# Patient Record
Sex: MALE | Race: WHITE | NOT HISPANIC OR LATINO | Employment: OTHER | URBAN - METROPOLITAN AREA
[De-identification: names, ages, dates, MRNs, and addresses within clinical notes are randomized per-mention and may not be internally consistent; named-entity substitution may affect disease eponyms.]

---

## 2017-04-06 ENCOUNTER — OFFICE VISIT (OUTPATIENT)
Dept: FAMILY MEDICINE CLINIC | Facility: CLINIC | Age: 77
End: 2017-04-06

## 2017-04-06 VITALS
HEIGHT: 68 IN | OXYGEN SATURATION: 98 % | TEMPERATURE: 97.8 F | BODY MASS INDEX: 28.48 KG/M2 | RESPIRATION RATE: 16 BRPM | HEART RATE: 58 BPM | SYSTOLIC BLOOD PRESSURE: 128 MMHG | DIASTOLIC BLOOD PRESSURE: 80 MMHG | WEIGHT: 187.9 LBS

## 2017-04-06 DIAGNOSIS — J06.9 VIRAL URI: Primary | ICD-10-CM

## 2017-04-06 PROCEDURE — 99213 OFFICE O/P EST LOW 20 MIN: CPT | Performed by: FAMILY MEDICINE

## 2017-04-06 NOTE — PROGRESS NOTES
"Subjective   Darwin Sparks is a 76 y.o. male.     Chief Complaint   Patient presents with   • Headache   • Cough   • Sinus Problem        History of Present Illness    4 days of cold symptoms.  Sinus drainage.  Sore throat.  Some bronchial tube irritation with cough.  No shortness of breath.  No high fever.  No myalgias.  Mild to moderate symptoms.  No shortness of breath with exertion.      The following portions of the patient's history were reviewed and updated as appropriate: allergies, current medications, past family history, past medical history, past social history, past surgical history and problem list.          Review of Systems   Constitutional: Negative for fatigue and fever.   HENT: Positive for congestion, postnasal drip, sore throat and voice change.    Respiratory: Positive for cough.    Gastrointestinal: Negative.    Skin: Negative for rash.       Objective   Blood pressure 128/80, pulse 58, temperature 97.8 °F (36.6 °C), temperature source Oral, resp. rate 16, height 68\" (172.7 cm), weight 187 lb 14.4 oz (85.2 kg), SpO2 98 %.  Physical Exam   Constitutional: No distress.   No acute distress.  Nontoxic.   HENT:   Right Ear: Tympanic membrane, external ear and ear canal normal.   Left Ear: Tympanic membrane, external ear and ear canal normal.   Nose: Nose normal.   Mouth/Throat: Oropharynx is clear and moist. No oropharyngeal exudate.   Eyes: Conjunctivae are normal. Right eye exhibits no discharge. Left eye exhibits no discharge. No scleral icterus.   Cardiovascular: Normal rate.    Pulmonary/Chest: Effort normal and breath sounds normal. No stridor. No respiratory distress. He has no wheezes. He has no rales.   No tachypnea   Lymphadenopathy:     He has no cervical adenopathy.   Skin: No rash noted.   Nursing note and vitals reviewed.      Assessment/Plan   Darwin was seen today for headache, cough and sinus problem.    Diagnoses and all orders for this visit:    Viral URI    Viral URI.  Recommend " observation.  At this time no further treatment needed.  However if he develops a fever, worsening cough, or other concerns seek medical attention - he has potentially at higher risk for pneumonia.  However did receive a recent pneumonia vaccination..  I also prescribed an antibiotic prescription, handwritten, Augmentin 875 mg twice a day #14.  Take this with fever and seek medical attention immediately.

## 2017-04-18 ENCOUNTER — OFFICE VISIT (OUTPATIENT)
Dept: FAMILY MEDICINE CLINIC | Facility: CLINIC | Age: 77
End: 2017-04-18

## 2017-04-18 VITALS
RESPIRATION RATE: 20 BRPM | SYSTOLIC BLOOD PRESSURE: 120 MMHG | TEMPERATURE: 97.7 F | WEIGHT: 183.7 LBS | HEIGHT: 68 IN | HEART RATE: 59 BPM | OXYGEN SATURATION: 97 % | BODY MASS INDEX: 27.84 KG/M2 | DIASTOLIC BLOOD PRESSURE: 68 MMHG

## 2017-04-18 DIAGNOSIS — E03.9 ADULT HYPOTHYROIDISM: ICD-10-CM

## 2017-04-18 DIAGNOSIS — Z00.00 HEALTH CARE MAINTENANCE: ICD-10-CM

## 2017-04-18 DIAGNOSIS — E78.00 PURE HYPERCHOLESTEROLEMIA: ICD-10-CM

## 2017-04-18 DIAGNOSIS — F41.9 ANXIETY: ICD-10-CM

## 2017-04-18 DIAGNOSIS — I10 ESSENTIAL HYPERTENSION: Primary | ICD-10-CM

## 2017-04-18 PROCEDURE — 99214 OFFICE O/P EST MOD 30 MIN: CPT | Performed by: INTERNAL MEDICINE

## 2017-04-18 NOTE — PROGRESS NOTES
"Subjective   Patient ID: Darwin Sparks is a 76 y.o. male presents with   Chief Complaint   Patient presents with   • Hypertension     f/u       HPI - this patient presents today for follow-up of hypertension, hypercholesterolemia, hypothyroidism chronic back pain anxiety.  Aside from his chronic back pain he feels like he's doing pretty well on his [place] diet.  He needs labs drawn today and he would like a potassium capsule instead of potassium by mouth.  It's easier to swallow.    Assessment plan    Hypertension controlled with atenolol and Maxide    Hypothyroidism levothyroxin we'll check thyroid panel    Hypercholesterolemia simvastatin 40 and fasting lipid profile    Anxiety when necessary clonazepam this medicine helps with his ability to sleep he has no adverse effects that helps with his quality of life and he has adherent to the regimen.    Allergies   Allergen Reactions   • Allantoin-Pramoxine    • Pregabalin      Other reaction(s): Visual Disturbance       The following portions of the patient's history were reviewed and updated as appropriate: allergies, current medications, past family history, past medical history, past social history, past surgical history and problem list.      Review of Systems   Constitutional: Negative.    HENT: Negative.    Respiratory: Negative.    Cardiovascular: Negative.    Gastrointestinal: Negative.    Musculoskeletal: Positive for back pain.   Allergic/Immunologic: Negative.    Neurological: Negative.    Hematological: Negative.    Psychiatric/Behavioral: Negative.        Objective     Vitals:    04/18/17 1308   BP: 120/68   Pulse: 59   Resp: 20   Temp: 97.7 °F (36.5 °C)   TempSrc: Oral   SpO2: 97%   Weight: 183 lb 11.2 oz (83.3 kg)   Height: 68\" (172.7 cm)         Physical Exam   Constitutional: He is oriented to person, place, and time. He appears well-developed and well-nourished.   HENT:   Head: Normocephalic and atraumatic.   Eyes: EOM are normal. Pupils are equal, " round, and reactive to light.   Cardiovascular: Normal rate, regular rhythm and normal heart sounds.    Pulmonary/Chest: Effort normal and breath sounds normal.   Musculoskeletal: He exhibits no tenderness.   Neurological: He is alert and oriented to person, place, and time.   Psychiatric: He has a normal mood and affect. His behavior is normal.   Nursing note and vitals reviewed.        Darwin was seen today for hypertension.    Diagnoses and all orders for this visit:    Essential hypertension  -     Lipid Panel  -     Comprehensive Metabolic Panel  -     TSH+Free T4    Pure hypercholesterolemia  -     Lipid Panel  -     Comprehensive Metabolic Panel  -     TSH+Free T4    Adult hypothyroidism  -     Lipid Panel  -     Comprehensive Metabolic Panel  -     TSH+Free T4    Anxiety  -     Lipid Panel  -     Comprehensive Metabolic Panel  -     TSH+Free T4    Health care maintenance  -     Lipid Panel  -     Comprehensive Metabolic Panel  -     TSH+Free T4        Call or return to clinic prn if these symptoms worsen or fail to improve as anticipated.

## 2017-04-19 LAB
ALBUMIN SERPL-MCNC: 4.6 G/DL (ref 3.5–5.2)
ALBUMIN/GLOB SERPL: 1.8 G/DL
ALP SERPL-CCNC: 67 U/L (ref 39–117)
ALT SERPL-CCNC: 23 U/L (ref 1–41)
AST SERPL-CCNC: 30 U/L (ref 1–40)
BILIRUB SERPL-MCNC: 0.4 MG/DL (ref 0.1–1.2)
BUN SERPL-MCNC: 11 MG/DL (ref 8–23)
BUN/CREAT SERPL: 9.9 (ref 7–25)
CALCIUM SERPL-MCNC: 10.1 MG/DL (ref 8.6–10.5)
CHLORIDE SERPL-SCNC: 100 MMOL/L (ref 98–107)
CHOLEST SERPL-MCNC: 200 MG/DL (ref 0–200)
CO2 SERPL-SCNC: 26.8 MMOL/L (ref 22–29)
CREAT SERPL-MCNC: 1.11 MG/DL (ref 0.76–1.27)
GLOBULIN SER CALC-MCNC: 2.6 GM/DL
GLUCOSE SERPL-MCNC: 80 MG/DL (ref 65–99)
HDLC SERPL-MCNC: 81 MG/DL (ref 40–60)
LDLC SERPL CALC-MCNC: 84 MG/DL (ref 0–100)
POTASSIUM SERPL-SCNC: 4.7 MMOL/L (ref 3.5–5.2)
PROT SERPL-MCNC: 7.2 G/DL (ref 6–8.5)
SODIUM SERPL-SCNC: 143 MMOL/L (ref 136–145)
T4 FREE SERPL-MCNC: 1.02 NG/DL (ref 0.93–1.7)
TRIGL SERPL-MCNC: 175 MG/DL (ref 0–150)
TSH SERPL DL<=0.005 MIU/L-ACNC: 4.55 MIU/ML (ref 0.27–4.2)
VLDLC SERPL CALC-MCNC: 35 MG/DL (ref 5–40)

## 2017-04-19 RX ORDER — POTASSIUM CHLORIDE 20 MEQ/1
20 TABLET, EXTENDED RELEASE ORAL DAILY
Qty: 90 TABLET | Refills: 2 | OUTPATIENT
Start: 2017-04-19 | End: 2018-07-30 | Stop reason: SDUPTHER

## 2017-05-31 RX ORDER — MELOXICAM 7.5 MG/1
TABLET ORAL
Qty: 90 TABLET | Refills: 0 | Status: SHIPPED | OUTPATIENT
Start: 2017-05-31 | End: 2017-09-22 | Stop reason: SDUPTHER

## 2017-07-11 RX ORDER — POTASSIUM CHLORIDE 1500 MG/1
TABLET, EXTENDED RELEASE ORAL
Qty: 90 TABLET | Refills: 1 | Status: SHIPPED | OUTPATIENT
Start: 2017-07-11 | End: 2018-01-18 | Stop reason: SDUPTHER

## 2017-09-11 RX ORDER — NIACIN 250 MG
TABLET ORAL
Qty: 90 TABLET | Refills: 1 | Status: SHIPPED | OUTPATIENT
Start: 2017-09-11 | End: 2019-01-21 | Stop reason: SDUPTHER

## 2017-09-22 RX ORDER — MELOXICAM 7.5 MG/1
TABLET ORAL
Qty: 90 TABLET | Refills: 0 | Status: SHIPPED | OUTPATIENT
Start: 2017-09-22 | End: 2017-12-12 | Stop reason: SDUPTHER

## 2017-10-17 ENCOUNTER — OFFICE VISIT (OUTPATIENT)
Dept: FAMILY MEDICINE CLINIC | Facility: CLINIC | Age: 77
End: 2017-10-17

## 2017-10-17 VITALS
WEIGHT: 186 LBS | HEIGHT: 68 IN | DIASTOLIC BLOOD PRESSURE: 72 MMHG | SYSTOLIC BLOOD PRESSURE: 122 MMHG | RESPIRATION RATE: 16 BRPM | OXYGEN SATURATION: 98 % | BODY MASS INDEX: 28.19 KG/M2 | HEART RATE: 78 BPM | TEMPERATURE: 97.8 F

## 2017-10-17 DIAGNOSIS — Z23 NEED FOR VACCINATION: Primary | ICD-10-CM

## 2017-10-17 DIAGNOSIS — F41.9 ANXIETY: Primary | ICD-10-CM

## 2017-10-17 PROCEDURE — 90662 IIV NO PRSV INCREASED AG IM: CPT | Performed by: INTERNAL MEDICINE

## 2017-10-17 PROCEDURE — G0008 ADMIN INFLUENZA VIRUS VAC: HCPCS | Performed by: INTERNAL MEDICINE

## 2017-10-17 PROCEDURE — 99213 OFFICE O/P EST LOW 20 MIN: CPT | Performed by: INTERNAL MEDICINE

## 2017-10-17 RX ORDER — CLONAZEPAM 1 MG/1
1 TABLET ORAL NIGHTLY PRN
Qty: 30 TABLET | Refills: 5 | Status: SHIPPED | OUTPATIENT
Start: 2017-10-17 | End: 2018-08-24

## 2017-10-17 NOTE — PROGRESS NOTES
"Subjective   Patient ID: Darwin Sparks is a 76 y.o. male presents with   Chief Complaint   Patient presents with   • Follow-up     on meds   • Med Refill     on clonazepam to the the [facility]  - This patient presents today for follow-up for anxiety.  He takes clonazepam at night this medicine helps his quality of life he has no adverse effects he is adherent to the regimen.    Assessment plan    Anxiety/insomnia-clonazepam 1 mg by mouth daily at bedtime when necessary, 30, refills, Raul reports obtaining narcotic agreement was signed.    Allergies   Allergen Reactions   • Allantoin-Pramoxine    • Pregabalin      Other reaction(s): Visual Disturbance       The following portions of the patient's history were reviewed and updated as appropriate: allergies, current medications, past family history, past medical history, past social history, past surgical history and problem list.      Review of Systems   Constitutional: Positive for fatigue.   Musculoskeletal: Positive for arthralgias, back pain and gait problem.   Psychiatric/Behavioral: Positive for sleep disturbance. The patient is nervous/anxious.        Objective     Vitals:    10/17/17 1307   BP: 122/72   Pulse: 78   Resp: 16   Temp: 97.8 °F (36.6 °C)   TempSrc: Oral   SpO2: 98%   Weight: 186 lb (84.4 kg)   Height: 68\" (172.7 cm)         Physical Exam   Constitutional: He is oriented to person, place, and time. He appears well-developed and well-nourished.   HENT:   Head: Normocephalic and atraumatic.   Cardiovascular: Normal rate, regular rhythm and normal heart sounds.    Pulmonary/Chest: Effort normal and breath sounds normal.   Neurological: He is alert and oriented to person, place, and time.   Psychiatric: He has a normal mood and affect. His behavior is normal.   Nursing note and vitals reviewed.        Darwin was seen today for follow-up and med refill.    Diagnoses and all orders for this visit:    Anxiety    Other orders  -     " clonazePAM (KlonoPIN) 1 MG tablet; Take 1 tablet by mouth At Night As Needed for Anxiety.        Call or return to clinic prn if these symptoms worsen or fail to improve as anticipated.

## 2017-12-12 RX ORDER — SIMVASTATIN 40 MG
TABLET ORAL
Qty: 90 TABLET | Refills: 1 | Status: SHIPPED | OUTPATIENT
Start: 2017-12-12 | End: 2018-06-19 | Stop reason: SDUPTHER

## 2017-12-12 RX ORDER — ATENOLOL 50 MG/1
TABLET ORAL
Qty: 90 TABLET | Refills: 1 | Status: SHIPPED | OUTPATIENT
Start: 2017-12-12 | End: 2018-06-19 | Stop reason: SDUPTHER

## 2017-12-12 RX ORDER — LEVOTHYROXINE SODIUM 0.03 MG/1
TABLET ORAL
Qty: 90 TABLET | Refills: 1 | Status: SHIPPED | OUTPATIENT
Start: 2017-12-12 | End: 2018-06-19 | Stop reason: SDUPTHER

## 2017-12-12 RX ORDER — MELOXICAM 7.5 MG/1
TABLET ORAL
Qty: 90 TABLET | Refills: 0 | Status: SHIPPED | OUTPATIENT
Start: 2017-12-12 | End: 2018-03-15 | Stop reason: SDUPTHER

## 2017-12-12 RX ORDER — TRIAMTERENE AND HYDROCHLOROTHIAZIDE 37.5; 25 MG/1; MG/1
TABLET ORAL
Qty: 90 TABLET | Refills: 1 | Status: SHIPPED | OUTPATIENT
Start: 2017-12-12 | End: 2018-04-17 | Stop reason: SDUPTHER

## 2018-01-18 RX ORDER — POTASSIUM CHLORIDE 20 MEQ/1
TABLET, EXTENDED RELEASE ORAL
Qty: 90 TABLET | Refills: 1 | Status: SHIPPED | OUTPATIENT
Start: 2018-01-18 | End: 2018-08-24 | Stop reason: SDUPTHER

## 2018-03-15 RX ORDER — MELOXICAM 7.5 MG/1
TABLET ORAL
Qty: 90 TABLET | Refills: 0 | Status: SHIPPED | OUTPATIENT
Start: 2018-03-15 | End: 2018-06-19 | Stop reason: SDUPTHER

## 2018-03-15 RX ORDER — NIACIN 250 MG
TABLET ORAL
Qty: 90 TABLET | Refills: 0 | Status: SHIPPED | OUTPATIENT
Start: 2018-03-15 | End: 2018-06-19 | Stop reason: SDUPTHER

## 2018-04-17 ENCOUNTER — OFFICE VISIT (OUTPATIENT)
Dept: FAMILY MEDICINE CLINIC | Facility: CLINIC | Age: 78
End: 2018-04-17

## 2018-04-17 VITALS
OXYGEN SATURATION: 96 % | TEMPERATURE: 97.7 F | SYSTOLIC BLOOD PRESSURE: 98 MMHG | WEIGHT: 186.5 LBS | DIASTOLIC BLOOD PRESSURE: 66 MMHG | HEIGHT: 68 IN | HEART RATE: 72 BPM | BODY MASS INDEX: 28.26 KG/M2

## 2018-04-17 DIAGNOSIS — E78.00 PURE HYPERCHOLESTEROLEMIA: ICD-10-CM

## 2018-04-17 DIAGNOSIS — F41.9 ANXIETY: ICD-10-CM

## 2018-04-17 DIAGNOSIS — E03.9 ADULT HYPOTHYROIDISM: ICD-10-CM

## 2018-04-17 DIAGNOSIS — G89.29 CHRONIC MIDLINE LOW BACK PAIN WITHOUT SCIATICA: ICD-10-CM

## 2018-04-17 DIAGNOSIS — I10 ESSENTIAL HYPERTENSION: Primary | ICD-10-CM

## 2018-04-17 DIAGNOSIS — M54.50 CHRONIC MIDLINE LOW BACK PAIN WITHOUT SCIATICA: ICD-10-CM

## 2018-04-17 PROCEDURE — 99213 OFFICE O/P EST LOW 20 MIN: CPT | Performed by: INTERNAL MEDICINE

## 2018-04-17 RX ORDER — TRIAMTERENE AND HYDROCHLOROTHIAZIDE 37.5; 25 MG/1; MG/1
0.5 TABLET ORAL DAILY
Qty: 90 TABLET | Refills: 1 | OUTPATIENT
Start: 2018-04-17 | End: 2018-06-19 | Stop reason: SDUPTHER

## 2018-04-17 NOTE — PROGRESS NOTES
"Subjective   Patient ID: Darwin Sparks is a 77 y.o. male presents with   Chief Complaint   Patient presents with   • Anxiety       HPI - This patient's here today for treatment of hypertension hyperlipidemia hypothyroidism and anxiety insomnia.  We reviewed his labs labs overall looked good blood pressures a little low.  When all of his blood pressure medicines were initiated he weighed about 15 pounds more.    Assessment plan    Hypertension decrease Maxide 37.5-1/2 tablet daily and monitor blood pressure and then follow-up with Dr. Rodriguez    Hyperlipidemia controlled with Zocor 40    Hypothyroidism levothyroxin 25 controlled    Anxiety insomnia clonazepam        Allergies   Allergen Reactions   • Allantoin-Pramoxine    • Pregabalin      Other reaction(s): Visual Disturbance       The following portions of the patient's history were reviewed and updated as appropriate: allergies, current medications, past family history, past medical history, past social history, past surgical history and problem list.      Review of Systems   Constitutional: Negative.    HENT: Negative.    Respiratory: Negative.    Cardiovascular: Negative.    Psychiatric/Behavioral: Negative.        Objective     Vitals:    04/17/18 1300   BP: 98/66   BP Location: Left arm   Patient Position: Sitting   Cuff Size: Adult   Pulse: 72   Temp: 97.7 °F (36.5 °C)   TempSrc: Oral   SpO2: 96%   Weight: 84.6 kg (186 lb 8 oz)   Height: 172.7 cm (67.99\")         Physical Exam   Constitutional: He is oriented to person, place, and time. He appears well-developed and well-nourished.   HENT:   Head: Normocephalic and atraumatic.   Cardiovascular: Normal rate, regular rhythm and normal heart sounds.    Pulmonary/Chest: Effort normal and breath sounds normal.   Neurological: He is alert and oriented to person, place, and time.   Psychiatric: He has a normal mood and affect. His behavior is normal.   Nursing note and vitals reviewed.        Darwin was seen today " for anxiety.    Diagnoses and all orders for this visit:    Essential hypertension    Pure hypercholesterolemia    Adult hypothyroidism    Chronic midline low back pain without sciatica    Anxiety    Other orders  -     triamterene-hydrochlorothiazide (MAXZIDE-25) 37.5-25 MG per tablet; Take 0.5 tablets by mouth Daily.        Call or return to clinic prn if these symptoms worsen or fail to improve as anticipated.

## 2018-06-19 RX ORDER — ATENOLOL 50 MG/1
50 TABLET ORAL DAILY
Qty: 30 TABLET | Refills: 0 | Status: SHIPPED | OUTPATIENT
Start: 2018-06-19 | End: 2018-07-27 | Stop reason: SDUPTHER

## 2018-06-19 RX ORDER — LEVOTHYROXINE SODIUM 0.03 MG/1
25 TABLET ORAL DAILY
Qty: 30 TABLET | Refills: 0 | Status: SHIPPED | OUTPATIENT
Start: 2018-06-19 | End: 2018-07-27 | Stop reason: SDUPTHER

## 2018-06-19 RX ORDER — NIACIN 250 MG
250 TABLET ORAL
Qty: 30 TABLET | Refills: 0 | Status: SHIPPED | OUTPATIENT
Start: 2018-06-19 | End: 2018-07-27 | Stop reason: SDUPTHER

## 2018-06-19 RX ORDER — SIMVASTATIN 40 MG
40 TABLET ORAL NIGHTLY
Qty: 30 TABLET | Refills: 0 | Status: SHIPPED | OUTPATIENT
Start: 2018-06-19 | End: 2018-07-27 | Stop reason: SDUPTHER

## 2018-06-19 RX ORDER — MELOXICAM 7.5 MG/1
7.5 TABLET ORAL DAILY
Qty: 30 TABLET | Refills: 0 | Status: SHIPPED | OUTPATIENT
Start: 2018-06-19 | End: 2018-07-27 | Stop reason: SDUPTHER

## 2018-06-19 RX ORDER — TRIAMTERENE AND HYDROCHLOROTHIAZIDE 37.5; 25 MG/1; MG/1
0.5 TABLET ORAL DAILY
Qty: 90 TABLET | Refills: 1 | OUTPATIENT
Start: 2018-06-19 | End: 2018-06-25 | Stop reason: SDUPTHER

## 2018-06-25 RX ORDER — TRIAMTERENE AND HYDROCHLOROTHIAZIDE 37.5; 25 MG/1; MG/1
0.5 TABLET ORAL DAILY
Qty: 90 TABLET | Refills: 0 | OUTPATIENT
Start: 2018-06-25 | End: 2018-07-02 | Stop reason: SDUPTHER

## 2018-07-02 RX ORDER — TRIAMTERENE AND HYDROCHLOROTHIAZIDE 37.5; 25 MG/1; MG/1
1 TABLET ORAL DAILY
Qty: 30 TABLET | Refills: 0 | OUTPATIENT
Start: 2018-07-02 | End: 2018-08-24

## 2018-07-30 RX ORDER — LEVOTHYROXINE SODIUM 0.03 MG/1
TABLET ORAL
Qty: 30 TABLET | Refills: 0 | Status: SHIPPED | OUTPATIENT
Start: 2018-07-30 | End: 2018-09-01 | Stop reason: SDUPTHER

## 2018-07-30 RX ORDER — POTASSIUM CHLORIDE 20 MEQ/1
20 TABLET, EXTENDED RELEASE ORAL DAILY
Qty: 90 TABLET | Refills: 0 | Status: SHIPPED | OUTPATIENT
Start: 2018-07-30 | End: 2018-08-24 | Stop reason: SDUPTHER

## 2018-07-30 RX ORDER — SIMVASTATIN 40 MG
TABLET ORAL
Qty: 30 TABLET | Refills: 0 | Status: SHIPPED | OUTPATIENT
Start: 2018-07-30 | End: 2018-09-01 | Stop reason: SDUPTHER

## 2018-07-30 RX ORDER — MELOXICAM 7.5 MG/1
TABLET ORAL
Qty: 30 TABLET | Refills: 0 | Status: SHIPPED | OUTPATIENT
Start: 2018-07-30 | End: 2018-08-24

## 2018-07-30 RX ORDER — NIACIN 250 MG
TABLET ORAL
Qty: 30 TABLET | Refills: 0 | Status: SHIPPED | OUTPATIENT
Start: 2018-07-30 | End: 2018-08-24 | Stop reason: SDUPTHER

## 2018-07-30 RX ORDER — ATENOLOL 50 MG/1
TABLET ORAL
Qty: 30 TABLET | Refills: 0 | Status: SHIPPED | OUTPATIENT
Start: 2018-07-30 | End: 2018-09-01 | Stop reason: SDUPTHER

## 2018-08-24 ENCOUNTER — OFFICE VISIT (OUTPATIENT)
Dept: FAMILY MEDICINE CLINIC | Facility: CLINIC | Age: 78
End: 2018-08-24

## 2018-08-24 VITALS
BODY MASS INDEX: 27.43 KG/M2 | HEART RATE: 91 BPM | HEIGHT: 68 IN | WEIGHT: 181 LBS | OXYGEN SATURATION: 97 % | SYSTOLIC BLOOD PRESSURE: 159 MMHG | DIASTOLIC BLOOD PRESSURE: 84 MMHG

## 2018-08-24 DIAGNOSIS — B35.9 TINEA: Primary | ICD-10-CM

## 2018-08-24 PROCEDURE — 99213 OFFICE O/P EST LOW 20 MIN: CPT | Performed by: NURSE PRACTITIONER

## 2018-08-24 RX ORDER — NYSTATIN AND TRIAMCINOLONE ACETONIDE 100000; 1 [USP'U]/G; MG/G
OINTMENT TOPICAL EVERY 12 HOURS SCHEDULED
Qty: 60 G | Refills: 1 | Status: SHIPPED | OUTPATIENT
Start: 2018-08-24 | End: 2020-10-19

## 2018-08-24 RX ORDER — POTASSIUM CHLORIDE 20 MEQ/1
20 TABLET, EXTENDED RELEASE ORAL DAILY
Qty: 90 TABLET | Refills: 2 | Status: SHIPPED | OUTPATIENT
Start: 2018-08-24 | End: 2019-01-21 | Stop reason: SDUPTHER

## 2018-08-24 RX ORDER — TRIAMCINOLONE ACETONIDE 1 MG/G
CREAM TOPICAL 2 TIMES DAILY
Qty: 60 G | Refills: 1 | Status: SHIPPED | OUTPATIENT
Start: 2018-08-24 | End: 2021-07-19

## 2018-08-24 RX ORDER — OMEPRAZOLE 20 MG/1
20 CAPSULE, DELAYED RELEASE ORAL DAILY
Qty: 30 CAPSULE | Refills: 5 | Status: SHIPPED | OUTPATIENT
Start: 2018-08-24 | End: 2021-07-19

## 2018-08-24 RX ORDER — FLUCONAZOLE 200 MG/1
TABLET ORAL
Qty: 2 TABLET | Refills: 0 | Status: SHIPPED | OUTPATIENT
Start: 2018-08-24 | End: 2019-01-21

## 2018-08-24 NOTE — PATIENT INSTRUCTIONS
Apply combination nystatin and steroid ointment  Thinly  twice daily until improved    If your insurance covers this you do not need to fill  The steroid cream     This is a combination    Diflucan 1 tablet now repeat in one week  If not improved in one week see dermatology

## 2018-08-24 NOTE — PROGRESS NOTES
Subjective   Darwin Sparks is a 77 y.o. male.     Needs new pcp   Itchy carito left thigh and buttocks several mos  Tinactin no help  itches    Prostate cancer  Treated  Getting  Circumcised soon phimosis      Chronic  Back problems        Rash   Pertinent negatives include no cough, fatigue, fever or shortness of breath.        The following portions of the patient's history were reviewed and updated as appropriate: allergies, past family history, past medical history, past social history, past surgical history and problem list.    Review of Systems   Constitutional: Negative for fatigue and fever.   HENT: Negative.  Negative for trouble swallowing.    Eyes: Negative.    Respiratory: Negative.  Negative for cough and shortness of breath.    Cardiovascular: Negative for chest pain, palpitations and leg swelling.   Gastrointestinal: Negative.  Negative for abdominal pain.   Genitourinary: Negative.    Musculoskeletal: Negative.    Skin: Positive for rash.   Neurological: Negative.  Negative for dizziness and confusion.   Psychiatric/Behavioral: Negative.        Objective   Physical Exam   Constitutional: He is oriented to person, place, and time. He appears well-developed and well-nourished. No distress.   HENT:   Head: Normocephalic and atraumatic.   Nose: Nose normal.   Mouth/Throat: Oropharynx is clear and moist.   Eyes: Pupils are equal, round, and reactive to light. Conjunctivae are normal.   Neck: Neck supple.   Cardiovascular: Normal rate, regular rhythm and normal heart sounds.    No murmur heard.  Pulmonary/Chest: Effort normal and breath sounds normal. No respiratory distress. He has no wheezes.   Abdominal: Soft. Bowel sounds are normal. He exhibits no distension and no mass. There is no tenderness. There is no guarding. No hernia.   Musculoskeletal: He exhibits no edema or tenderness.   Lymphadenopathy:     He has no cervical adenopathy.   Neurological: He is alert and oriented to person, place, and time.    Skin: Skin is warm and dry. Rash noted. He is not diaphoretic.   Annular rash approximately 3 1/2 cm central clearing dry scaly  Buttocks  Mild errythemic rash   Psychiatric: He has a normal mood and affect. His behavior is normal. Judgment and thought content normal.   Vitals reviewed.        Assessment/Plan   Darwin was seen today for rash.    Diagnoses and all orders for this visit:    Tinea    Other orders  -     triamcinolone (KENALOG) 0.1 % cream; Apply  topically to the appropriate area as directed 2 (Two) Times a Day. Until better avoid chronic use  -     fluconazole (DIFLUCAN) 200 MG tablet; 1 tablet now, then repeat 1 tablet in 7 days then discontinue  -     nystatin-triamcinolone (MYCOLOG) 897315-0.1 UNIT/GM-% ointment; Apply  topically to the appropriate area as directed Every 12 (Twelve) Hours. Sparingly until resolved  -     omeprazole (priLOSEC) 20 MG capsule; Take 1 capsule by mouth Daily. For gastric protection when taking anti-inflammatory

## 2018-09-04 RX ORDER — NIACIN 250 MG
TABLET ORAL
Qty: 30 TABLET | Refills: 0 | Status: SHIPPED | OUTPATIENT
Start: 2018-09-04 | End: 2018-10-03 | Stop reason: SDUPTHER

## 2018-09-04 RX ORDER — MELOXICAM 7.5 MG/1
TABLET ORAL
Qty: 30 TABLET | Refills: 0 | Status: SHIPPED | OUTPATIENT
Start: 2018-09-04 | End: 2018-10-03 | Stop reason: SDUPTHER

## 2018-09-04 RX ORDER — SIMVASTATIN 40 MG
TABLET ORAL
Qty: 30 TABLET | Refills: 0 | Status: SHIPPED | OUTPATIENT
Start: 2018-09-04 | End: 2018-10-03 | Stop reason: SDUPTHER

## 2018-09-04 RX ORDER — ATENOLOL 50 MG/1
TABLET ORAL
Qty: 30 TABLET | Refills: 0 | Status: SHIPPED | OUTPATIENT
Start: 2018-09-04 | End: 2018-10-03 | Stop reason: SDUPTHER

## 2018-09-04 RX ORDER — LEVOTHYROXINE SODIUM 0.03 MG/1
TABLET ORAL
Qty: 30 TABLET | Refills: 0 | Status: SHIPPED | OUTPATIENT
Start: 2018-09-04 | End: 2018-10-03 | Stop reason: SDUPTHER

## 2018-10-03 RX ORDER — ATENOLOL 50 MG/1
TABLET ORAL
Qty: 30 TABLET | Refills: 6 | Status: SHIPPED | OUTPATIENT
Start: 2018-10-03 | End: 2019-03-11 | Stop reason: SDUPTHER

## 2018-10-03 RX ORDER — NIACIN 250 MG
TABLET ORAL
Qty: 30 TABLET | Refills: 6 | Status: SHIPPED | OUTPATIENT
Start: 2018-10-03 | End: 2019-07-22 | Stop reason: SDUPTHER

## 2018-10-03 RX ORDER — MELOXICAM 7.5 MG/1
TABLET ORAL
Qty: 30 TABLET | Refills: 6 | Status: SHIPPED | OUTPATIENT
Start: 2018-10-03 | End: 2019-08-30

## 2018-10-03 RX ORDER — LEVOTHYROXINE SODIUM 0.03 MG/1
TABLET ORAL
Qty: 30 TABLET | Refills: 6 | Status: SHIPPED | OUTPATIENT
Start: 2018-10-03 | End: 2019-03-11 | Stop reason: SDUPTHER

## 2018-10-03 RX ORDER — SIMVASTATIN 40 MG
TABLET ORAL
Qty: 30 TABLET | Refills: 6 | Status: SHIPPED | OUTPATIENT
Start: 2018-10-03 | End: 2019-01-21 | Stop reason: SDUPTHER

## 2018-12-13 ENCOUNTER — OFFICE VISIT (OUTPATIENT)
Dept: FAMILY MEDICINE CLINIC | Facility: CLINIC | Age: 78
End: 2018-12-13

## 2018-12-13 VITALS
DIASTOLIC BLOOD PRESSURE: 78 MMHG | SYSTOLIC BLOOD PRESSURE: 124 MMHG | OXYGEN SATURATION: 96 % | HEIGHT: 67 IN | BODY MASS INDEX: 29.58 KG/M2 | WEIGHT: 188.5 LBS | HEART RATE: 76 BPM

## 2018-12-13 DIAGNOSIS — Z23 NEED FOR IMMUNIZATION AGAINST INFLUENZA: ICD-10-CM

## 2018-12-13 DIAGNOSIS — G47.00 INSOMNIA, UNSPECIFIED TYPE: Primary | ICD-10-CM

## 2018-12-13 DIAGNOSIS — E78.2 MIXED HYPERLIPIDEMIA: ICD-10-CM

## 2018-12-13 DIAGNOSIS — G89.29 CHRONIC MIDLINE LOW BACK PAIN WITHOUT SCIATICA: ICD-10-CM

## 2018-12-13 DIAGNOSIS — M54.50 CHRONIC MIDLINE LOW BACK PAIN WITHOUT SCIATICA: ICD-10-CM

## 2018-12-13 DIAGNOSIS — E03.9 ADULT HYPOTHYROIDISM: ICD-10-CM

## 2018-12-13 PROCEDURE — 90662 IIV NO PRSV INCREASED AG IM: CPT | Performed by: NURSE PRACTITIONER

## 2018-12-13 PROCEDURE — G0008 ADMIN INFLUENZA VIRUS VAC: HCPCS | Performed by: NURSE PRACTITIONER

## 2018-12-13 PROCEDURE — 99214 OFFICE O/P EST MOD 30 MIN: CPT | Performed by: NURSE PRACTITIONER

## 2018-12-13 RX ORDER — CLONAZEPAM 1 MG/1
TABLET ORAL
Qty: 15 TABLET | Refills: 0 | Status: SHIPPED | OUTPATIENT
Start: 2018-12-13 | End: 2019-03-11

## 2018-12-13 RX ORDER — CLONAZEPAM 1 MG/1
TABLET ORAL
Qty: 15 TABLET | Refills: 0 | Status: SHIPPED | OUTPATIENT
Start: 2018-12-13 | End: 2018-12-13

## 2018-12-13 RX ORDER — TRAZODONE HYDROCHLORIDE 50 MG/1
TABLET ORAL
Qty: 30 TABLET | Refills: 3 | Status: SHIPPED | OUTPATIENT
Start: 2018-12-13 | End: 2019-01-21

## 2018-12-13 RX ORDER — CLONAZEPAM 1 MG/1
1 TABLET ORAL NIGHTLY PRN
COMMUNITY
End: 2018-12-13

## 2018-12-13 NOTE — PROGRESS NOTES
Pleasant gentleman here for follow-up  Recent tinea infection cleared up nicely    Chronic degenerative disc disease most back spine history sciatica  History of constipation with opiates  Chronic insomnia as well as sleep difficulty has been taking clonazepam to help sleep      You takes simvastatin    Takes clanazepam 2days week        D/c  trazadone 50 1/2 to 1      Neg slr no pf df weakness

## 2018-12-13 NOTE — PATIENT INSTRUCTIONS
Discharge instructions    Discontinue clonazepam as discussed    Try trazodone 50 mg one half tablet at bedtime  Use pills bladder  Call me in 3 weeks  Sign up my chart  Recheck in 3 months sooner if problems
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
## 2018-12-18 ENCOUNTER — RESULTS ENCOUNTER (OUTPATIENT)
Dept: FAMILY MEDICINE CLINIC | Facility: CLINIC | Age: 78
End: 2018-12-18

## 2018-12-18 DIAGNOSIS — E03.9 ADULT HYPOTHYROIDISM: ICD-10-CM

## 2018-12-18 DIAGNOSIS — E78.2 MIXED HYPERLIPIDEMIA: ICD-10-CM

## 2018-12-18 DIAGNOSIS — Z23 NEED FOR IMMUNIZATION AGAINST INFLUENZA: ICD-10-CM

## 2018-12-18 DIAGNOSIS — G89.29 CHRONIC MIDLINE LOW BACK PAIN WITHOUT SCIATICA: ICD-10-CM

## 2018-12-18 DIAGNOSIS — G47.00 INSOMNIA, UNSPECIFIED TYPE: ICD-10-CM

## 2018-12-18 DIAGNOSIS — M54.50 CHRONIC MIDLINE LOW BACK PAIN WITHOUT SCIATICA: ICD-10-CM

## 2018-12-19 ENCOUNTER — TELEPHONE (OUTPATIENT)
Dept: FAMILY MEDICINE CLINIC | Facility: CLINIC | Age: 78
End: 2018-12-19

## 2018-12-19 NOTE — TELEPHONE ENCOUNTER
Patient was seen on 12/13/18 received a flu shot. Patient states that night he came down with the flu.    Please advise.

## 2019-01-21 ENCOUNTER — OFFICE VISIT (OUTPATIENT)
Dept: FAMILY MEDICINE CLINIC | Facility: CLINIC | Age: 79
End: 2019-01-21

## 2019-01-21 VITALS
HEIGHT: 67 IN | OXYGEN SATURATION: 98 % | WEIGHT: 181 LBS | DIASTOLIC BLOOD PRESSURE: 90 MMHG | HEART RATE: 68 BPM | BODY MASS INDEX: 28.41 KG/M2 | SYSTOLIC BLOOD PRESSURE: 150 MMHG

## 2019-01-21 DIAGNOSIS — B35.6 TINEA CRURIS: Primary | ICD-10-CM

## 2019-01-21 DIAGNOSIS — N48.89 PENIS PAIN: ICD-10-CM

## 2019-01-21 PROCEDURE — 99214 OFFICE O/P EST MOD 30 MIN: CPT | Performed by: NURSE PRACTITIONER

## 2019-01-21 RX ORDER — POTASSIUM CHLORIDE 20 MEQ/1
20 TABLET, EXTENDED RELEASE ORAL DAILY
Qty: 90 TABLET | Refills: 2 | Status: SHIPPED | OUTPATIENT
Start: 2019-01-21 | End: 2019-10-25 | Stop reason: SDUPTHER

## 2019-01-21 RX ORDER — SIMVASTATIN 40 MG
40 TABLET ORAL NIGHTLY
Qty: 30 TABLET | Refills: 6 | Status: SHIPPED | OUTPATIENT
Start: 2019-01-21 | End: 2019-10-24 | Stop reason: SDUPTHER

## 2019-01-21 RX ORDER — LISINOPRIL 10 MG/1
10 TABLET ORAL DAILY
Qty: 30 TABLET | Refills: 1 | Status: SHIPPED | OUTPATIENT
Start: 2019-01-21 | End: 2019-03-11

## 2019-01-21 RX ORDER — TRAZODONE HYDROCHLORIDE 100 MG/1
TABLET ORAL
Qty: 30 TABLET | Refills: 2 | Status: SHIPPED | OUTPATIENT
Start: 2019-01-21 | End: 2019-04-27 | Stop reason: SDUPTHER

## 2019-01-21 NOTE — PATIENT INSTRUCTIONS
Tinea Cruis    OTC Lamisil generic  Twice daily until gone      If not resolved in 6 weeks see dermatology    Lisinopril 10 mg daily  Outpatient BP checks and  Fasting labs approximate one-week    1 in 20 persons may develop a chronic cough  If he developed this please return office to change medication      Continue atenolol 59 g daily    Increase trazodone to 100 mg at bedtime  May increase to 150 if needed    Let me know how you are doing in one week

## 2019-01-21 NOTE — PROGRESS NOTES
Subjective   Darwin Sparks is a 78 y.o. male.     Itchy rash times one month  Annular left groin  Not happy with outcome recent circumcision  Difficulty urinating  Not able to have full erection  Wasn't able to do much after his surgery  Has not followed up with urology  He had phimosis prior to surgery  He thinks the skin has healed too far down on his penis    Insomnia  Trazodone not working too much wakes up in 3 hours  He took a second one but then did not work    We discussed increasing to 100 mg he will try this and call me    Essential hypertension  Has been running 150s  No chest pain no shortness of breath  Takes a atenolol  Checks blood pressure at home         The following portions of the patient's history were reviewed and updated as appropriate: allergies, current medications, past family history, past medical history, past social history, past surgical history and problem list.    Review of Systems   Genitourinary:        Penis discomfort with erection difficulty   Skin: Positive for rash.   All other systems reviewed and are negative.      Objective   Physical Exam   Constitutional: He is oriented to person, place, and time. He appears well-developed and well-nourished. No distress.   HENT:   Head: Normocephalic and atraumatic.   Nose: Nose normal.   Mouth/Throat: Oropharynx is clear and moist.   Eyes: Conjunctivae are normal. Pupils are equal, round, and reactive to light. No scleral icterus.   Neck: Neck supple. No JVD present. No thyromegaly present.   Cardiovascular: Normal rate, regular rhythm and normal heart sounds. Exam reveals no gallop and no friction rub.   No murmur heard.  Pulmonary/Chest: Effort normal and breath sounds normal. No respiratory distress. He has no wheezes. He has no rales.   Abdominal: Soft. Bowel sounds are normal. He exhibits no distension and no mass. There is no tenderness. There is no guarding. No hernia.   Musculoskeletal: He exhibits no edema or tenderness.    Meatus is normal  Glans foreskin surrounds glans  And it does appear at least a portion to adhere to a approximal portion of the glans otherwise normal exam       Lymphadenopathy:     He has no cervical adenopathy.   Neurological: He is alert and oriented to person, place, and time. He has normal reflexes.   Skin: Skin is warm and dry. No rash noted. He is not diaphoretic. No erythema.   Psychiatric: He has a normal mood and affect. His behavior is normal. Judgment and thought content normal.   Vitals reviewed.        Assessment/Plan   Darwin was seen today for rash on left thigh and right shoulder.    Diagnoses and all orders for this visit:    Tinea cruris    Penis pain  Comments:  Mild associated with foreskin discomfort with erection status post circumcision    Other orders  -     potassium chloride (K-DUR,KLOR-CON) 20 MEQ CR tablet; Take 1 tablet by mouth Daily.  -     simvastatin (ZOCOR) 40 MG tablet; Take 1 tablet by mouth Every Night.  -     lisinopril (ZESTRIL) 10 MG tablet; Take 1 tablet by mouth Daily. For bp  -     traZODone (DESYREL) 100 MG tablet; one tablet as needed at bedtime for insomnia caution falls risk                  Tinea Cruis    OTC Lamisil generic  Twice daily until gone      If not resolved in 6 weeks see dermatology    Lisinopril 10 mg daily  Outpatient BP checks and  Fasting labs approximate one-week    1 in 20 persons may develop a chronic cough  If he developed this please return office to change medication      Continue atenolol 59 g daily    Increase trazodone to 100 mg at bedtime  May increase to 150 if needed    Let me know how you are doing in one week

## 2019-01-26 LAB
ALBUMIN SERPL-MCNC: 4.3 G/DL (ref 3.5–5.2)
ALBUMIN/GLOB SERPL: 1.9 G/DL
ALP SERPL-CCNC: 80 U/L (ref 39–117)
ALT SERPL-CCNC: 20 U/L (ref 1–41)
APPEARANCE UR: CLEAR
AST SERPL-CCNC: 21 U/L (ref 1–40)
BASOPHILS # BLD AUTO: 0.02 10*3/MM3 (ref 0–0.2)
BASOPHILS NFR BLD AUTO: 0.5 % (ref 0–1.5)
BILIRUB SERPL-MCNC: 0.5 MG/DL (ref 0.1–1.2)
BILIRUB UR QL STRIP: NEGATIVE
BUN SERPL-MCNC: 14 MG/DL (ref 8–23)
BUN/CREAT SERPL: 14.4 (ref 7–25)
CALCIUM SERPL-MCNC: 9.9 MG/DL (ref 8.6–10.5)
CHLORIDE SERPL-SCNC: 101 MMOL/L (ref 98–107)
CHOLEST SERPL-MCNC: 185 MG/DL (ref 0–200)
CO2 SERPL-SCNC: 25.1 MMOL/L (ref 22–29)
COLOR UR: YELLOW
CREAT SERPL-MCNC: 0.97 MG/DL (ref 0.76–1.27)
EOSINOPHIL # BLD AUTO: 0.08 10*3/MM3 (ref 0–0.7)
EOSINOPHIL NFR BLD AUTO: 2.1 % (ref 0.3–6.2)
ERYTHROCYTE [DISTWIDTH] IN BLOOD BY AUTOMATED COUNT: 12.9 % (ref 11.5–14.5)
GLOBULIN SER CALC-MCNC: 2.3 GM/DL
GLUCOSE SERPL-MCNC: 94 MG/DL (ref 65–99)
GLUCOSE UR QL: NEGATIVE
HCT VFR BLD AUTO: 45.8 % (ref 40.4–52.2)
HDLC SERPL-MCNC: 65 MG/DL (ref 40–60)
HGB BLD-MCNC: 14.9 G/DL (ref 13.7–17.6)
HGB UR QL STRIP: NEGATIVE
IMM GRANULOCYTES # BLD AUTO: 0.02 10*3/MM3 (ref 0–0.03)
IMM GRANULOCYTES NFR BLD AUTO: 0.5 % (ref 0–0.5)
KETONES UR QL STRIP: ABNORMAL
LDLC SERPL CALC-MCNC: 88 MG/DL (ref 0–100)
LDLC/HDLC SERPL: 1.36 {RATIO}
LEUKOCYTE ESTERASE UR QL STRIP: NEGATIVE
LYMPHOCYTES # BLD AUTO: 1.85 10*3/MM3 (ref 0.9–4.8)
LYMPHOCYTES NFR BLD AUTO: 48.7 % (ref 19.6–45.3)
MCH RBC QN AUTO: 33.8 PG (ref 27–32.7)
MCHC RBC AUTO-ENTMCNC: 32.5 G/DL (ref 32.6–36.4)
MCV RBC AUTO: 103.9 FL (ref 79.8–96.2)
MONOCYTES # BLD AUTO: 0.25 10*3/MM3 (ref 0.2–1.2)
MONOCYTES NFR BLD AUTO: 6.6 % (ref 5–12)
NEUTROPHILS # BLD AUTO: 1.58 10*3/MM3 (ref 1.9–8.1)
NEUTROPHILS NFR BLD AUTO: 41.6 % (ref 42.7–76)
NITRITE UR QL STRIP: NEGATIVE
PH UR STRIP: 6.5 [PH] (ref 5–8)
PLATELET # BLD AUTO: 200 10*3/MM3 (ref 140–500)
POTASSIUM SERPL-SCNC: 4.3 MMOL/L (ref 3.5–5.2)
PROT SERPL-MCNC: 6.6 G/DL (ref 6–8.5)
PROT UR QL STRIP: NEGATIVE
RBC # BLD AUTO: 4.41 10*6/MM3 (ref 4.6–6)
SODIUM SERPL-SCNC: 142 MMOL/L (ref 136–145)
SP GR UR: 1.02 (ref 1–1.03)
T4 FREE SERPL-MCNC: 1.24 NG/DL (ref 0.93–1.7)
TRIGL SERPL-MCNC: 159 MG/DL (ref 0–150)
TSH SERPL DL<=0.005 MIU/L-ACNC: 4.84 MIU/ML (ref 0.27–4.2)
UROBILINOGEN UR STRIP-MCNC: ABNORMAL MG/DL
VLDLC SERPL CALC-MCNC: 31.8 MG/DL (ref 5–40)
WBC # BLD AUTO: 3.8 10*3/MM3 (ref 4.5–10.7)

## 2019-02-11 DIAGNOSIS — D53.9 MACROCYTIC ANEMIA: ICD-10-CM

## 2019-02-11 DIAGNOSIS — D70.9 NEUTROPENIA, UNSPECIFIED TYPE (HCC): Primary | ICD-10-CM

## 2019-02-12 ENCOUNTER — LAB (OUTPATIENT)
Dept: FAMILY MEDICINE CLINIC | Facility: CLINIC | Age: 79
End: 2019-02-12

## 2019-02-12 DIAGNOSIS — D70.9 NEUTROPENIA, UNSPECIFIED TYPE (HCC): ICD-10-CM

## 2019-02-12 DIAGNOSIS — D53.9 MACROCYTIC ANEMIA: ICD-10-CM

## 2019-02-15 LAB
ALBUMIN 24H MFR UR ELPH: 30.9 %
ALBUMIN SERPL ELPH-MCNC: 3.8 G/DL (ref 2.9–4.4)
ALBUMIN/GLOB SERPL: 1.4 {RATIO} (ref 0.7–1.7)
ALPHA1 GLOB 24H MFR UR ELPH: 4.3 %
ALPHA1 GLOB SERPL ELPH-MCNC: 0.2 G/DL (ref 0–0.4)
ALPHA2 GLOB 24H MFR UR ELPH: 13 %
ALPHA2 GLOB SERPL ELPH-MCNC: 0.7 G/DL (ref 0.4–1)
B-GLOBULIN MFR UR ELPH: 35.2 %
B-GLOBULIN SERPL ELPH-MCNC: 1.1 G/DL (ref 0.7–1.3)
BASOPHILS # BLD AUTO: 0 X10E3/UL (ref 0–0.2)
BASOPHILS NFR BLD AUTO: 0 %
EOSINOPHIL # BLD AUTO: 0 X10E3/UL (ref 0–0.4)
EOSINOPHIL NFR BLD AUTO: 0 %
ERYTHROCYTE [DISTWIDTH] IN BLOOD BY AUTOMATED COUNT: 13.2 % (ref 12.3–15.4)
FOLATE SERPL-MCNC: 17 NG/ML
GAMMA GLOB 24H MFR UR ELPH: 16.6 %
GAMMA GLOB SERPL ELPH-MCNC: 0.8 G/DL (ref 0.4–1.8)
GLOBULIN SER-MCNC: 2.8 G/DL (ref 2.2–3.9)
HCT VFR BLD AUTO: 43.2 % (ref 37.5–51)
HGB BLD-MCNC: 14.9 G/DL (ref 13–17.7)
HIV 1 & 2 AB SER-IMP: NORMAL
IGA SERPL-MCNC: 226 MG/DL (ref 61–437)
IGG SERPL-MCNC: 715 MG/DL (ref 700–1600)
IGM SERPL-MCNC: 82 MG/DL (ref 15–143)
IMM GRANULOCYTES # BLD AUTO: 0 X10E3/UL (ref 0–0.1)
IMM GRANULOCYTES NFR BLD AUTO: 0 %
INTERPRETATION SERPL IEP-IMP: NORMAL
INTERPRETATION UR IFE-IMP: NORMAL
LABORATORY COMMENT REPORT: NORMAL
LYMPHOCYTES # BLD AUTO: 2.3 X10E3/UL (ref 0.7–3.1)
LYMPHOCYTES NFR BLD AUTO: 54 %
M PROTEIN 24H MFR UR ELPH: NORMAL %
M PROTEIN SERPL ELPH-MCNC: NORMAL G/DL
MCH RBC QN AUTO: 33.7 PG (ref 26.6–33)
MCHC RBC AUTO-ENTMCNC: 34.5 G/DL (ref 31.5–35.7)
MCV RBC AUTO: 98 FL (ref 79–97)
MONOCYTES # BLD AUTO: 0.3 X10E3/UL (ref 0.1–0.9)
MONOCYTES NFR BLD AUTO: 8 %
NEUTROPHILS # BLD AUTO: 1.6 X10E3/UL (ref 1.4–7)
NEUTROPHILS NFR BLD AUTO: 38 %
PATH INTERP BLD-IMP: ABNORMAL
PATH REV BLD -IMP: ABNORMAL
PATHOLOGIST NAME: ABNORMAL
PLATELET # BLD AUTO: 244 X10E3/UL (ref 150–379)
PROT SERPL-MCNC: 6.6 G/DL (ref 6–8.5)
PROT UR-MCNC: 19 MG/DL
RBC # BLD AUTO: 4.42 X10E6/UL (ref 4.14–5.8)
VIT B12 SERPL-MCNC: 752 PG/ML (ref 232–1245)
WBC # BLD AUTO: 4.2 X10E3/UL (ref 3.4–10.8)

## 2019-03-11 ENCOUNTER — OFFICE VISIT (OUTPATIENT)
Dept: FAMILY MEDICINE CLINIC | Facility: CLINIC | Age: 79
End: 2019-03-11

## 2019-03-11 VITALS
HEIGHT: 67 IN | SYSTOLIC BLOOD PRESSURE: 110 MMHG | BODY MASS INDEX: 25.9 KG/M2 | WEIGHT: 165 LBS | DIASTOLIC BLOOD PRESSURE: 68 MMHG | HEART RATE: 106 BPM | OXYGEN SATURATION: 96 %

## 2019-03-11 DIAGNOSIS — E03.9 ADULT HYPOTHYROIDISM: ICD-10-CM

## 2019-03-11 DIAGNOSIS — I10 ESSENTIAL HYPERTENSION: Primary | ICD-10-CM

## 2019-03-11 DIAGNOSIS — G89.29 CHRONIC MIDLINE LOW BACK PAIN WITHOUT SCIATICA: ICD-10-CM

## 2019-03-11 DIAGNOSIS — G47.09 OTHER INSOMNIA: ICD-10-CM

## 2019-03-11 DIAGNOSIS — M54.50 CHRONIC MIDLINE LOW BACK PAIN WITHOUT SCIATICA: ICD-10-CM

## 2019-03-11 PROCEDURE — 99213 OFFICE O/P EST LOW 20 MIN: CPT | Performed by: NURSE PRACTITIONER

## 2019-03-11 RX ORDER — ATENOLOL 50 MG/1
50 TABLET ORAL DAILY
Qty: 30 TABLET | Refills: 6 | Status: SHIPPED | OUTPATIENT
Start: 2019-03-11 | End: 2019-10-24 | Stop reason: DRUGHIGH

## 2019-03-11 RX ORDER — LISINOPRIL 5 MG/1
5 TABLET ORAL DAILY
Qty: 30 TABLET | Refills: 5 | Status: SHIPPED | OUTPATIENT
Start: 2019-03-11 | End: 2019-10-24 | Stop reason: DRUGHIGH

## 2019-03-11 RX ORDER — LEVOTHYROXINE SODIUM 0.03 MG/1
25 TABLET ORAL DAILY
Qty: 30 TABLET | Refills: 6 | Status: SHIPPED | OUTPATIENT
Start: 2019-03-11 | End: 2019-10-24 | Stop reason: SDUPTHER

## 2019-03-11 NOTE — PROGRESS NOTES
Subjective   Darwin Sparks is a 78 y.o. male.     F/u ess htn ran out atenolol  He takes atenolol daily for blood pressure  Lisinopril 10 mg  Blood pressures controlled no dizziness  Discussed blood pressure goal  1 to avoid hypotension or too aggressive blood pressure treatment  Less than 140/90  Blood pressure sometimes is been low 100 he will hold the atenolol  We discussed regarding decreasing lisinopril from 10-5 and be more consistent  We may need to decrease this further?  Hypothyroid compliant with levothyroxine low dose    Insomnia previously clonazepam for many years at least 20 he has not taken this since January  Trazodone 50 helped sleep but awakened early  Sleeps better with 100 but has some daytime somnolence early but getting better    Abnormal dreams with melatonin  Caution with trazodone if he does not improve with this we should consider doxepin  He can see psychiatry as well    He does not wish to try gabapentin  His  had problems with this medication        Hypertension   Pertinent negatives include no chest pain, palpitations or shortness of breath.        The following portions of the patient's history were reviewed and updated as appropriate: allergies, current medications, past family history, past medical history, past social history, past surgical history and problem list.    Review of Systems   Constitutional: Negative for fatigue and fever.   HENT: Negative.  Negative for trouble swallowing.    Eyes: Negative.    Respiratory: Negative.  Negative for cough and shortness of breath.    Cardiovascular: Negative for chest pain, palpitations and leg swelling.   Gastrointestinal: Negative.  Negative for abdominal pain.   Genitourinary: Negative.    Musculoskeletal: Positive for back pain.   Skin: Negative.    Neurological: Negative.  Negative for dizziness and confusion.   Psychiatric/Behavioral: Negative.        Objective   Physical Exam   Constitutional: He is oriented to person,  place, and time. He appears well-developed and well-nourished. No distress.   Nontoxic pleasant   HENT:   Head: Normocephalic and atraumatic.   Nose: Nose normal.   Mouth/Throat: Oropharynx is clear and moist.   Eyes: Conjunctivae are normal. Pupils are equal, round, and reactive to light. No scleral icterus.   Neck: Neck supple. No JVD present. No thyromegaly present.   Cardiovascular: Normal rate, regular rhythm and normal heart sounds. Exam reveals no gallop and no friction rub.   No murmur heard.  Pulmonary/Chest: Effort normal and breath sounds normal. No respiratory distress. He has no wheezes. He has no rales.   Abdominal: Soft. Bowel sounds are normal. He exhibits no distension and no mass. There is no tenderness. There is no guarding. No hernia.   Musculoskeletal: He exhibits no edema or tenderness.   Uses crutches to ambulate   Lymphadenopathy:     He has no cervical adenopathy.   Neurological: He is alert and oriented to person, place, and time. He has normal reflexes.   Skin: Skin is warm and dry. No rash noted. He is not diaphoretic. No erythema.   Psychiatric: He has a normal mood and affect. His behavior is normal. Judgment and thought content normal.   Vitals reviewed.        Assessment/Plan   Darwin was seen today for hypertension.    Diagnoses and all orders for this visit:    Essential hypertension    Adult hypothyroidism    Other insomnia    Chronic midline low back pain without sciatica    Other orders  -     atenolol (TENORMIN) 50 MG tablet; Take 1 tablet by mouth Daily.  -     levothyroxine (SYNTHROID, LEVOTHROID) 25 MCG tablet; Take 1 tablet by mouth Daily.  -     lisinopril (PRINIVIL,ZESTRIL) 5 MG tablet; Take 1 tablet by mouth Daily. For bp                  Consider psychiatry  Doxepin if he does not improve soon with trazodone use caution sedation falls high risk medication prefer no medication consider  Meditation  Calm shannan  Etc.  Recheck in 3 months sooner problems  Consider muscle  relaxer as another alternative such as low-dose Zanaflex at bedtime?

## 2019-03-25 RX ORDER — LISINOPRIL 10 MG/1
TABLET ORAL
Qty: 30 TABLET | Refills: 0 | Status: SHIPPED | OUTPATIENT
Start: 2019-03-25 | End: 2019-04-27 | Stop reason: SDUPTHER

## 2019-04-12 RX ORDER — CLONAZEPAM 1 MG/1
TABLET ORAL
Qty: 15 TABLET | Refills: 0 | Status: SHIPPED | OUTPATIENT
Start: 2019-04-12 | End: 2019-08-30

## 2019-04-29 RX ORDER — LISINOPRIL 10 MG/1
TABLET ORAL
Qty: 30 TABLET | Refills: 5 | Status: SHIPPED | OUTPATIENT
Start: 2019-04-29 | End: 2019-10-24 | Stop reason: DRUGHIGH

## 2019-04-29 RX ORDER — TRAZODONE HYDROCHLORIDE 100 MG/1
TABLET ORAL
Qty: 30 TABLET | Refills: 5 | Status: SHIPPED | OUTPATIENT
Start: 2019-04-29 | End: 2019-04-30

## 2019-04-30 ENCOUNTER — OFFICE VISIT (OUTPATIENT)
Dept: FAMILY MEDICINE CLINIC | Facility: CLINIC | Age: 79
End: 2019-04-30

## 2019-04-30 VITALS
BODY MASS INDEX: 26.37 KG/M2 | WEIGHT: 168 LBS | DIASTOLIC BLOOD PRESSURE: 76 MMHG | HEART RATE: 72 BPM | SYSTOLIC BLOOD PRESSURE: 142 MMHG | OXYGEN SATURATION: 97 % | HEIGHT: 67 IN

## 2019-04-30 DIAGNOSIS — F41.9 ANXIETY: Primary | ICD-10-CM

## 2019-04-30 DIAGNOSIS — K59.00 CONSTIPATION, UNSPECIFIED CONSTIPATION TYPE: ICD-10-CM

## 2019-04-30 PROCEDURE — 99213 OFFICE O/P EST LOW 20 MIN: CPT | Performed by: NURSE PRACTITIONER

## 2019-04-30 NOTE — PATIENT INSTRUCTIONS
Discharge instructions  Plenty of vegetables and fruit increase water intake daily    Gradually at your pace decrease the dose and frequency of clonazepam  Follow-up in 4 months  Call for refill approximately for 5 days early so you do not run out of clonazepam  Do not stop clonazepam abruptly

## 2019-04-30 NOTE — PROGRESS NOTES
Subjective   Darwin Sparks is a 78 y.o. male.     Chronic insomnia anxiety takes clonazepam he decreased the dose and tried to stop it  Although I did not want him to completely stop    But he started trazodone and had taken it for a while but he thinks it caused constipation at very brick hard stool cause some left abdominal discomfort which since has resolved after bowel movement he was taking over-the-counter laxatives which helped.  Stool softeners which help MiraLAX  Hemorrhoid resolved he is doing fine presently  1 me to check his groin thought he may have tore something but no pain  Last week he had pain with bowel movement but no fever good appetite    Colonoscopy according to patient previous was normal and up-to-date    Medication because of dry mouth as well  Does not want to see psychiatry who have done not before  He is willing to work with me as I am decreasing the medication slowly      Constipation          The following portions of the patient's history were reviewed and updated as appropriate: allergies, current medications, past family history, past medical history, past social history, past surgical history and problem list.    Review of Systems   Gastrointestinal: Positive for constipation.       Objective   Physical Exam   Constitutional: He is oriented to person, place, and time. He appears well-developed and well-nourished. No distress.   HENT:   Head: Normocephalic and atraumatic.   Nose: Nose normal.   Mouth/Throat: Oropharynx is clear and moist.   Eyes: Conjunctivae are normal. Pupils are equal, round, and reactive to light.   Neck: Neck supple. No JVD present.   Cardiovascular: Normal rate, regular rhythm and normal heart sounds.   No murmur heard.  Pulmonary/Chest: Effort normal and breath sounds normal. No respiratory distress. He has no wheezes.   Abdominal: Soft. Bowel sounds are normal. He exhibits no distension and no mass. There is no tenderness. There is no guarding. No hernia.    Genitourinary:   Genitourinary Comments: No hernia  Standing Valsalva   Musculoskeletal: He exhibits no edema or tenderness.   Uses braces to ambulate   Lymphadenopathy:     He has no cervical adenopathy.   Neurological: He is alert and oriented to person, place, and time.   Skin: Skin is warm and dry. He is not diaphoretic.   Psychiatric: He has a normal mood and affect. His behavior is normal. Judgment and thought content normal.   Vitals reviewed.        Assessment/Plan   Darwin was seen today for follow-up on trazodone and constipation.    Diagnoses and all orders for this visit:    Anxiety    Constipation, unspecified constipation type        Patient may have had some diverticulitis but he is without pain presently otherwise just good no constipation probably related to trazodone  Stop trazodone  He is rarely taking clonazepam presently only twice a week and has decreased the dose we can continue try to wean  And see how he does with this  Consider doing it slowly over 4 to 6 months  He will follow-up in 4 months    Otherwise we could consider  Low-dose mirtazapine?  At bedtime to  Gastroenterology for any recurrent constipation or abdominal pain acute abdominal pain especially with fever fatigue urgent care or ER          Patient Instructions   Discharge instructions  Plenty of vegetables and fruit increase water intake daily    Gradually at your pace decrease the dose and frequency of clonazepam  Follow-up in 4 months  Call for refill approximately for 5 days early so you do not run out of clonazepam  Do not stop clonazepam abruptly

## 2019-07-22 RX ORDER — NIACIN 250 MG
250 TABLET ORAL
Qty: 30 TABLET | Refills: 6 | Status: SHIPPED | OUTPATIENT
Start: 2019-07-22

## 2019-08-30 ENCOUNTER — OFFICE VISIT (OUTPATIENT)
Dept: FAMILY MEDICINE CLINIC | Facility: CLINIC | Age: 79
End: 2019-08-30

## 2019-08-30 VITALS
DIASTOLIC BLOOD PRESSURE: 70 MMHG | HEART RATE: 64 BPM | WEIGHT: 170 LBS | OXYGEN SATURATION: 98 % | BODY MASS INDEX: 26.68 KG/M2 | SYSTOLIC BLOOD PRESSURE: 140 MMHG | HEIGHT: 67 IN

## 2019-08-30 DIAGNOSIS — G89.29 CHRONIC MIDLINE LOW BACK PAIN WITHOUT SCIATICA: ICD-10-CM

## 2019-08-30 DIAGNOSIS — I10 ESSENTIAL HYPERTENSION: ICD-10-CM

## 2019-08-30 DIAGNOSIS — M15.9 PRIMARY OSTEOARTHRITIS INVOLVING MULTIPLE JOINTS: ICD-10-CM

## 2019-08-30 DIAGNOSIS — G47.09 OTHER INSOMNIA: Primary | ICD-10-CM

## 2019-08-30 DIAGNOSIS — M54.50 CHRONIC MIDLINE LOW BACK PAIN WITHOUT SCIATICA: ICD-10-CM

## 2019-08-30 DIAGNOSIS — F13.20 BENZODIAZEPINE DEPENDENCE (HCC): ICD-10-CM

## 2019-08-30 PROBLEM — M15.0 PRIMARY OSTEOARTHRITIS INVOLVING MULTIPLE JOINTS: Status: ACTIVE | Noted: 2019-08-30

## 2019-08-30 PROCEDURE — 99213 OFFICE O/P EST LOW 20 MIN: CPT | Performed by: NURSE PRACTITIONER

## 2019-08-30 RX ORDER — MELOXICAM 10 MG/1
1 CAPSULE ORAL DAILY
Qty: 30 CAPSULE | Refills: 5 | Status: SHIPPED | OUTPATIENT
Start: 2019-08-30 | End: 2019-11-04 | Stop reason: SDUPTHER

## 2019-08-30 NOTE — PROGRESS NOTES
Subjective   Darwin Sparks is a 78 y.o. male.     Pleasant gentleman here to follow-up on insomnia  Has been taking clonazepam for years for anxiety and insomnia at night  We have weaned him off of this but he is having difficulty sleeping does not sleep a couple hours  Cannot do well with trazodone caused him quite a bit constipation discomfort  We discussed other alternatives as well he is already tried sleep hygiene continues to do so he is had a long history of insomnia and sleep difficulties  We discussed other possibilities including Belsomra mechanism of action risk and benefit expectations controlled substance  His insurance may not cover this but I would like him to go ahead and check this he would like to try this now  I will give him a 10-day free coupon try      Essential hypertension his blood pressures been controlled  Takes lisinopril  I think you are up-to-date on everything else    History of age-related arthritis chronic low back pain no new change osteoarthritis  Normally takes 7.5 meloxicam he would like to increase it to 10 mg  He borrowed his 's 10 mg and did better with this  We reviewed risk and benefit including risk of stroke heart attack fluid retention gastric bleed and kidney failure and strategy to mitigate risk he should be taken some type of antiacid but this due to his age  Although this may be less likely than others to call us bleeds      Hypertension   Pertinent negatives include no chest pain, palpitations or shortness of breath.        The following portions of the patient's history were reviewed and updated as appropriate: allergies, current medications, past family history, past medical history, past social history, past surgical history and problem list.    Review of Systems   Constitutional: Negative for fatigue and fever.   HENT: Negative.  Negative for trouble swallowing.    Eyes: Negative.    Respiratory: Negative.  Negative for cough and shortness of breath.     Cardiovascular: Negative for chest pain, palpitations and leg swelling.   Gastrointestinal: Negative.  Negative for abdominal pain.   Genitourinary: Negative.    Musculoskeletal: Positive for arthralgias and back pain.   Skin: Negative.    Neurological: Negative.  Negative for dizziness and confusion.   Psychiatric/Behavioral: Positive for sleep disturbance. The patient is not nervous/anxious.    All other systems reviewed and are negative.      Objective   Physical Exam   Constitutional: He is oriented to person, place, and time. He appears well-developed and well-nourished. No distress.   HENT:   Head: Normocephalic and atraumatic.   Nose: Nose normal.   Mouth/Throat: Oropharynx is clear and moist.   Eyes: Conjunctivae are normal. Pupils are equal, round, and reactive to light.   Neck: Neck supple. No JVD present.   Cardiovascular: Normal rate, regular rhythm and normal heart sounds.   No murmur heard.  Pulmonary/Chest: Effort normal and breath sounds normal. No respiratory distress. He has no wheezes.   Abdominal: Soft. Bowel sounds are normal. He exhibits no distension and no mass. There is no tenderness. There is no guarding. No hernia.   Musculoskeletal: He exhibits no edema or tenderness.   Patient uses crutches to ambulate due to chronic  Muscle skeletal deformities weakness lower extremity     Lymphadenopathy:     He has no cervical adenopathy.   Neurological: He is alert and oriented to person, place, and time.   Skin: Skin is warm and dry. He is not diaphoretic.   Psychiatric: He has a normal mood and affect. His behavior is normal. Judgment and thought content normal.   Vitals reviewed.        Assessment/Plan   Darwin was seen today for hypertension.    Diagnoses and all orders for this visit:    Other insomnia    Essential hypertension    Benzodiazepine dependence (CMS/HCC)    Other orders  -     Meloxicam 10 MG capsule; Take 1 capsule by mouth Daily. As needed with food and water  -     Suvorexant  (BELSOMRA) 15 MG tablet; Take 1 tablet by mouth every night at bedtime. For insomia    Insomnia sleep hygiene discussed options including Belsomra risk-benefit  Falls precaution  Patient prefers to decrease the dose first night which is  certainly acceptable      Stretching walking range of motion exercises for most arthritis  Okay to increase meloxicam to 10 mg maximum would be 15  Gastric protection as discussed omeprazole  Push fluids check blood pressure kidney liver function every 6 months              Patient Instructions   Start Belsomra 15 mg 1/2 tablet or 1 tablet at bedtime  May take several days up to a week for full effect  Should be tolerated fine but if any problems stop  Falls precaution      Check with your insurance to see if they will cover Belsomra or what process we need to do     Call me several days before you run out I can reenter the same medicine at a different strength to get another 10 days we can do this 3 times total  This will give us time to find a better plan for you      Otherwise I will discuss other options  With you as we discussed briefly in the office to let keep me updated

## 2019-08-30 NOTE — PATIENT INSTRUCTIONS
Start Belsomra 15 mg 1/2 tablet or 1 tablet at bedtime  May take several days up to a week for full effect  Should be tolerated fine but if any problems stop  Falls precaution      Check with your insurance to see if they will cover Belsomra or what process we need to do     Call me several days before you run out I can reenter the same medicine at a different strength to get another 10 days we can do this 3 times total  This will give us time to find a better plan for you      Otherwise I will discuss other options  With you as we discussed briefly in the office to let keep me updated

## 2019-10-24 RX ORDER — LISINOPRIL 10 MG/1
10 TABLET ORAL DAILY
Qty: 30 TABLET | Refills: 6 | Status: SHIPPED | OUTPATIENT
Start: 2019-10-24 | End: 2020-05-19

## 2019-10-24 RX ORDER — LEVOTHYROXINE SODIUM 0.03 MG/1
25 TABLET ORAL DAILY
Qty: 30 TABLET | Refills: 6 | Status: SHIPPED | OUTPATIENT
Start: 2019-10-24 | End: 2020-05-19

## 2019-10-24 RX ORDER — SIMVASTATIN 40 MG
40 TABLET ORAL NIGHTLY
Qty: 30 TABLET | Refills: 6 | Status: SHIPPED | OUTPATIENT
Start: 2019-10-24 | End: 2020-06-09

## 2019-10-24 RX ORDER — ATENOLOL 50 MG/1
50 TABLET ORAL DAILY
Qty: 30 TABLET | Refills: 6 | Status: SHIPPED | OUTPATIENT
Start: 2019-10-24 | End: 2020-06-09

## 2019-10-25 RX ORDER — POTASSIUM CHLORIDE 20 MEQ/1
20 TABLET, EXTENDED RELEASE ORAL DAILY
Qty: 90 TABLET | Refills: 2 | Status: SHIPPED | OUTPATIENT
Start: 2019-10-25 | End: 2020-07-23

## 2019-11-04 RX ORDER — MELOXICAM 10 MG/1
1 CAPSULE ORAL DAILY
Qty: 30 CAPSULE | Refills: 5 | Status: SHIPPED | OUTPATIENT
Start: 2019-11-04 | End: 2019-11-19 | Stop reason: DRUGHIGH

## 2019-11-19 ENCOUNTER — OFFICE VISIT (OUTPATIENT)
Dept: FAMILY MEDICINE CLINIC | Facility: CLINIC | Age: 79
End: 2019-11-19

## 2019-11-19 VITALS
SYSTOLIC BLOOD PRESSURE: 118 MMHG | DIASTOLIC BLOOD PRESSURE: 74 MMHG | BODY MASS INDEX: 27.31 KG/M2 | OXYGEN SATURATION: 98 % | HEIGHT: 67 IN | WEIGHT: 174 LBS | HEART RATE: 73 BPM

## 2019-11-19 DIAGNOSIS — G47.00 INSOMNIA, UNSPECIFIED TYPE: ICD-10-CM

## 2019-11-19 DIAGNOSIS — Z23 NEED FOR IMMUNIZATION AGAINST INFLUENZA: Primary | ICD-10-CM

## 2019-11-19 DIAGNOSIS — M15.9 PRIMARY OSTEOARTHRITIS INVOLVING MULTIPLE JOINTS: ICD-10-CM

## 2019-11-19 DIAGNOSIS — F41.9 ANXIETY: ICD-10-CM

## 2019-11-19 PROCEDURE — G0008 ADMIN INFLUENZA VIRUS VAC: HCPCS | Performed by: NURSE PRACTITIONER

## 2019-11-19 PROCEDURE — 90653 IIV ADJUVANT VACCINE IM: CPT | Performed by: NURSE PRACTITIONER

## 2019-11-19 PROCEDURE — 99213 OFFICE O/P EST LOW 20 MIN: CPT | Performed by: NURSE PRACTITIONER

## 2019-11-19 RX ORDER — MELOXICAM 15 MG/1
15 TABLET ORAL DAILY
Qty: 90 TABLET | Refills: 1 | Status: SHIPPED | OUTPATIENT
Start: 2019-11-19 | End: 2021-02-09

## 2019-11-19 RX ORDER — MELOXICAM 7.5 MG/1
TABLET ORAL
COMMUNITY
Start: 2019-09-30 | End: 2019-11-19

## 2019-11-19 RX ORDER — MIRTAZAPINE 7.5 MG/1
7.5 TABLET, FILM COATED ORAL NIGHTLY
Qty: 30 TABLET | Refills: 1 | Status: SHIPPED | OUTPATIENT
Start: 2019-11-19 | End: 2020-01-15

## 2019-11-19 NOTE — PATIENT INSTRUCTIONS
Discharge instructions    Trial of mirtazapine 7.5 mg  Approximately 1 hour prior to bed    Avoid alcohol okay to continue melatonin    Follow-up psychiatry      [facility] [place] [facility]  [facility]  [facility]    Meloxicam consider alternating 15 mg with 7.5 mg and give it a 3-week trial  With food and water  Liver kidney function every 6 months or so    Follow-up email in 3 weeks    Otherwise follow-up in the office in 4 months

## 2019-11-21 NOTE — PROGRESS NOTES
"Subjective   Darwin Sparks is a 79 y.o. male.     Follow-up insomnia anxiety, previously taking clonazepam we tried several alternatives in patients had quite a bit of side effects including constipation next-day somnolence.  We discussed patient seeing psychiatry to discuss he is already tried and continues sleep hygiene  Does not like to drink but switch to having a couple drinks at night to help him sleep    Insurance would not cover 10 mg of meloxicam  He did better with 10 mg and 7.5 and did not think he needed the 15  We did discuss that he could alternate doses 15 mg with 7.5  After a week or 2 he would get a steady state likely enough to cover his pain while lowering risk      Back Pain   Pertinent negatives include no abdominal pain, chest pain or fever.   Insomnia   Pertinent negatives include no abdominal pain, chest pain, coughing, fatigue or fever.        /74   Pulse 73   Ht 170.2 cm (67\")   Wt 78.9 kg (174 lb)   SpO2 98%   BMI 27.25 kg/m²       The following portions of the patient's history were reviewed and updated as appropriate: allergies, current medications, past family history, past medical history, past social history, past surgical history and problem list.    Review of Systems   Constitutional: Negative for fatigue and fever.   HENT: Negative.  Negative for trouble swallowing.    Eyes: Negative.    Respiratory: Negative.  Negative for cough and shortness of breath.    Cardiovascular: Negative for chest pain, palpitations and leg swelling.   Gastrointestinal: Negative.  Negative for abdominal pain.   Genitourinary: Negative.    Musculoskeletal: Positive for back pain.   Skin: Negative.    Neurological: Negative.  Negative for dizziness and confusion.   Psychiatric/Behavioral: Positive for sleep disturbance. The patient is nervous/anxious and has insomnia.    All other systems reviewed and are negative.      Objective   Physical Exam   Constitutional: He is oriented to person, " place, and time. He appears well-developed and well-nourished. No distress.   HENT:   Head: Normocephalic and atraumatic.   Nose: Nose normal.   Mouth/Throat: Oropharynx is clear and moist.   Eyes: Conjunctivae are normal. Pupils are equal, round, and reactive to light.   Neck: Neck supple. No JVD present.   Cardiovascular: Normal rate, regular rhythm and normal heart sounds.   No murmur heard.  Pulmonary/Chest: Effort normal and breath sounds normal. No respiratory distress. He has no wheezes.   Abdominal: Soft. Bowel sounds are normal. He exhibits no distension and no mass. There is no tenderness. There is no guarding. No hernia.   Musculoskeletal: He exhibits no edema or tenderness.   Chronic lower extremity weakness gait deformity uses crutches to ambulate   Lymphadenopathy:     He has no cervical adenopathy.   Neurological: He is alert and oriented to person, place, and time.   Skin: Skin is warm and dry. He is not diaphoretic.   Psychiatric: He has a normal mood and affect. His behavior is normal. Judgment and thought content normal.   Vitals reviewed.        Assessment/Plan   Darwin was seen today for back pain and insomnia.    Diagnoses and all orders for this visit:    Need for immunization against influenza  -     Fluad Quad >65 years ([id number])    Insomnia, unspecified type    Primary osteoarthritis involving multiple joints    Other orders  -     meloxicam (MOBIC) 15 MG tablet; Take 1 tablet by mouth Daily. Or as directed take with food and water  -     mirtazapine (REMERON) 7.5 MG tablet; Take 1 tablet by mouth Every Night.      Insomnia anxiety we can try mirtazapine  Risk-benefit  Discussed caution falls  Osteoarthritic aches and pains discussed NSAID lowest effective dose shortest time possible strategy to mitigate risk  He can alternate doses as discussed              Patient Instructions   Discharge instructions    Trial of mirtazapine 7.5 mg  Approximately 1 hour prior to bed    Avoid alcohol okay  to continue melatonin    Follow-up psychiatry      [place] [facility]  [facility]  [facility]    Meloxicam consider alternating 15 mg with 7.5 mg and give it a 3-week trial  With food and water  Liver kidney function every 6 months or so    Follow-up email in 3 weeks    Otherwise follow-up in the office in 4 months

## 2020-01-15 RX ORDER — MIRTAZAPINE 7.5 MG/1
TABLET, FILM COATED ORAL
Qty: 30 TABLET | Refills: 1 | Status: SHIPPED | OUTPATIENT
Start: 2020-01-15 | End: 2020-05-19

## 2020-05-13 DIAGNOSIS — Z79.899 LONG-TERM USE OF HIGH-RISK MEDICATION: ICD-10-CM

## 2020-05-13 DIAGNOSIS — E78.2 MIXED HYPERLIPIDEMIA: ICD-10-CM

## 2020-05-13 DIAGNOSIS — I10 ESSENTIAL HYPERTENSION: ICD-10-CM

## 2020-05-13 DIAGNOSIS — I10 ESSENTIAL HYPERTENSION: Primary | ICD-10-CM

## 2020-05-13 LAB
ALBUMIN SERPL-MCNC: 4.5 G/DL (ref 3.5–5.2)
ALBUMIN/GLOB SERPL: 1.9 G/DL
ALP SERPL-CCNC: 78 U/L (ref 39–117)
ALT SERPL-CCNC: 45 U/L (ref 1–41)
AST SERPL-CCNC: 49 U/L (ref 1–40)
BASOPHILS # BLD AUTO: 0.06 10*3/MM3 (ref 0–0.2)
BASOPHILS NFR BLD AUTO: 1 % (ref 0–1.5)
BILIRUB SERPL-MCNC: 0.9 MG/DL (ref 0.2–1.2)
BUN SERPL-MCNC: 14 MG/DL (ref 8–23)
BUN/CREAT SERPL: 13.2 (ref 7–25)
CALCIUM SERPL-MCNC: 10 MG/DL (ref 8.6–10.5)
CHLORIDE SERPL-SCNC: 103 MMOL/L (ref 98–107)
CHOLEST SERPL-MCNC: 203 MG/DL (ref 0–200)
CO2 SERPL-SCNC: 26.5 MMOL/L (ref 22–29)
CREAT SERPL-MCNC: 1.06 MG/DL (ref 0.76–1.27)
EOSINOPHIL # BLD AUTO: 0.25 10*3/MM3 (ref 0–0.4)
EOSINOPHIL NFR BLD AUTO: 4.3 % (ref 0.3–6.2)
ERYTHROCYTE [DISTWIDTH] IN BLOOD BY AUTOMATED COUNT: 12.7 % (ref 12.3–15.4)
GLOBULIN SER CALC-MCNC: 2.4 GM/DL
GLUCOSE SERPL-MCNC: 108 MG/DL (ref 65–99)
HCT VFR BLD AUTO: 39.1 % (ref 37.5–51)
HDLC SERPL-MCNC: 85 MG/DL (ref 40–60)
HGB BLD-MCNC: 13.5 G/DL (ref 13–17.7)
IMM GRANULOCYTES # BLD AUTO: 0.01 10*3/MM3 (ref 0–0.05)
IMM GRANULOCYTES NFR BLD AUTO: 0.2 % (ref 0–0.5)
LDLC SERPL CALC-MCNC: 91 MG/DL (ref 0–100)
LDLC/HDLC SERPL: 1.07 {RATIO}
LYMPHOCYTES # BLD AUTO: 2.54 10*3/MM3 (ref 0.7–3.1)
LYMPHOCYTES NFR BLD AUTO: 43.5 % (ref 19.6–45.3)
MCH RBC QN AUTO: 35.5 PG (ref 26.6–33)
MCHC RBC AUTO-ENTMCNC: 34.5 G/DL (ref 31.5–35.7)
MCV RBC AUTO: 102.9 FL (ref 79–97)
MONOCYTES # BLD AUTO: 0.54 10*3/MM3 (ref 0.1–0.9)
MONOCYTES NFR BLD AUTO: 9.2 % (ref 5–12)
NEUTROPHILS # BLD AUTO: 2.44 10*3/MM3 (ref 1.7–7)
NEUTROPHILS NFR BLD AUTO: 41.8 % (ref 42.7–76)
NRBC BLD AUTO-RTO: 0 /100 WBC (ref 0–0.2)
PLATELET # BLD AUTO: 235 10*3/MM3 (ref 140–450)
POTASSIUM SERPL-SCNC: 4.1 MMOL/L (ref 3.5–5.2)
PROT SERPL-MCNC: 6.9 G/DL (ref 6–8.5)
RBC # BLD AUTO: 3.8 10*6/MM3 (ref 4.14–5.8)
SODIUM SERPL-SCNC: 143 MMOL/L (ref 136–145)
T4 FREE SERPL-MCNC: 1.04 NG/DL (ref 0.93–1.7)
TRIGL SERPL-MCNC: 134 MG/DL (ref 0–150)
TSH SERPL DL<=0.005 MIU/L-ACNC: 11.7 UIU/ML (ref 0.27–4.2)
VLDLC SERPL CALC-MCNC: 26.8 MG/DL
WBC # BLD AUTO: 5.84 10*3/MM3 (ref 3.4–10.8)

## 2020-05-19 ENCOUNTER — OFFICE VISIT (OUTPATIENT)
Dept: FAMILY MEDICINE CLINIC | Facility: CLINIC | Age: 80
End: 2020-05-19

## 2020-05-19 VITALS
DIASTOLIC BLOOD PRESSURE: 66 MMHG | HEART RATE: 81 BPM | OXYGEN SATURATION: 97 % | HEIGHT: 67 IN | WEIGHT: 179 LBS | RESPIRATION RATE: 16 BRPM | BODY MASS INDEX: 28.09 KG/M2 | TEMPERATURE: 98.2 F | SYSTOLIC BLOOD PRESSURE: 102 MMHG

## 2020-05-19 DIAGNOSIS — G47.00 INSOMNIA, UNSPECIFIED TYPE: ICD-10-CM

## 2020-05-19 DIAGNOSIS — E03.9 ADULT HYPOTHYROIDISM: ICD-10-CM

## 2020-05-19 DIAGNOSIS — I10 ESSENTIAL HYPERTENSION: Primary | ICD-10-CM

## 2020-05-19 DIAGNOSIS — M51.36 DEGENERATIVE DISC DISEASE, LUMBAR: ICD-10-CM

## 2020-05-19 PROCEDURE — 99213 OFFICE O/P EST LOW 20 MIN: CPT | Performed by: NURSE PRACTITIONER

## 2020-05-19 RX ORDER — LEVOTHYROXINE SODIUM 0.05 MG/1
50 TABLET ORAL DAILY
Qty: 30 TABLET | Refills: 5 | Status: SHIPPED | OUTPATIENT
Start: 2020-05-19 | End: 2020-11-16

## 2020-05-19 RX ORDER — MIRTAZAPINE 15 MG/1
15 TABLET, FILM COATED ORAL NIGHTLY
Qty: 30 TABLET | Refills: 5 | Status: SHIPPED | OUTPATIENT
Start: 2020-05-19 | End: 2020-11-16

## 2020-05-19 RX ORDER — LISINOPRIL 5 MG/1
5 TABLET ORAL DAILY
Qty: 30 TABLET | Refills: 6 | Status: SHIPPED | OUTPATIENT
Start: 2020-05-19 | End: 2020-11-20

## 2020-05-19 RX ORDER — METHYLPREDNISOLONE 4 MG/1
TABLET ORAL
Qty: 21 TABLET | Refills: 0 | Status: SHIPPED | OUTPATIENT
Start: 2020-05-19 | End: 2020-11-20
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
## 2020-05-19 NOTE — PATIENT INSTRUCTIONS
Consider and recommend water aerobics physical therapy    Daily range of motion exercises to increase flexibility and decrease pain levels    Falls precaution    Recheck blood pressure  Should be less than 130/80 and greater than 110/70 approximately  Weekly blood pressure check    Meloxicam 15 mg lowest effective dose for example  May alternate between 15 mg and 7.5 mg during better periods or decrease to 1/2 tablet 7.5 mg    Plenty of fluids to protect your kidneys continue antiacid for GI protection    Mirtazapine 15 mg at bedtime  For improved sleep  6-week trial may need to increase to 30 mg at some point?      Other options we may consider a low-dose Seroquel or quetiapine    Or tramadol at bedtime    Increase levothyroxine or Synthroid  25 mg  1-1/2 tablet x 1 week  Then increase to 50 mg daily    Outpatient TSH in 1 month after the change  Nonfasting okay    Decrease lisinopril 10 mg down to 5 mg daily  Call me some blood pressure readings in 3 to 4 weeks    Follow-up in the office in 4 months otherwise

## 2020-06-09 RX ORDER — SIMVASTATIN 40 MG
TABLET ORAL
Qty: 30 TABLET | Refills: 5 | Status: SHIPPED | OUTPATIENT
Start: 2020-06-09 | End: 2020-12-18

## 2020-06-09 RX ORDER — LEVOTHYROXINE SODIUM 0.03 MG/1
TABLET ORAL
Qty: 30 TABLET | Refills: 5 | Status: SHIPPED | OUTPATIENT
Start: 2020-06-09 | End: 2021-08-17

## 2020-06-09 RX ORDER — LISINOPRIL 10 MG/1
TABLET ORAL
Qty: 30 TABLET | Refills: 5 | Status: SHIPPED | OUTPATIENT
Start: 2020-06-09 | End: 2020-12-18

## 2020-06-09 RX ORDER — ATENOLOL 50 MG/1
TABLET ORAL
Qty: 30 TABLET | Refills: 5 | Status: SHIPPED | OUTPATIENT
Start: 2020-06-09 | End: 2020-12-18

## 2020-06-10 ENCOUNTER — RESULTS ENCOUNTER (OUTPATIENT)
Dept: FAMILY MEDICINE CLINIC | Facility: CLINIC | Age: 80
End: 2020-06-10

## 2020-06-10 DIAGNOSIS — E03.9 ADULT HYPOTHYROIDISM: ICD-10-CM

## 2020-07-23 RX ORDER — POTASSIUM CHLORIDE 20 MEQ/1
TABLET, EXTENDED RELEASE ORAL
Qty: 90 TABLET | Refills: 1 | Status: SHIPPED | OUTPATIENT
Start: 2020-07-23 | End: 2021-01-18

## 2020-10-19 RX ORDER — NYSTATIN AND TRIAMCINOLONE ACETONIDE 100000; 1 [USP'U]/G; MG/G
OINTMENT TOPICAL
Qty: 60 G | Refills: 0 | Status: SHIPPED | OUTPATIENT
Start: 2020-10-19 | End: 2022-05-11

## 2020-11-16 RX ORDER — MIRTAZAPINE 15 MG/1
TABLET, FILM COATED ORAL
Qty: 30 TABLET | Refills: 0 | Status: SHIPPED | OUTPATIENT
Start: 2020-11-16 | End: 2020-11-20

## 2020-11-16 RX ORDER — LEVOTHYROXINE SODIUM 0.05 MG/1
TABLET ORAL
Qty: 30 TABLET | Refills: 0 | Status: SHIPPED | OUTPATIENT
Start: 2020-11-16 | End: 2020-12-18

## 2020-11-20 ENCOUNTER — OFFICE VISIT (OUTPATIENT)
Dept: FAMILY MEDICINE CLINIC | Facility: CLINIC | Age: 80
End: 2020-11-20

## 2020-11-20 VITALS
SYSTOLIC BLOOD PRESSURE: 117 MMHG | TEMPERATURE: 97.1 F | BODY MASS INDEX: 28.56 KG/M2 | HEART RATE: 64 BPM | DIASTOLIC BLOOD PRESSURE: 67 MMHG | HEIGHT: 67 IN | OXYGEN SATURATION: 95 % | WEIGHT: 182 LBS

## 2020-11-20 DIAGNOSIS — M54.42 CHRONIC BILATERAL LOW BACK PAIN WITH LEFT-SIDED SCIATICA: ICD-10-CM

## 2020-11-20 DIAGNOSIS — E03.9 ADULT HYPOTHYROIDISM: ICD-10-CM

## 2020-11-20 DIAGNOSIS — F41.9 ANXIETY: ICD-10-CM

## 2020-11-20 DIAGNOSIS — E78.2 MIXED HYPERLIPIDEMIA: ICD-10-CM

## 2020-11-20 DIAGNOSIS — F13.20 BENZODIAZEPINE DEPENDENCE (HCC): ICD-10-CM

## 2020-11-20 DIAGNOSIS — M62.3 IMMOBILITY SYNDROME: ICD-10-CM

## 2020-11-20 DIAGNOSIS — G89.29 CHRONIC BILATERAL LOW BACK PAIN WITH LEFT-SIDED SCIATICA: ICD-10-CM

## 2020-11-20 DIAGNOSIS — I10 ESSENTIAL HYPERTENSION: Primary | ICD-10-CM

## 2020-11-20 DIAGNOSIS — M54.16 LUMBAR RADICULOPATHY: ICD-10-CM

## 2020-11-20 PROCEDURE — 99213 OFFICE O/P EST LOW 20 MIN: CPT | Performed by: NURSE PRACTITIONER

## 2020-11-20 NOTE — PATIENT INSTRUCTIONS
Discharge instructions    Pain management Dr. Rodriguez to discuss options  Focus on increasing mobility  Strength in lower extremities  Consider physical therapy    Follow-up in 6 months  kn95 hi filtration mask  Covid vaccine when available

## 2020-11-21 LAB
ALBUMIN SERPL-MCNC: 3.9 G/DL (ref 3.5–5.2)
ALBUMIN/GLOB SERPL: 1.8 G/DL
ALP SERPL-CCNC: 81 U/L (ref 39–117)
ALT SERPL-CCNC: 24 U/L (ref 1–41)
AST SERPL-CCNC: 27 U/L (ref 1–40)
BASOPHILS # BLD AUTO: 0.06 10*3/MM3 (ref 0–0.2)
BASOPHILS NFR BLD AUTO: 1 % (ref 0–1.5)
BILIRUB SERPL-MCNC: 0.4 MG/DL (ref 0–1.2)
BUN SERPL-MCNC: 15 MG/DL (ref 8–23)
BUN/CREAT SERPL: 16.7 (ref 7–25)
CALCIUM SERPL-MCNC: 9.5 MG/DL (ref 8.6–10.5)
CHLORIDE SERPL-SCNC: 103 MMOL/L (ref 98–107)
CO2 SERPL-SCNC: 24.6 MMOL/L (ref 22–29)
CREAT SERPL-MCNC: 0.9 MG/DL (ref 0.76–1.27)
EOSINOPHIL # BLD AUTO: 0.57 10*3/MM3 (ref 0–0.4)
EOSINOPHIL NFR BLD AUTO: 9.2 % (ref 0.3–6.2)
ERYTHROCYTE [DISTWIDTH] IN BLOOD BY AUTOMATED COUNT: 12.8 % (ref 12.3–15.4)
GLOBULIN SER CALC-MCNC: 2.2 GM/DL
GLUCOSE SERPL-MCNC: 96 MG/DL (ref 65–99)
HCT VFR BLD AUTO: 41.8 % (ref 37.5–51)
HGB BLD-MCNC: 14.4 G/DL (ref 13–17.7)
IMM GRANULOCYTES # BLD AUTO: 0.01 10*3/MM3 (ref 0–0.05)
IMM GRANULOCYTES NFR BLD AUTO: 0.2 % (ref 0–0.5)
LYMPHOCYTES # BLD AUTO: 2.71 10*3/MM3 (ref 0.7–3.1)
LYMPHOCYTES NFR BLD AUTO: 43.6 % (ref 19.6–45.3)
MCH RBC QN AUTO: 34.2 PG (ref 26.6–33)
MCHC RBC AUTO-ENTMCNC: 34.4 G/DL (ref 31.5–35.7)
MCV RBC AUTO: 99.3 FL (ref 79–97)
MONOCYTES # BLD AUTO: 0.44 10*3/MM3 (ref 0.1–0.9)
MONOCYTES NFR BLD AUTO: 7.1 % (ref 5–12)
NEUTROPHILS # BLD AUTO: 2.42 10*3/MM3 (ref 1.7–7)
NEUTROPHILS NFR BLD AUTO: 38.9 % (ref 42.7–76)
NRBC BLD AUTO-RTO: 0 /100 WBC (ref 0–0.2)
PLATELET # BLD AUTO: 240 10*3/MM3 (ref 140–450)
POTASSIUM SERPL-SCNC: 5.2 MMOL/L (ref 3.5–5.2)
PROT SERPL-MCNC: 6.1 G/DL (ref 6–8.5)
RBC # BLD AUTO: 4.21 10*6/MM3 (ref 4.14–5.8)
SODIUM SERPL-SCNC: 139 MMOL/L (ref 136–145)
TSH SERPL DL<=0.005 MIU/L-ACNC: 4.31 UIU/ML (ref 0.27–4.2)
WBC # BLD AUTO: 6.21 10*3/MM3 (ref 3.4–10.8)

## 2020-11-26 NOTE — PROGRESS NOTES
"Subjective   Darwin Sparks is a 80 y.o. male.     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      Chronic weakness from decreased mobility chronic pain radiculopathy symptoms  Patient's had a multitude of evaluations over the years nonetheless he has had a decrease in his mobility  He is a little resistant for any further work-up or evaluation now but after some discussion he agreed to see pain management to see if there is any other options for quality life and improve mobility      Hypertension  Pertinent negatives include no chest pain, palpitations or shortness of breath.        /67   Pulse 64   Temp 97.1 °F (36.2 °C) (Temporal)   Ht 170.2 cm (67\")   Wt 82.6 kg (182 lb)   SpO2 95%   BMI 28.51 kg/m²       The following portions of the patient's history were reviewed and updated as appropriate: allergies, current medications, past family history, past medical history, past social history, past surgical history and problem list.    Review of Systems   Constitutional: Negative for fatigue and fever.   HENT: Negative.  Negative for trouble swallowing.    Eyes: Negative.    Respiratory: Negative.  Negative for cough and shortness of breath.    Cardiovascular: Negative for chest pain, palpitations and leg swelling.   Gastrointestinal: Negative.  Negative for abdominal pain.   Genitourinary: Negative.    Musculoskeletal: Negative.    Skin: Negative.    Neurological: Negative.  Negative for dizziness and confusion.   Psychiatric/Behavioral: Negative.        Objective   Physical Exam  Vitals signs " reviewed.   Constitutional:       General: He is not in acute distress.     Appearance: Normal appearance. He is well-developed and normal weight. He is not diaphoretic.      Comments: Appears well pleasant in a wheelchair which is new   HENT:      Head: Normocephalic and atraumatic.      Nose: Nose normal.   Eyes:      Conjunctiva/sclera: Conjunctivae normal.      Pupils: Pupils are equal, round, and reactive to light.   Neck:      Musculoskeletal: Neck supple.      Vascular: No JVD.   Cardiovascular:      Rate and Rhythm: Normal rate and regular rhythm.      Heart sounds: Normal heart sounds. No murmur.   Pulmonary:      Effort: Pulmonary effort is normal. No respiratory distress.      Breath sounds: Normal breath sounds. No wheezing.   Abdominal:      General: Bowel sounds are normal. There is no distension.      Palpations: Abdomen is soft. There is no mass.      Tenderness: There is no abdominal tenderness. There is no guarding.      Hernia: No hernia is present.   Musculoskeletal:         General: No tenderness.   Lymphadenopathy:      Cervical: No cervical adenopathy.   Skin:     General: Skin is warm and dry.   Neurological:      Mental Status: He is alert and oriented to person, place, and time.   Psychiatric:         Mood and Affect: Mood normal.         Behavior: Behavior normal.         Thought Content: Thought content normal.         Judgment: Judgment normal.           Assessment/Plan   Diagnoses and all orders for this visit:    1. Essential hypertension (Primary)    2. Mixed hyperlipidemia  -     CBC & Differential  -     Comprehensive Metabolic Panel  -     TSH    3. Adult hypothyroidism  -     CBC & Differential  -     Comprehensive Metabolic Panel  -     TSH    4. Anxiety  -     CBC & Differential  -     Comprehensive Metabolic Panel  -     TSH    5. Benzodiazepine dependence (CMS/HCC)  -     CBC & Differential  -     Comprehensive Metabolic Panel  -     TSH    6. Lumbar radiculopathy  -     CBC &  Differential  -     Comprehensive Metabolic Panel  -     TSH  -     Ambulatory Referral to Pain Management    7. Chronic bilateral low back pain with left-sided sciatica  -     Ambulatory Referral to Pain Management    8. Immobility syndrome  Comments:  Decreased mobility due to chronic radiculopathy pain lumbar        Encouraged physical therapy strongly advised therapy he declines at this time but he is willing to go to pain management not for narcotic but to see if there is any other interventions that can help him with pain  Range of motion exercises pool therapy recommended    Continue to wean clonazepam        Patient Instructions   Discharge instructions    Pain management Dr. Rodriguez to discuss options  Focus on increasing mobility  Strength in lower extremities  Consider physical therapy    Follow-up in 6 months  kn95 hi filtration mask  Covid vaccine when available

## 2020-12-18 RX ORDER — LISINOPRIL 10 MG/1
TABLET ORAL
Qty: 30 TABLET | Refills: 4 | Status: SHIPPED | OUTPATIENT
Start: 2020-12-18 | End: 2022-05-11 | Stop reason: SDUPTHER

## 2020-12-18 RX ORDER — MIRTAZAPINE 15 MG/1
TABLET, FILM COATED ORAL
Qty: 30 TABLET | Refills: 0 | Status: SHIPPED | OUTPATIENT
Start: 2020-12-18 | End: 2021-01-18

## 2020-12-18 RX ORDER — LISINOPRIL 5 MG/1
TABLET ORAL
Qty: 30 TABLET | Refills: 5 | Status: SHIPPED | OUTPATIENT
Start: 2020-12-18 | End: 2022-05-11 | Stop reason: SDUPTHER

## 2020-12-18 RX ORDER — ATENOLOL 50 MG/1
TABLET ORAL
Qty: 30 TABLET | Refills: 4 | Status: SHIPPED | OUTPATIENT
Start: 2020-12-18 | End: 2022-05-11 | Stop reason: SDUPTHER

## 2020-12-18 RX ORDER — LEVOTHYROXINE SODIUM 0.05 MG/1
TABLET ORAL
Qty: 30 TABLET | Refills: 0 | Status: SHIPPED | OUTPATIENT
Start: 2020-12-18 | End: 2021-01-18

## 2020-12-18 RX ORDER — SIMVASTATIN 40 MG
TABLET ORAL
Qty: 30 TABLET | Refills: 4 | Status: SHIPPED | OUTPATIENT
Start: 2020-12-18 | End: 2022-05-11 | Stop reason: SDUPTHER

## 2021-01-18 RX ORDER — LEVOTHYROXINE SODIUM 0.05 MG/1
TABLET ORAL
Qty: 30 TABLET | Refills: 5 | Status: SHIPPED | OUTPATIENT
Start: 2021-01-18 | End: 2022-05-11

## 2021-01-18 RX ORDER — POTASSIUM CHLORIDE 1500 MG/1
TABLET, EXTENDED RELEASE ORAL
Qty: 90 TABLET | Refills: 0 | Status: SHIPPED | OUTPATIENT
Start: 2021-01-18 | End: 2021-04-15

## 2021-01-18 RX ORDER — MIRTAZAPINE 15 MG/1
TABLET, FILM COATED ORAL
Qty: 30 TABLET | Refills: 5 | Status: SHIPPED | OUTPATIENT
Start: 2021-01-18 | End: 2021-01-28

## 2021-01-28 ENCOUNTER — OFFICE VISIT (OUTPATIENT)
Dept: FAMILY MEDICINE CLINIC | Facility: CLINIC | Age: 81
End: 2021-01-28

## 2021-01-28 VITALS
BODY MASS INDEX: 28.41 KG/M2 | OXYGEN SATURATION: 96 % | SYSTOLIC BLOOD PRESSURE: 133 MMHG | HEIGHT: 67 IN | WEIGHT: 181 LBS | TEMPERATURE: 97.3 F | DIASTOLIC BLOOD PRESSURE: 77 MMHG | HEART RATE: 63 BPM

## 2021-01-28 DIAGNOSIS — R10.31 RIGHT LOWER QUADRANT ABDOMINAL PAIN: Primary | ICD-10-CM

## 2021-01-28 DIAGNOSIS — E78.2 MIXED HYPERLIPIDEMIA: ICD-10-CM

## 2021-01-28 DIAGNOSIS — K59.09 OTHER CONSTIPATION: ICD-10-CM

## 2021-01-28 DIAGNOSIS — E03.9 ADULT HYPOTHYROIDISM: ICD-10-CM

## 2021-01-28 PROCEDURE — 99214 OFFICE O/P EST MOD 30 MIN: CPT | Performed by: NURSE PRACTITIONER

## 2021-01-28 RX ORDER — AMOXICILLIN AND CLAVULANATE POTASSIUM 875; 125 MG/1; MG/1
1 TABLET, FILM COATED ORAL 2 TIMES DAILY
Qty: 20 TABLET | Refills: 0 | Status: SHIPPED | OUTPATIENT
Start: 2021-01-28 | End: 2021-07-19

## 2021-01-28 NOTE — PATIENT INSTRUCTIONS
Discharge instructions  Increase water intake to help constipation  Stool softener several weeks MiraLAX daily large glass of water  Unlikely diverticulitis but cannot rule out today so will cover you with Augmentin for 10 days  Eat lightly the next several   Days,  Increase abdominal pain fever chills vomiting emergency room otherwise see gastroenterology for work-up for colonoscopy  As well as a CT abdomen any increasing abdominal pain  Any severe or persistent abdominal pain fever chills nausea vomiting as above emergency room immediately

## 2021-01-28 NOTE — PROGRESS NOTES
"Chief Complaint  Hip Pain (x 1 mo, right hip) and Constipation (x 1 wk )    Subjective          Darwin Sparks presents to [facility] for   Pleasant gentleman here today follow-up essential hypertension controlled 133/77  Hyperlipidemia is compliant with his statin hypothyroid takes replacement    Is having some constipation in the right lower quadrant intermittent pain no pain today no fever no chills no coffee-ground emesis or black tarry stools  He has no night sweats or unexplained weight loss its been sometime since his last colonoscopy  Sometimes some pain in his upper abdomen  He actually has no hip pain and denies any hip pain denies any low back pain and no pain to his groin or any pain with range of motion of the hip or bony pain.        Hip Pain     Constipation        Objective   Vital Signs:   /77   Pulse 63   Temp 97.3 °F (36.3 °C) (Temporal)   Ht 170.2 cm (67.01\")   Wt 82.1 kg (181 lb)   SpO2 96%   BMI 28.34 kg/m²     Physical Exam  Vitals signs reviewed.   Constitutional:       General: He is not in acute distress.     Appearance: He is well-developed. He is not diaphoretic.   HENT:      Head: Normocephalic and atraumatic.      Nose: Nose normal.   Eyes:      Conjunctiva/sclera: Conjunctivae normal.      Pupils: Pupils are equal, round, and reactive to light.   Neck:      Musculoskeletal: Neck supple.      Vascular: No JVD.   Cardiovascular:      Rate and Rhythm: Normal rate and regular rhythm.      Heart sounds: Normal heart sounds. No murmur.   Pulmonary:      Effort: Pulmonary effort is normal. No respiratory distress.      Breath sounds: Normal breath sounds. No wheezing.   Abdominal:      General: Bowel sounds are normal. There is no distension.      Palpations: Abdomen is soft. There is no mass.      Tenderness: There is no abdominal tenderness. There is no guarding.      Hernia: No hernia is present.   Musculoskeletal:         General: No tenderness. "   Lymphadenopathy:      Cervical: No cervical adenopathy.   Skin:     General: Skin is warm and dry.   Neurological:      Mental Status: He is alert and oriented to person, place, and time.   Psychiatric:         Behavior: Behavior normal.         Thought Content: Thought content normal.         Judgment: Judgment normal.        Result Review :                 Assessment and Plan    Problem List Items Addressed This Visit     None          Follow Up   No follow-ups on file.  Patient was given instructions and counseling regarding his condition or for health maintenance advice. Please see specific information pulled into the AVS if appropriate.   Patient has no acute pain today no evidence of any acute infection or diverticulitis and appendicitis  He will need to see gastroenterology and likely need a colonoscopy and CT abdomen pelvis discussed with patient for now I will let gastro decide which is the best test to work him up.  Make sure he gets in the gastro reasonable amount of time the next couple weeks any difficulties he should notify me immediately as well  Follow-up with me after his evaluation      Any severe pain fever chills weakness emergency room continue social distancing high risk  Continue antihypertensives  Continue present medication  See plan for constipation refer to GI

## 2021-01-29 LAB
ALBUMIN SERPL-MCNC: 3.7 G/DL (ref 3.5–5.2)
ALBUMIN/GLOB SERPL: 1.7 G/DL
ALP SERPL-CCNC: 72 U/L (ref 39–117)
ALT SERPL-CCNC: 26 U/L (ref 1–41)
APPEARANCE UR: CLEAR
AST SERPL-CCNC: 29 U/L (ref 1–40)
BASOPHILS # BLD AUTO: 0.03 10*3/MM3 (ref 0–0.2)
BASOPHILS NFR BLD AUTO: 0.7 % (ref 0–1.5)
BILIRUB SERPL-MCNC: 0.5 MG/DL (ref 0–1.2)
BILIRUB UR QL STRIP: NEGATIVE
BUN SERPL-MCNC: 13 MG/DL (ref 8–23)
BUN/CREAT SERPL: 16.3 (ref 7–25)
CALCIUM SERPL-MCNC: 9.1 MG/DL (ref 8.6–10.5)
CHLORIDE SERPL-SCNC: 104 MMOL/L (ref 98–107)
CHOLEST SERPL-MCNC: 164 MG/DL (ref 0–200)
CO2 SERPL-SCNC: 24 MMOL/L (ref 22–29)
COLOR UR: ABNORMAL
CREAT SERPL-MCNC: 0.8 MG/DL (ref 0.76–1.27)
EOSINOPHIL # BLD AUTO: 0.09 10*3/MM3 (ref 0–0.4)
EOSINOPHIL NFR BLD AUTO: 2 % (ref 0.3–6.2)
ERYTHROCYTE [DISTWIDTH] IN BLOOD BY AUTOMATED COUNT: 12 % (ref 12.3–15.4)
GLOBULIN SER CALC-MCNC: 2.2 GM/DL
GLUCOSE SERPL-MCNC: 90 MG/DL (ref 65–99)
GLUCOSE UR QL: NEGATIVE
HCT VFR BLD AUTO: 41.7 % (ref 37.5–51)
HDLC SERPL-MCNC: 61 MG/DL (ref 40–60)
HGB BLD-MCNC: 14.1 G/DL (ref 13–17.7)
HGB UR QL STRIP: NEGATIVE
IMM GRANULOCYTES # BLD AUTO: 0.01 10*3/MM3 (ref 0–0.05)
IMM GRANULOCYTES NFR BLD AUTO: 0.2 % (ref 0–0.5)
KETONES UR QL STRIP: ABNORMAL
LDLC SERPL CALC-MCNC: 84 MG/DL (ref 0–100)
LDLC/HDLC SERPL: 1.33 {RATIO}
LEUKOCYTE ESTERASE UR QL STRIP: NEGATIVE
LYMPHOCYTES # BLD AUTO: 1.65 10*3/MM3 (ref 0.7–3.1)
LYMPHOCYTES NFR BLD AUTO: 36.5 % (ref 19.6–45.3)
MCH RBC QN AUTO: 35 PG (ref 26.6–33)
MCHC RBC AUTO-ENTMCNC: 33.8 G/DL (ref 31.5–35.7)
MCV RBC AUTO: 103.5 FL (ref 79–97)
MONOCYTES # BLD AUTO: 0.43 10*3/MM3 (ref 0.1–0.9)
MONOCYTES NFR BLD AUTO: 9.5 % (ref 5–12)
NEUTROPHILS # BLD AUTO: 2.31 10*3/MM3 (ref 1.7–7)
NEUTROPHILS NFR BLD AUTO: 51.1 % (ref 42.7–76)
NITRITE UR QL STRIP: NEGATIVE
NRBC BLD AUTO-RTO: 0 /100 WBC (ref 0–0.2)
PH UR STRIP: 5.5 [PH] (ref 5–8)
PLATELET # BLD AUTO: 191 10*3/MM3 (ref 140–450)
POTASSIUM SERPL-SCNC: 4.2 MMOL/L (ref 3.5–5.2)
PROT SERPL-MCNC: 5.9 G/DL (ref 6–8.5)
PROT UR QL STRIP: NEGATIVE
RBC # BLD AUTO: 4.03 10*6/MM3 (ref 4.14–5.8)
SODIUM SERPL-SCNC: 140 MMOL/L (ref 136–145)
SP GR UR: 1.02 (ref 1–1.03)
TRIGL SERPL-MCNC: 108 MG/DL (ref 0–150)
TSH SERPL DL<=0.005 MIU/L-ACNC: 7.8 UIU/ML (ref 0.27–4.2)
UROBILINOGEN UR STRIP-MCNC: ABNORMAL MG/DL
VLDLC SERPL CALC-MCNC: 19 MG/DL (ref 5–40)
WBC # BLD AUTO: 4.52 10*3/MM3 (ref 3.4–10.8)

## 2021-02-09 RX ORDER — MELOXICAM 15 MG/1
TABLET ORAL
Qty: 90 TABLET | Refills: 0 | Status: SHIPPED | OUTPATIENT
Start: 2021-02-09 | End: 2021-07-12

## 2021-03-03 ENCOUNTER — OFFICE VISIT (OUTPATIENT)
Dept: GASTROENTEROLOGY | Facility: CLINIC | Age: 81
End: 2021-03-03

## 2021-03-03 VITALS — BODY MASS INDEX: 28.16 KG/M2 | WEIGHT: 179.4 LBS | TEMPERATURE: 97.4 F | HEIGHT: 67 IN

## 2021-03-03 DIAGNOSIS — K59.00 CONSTIPATION, UNSPECIFIED CONSTIPATION TYPE: Primary | ICD-10-CM

## 2021-03-03 DIAGNOSIS — R10.31 RIGHT LOWER QUADRANT ABDOMINAL PAIN: ICD-10-CM

## 2021-03-03 PROCEDURE — 99203 OFFICE O/P NEW LOW 30 MIN: CPT | Performed by: INTERNAL MEDICINE

## 2021-03-03 NOTE — PROGRESS NOTES
Chief Complaint   Patient presents with   • Abdominal Pain     Right Lower quadrent pain   • Constipation        Darwin Sparks is a  80 y.o. male here for an initial visit for abdominal pain, constipation.    HPI this 80-year-old white male patient of James Epley APRN presents with recent complaints of right lower quadrant abdominal pain as well as constipation.  He was seen by his primary care tender in GI late January of this year and after taking of course of antibiotics did show symptomatic relief.  He has used stool softeners, fiber, and laxatives to control his constipation.  Currently he is having bowel movements daily or every other day.  He had undergone previous colonoscopic examination through this office last in 2014 for screening purposes with reported negative findings.  He denies any melena or bright red blood per rectum.  He would like to have further evaluation and I will start with a CT scan of the abdomen and pelvis.  He admits to immobility syndrome to some degree because of significant spinal problems.  He uses bilateral canes to ambulate.    Past Medical History:   Diagnosis Date   • Hyperlipidemia    • Hypertension    • Hypothyroidism        Current Outpatient Medications   Medication Sig Dispense Refill   • amoxicillin-clavulanate (Augmentin) 875-125 MG per tablet Take 1 tablet by mouth 2 (Two) Times a Day. 20 tablet 0   • atenolol (TENORMIN) 50 MG tablet TAKE ONE TABLET BY MOUTH DAILY 30 tablet 4   • Cholecalciferol (VITAMIN D) 1000 UNITS tablet Take  by mouth.     • KLOR-CON 20 MEQ CR tablet TAKE ONE TABLET BY MOUTH DAILY 90 tablet 0   • levothyroxine (SYNTHROID, LEVOTHROID) 25 MCG tablet TAKE ONE TABLET BY MOUTH DAILY 30 tablet 5   • levothyroxine (SYNTHROID, LEVOTHROID) 50 MCG tablet TAKE ONE TABLET BY MOUTH DAILY FOR THYROID 30 tablet 5   • lisinopril (PRINIVIL,ZESTRIL) 10 MG tablet TAKE ONE TABLET BY MOUTH DAILY FOR BLOOD PRESSURE 30 tablet 4   • lisinopril (PRINIVIL,ZESTRIL) 5 MG  tablet TAKE ONE TABLET BY MOUTH DAILY FOR BLOOD PRESSURE 30 tablet 5   • Misc Natural Products (OSTEO BI-FLEX ADV TRIPLE ST) tablet Take  by mouth.     • MULTIPLE VITAMINS ESSENTIAL PO Take  by mouth.     • niacin 250 MG tablet Take 1 tablet by mouth Daily With Breakfast. 30 tablet 6   • nystatin-triamcinolone (MYCOLOG) 578585-9.1 UNIT/GM-% ointment APPLY TO APPROPRIATE AREA(S) AS DIRECTED EVERY 12 HOURS SPARINGLY UNTIL RESOVED 60 g 0   • simvastatin (ZOCOR) 40 MG tablet TAKE ONE TABLET BY MOUTH ONCE NIGHTLY 30 tablet 4   • triamcinolone (KENALOG) 0.1 % cream Apply  topically to the appropriate area as directed 2 (Two) Times a Day. Until better avoid chronic use 60 g 1   • meloxicam (MOBIC) 15 MG tablet TAKE ONE TABLET BY MOUTH DAILY WITH FOOD AND WATER 90 tablet 0   • omeprazole (priLOSEC) 20 MG capsule Take 1 capsule by mouth Daily. For gastric protection when taking anti-inflammatory 30 capsule 5     No current facility-administered medications for this visit.        PRN Meds:.    Allergies   Allergen Reactions   • Allantoin-Pramoxine    • Milk-Related Compounds Other (See Comments)     Constipation   • Pregabalin      Other reaction(s): Visual Disturbance   • Neomycin-Bacitracin Zn-Polymyx Rash       Social History     Socioeconomic History   • Marital status:      Spouse name: Not on file   • Number of children: Not on file   • Years of education: Not on file   • Highest education level: Not on file   Tobacco Use   • Smoking status: Former Smoker   • Smokeless tobacco: Never Used   Substance and Sexual Activity   • Alcohol use: Yes   • Drug use: Not Currently     Types: Marijuana     Comment: used in the 1970's   • Sexual activity: Defer       Family History   Problem Relation Age of Onset   • Diabetes Mother    • Hypertension Mother    • Bipolar disorder Mother    • Diabetes Father    • Hypertension Father        Review of Systems   Constitutional: Negative for activity change, appetite change, fatigue  and unexpected weight change.   HENT: Negative for congestion, facial swelling, sore throat, trouble swallowing and voice change.    Eyes: Negative for photophobia and visual disturbance.   Respiratory: Negative for cough and choking.    Cardiovascular: Negative for chest pain.   Gastrointestinal: Positive for abdominal pain and constipation. Negative for abdominal distention, anal bleeding, blood in stool, diarrhea, nausea, rectal pain and vomiting.   Endocrine: Negative for polyphagia.   Musculoskeletal: Negative for arthralgias, gait problem and joint swelling.   Skin: Negative for color change, pallor and rash.   Allergic/Immunologic: Negative for food allergies.   Neurological: Negative for speech difficulty and headaches.   Hematological: Does not bruise/bleed easily.   Psychiatric/Behavioral: Negative for agitation, confusion and sleep disturbance.       Vitals:    03/03/21 1352   Temp: 97.4 °F (36.3 °C)       Physical Exam  Vitals signs reviewed.   Constitutional:       General: He is not in acute distress.     Appearance: He is well-developed. He is not diaphoretic.   HENT:      Head: Normocephalic.      Mouth/Throat:      Pharynx: No oropharyngeal exudate.   Eyes:      General: No scleral icterus.     Conjunctiva/sclera: Conjunctivae normal.   Neck:      Musculoskeletal: Normal range of motion.      Thyroid: No thyromegaly.   Cardiovascular:      Rate and Rhythm: Normal rate and regular rhythm.      Heart sounds: No murmur.   Pulmonary:      Effort: No respiratory distress.      Breath sounds: Normal breath sounds. No wheezing or rales.   Abdominal:      General: Bowel sounds are normal. There is no distension.      Palpations: Abdomen is soft. There is no mass.      Tenderness: There is no abdominal tenderness.   Musculoskeletal: Normal range of motion.         General: No tenderness.      Comments: Uses bilateral canes for ambulation   Lymphadenopathy:      Cervical: No cervical adenopathy.   Skin:      General: Skin is warm and dry.      Findings: No erythema or rash.   Neurological:      Mental Status: He is alert and oriented to person, place, and time.   Psychiatric:         Behavior: Behavior normal.         ASSESSMENT   #1 constipation: Improving with use of stool softener, fiber, and laxatives as needed  #2 right lower quadrant abdominal pain: Etiology not well-defined      PLAN  Schedule CT scan of the abdomen and pelvis with contrast  We will call patient with results, pending those findings in his status may entertain colonoscopic examination.      ICD-10-CM ICD-9-CM   1. Constipation, unspecified constipation type  K59.00 564.00   2. Right lower quadrant abdominal pain  R10.31 789.03

## 2021-03-17 ENCOUNTER — HOSPITAL ENCOUNTER (OUTPATIENT)
Dept: CT IMAGING | Facility: HOSPITAL | Age: 81
Discharge: HOME | End: 2021-03-17
Admitting: INTERNAL MEDICINE

## 2021-03-17 DIAGNOSIS — R10.31 RIGHT LOWER QUADRANT ABDOMINAL PAIN: ICD-10-CM

## 2021-03-17 DIAGNOSIS — K59.00 CONSTIPATION, UNSPECIFIED CONSTIPATION TYPE: ICD-10-CM

## 2021-03-17 PROCEDURE — 25010000002 IOPAMIDOL 61 % SOLUTION: Performed by: INTERNAL MEDICINE

## 2021-03-17 PROCEDURE — 0 DIATRIZOATE MEGLUMINE & SODIUM PER 1 ML: Performed by: INTERNAL MEDICINE

## 2021-03-17 PROCEDURE — 82565 ASSAY OF CREATININE: CPT

## 2021-03-17 PROCEDURE — 74177 CT ABD & PELVIS W/CONTRAST: CPT

## 2021-03-17 RX ADMIN — DIATRIZOATE MEGLUMINE AND DIATRIZOATE SODIUM 30 ML: 660; 100 LIQUID ORAL; RECTAL at 07:30

## 2021-03-17 RX ADMIN — IOPAMIDOL 85 ML: 612 INJECTION, SOLUTION INTRAVENOUS at 08:41

## 2021-03-18 LAB — CREAT BLDA-MCNC: 0.9 MG/DL (ref 0.6–1.3)

## 2021-04-15 RX ORDER — POTASSIUM CHLORIDE 1500 MG/1
TABLET, EXTENDED RELEASE ORAL
Qty: 90 TABLET | Refills: 0 | Status: SHIPPED | OUTPATIENT
Start: 2021-04-15 | End: 2022-05-11 | Stop reason: SDUPTHER

## 2021-04-20 ENCOUNTER — TELEPHONE (OUTPATIENT)
Dept: GASTROENTEROLOGY | Facility: CLINIC | Age: 81
End: 2021-04-20

## 2021-04-20 DIAGNOSIS — K59.00 CONSTIPATION, UNSPECIFIED CONSTIPATION TYPE: Primary | ICD-10-CM

## 2021-04-20 DIAGNOSIS — R93.89 ABNORMAL FINDING ON CT SCAN: ICD-10-CM

## 2021-04-20 NOTE — TELEPHONE ENCOUNTER
Called pt and per ct report advised it did show wall thickening of the terminal ileum, a mod sized hiatal hernia, and a mild fatty liver.     ADvised of Dr Michaels's recommendations.  Pt verb understanding and would like to proceed with c/s.  Advised someone from scheduling will be calling him to arrange.       Message sent to Dr Michaels to coisign case request.

## 2021-04-21 PROBLEM — R93.89 ABNORMAL FINDING ON CT SCAN: Status: ACTIVE | Noted: 2021-04-21

## 2021-04-21 PROBLEM — K59.00 CONSTIPATION: Status: ACTIVE | Noted: 2021-04-21

## 2021-04-27 ENCOUNTER — OFFICE VISIT (OUTPATIENT)
Dept: FAMILY MEDICINE CLINIC | Facility: CLINIC | Age: 81
End: 2021-04-27

## 2021-04-27 ENCOUNTER — HOSPITAL ENCOUNTER (OUTPATIENT)
Dept: GENERAL RADIOLOGY | Facility: HOSPITAL | Age: 81
Discharge: HOME | End: 2021-04-27

## 2021-04-27 VITALS
HEIGHT: 67 IN | WEIGHT: 172 LBS | OXYGEN SATURATION: 97 % | SYSTOLIC BLOOD PRESSURE: 134 MMHG | TEMPERATURE: 97.1 F | HEART RATE: 65 BPM | DIASTOLIC BLOOD PRESSURE: 78 MMHG | BODY MASS INDEX: 27 KG/M2

## 2021-04-27 DIAGNOSIS — M25.551 RIGHT HIP PAIN: ICD-10-CM

## 2021-04-27 DIAGNOSIS — R10.31 RIGHT LOWER QUADRANT ABDOMINAL PAIN: Primary | ICD-10-CM

## 2021-04-27 PROCEDURE — 73502 X-RAY EXAM HIP UNI 2-3 VIEWS: CPT

## 2021-04-27 PROCEDURE — 72100 X-RAY EXAM L-S SPINE 2/3 VWS: CPT

## 2021-04-27 PROCEDURE — 99214 OFFICE O/P EST MOD 30 MIN: CPT | Performed by: NURSE PRACTITIONER

## 2021-04-27 RX ORDER — AMOXICILLIN AND CLAVULANATE POTASSIUM 875; 125 MG/1; MG/1
1 TABLET, FILM COATED ORAL 2 TIMES DAILY
Qty: 20 TABLET | Refills: 0 | Status: SHIPPED | OUTPATIENT
Start: 2021-04-27 | End: 2021-07-19

## 2021-04-27 NOTE — PATIENT INSTRUCTIONS
Discharge instructions  Labs today then x-rays of your hip and back and pelvis    Light diet the next several days okay to try Linzess to discontinue if any problems increased diarrhea or abdominal pain  Augmentin as instructed  Follow-up next week if not improving  Keep your appointment with gastro for colonoscopy and keep your appointment with me as well to follow-up I want a Rose until his pain subsides to make sure were on top of the pain and listening to you.  As well as to make sure you need no other work-up that is not a referred pain etc.    Severe pain fever chills worsening symptoms from this point on saddlebag paresthesias incontinence or retention emergency room

## 2021-04-27 NOTE — PROGRESS NOTES
"Chief Complaint  Pain right side and back    Subjective          Darwin Sparks presents to [facility]  Pleasant gentleman complains of right lower abdominal pain right side pain above his pelvis.  Occasionally radiates down his leg some back pain but no new back pain mostly abdominal pain, no fever no chills he has had some intermittent constipation, he is taking a mix of stool softeners MiraLAX,, Senokot,, Metamucil, he is already seeing gastro for right lower quadrant pain he responded back in January to some Augmentin, if possible chronic or acute inflammation of his ileum, some diverticulosis and some fatty liver otherwise normal.  He has a colonoscopy scheduled next month.  Presently no vomiting no fever no chills been eating healthy lots of fiber really making some good changes and chronic changes with his diet.    He has a known chronic midline      Objective   Vital Signs:   /78   Pulse 65   Temp 97.1 °F (36.2 °C) (Temporal)   Ht 170.2 cm (67.01\")   Wt 78 kg (172 lb)   SpO2 97%   BMI 26.93 kg/m²     Physical Exam  Vitals reviewed.   Constitutional:       Appearance: He is well-developed.   HENT:      Head: Normocephalic.      Nose: Nose normal.   Eyes:      General: No scleral icterus.     Conjunctiva/sclera: Conjunctivae normal.      Pupils: Pupils are equal, round, and reactive to light.   Neck:      Thyroid: No thyromegaly.      Vascular: No JVD.   Cardiovascular:      Rate and Rhythm: Normal rate and regular rhythm.      Heart sounds: Normal heart sounds. No murmur heard.   No friction rub. No gallop.    Pulmonary:      Effort: Pulmonary effort is normal. No respiratory distress.      Breath sounds: Normal breath sounds. No stridor. No wheezing or rales.   Abdominal:      General: Bowel sounds are normal. There is no distension.      Palpations: Abdomen is soft.      Tenderness: There is no abdominal tenderness.      Comments: No hepatosplenomegaly, no " ascites,  Mild tenderness right mid and patient nontender clavicle tenderness left lower quadrant tenderness  Abdomen soft bowel sounds positive no distention no CVA tenderness no epigastric tenderness or right upper quadrant     Musculoskeletal:         General: No tenderness.      Cervical back: Neck supple.   Lymphadenopathy:      Cervical: No cervical adenopathy.   Skin:     General: Skin is warm and dry.      Findings: No erythema or rash.   Neurological:      Mental Status: He is alert and oriented to person, place, and time.      Deep Tendon Reflexes: Reflexes are normal and symmetric.   Psychiatric:         Behavior: Behavior normal.         Thought Content: Thought content normal.         Judgment: Judgment normal.        Result Review :                 Assessment and Plan    Diagnoses and all orders for this visit:    1. Right lower quadrant abdominal pain (Primary)  -     CBC & Differential  -     Comprehensive Metabolic Panel  -     Amylase  -     Urinalysis With Microscopic If Indicated (No Culture) - Urine, Clean Catch  -     XR Spine Lumbar 2 or 3 View  -     XR Hip With or Without Pelvis 2 - 3 View Right  -     Sedimentation Rate    2. Right hip pain  -     CBC & Differential  -     Comprehensive Metabolic Panel  -     Amylase  -     Urinalysis With Microscopic If Indicated (No Culture) - Urine, Clean Catch  -     XR Spine Lumbar 2 or 3 View  -     XR Hip With or Without Pelvis 2 - 3 View Right  -     Sedimentation Rate    Other orders  -     amoxicillin-clavulanate (Augmentin) 875-125 MG per tablet; Take 1 tablet by mouth 2 (Two) Times a Day.  Dispense: 20 tablet; Refill: 0        Follow Up   No follow-ups on file.  Patient was given instructions and counseling regarding his condition or for health maintenance advice. Please see specific information pulled into the AVS if appropriate.         Patient to return to office promptly any new bone pain or joint pain he has history of prostate cancer has  none today  Mostly right lower quadrant and lateral abdominal pain without dysuria frequency urgency or flank pain  He reports similar, pain in January responded to Augmentin he is asking for the same he has appointment with GI for colonoscopy soon  Chronic constipation, alternates between  MiraLAX, Metamucil, Senokot    He is pointing to pain laterally above his iliac crest I will go ahead and include a pelvis x-ray although he is having no definite bony pain  Denies any back pain radiating to his abdomen    Low-dose linzses  Recheck next week severe pain weakness fever chills emergency room

## 2021-04-28 LAB
ALBUMIN SERPL-MCNC: 3.9 G/DL (ref 3.5–5.2)
ALBUMIN/GLOB SERPL: 1.8 G/DL
ALP SERPL-CCNC: 97 U/L (ref 39–117)
ALT SERPL-CCNC: 34 U/L (ref 1–41)
AMYLASE SERPL-CCNC: 47 U/L (ref 28–100)
APPEARANCE UR: CLEAR
AST SERPL-CCNC: 49 U/L (ref 1–40)
BASOPHILS # BLD AUTO: ABNORMAL 10*3/UL
BASOPHILS # BLD MANUAL: 0.04 10*3/MM3 (ref 0–0.2)
BASOPHILS NFR BLD MANUAL: 1 % (ref 0–1.5)
BILIRUB SERPL-MCNC: 0.8 MG/DL (ref 0–1.2)
BILIRUB UR QL STRIP: NEGATIVE
BUN SERPL-MCNC: 9 MG/DL (ref 8–23)
BUN/CREAT SERPL: 9.6 (ref 7–25)
CALCIUM SERPL-MCNC: 9.8 MG/DL (ref 8.6–10.5)
CHLORIDE SERPL-SCNC: 104 MMOL/L (ref 98–107)
CO2 SERPL-SCNC: 26.4 MMOL/L (ref 22–29)
COLOR UR: YELLOW
CREAT SERPL-MCNC: 0.94 MG/DL (ref 0.76–1.27)
DIFFERENTIAL COMMENT: NORMAL
EOSINOPHIL # BLD AUTO: ABNORMAL 10*3/UL
EOSINOPHIL # BLD MANUAL: 0.14 10*3/MM3 (ref 0–0.4)
EOSINOPHIL NFR BLD AUTO: ABNORMAL %
EOSINOPHIL NFR BLD MANUAL: 3.1 % (ref 0.3–6.2)
ERYTHROCYTE [DISTWIDTH] IN BLOOD BY AUTOMATED COUNT: 14.1 % (ref 12.3–15.4)
ERYTHROCYTE [SEDIMENTATION RATE] IN BLOOD BY WESTERGREN METHOD: 2 MM/HR (ref 0–20)
GLOBULIN SER CALC-MCNC: 2.2 GM/DL
GLUCOSE SERPL-MCNC: 95 MG/DL (ref 65–99)
GLUCOSE UR QL: NEGATIVE
HCT VFR BLD AUTO: 46.1 % (ref 37.5–51)
HGB BLD-MCNC: 15.5 G/DL (ref 13–17.7)
HGB UR QL STRIP: NEGATIVE
KETONES UR QL STRIP: NEGATIVE
LEUKOCYTE ESTERASE UR QL STRIP: NEGATIVE
LYMPHOCYTES # BLD AUTO: ABNORMAL 10*3/UL
LYMPHOCYTES # BLD MANUAL: 1.49 10*3/MM3 (ref 0.7–3.1)
LYMPHOCYTES NFR BLD AUTO: ABNORMAL %
LYMPHOCYTES NFR BLD MANUAL: 34 % (ref 19.6–45.3)
MCH RBC QN AUTO: 35.7 PG (ref 26.6–33)
MCHC RBC AUTO-ENTMCNC: 33.6 G/DL (ref 31.5–35.7)
MCV RBC AUTO: 106.2 FL (ref 79–97)
MONOCYTES # BLD MANUAL: 0.36 10*3/MM3 (ref 0.1–0.9)
MONOCYTES NFR BLD AUTO: ABNORMAL %
MONOCYTES NFR BLD MANUAL: 8.2 % (ref 5–12)
NEUTROPHILS # BLD MANUAL: 2.35 10*3/MM3 (ref 1.7–7)
NEUTROPHILS NFR BLD AUTO: ABNORMAL %
NEUTROPHILS NFR BLD MANUAL: 53.6 % (ref 42.7–76)
NITRITE UR QL STRIP: NEGATIVE
PH UR STRIP: 7.5 [PH] (ref 5–8)
PLATELET # BLD AUTO: 245 10*3/MM3 (ref 140–450)
PLATELET BLD QL SMEAR: NORMAL
POTASSIUM SERPL-SCNC: 4.6 MMOL/L (ref 3.5–5.2)
PROT SERPL-MCNC: 6.1 G/DL (ref 6–8.5)
PROT UR QL STRIP: ABNORMAL
RBC # BLD AUTO: 4.34 10*6/MM3 (ref 4.14–5.8)
RBC MORPH BLD: NORMAL
SODIUM SERPL-SCNC: 143 MMOL/L (ref 136–145)
SP GR UR: 1.02 (ref 1–1.03)
UROBILINOGEN UR STRIP-MCNC: ABNORMAL MG/DL
WBC # BLD AUTO: 4.39 10*3/MM3 (ref 3.4–10.8)

## 2021-05-01 LAB
HCV AB S/CO SERPL IA: <0.1 S/CO RATIO (ref 0–0.9)
Lab: NORMAL
WRITTEN AUTHORIZATION: NORMAL

## 2021-05-04 ENCOUNTER — OFFICE VISIT (OUTPATIENT)
Dept: FAMILY MEDICINE CLINIC | Facility: CLINIC | Age: 81
End: 2021-05-04

## 2021-05-04 VITALS
BODY MASS INDEX: 27 KG/M2 | HEIGHT: 67 IN | DIASTOLIC BLOOD PRESSURE: 84 MMHG | SYSTOLIC BLOOD PRESSURE: 128 MMHG | TEMPERATURE: 97.5 F | HEART RATE: 61 BPM | OXYGEN SATURATION: 98 % | WEIGHT: 172 LBS

## 2021-05-04 DIAGNOSIS — G89.29 CHRONIC MIDLINE LOW BACK PAIN WITHOUT SCIATICA: Primary | ICD-10-CM

## 2021-05-04 DIAGNOSIS — K59.00 CONSTIPATION, UNSPECIFIED CONSTIPATION TYPE: ICD-10-CM

## 2021-05-04 DIAGNOSIS — M54.50 CHRONIC MIDLINE LOW BACK PAIN WITHOUT SCIATICA: Primary | ICD-10-CM

## 2021-05-04 DIAGNOSIS — M15.9 PRIMARY OSTEOARTHRITIS INVOLVING MULTIPLE JOINTS: ICD-10-CM

## 2021-05-04 DIAGNOSIS — M41.9 SCOLIOSIS OF LUMBAR SPINE, UNSPECIFIED SCOLIOSIS TYPE: ICD-10-CM

## 2021-05-04 PROCEDURE — 99213 OFFICE O/P EST LOW 20 MIN: CPT | Performed by: NURSE PRACTITIONER

## 2021-05-12 PROBLEM — M41.9 SCOLIOSIS OF LUMBAR SPINE: Status: ACTIVE | Noted: 2021-05-12

## 2021-05-24 RX ORDER — LINACLOTIDE 72 UG/1
CAPSULE, GELATIN COATED ORAL
Qty: 30 CAPSULE | Refills: 3 | Status: SHIPPED | OUTPATIENT
Start: 2021-05-24 | End: 2023-01-12 | Stop reason: ALTCHOICE

## 2021-05-27 ENCOUNTER — HOSPITAL ENCOUNTER (OUTPATIENT)
Facility: HOSPITAL | Age: 81
Setting detail: HOSPITAL OUTPATIENT SURGERY
Discharge: HOME | End: 2021-05-27
Attending: INTERNAL MEDICINE | Admitting: INTERNAL MEDICINE

## 2021-05-27 ENCOUNTER — ANESTHESIA EVENT (OUTPATIENT)
Dept: GASTROENTEROLOGY | Facility: HOSPITAL | Age: 81
End: 2021-05-27

## 2021-05-27 ENCOUNTER — ANESTHESIA (OUTPATIENT)
Dept: GASTROENTEROLOGY | Facility: HOSPITAL | Age: 81
End: 2021-05-27

## 2021-05-27 VITALS
SYSTOLIC BLOOD PRESSURE: 154 MMHG | HEART RATE: 73 BPM | DIASTOLIC BLOOD PRESSURE: 91 MMHG | RESPIRATION RATE: 16 BRPM | WEIGHT: 175 LBS | HEIGHT: 67 IN | TEMPERATURE: 97.9 F | BODY MASS INDEX: 27.47 KG/M2 | OXYGEN SATURATION: 98 %

## 2021-05-27 PROCEDURE — 45378 DIAGNOSTIC COLONOSCOPY: CPT | Performed by: INTERNAL MEDICINE

## 2021-05-27 PROCEDURE — 25010000002 PROPOFOL 10 MG/ML EMULSION: Performed by: ANESTHESIOLOGY

## 2021-05-27 PROCEDURE — S0260 H&P FOR SURGERY: HCPCS | Performed by: INTERNAL MEDICINE

## 2021-05-27 RX ORDER — PROMETHAZINE HYDROCHLORIDE 25 MG/1
25 SUPPOSITORY RECTAL ONCE AS NEEDED
Status: DISCONTINUED | OUTPATIENT
Start: 2021-05-27 | End: 2021-05-27 | Stop reason: HOSPADM

## 2021-05-27 RX ORDER — PROMETHAZINE HYDROCHLORIDE 25 MG/1
25 TABLET ORAL ONCE AS NEEDED
Status: DISCONTINUED | OUTPATIENT
Start: 2021-05-27 | End: 2021-05-27 | Stop reason: HOSPADM

## 2021-05-27 RX ORDER — LIDOCAINE HYDROCHLORIDE 20 MG/ML
INJECTION, SOLUTION INFILTRATION; PERINEURAL AS NEEDED
Status: DISCONTINUED | OUTPATIENT
Start: 2021-05-27 | End: 2021-05-27 | Stop reason: SURG

## 2021-05-27 RX ORDER — SODIUM CHLORIDE, SODIUM LACTATE, POTASSIUM CHLORIDE, CALCIUM CHLORIDE 600; 310; 30; 20 MG/100ML; MG/100ML; MG/100ML; MG/100ML
1000 INJECTION, SOLUTION INTRAVENOUS CONTINUOUS
Status: DISCONTINUED | OUTPATIENT
Start: 2021-05-27 | End: 2021-05-27 | Stop reason: HOSPADM

## 2021-05-27 RX ORDER — PROPOFOL 10 MG/ML
VIAL (ML) INTRAVENOUS CONTINUOUS PRN
Status: DISCONTINUED | OUTPATIENT
Start: 2021-05-27 | End: 2021-05-27 | Stop reason: SURG

## 2021-05-27 RX ORDER — SODIUM CHLORIDE 0.9 % (FLUSH) 0.9 %
10 SYRINGE (ML) INJECTION AS NEEDED
Status: DISCONTINUED | OUTPATIENT
Start: 2021-05-27 | End: 2021-05-27 | Stop reason: HOSPADM

## 2021-05-27 RX ADMIN — SODIUM CHLORIDE, POTASSIUM CHLORIDE, SODIUM LACTATE AND CALCIUM CHLORIDE 1000 ML: 600; 310; 30; 20 INJECTION, SOLUTION INTRAVENOUS at 08:01

## 2021-05-27 RX ADMIN — PROPOFOL 200 MCG/KG/MIN: 10 INJECTION, EMULSION INTRAVENOUS at 08:52

## 2021-05-27 RX ADMIN — LIDOCAINE HYDROCHLORIDE 60 MG: 20 INJECTION, SOLUTION INFILTRATION; PERINEURAL at 08:52

## 2021-05-27 NOTE — ANESTHESIA POSTPROCEDURE EVALUATION
Patient: Darwin Sparks    Procedure Summary     Date: 05/27/21 Room / Location:  [facility]    Anesthesia Start: 0848 Anesthesia Stop: 0910    Procedure: COLONOSCOPY TO CECUM/TI (N/A ) Diagnosis:       Constipation, unspecified constipation type      Abnormal finding on CT scan      (Constipation, unspecified constipation type [K59.00])      (Abnormal finding on CT scan [R93.89])    Surgeons: Jamel Michaels MD Provider: Fran Del Rosario MD    Anesthesia Type: MAC ASA Status: 3          Anesthesia Type: MAC    Vitals  No vitals data found for the desired time range.          Post Anesthesia Care and Evaluation    Patient location during evaluation: bedside  Pain management: adequate  Airway patency: patent  Anesthetic complications: No anesthetic complications    Cardiovascular status: acceptable  Respiratory status: acceptable  Hydration status: acceptable

## 2021-05-27 NOTE — ANESTHESIA PREPROCEDURE EVALUATION
Anesthesia Evaluation     NPO Solid Status: > 8 hours             Airway   Mallampati: II  TM distance: >3 FB  Neck ROM: full  Dental    (+) upper dentures and lower dentures    Pulmonary - negative pulmonary ROS and normal exam   Cardiovascular - normal exam    (+) hypertension, hyperlipidemia,   (-) past MI      Neuro/Psych  GI/Hepatic/Renal/Endo      Musculoskeletal     Abdominal    Substance History      OB/GYN          Other                        Anesthesia Plan    ASA 3     MAC       Anesthetic plan, all risks, benefits, and alternatives have been provided, discussed and informed consent has been obtained with: patient.

## 2021-07-12 RX ORDER — MELOXICAM 15 MG/1
TABLET ORAL
Qty: 90 TABLET | Refills: 0 | Status: SHIPPED | OUTPATIENT
Start: 2021-07-12 | End: 2022-05-11 | Stop reason: SDUPTHER

## 2021-07-19 ENCOUNTER — TRANSCRIBE ORDERS (OUTPATIENT)
Dept: SURGERY | Facility: SURGERY CENTER | Age: 81
End: 2021-07-19

## 2021-07-19 ENCOUNTER — PREP FOR SURGERY (OUTPATIENT)
Dept: SURGERY | Facility: SURGERY CENTER | Age: 81
End: 2021-07-19

## 2021-07-19 ENCOUNTER — TRANSCRIBE ORDERS (OUTPATIENT)
Dept: LAB | Facility: SURGERY CENTER | Age: 81
End: 2021-07-19

## 2021-07-19 ENCOUNTER — OFFICE VISIT (OUTPATIENT)
Dept: PAIN MEDICINE | Facility: CLINIC | Age: 81
End: 2021-07-19

## 2021-07-19 VITALS
TEMPERATURE: 97.2 F | OXYGEN SATURATION: 96 % | HEIGHT: 67 IN | RESPIRATION RATE: 18 BRPM | SYSTOLIC BLOOD PRESSURE: 129 MMHG | DIASTOLIC BLOOD PRESSURE: 80 MMHG | WEIGHT: 182 LBS | BODY MASS INDEX: 28.56 KG/M2 | HEART RATE: 68 BPM

## 2021-07-19 DIAGNOSIS — M54.16 LUMBAR RADICULOPATHY: ICD-10-CM

## 2021-07-19 DIAGNOSIS — G89.4 CHRONIC PAIN SYNDROME: Primary | ICD-10-CM

## 2021-07-19 DIAGNOSIS — M47.816 LUMBAR FACET ARTHROPATHY: Primary | ICD-10-CM

## 2021-07-19 DIAGNOSIS — M47.816 LUMBAR FACET ARTHROPATHY: ICD-10-CM

## 2021-07-19 DIAGNOSIS — M47.26 OSTEOARTHRITIS OF SPINE WITH RADICULOPATHY, LUMBAR REGION: ICD-10-CM

## 2021-07-19 DIAGNOSIS — Z01.818 OTHER SPECIFIED PRE-OPERATIVE EXAMINATION: Primary | ICD-10-CM

## 2021-07-19 PROCEDURE — 99204 OFFICE O/P NEW MOD 45 MIN: CPT | Performed by: ANESTHESIOLOGY

## 2021-07-19 NOTE — PROGRESS NOTES
CHIEF COMPLAINT  Mr. Sparks states that he has had low back pain since 1973. He states that he has previously had PT for his back pain that didn't help. He states that he has previously seen several back surgeons for his back pain. He also states that he has previously had 3 rounds of epidurals done at [facility] that didn't help.    Subjective   Darwin Sparks is a 80 y.o. male.   He presents to the office for evaluation of back pain. He was referred here by James Epley, APRN..     I saw this gentleman many years ago.  It has been much greater than 3 years ago so today's visit is a new patient type visit.  In review of my notes he does have a history of seen many back surgeons and has trialed physical therapy options.  He states a desire to avoid opiates.  For an nonoperative type low back pain and failure with epidural injections we had talked about advanced options in the past, and a presented him many years ago with some information about spinal cord stimulation therapies but he was not interested in anything invasive at the time.        History of Present Illness     Worsening back pain.  Gradually worsening over years.  It feels even quite a bit worse than his severe baseline over the past year or so.    He describes bilateral throbbing and dull back pains with shooting elements.  Shoots down the backs of the legs being moderate to severe averaging 7 out of 10 pain.  Bilateral posterior/posterior lateral pain shoots all the way to his feet and there are stabbing pains in the feet.   He describes his pain is hurting a lot and that he has had some element of constant pain since 1973.  Pain flareups make him feel nervous and irritable and sometimes will affect appetite as well as sleep.        PEG Assessment   What number best describes your pain on average in the past week?8  What number best describes how, during the past week, pain has interfered with your enjoyment of life?10  What number best describes  how, during the past week, pain has interfered with your general activity?  5    --  The aforementioned information the Chief Complaint section and above subjective data including any HPI data, and also the Review of Systems data, has been personally reviewed and affirmed.  --        Current Outpatient Medications:   •  atenolol (TENORMIN) 50 MG tablet, TAKE ONE TABLET BY MOUTH DAILY, Disp: 30 tablet, Rfl: 4  •  Cholecalciferol (VITAMIN D) 1000 UNITS tablet, Take  by mouth., Disp: , Rfl:   •  KLOR-CON 20 MEQ CR tablet, TAKE ONE TABLET BY MOUTH DAILY, Disp: 90 tablet, Rfl: 0  •  levothyroxine (SYNTHROID, LEVOTHROID) 25 MCG tablet, TAKE ONE TABLET BY MOUTH DAILY, Disp: 30 tablet, Rfl: 5  •  levothyroxine (SYNTHROID, LEVOTHROID) 50 MCG tablet, TAKE ONE TABLET BY MOUTH DAILY FOR THYROID, Disp: 30 tablet, Rfl: 5  •  Linzess 72 MCG capsule capsule, TAKE ONE CAPSULE BY MOUTH EVERY MORNING BEFORE BREAKFAST, Disp: 30 capsule, Rfl: 3  •  lisinopril (PRINIVIL,ZESTRIL) 10 MG tablet, TAKE ONE TABLET BY MOUTH DAILY FOR BLOOD PRESSURE, Disp: 30 tablet, Rfl: 4  •  lisinopril (PRINIVIL,ZESTRIL) 5 MG tablet, TAKE ONE TABLET BY MOUTH DAILY FOR BLOOD PRESSURE, Disp: 30 tablet, Rfl: 5  •  meloxicam (MOBIC) 15 MG tablet, TAKE ONE TABLET BY MOUTH DAILY WITH FOOD AND WATER, Disp: 90 tablet, Rfl: 0  •  Misc Natural Products (OSTEO BI-FLEX ADV TRIPLE ST) tablet, Take  by mouth., Disp: , Rfl:   •  MULTIPLE VITAMINS ESSENTIAL PO, Take  by mouth., Disp: , Rfl:   •  niacin 250 MG tablet, Take 1 tablet by mouth Daily With Breakfast., Disp: 30 tablet, Rfl: 6  •  nystatin-triamcinolone (MYCOLOG) 865117-8.1 UNIT/GM-% ointment, APPLY TO APPROPRIATE AREA(S) AS DIRECTED EVERY 12 HOURS SPARINGLY UNTIL RESOVED, Disp: 60 g, Rfl: 0  •  simvastatin (ZOCOR) 40 MG tablet, TAKE ONE TABLET BY MOUTH ONCE NIGHTLY, Disp: 30 tablet, Rfl: 4    The following portions of the patient's history were reviewed and updated as appropriate: allergies, current medications,  past family history, past medical history, past social history, past surgical history and problem list.    -------    The following portions of the patient's history were reviewed and updated as appropriate: allergies, current medications, past family history, past medical history, past social history, past surgical history and problem list.    Allergies   Allergen Reactions   • Milk-Related Compounds Other (See Comments)     Constipation   • Pregabalin      Other reaction(s): Visual Disturbance   • Neomycin-Bacitracin Zn-Polymyx Rash       Current Outpatient Medications on File Prior to Visit   Medication Sig Dispense Refill   • atenolol (TENORMIN) 50 MG tablet TAKE ONE TABLET BY MOUTH DAILY 30 tablet 4   • Cholecalciferol (VITAMIN D) 1000 UNITS tablet Take  by mouth.     • KLOR-CON 20 MEQ CR tablet TAKE ONE TABLET BY MOUTH DAILY 90 tablet 0   • levothyroxine (SYNTHROID, LEVOTHROID) 25 MCG tablet TAKE ONE TABLET BY MOUTH DAILY 30 tablet 5   • levothyroxine (SYNTHROID, LEVOTHROID) 50 MCG tablet TAKE ONE TABLET BY MOUTH DAILY FOR THYROID 30 tablet 5   • Linzess 72 MCG capsule capsule TAKE ONE CAPSULE BY MOUTH EVERY MORNING BEFORE BREAKFAST 30 capsule 3   • lisinopril (PRINIVIL,ZESTRIL) 10 MG tablet TAKE ONE TABLET BY MOUTH DAILY FOR BLOOD PRESSURE 30 tablet 4   • lisinopril (PRINIVIL,ZESTRIL) 5 MG tablet TAKE ONE TABLET BY MOUTH DAILY FOR BLOOD PRESSURE 30 tablet 5   • meloxicam (MOBIC) 15 MG tablet TAKE ONE TABLET BY MOUTH DAILY WITH FOOD AND WATER 90 tablet 0   • Misc Natural Products (OSTEO BI-FLEX ADV TRIPLE ST) tablet Take  by mouth.     • MULTIPLE VITAMINS ESSENTIAL PO Take  by mouth.     • niacin 250 MG tablet Take 1 tablet by mouth Daily With Breakfast. 30 tablet 6   • nystatin-triamcinolone (MYCOLOG) 393491-9.1 UNIT/GM-% ointment APPLY TO APPROPRIATE AREA(S) AS DIRECTED EVERY 12 HOURS SPARINGLY UNTIL RESOVED 60 g 0   • simvastatin (ZOCOR) 40 MG tablet TAKE ONE TABLET BY MOUTH ONCE NIGHTLY 30 tablet 4     No  current facility-administered medications on file prior to visit.       Patient Active Problem List   Diagnosis   • BP (high blood pressure)   • Hyperlipidemia   • Adult hypothyroidism   • Insomnia   • Anxiety   • Dermatitis, eczematoid   • Bronchitis   • Seasonal allergies   • Health care maintenance   • Chronic midline low back pain without sciatica   • Penis pain   • Tinea cruris   • Primary osteoarthritis involving multiple joints   • Benzodiazepine dependence (CMS/HCC)   • Constipation   • Abnormal finding on CT scan   • Scoliosis of lumbar spine   • Lumbar facet arthropathy       Past Medical History:   Diagnosis Date   • Cataract    • Hyperlipidemia    • Hypertension    • Hypothyroidism    • Osteoarthritis    • Prostate cancer (CMS/HCC)        Past Surgical History:   Procedure Laterality Date   • CATARACT EXTRACTION     • CIRCUMCISION     • COLONOSCOPY     • COLONOSCOPY N/A 5/27/2021    Procedure: COLONOSCOPY TO CECUM/TI;  Surgeon: Jamel Michaels MD;  Location: [facility] ENDOSCOPY;  Service: Gastroenterology;  Laterality: N/A;  Pre op: Abnormal cat scan, constipation, RLQ pain  Post op: Diverticulosis, Hemorrhoids, otherwise normal to and including TI.   • TONSILLECTOMY     • TOOTH EXTRACTION         Family History   Problem Relation Age of Onset   • Diabetes Mother    • Hypertension Mother    • Diabetes Father    • Hypertension Father    • Bipolar disorder Father        Social History     Socioeconomic History   • Marital status:      Spouse name: Not on file   • Number of children: Not on file   • Years of education: Not on file   • Highest education level: Not on file   Tobacco Use   • Smoking status: Former Smoker   • Smokeless tobacco: Never Used   Vaping Use   • Vaping Use: Never used   Substance and Sexual Activity   • Alcohol use: Yes   • Drug use: Not Currently     Types: Marijuana     Comment: used in the 1970's   • Sexual activity: Defer       -------      REVIEW OF PERTINENT MEDICAL  "DATA    E-charlie report is reviewed:  I reviewed the document in the electronic form under the PDMP tab in the Epic EMR...  - In this function, the report is a current report in as close to real-time as possible.  - The report was available for immediate review.    - I did celena the report as reviewed.  - There is not concern for aberrant behavior based on this ekasper review.      Review of Systems   Constitutional: Positive for fatigue.   HENT: Negative for congestion.    Eyes: Negative for visual disturbance.   Respiratory: Negative for cough, shortness of breath and wheezing.    Cardiovascular: Negative.    Gastrointestinal: Negative for constipation and diarrhea.   Genitourinary: Negative for difficulty urinating.   Musculoskeletal: Positive for back pain.   Neurological: Positive for weakness (bilateral legs). Negative for numbness.   Psychiatric/Behavioral: Positive for sleep disturbance. Negative for suicidal ideas. The patient is not nervous/anxious.          Vitals:    07/19/21 1423   BP: 129/80   Pulse: 68   Resp: 18   Temp: 97.2 °F (36.2 °C)   SpO2: 96%   Weight: 82.6 kg (182 lb)   Height: 170.2 cm (67\")   PainSc:   8   PainLoc: Back         Objective   Physical Exam  VSS, NNR, NCAT, NMNA, NRD, AAOx3.  He has some quite severe pain on extension of his low back.  Mid lower lumbar facet palpation is very painful but little pain on palpation of the spinous processes.  Lumbar loading is exquisitely positive as is extension of the lumbar spine even 1 or 2 degrees past the neutral position.  He does display only equivocal straight leg raising maneuvers on both sides.  The radicular component been stimulated by this though it is not nearly as painful as these facet provocation maneuvers.    Assessment/Plan   Diagnoses and all orders for this visit:    1. Chronic pain syndrome (Primary)    2. Lumbar facet arthropathy    3. Lumbar radiculopathy    4. Osteoarthritis of spine with radiculopathy, lumbar region    So " in summary his radiating pain down the legs may have a radicular component but also may have a pseudosciatica component as a facet mediated referred pain.  His axial symptoms were quite significant also.    --- Follow-up for procedure... bilateral lumbar medial branch blockade, likely L35 bilaterally, consider MAC care  --If diagnostic but if not sustaining relief then repeat injections would be diagnostic only for potential consideration of RF ablation  --Alternative plan of care could be a trial of spinal cord stimulation  --I do not think opioid therapy is indicated         SYDNI REPORT  SYDNI report has been reviewed and scanned into the patient's chart.  Date of last SYDNI : as above.  No current use of opioids.            EMR Dragon/Transcription disclaimer:   Much of this encounter note is an electronic transcription/translation of spoken language to printed text. The electronic translation of spoken language may permit erroneous, or at times, nonsensical words or phrases to be inadvertently transcribed; Although I have reviewed the note for such errors, some may still exist.        ---    Vitals:    07/19/21 1423   PainSc:   8   PainLoc: Back        Darwin Sparks reports a pain score of 8.  Given his pain assessment as noted, treatment options were discussed and the following options were decided upon as a follow-up plan to address the patient's pain: educational materials on pain management and steroid injections.

## 2021-07-23 ENCOUNTER — PATIENT ROUNDING (BHMG ONLY) (OUTPATIENT)
Dept: PAIN MEDICINE | Facility: CLINIC | Age: 81
End: 2021-07-23

## 2021-07-28 ENCOUNTER — LAB (OUTPATIENT)
Dept: LAB | Facility: SURGERY CENTER | Age: 81
End: 2021-07-28

## 2021-07-30 ENCOUNTER — ANESTHESIA EVENT (OUTPATIENT)
Dept: SURGERY | Facility: SURGERY CENTER | Age: 81
End: 2021-07-30

## 2021-07-30 ENCOUNTER — HOSPITAL ENCOUNTER (OUTPATIENT)
Facility: SURGERY CENTER | Age: 81
Setting detail: HOSPITAL OUTPATIENT SURGERY
Discharge: HOME | End: 2021-07-30
Attending: ANESTHESIOLOGY | Admitting: ANESTHESIOLOGY

## 2021-07-30 ENCOUNTER — HOSPITAL ENCOUNTER (OUTPATIENT)
Dept: GENERAL RADIOLOGY | Facility: SURGERY CENTER | Age: 81
Setting detail: HOSPITAL OUTPATIENT SURGERY
End: 2021-07-30

## 2021-07-30 ENCOUNTER — ANESTHESIA (OUTPATIENT)
Dept: SURGERY | Facility: SURGERY CENTER | Age: 81
End: 2021-07-30

## 2021-07-30 VITALS
WEIGHT: 174 LBS | TEMPERATURE: 96.9 F | SYSTOLIC BLOOD PRESSURE: 164 MMHG | OXYGEN SATURATION: 95 % | BODY MASS INDEX: 27.25 KG/M2 | DIASTOLIC BLOOD PRESSURE: 90 MMHG | RESPIRATION RATE: 20 BRPM | HEART RATE: 79 BPM

## 2021-07-30 DIAGNOSIS — M47.816 LUMBAR FACET ARTHROPATHY: ICD-10-CM

## 2021-07-30 PROCEDURE — 64493 INJ PARAVERT F JNT L/S 1 LEV: CPT | Performed by: ANESTHESIOLOGY

## 2021-07-30 PROCEDURE — 0 IOHEXOL 300 MG/ML SOLUTION 10 ML VIAL: Performed by: ANESTHESIOLOGY

## 2021-07-30 PROCEDURE — BR16ZZZ FLUOROSCOPY OF LUMBAR FACET JOINT(S): ICD-10-PCS | Performed by: ANESTHESIOLOGY

## 2021-07-30 PROCEDURE — 25010000002 METHYLPREDNISOLONE PER 80 MG: Performed by: ANESTHESIOLOGY

## 2021-07-30 PROCEDURE — 25010000002 PROPOFOL 10 MG/ML EMULSION: Performed by: NURSE ANESTHETIST, CERTIFIED REGISTERED

## 2021-07-30 PROCEDURE — 3E0T3BZ INTRODUCTION OF ANESTHETIC AGENT INTO PERIPHERAL NERVES AND PLEXI, PERCUTANEOUS APPROACH: ICD-10-PCS | Performed by: ANESTHESIOLOGY

## 2021-07-30 PROCEDURE — 3E0T33Z INTRODUCTION OF ANTI-INFLAMMATORY INTO PERIPHERAL NERVES AND PLEXI, PERCUTANEOUS APPROACH: ICD-10-PCS | Performed by: ANESTHESIOLOGY

## 2021-07-30 PROCEDURE — 64494 INJ PARAVERT F JNT L/S 2 LEV: CPT | Performed by: ANESTHESIOLOGY

## 2021-07-30 RX ORDER — PROPOFOL 10 MG/ML
VIAL (ML) INTRAVENOUS AS NEEDED
Status: DISCONTINUED | OUTPATIENT
Start: 2021-07-30 | End: 2021-07-30 | Stop reason: SURG

## 2021-07-30 RX ORDER — SODIUM CHLORIDE, SODIUM LACTATE, POTASSIUM CHLORIDE, CALCIUM CHLORIDE 600; 310; 30; 20 MG/100ML; MG/100ML; MG/100ML; MG/100ML
1000 INJECTION, SOLUTION INTRAVENOUS CONTINUOUS
Status: DISCONTINUED | OUTPATIENT
Start: 2021-07-30 | End: 2021-07-30 | Stop reason: HOSPADM

## 2021-07-30 RX ORDER — FENTANYL CITRATE 50 UG/ML
50 INJECTION, SOLUTION INTRAMUSCULAR; INTRAVENOUS
Status: DISCONTINUED | OUTPATIENT
Start: 2021-07-30 | End: 2021-07-30 | Stop reason: HOSPADM

## 2021-07-30 RX ORDER — LIDOCAINE HYDROCHLORIDE 10 MG/ML
0.5 INJECTION, SOLUTION INFILTRATION; PERINEURAL ONCE AS NEEDED
Status: DISCONTINUED | OUTPATIENT
Start: 2021-07-30 | End: 2021-07-30 | Stop reason: HOSPADM

## 2021-07-30 RX ORDER — ONDANSETRON 2 MG/ML
4 INJECTION INTRAMUSCULAR; INTRAVENOUS ONCE AS NEEDED
Status: DISCONTINUED | OUTPATIENT
Start: 2021-07-30 | End: 2021-07-30 | Stop reason: HOSPADM

## 2021-07-30 RX ORDER — LIDOCAINE HYDROCHLORIDE 20 MG/ML
INJECTION, SOLUTION INFILTRATION; PERINEURAL AS NEEDED
Status: DISCONTINUED | OUTPATIENT
Start: 2021-07-30 | End: 2021-07-30 | Stop reason: SURG

## 2021-07-30 RX ORDER — IBUPROFEN 800 MG/1
800 TABLET ORAL ONCE AS NEEDED
Status: DISCONTINUED | OUTPATIENT
Start: 2021-07-30 | End: 2021-07-30 | Stop reason: HOSPADM

## 2021-07-30 RX ORDER — DIPHENHYDRAMINE HYDROCHLORIDE 50 MG/ML
12.5 INJECTION INTRAMUSCULAR; INTRAVENOUS
Status: DISCONTINUED | OUTPATIENT
Start: 2021-07-30 | End: 2021-07-30 | Stop reason: HOSPADM

## 2021-07-30 RX ORDER — SODIUM CHLORIDE 0.9 % (FLUSH) 0.9 %
10 SYRINGE (ML) INJECTION AS NEEDED
Status: DISCONTINUED | OUTPATIENT
Start: 2021-07-30 | End: 2021-07-30 | Stop reason: HOSPADM

## 2021-07-30 RX ORDER — LABETALOL HYDROCHLORIDE 5 MG/ML
5 INJECTION, SOLUTION INTRAVENOUS
Status: DISCONTINUED | OUTPATIENT
Start: 2021-07-30 | End: 2021-07-30 | Stop reason: HOSPADM

## 2021-07-30 RX ADMIN — SODIUM CHLORIDE, POTASSIUM CHLORIDE, SODIUM LACTATE AND CALCIUM CHLORIDE 1000 ML: 600; 310; 30; 20 INJECTION, SOLUTION INTRAVENOUS at 07:24

## 2021-07-30 RX ADMIN — GLYCOPYRROLATE 0.2 MG: 0.2 INJECTION, SOLUTION INTRAMUSCULAR; INTRAVITREAL at 08:23

## 2021-07-30 RX ADMIN — PROPOFOL 50 MCG/KG/MIN: 10 INJECTION, EMULSION INTRAVENOUS at 08:31

## 2021-07-30 RX ADMIN — PROPOFOL 50 MG: 10 INJECTION, EMULSION INTRAVENOUS at 08:31

## 2021-07-30 RX ADMIN — LIDOCAINE HYDROCHLORIDE 60 MG: 20 INJECTION, SOLUTION INFILTRATION; PERINEURAL at 08:27

## 2021-07-30 NOTE — ANESTHESIA PREPROCEDURE EVALUATION
Anesthesia Evaluation     Patient summary reviewed and Nursing notes reviewed   NPO Solid Status: > 8 hours  NPO Liquid Status: > 2 hours           Airway   Mallampati: II  Dental - normal exam   (+) upper dentures and lower dentures    Pulmonary - normal exam   Cardiovascular - normal exam    (+) hypertension, hyperlipidemia,       Neuro/Psych  (+) psychiatric history Anxiety,     GI/Hepatic/Renal/Endo    (+)   thyroid problem hypothyroidism    Musculoskeletal     Abdominal    Substance History      OB/GYN          Other   arthritis,    history of cancer                    Anesthesia Plan    ASA 2     MAC       Anesthetic plan, all risks, benefits, and alternatives have been provided, discussed and informed consent has been obtained with: patient.

## 2021-07-30 NOTE — ANESTHESIA POSTPROCEDURE EVALUATION
Patient: Darwin Sparks    Procedure Summary     Date: 07/30/21 Room / Location: [facility]    Anesthesia Start: 0821 Anesthesia Stop: 0850    Procedure: LUMBAR MEDIAL BRANCH BLOCK (Bilateral ) Diagnosis:       Lumbar facet arthropathy      (Lumbar facet arthropathy [M47.816])    Surgeons: Kb Rodriguez MD Provider: Christine Hess MD    Anesthesia Type: MAC ASA Status: 2          Anesthesia Type: MAC    Vitals  Vitals Value Taken Time   /87 07/30/21 0855   Temp 36.1 °C (96.9 °F) 07/30/21 0850   Pulse 82 07/30/21 0855   Resp 16 07/30/21 0855   SpO2 94 % 07/30/21 0855           Post Anesthesia Care and Evaluation    Patient location during evaluation: PHASE II  Patient participation: complete - patient participated  Level of consciousness: awake  Pain management: adequate  Airway patency: patent  Anesthetic complications: No anesthetic complications    Cardiovascular status: acceptable  Respiratory status: acceptable  Hydration status: acceptable    Comments: /87 (BP Location: Left arm, Patient Position: Sitting)   Pulse 82   Temp 36.1 °C (96.9 °F) (Temporal)   Resp 16   Wt 78.9 kg (174 lb)   SpO2 94%   BMI 27.25 kg/m²

## 2021-08-17 ENCOUNTER — TRANSCRIBE ORDERS (OUTPATIENT)
Dept: LAB | Facility: SURGERY CENTER | Age: 81
End: 2021-08-17

## 2021-08-17 ENCOUNTER — OFFICE VISIT (OUTPATIENT)
Dept: PAIN MEDICINE | Facility: CLINIC | Age: 81
End: 2021-08-17

## 2021-08-17 ENCOUNTER — PREP FOR SURGERY (OUTPATIENT)
Dept: SURGERY | Facility: SURGERY CENTER | Age: 81
End: 2021-08-17

## 2021-08-17 ENCOUNTER — TRANSCRIBE ORDERS (OUTPATIENT)
Dept: SURGERY | Facility: SURGERY CENTER | Age: 81
End: 2021-08-17

## 2021-08-17 VITALS
OXYGEN SATURATION: 99 % | WEIGHT: 180 LBS | RESPIRATION RATE: 18 BRPM | HEART RATE: 64 BPM | DIASTOLIC BLOOD PRESSURE: 74 MMHG | BODY MASS INDEX: 28.25 KG/M2 | SYSTOLIC BLOOD PRESSURE: 128 MMHG | TEMPERATURE: 96.4 F | HEIGHT: 67 IN

## 2021-08-17 DIAGNOSIS — M47.816 LUMBAR FACET ARTHROPATHY: ICD-10-CM

## 2021-08-17 DIAGNOSIS — M47.816 LUMBAR FACET ARTHROPATHY: Primary | ICD-10-CM

## 2021-08-17 DIAGNOSIS — G89.4 CHRONIC PAIN SYNDROME: Primary | ICD-10-CM

## 2021-08-17 DIAGNOSIS — M47.26 OSTEOARTHRITIS OF SPINE WITH RADICULOPATHY, LUMBAR REGION: Primary | ICD-10-CM

## 2021-08-17 DIAGNOSIS — Z01.818 OTHER SPECIFIED PRE-OPERATIVE EXAMINATION: Primary | ICD-10-CM

## 2021-08-17 DIAGNOSIS — M47.26 OSTEOARTHRITIS OF SPINE WITH RADICULOPATHY, LUMBAR REGION: ICD-10-CM

## 2021-08-17 PROCEDURE — 99214 OFFICE O/P EST MOD 30 MIN: CPT | Performed by: NURSE PRACTITIONER

## 2021-08-17 RX ORDER — SODIUM CHLORIDE 0.9 % (FLUSH) 0.9 %
10 SYRINGE (ML) INJECTION AS NEEDED
Status: CANCELLED | OUTPATIENT
Start: 2021-08-17

## 2021-08-17 RX ORDER — SODIUM CHLORIDE 0.9 % (FLUSH) 0.9 %
10 SYRINGE (ML) INJECTION EVERY 12 HOURS SCHEDULED
Status: CANCELLED | OUTPATIENT
Start: 2021-08-17

## 2021-08-17 NOTE — H&P (VIEW-ONLY)
CHIEF COMPLAINT  Follow-up for back pain.      Subjective   Darwin pSarks is a 80 y.o. male  who presents to the office for follow-up of procedure.  He completed a Bilateral L3-5 Lumbar Medial Branch Blockade   on  7/30/2021 performed by Dr. Rodriguez for management of back pain. Patient reports 50-75% relief from the procedure for 2 weeks. Noticed improvement and ability to cook. He was also able to sleep much better.   His pain is almost back to baseline.     Complains of pain in his low back. Today his pain is 6/10VAS.  Describes the pain as continuous aching. Pain increases with walking, standing, activity, household chores; pain decreases with rest, laying down, procedure and medication. ADL's by self. Denies any bowel or bladder changes. He reports his BM's have increased due to diet changes (allergy to animal protein, vegan lifestyle now).     Patient was initially evaluated as a new patient by Dr. Lazaro Rodriguez on 7/19/2021.  He was referred here by his PCP James Epley, APRN.  Has a history of chronic low back pain.  Previously had 3 rounds of epidurals done at [facility] that did not help.  He was previously seen many years ago here at this clinic.  Discussed about advanced neuromodulation with patient did not want anything invasive at that time.  Plan for bilateral L3-L5 MBB under MAC care.  If diagnostic but not sustained relief then repeat injections would be diagnostic only for potential consideration of RF ablation.  Alternative plan would be to trial a spinal cord stimulator.  Do not believe opioid therapy is indicated.    Patient remained masked during entire encounter. No cough present. I donned a mask and eye protection throughout entire visit. Prior to donning mask and eye protection, hand hygiene was performed, as well as when it was doffed.  I was closer than 6 feet, but not for an extended period of time. No obvious exposure to any bodily fluids.    Back Pain  This is a chronic problem.  "The current episode started more than 1 year ago. Pertinent negatives include no chest pain or numbness.      PEG Assessment   What number best describes your pain on average in the past week?6  What number best describes how, during the past week, pain has interfered with your enjoyment of life?4  What number best describes how, during the past week, pain has interfered with your general activity?  5    The following portions of the patient's history were reviewed and updated as appropriate: allergies, current medications, past family history, past medical history, past social history, past surgical history and problem list.    Review of Systems   Constitutional: Positive for fatigue.   HENT: Negative for congestion.    Eyes: Negative for visual disturbance.   Respiratory: Positive for shortness of breath.    Cardiovascular: Negative for chest pain.   Gastrointestinal: Positive for constipation.   Genitourinary: Positive for difficulty urinating.   Musculoskeletal: Positive for back pain.   Neurological: Negative for numbness.   Psychiatric/Behavioral: Positive for sleep disturbance. The patient is not nervous/anxious.      I have reviewed and confirmed the accuracy of the ROS as documented by the MA/LPN/RN MICHAEL Gtz       Vitals:    08/17/21 1414   BP: 128/74   Pulse: 64   Resp: 18   Temp: 96.4 °F (35.8 °C)   SpO2: 99%   Weight: 81.6 kg (180 lb)   Height: 170.2 cm (67\")   PainSc:   6   PainLoc: Back     Objective   Physical Exam  Vitals and nursing note reviewed.   Constitutional:       Appearance: He is well-developed.   HENT:      Head: Normocephalic and atraumatic.   Musculoskeletal:      Lumbar back: Tenderness and bony tenderness (bilateral moderate L3-L5 facet tenderness; + loading manuever) present. Decreased range of motion.   Neurological:      Mental Status: He is alert.      Gait: Gait abnormal.   Psychiatric:         Speech: Speech normal.         Behavior: Behavior normal.         " Thought Content: Thought content normal.         Judgment: Judgment normal.         Assessment/Plan   Diagnoses and all orders for this visit:    1. Chronic pain syndrome (Primary)    2. Lumbar facet arthropathy    3. Osteoarthritis of spine with radiculopathy, lumbar region      --- repeat bilateral L3-L5 MBB with MAC No blood thinners. Reviewed the procedure at length with the patient.  Included in the review was expectations, complications, risk and benefits.The procedure was described in detail and the risks, benefits and alternatives were discussed with the patient (including but not limited to: bleeding, infection, nerve damage, worsening of pain, inability to perform injection, paralysis, seizures, and death) who agreed to proceed.  Discussed the potential for sedation if warranted/wanted.  The procedure will plan to be performed at [facility] with fluoroscopic guidance(unless ultrasound is indicated) and could potentially have steroids and contrast dye used. Questions were answered and in a way the patient could understand.  Patient verbalized understanding and wishes to proceed.  This intervention will be ordered.  Discussed with patient that all procedures are part of a multimodal plan of care and include either formal PT or a home exercise program.  Patient has no evidence of coagulopathy or current infection.    --- Follow-up after procedure or sooner if needed.    -------  Education about Medial Branch Blockade and RF Therapy:    This medial branch blockade (MBB) suggested is intended for diagnostic purposes, with the intent of offering the patient Radiofrequency thermal rhizotomy (RF) if the MBB is diagnostically effective.  The diagnostic blockade is necessary to determine the likelihood that RF therapy could be efficacious in providing long term relief to the patient.    Medial branches are sensory nerve branches that connect to a facet joint and transmit sensations & pain  "signals from that joint.  Facet is a term for the type of joints found in the spine.  Medial branches are the nerves that go to a facet, and therefore are also sometimes called \"facet joint nerves\" (FJNs).      In a medial branch blockade procedure, xray fluoroscopy is used to verify the locations of the outside of the joint lines which are being targeted.  Under xray guidance, needles are placed to these areas.  Contrast dye is injected to confirm proper placement, with dye flowing over the joint area, and to ensure that the dye does not flow into unintended areas such as a vein.  When this is confirmed, local anesthetic is injected to block the medial branch at that joint level.      If MBBs are diagnostically successful in blocking pain, then the patient is most likely a great candidate for Radiofrequency of those facet joint nerves.  In the RF procedure, needles are placed to the joint lines in the same fashion, and after testing, the needle tips are heated to thermally treat the nerves, blocking the nerves by in essence damaging the nerves with the heat treatment.       Medically, a successful RF procedure should provide a patient with 50% pain relief or more for at least 6 months.  Clinical experience suggests that successful patients receive relief more in the range of 12 months on average.  We also discussed that a fortunate minority of patients receive therapeutic success from the MBB, and may not require RF ablation.  If a patient receives more than 8 weeks of relief from MBB, then occasional repeat MBB for therapeutic purposes is a very reasonable alternative therapy.  This course of therapy is consistent with our LCDs according to our CMS  in the area, and therefore other insurance providers should follow accordingly.  We will monitor our patients to screen for these therapeutic responders and will offer RF therapy only when necessary.        We discussed that MBB & RF are not without risks. "  Guidelines regarding anticoagulant use & neuraxial procedures will be respected.  Patients that are ill or otherwise may be at risk for sepsis will not have their spines accessed by neuraxial injections of any type.  This patient will not be offered these therapies if there is an increased risk.   We discussed that there is a risk of postprocedural pain and also a risk of worsening of clinical picture with these procedures as with any neuraxial procedure.    -------         SYDNI REPORT  As the clinician, I personally reviewed the SYDNI from 8-17-21 while the patient was in the office today.           Dictated utilizing Dragon dictation.

## 2021-08-18 ENCOUNTER — LAB (OUTPATIENT)
Dept: LAB | Facility: SURGERY CENTER | Age: 81
End: 2021-08-18

## 2021-08-18 DIAGNOSIS — Z01.818 OTHER SPECIFIED PRE-OPERATIVE EXAMINATION: ICD-10-CM

## 2021-08-18 LAB — SARS-COV-2 ORF1AB RESP QL NAA+PROBE: NOT DETECTED

## 2021-08-18 PROCEDURE — U0004 COV-19 TEST NON-CDC HGH THRU: HCPCS | Performed by: SURGERY

## 2021-08-18 PROCEDURE — U0005 INFEC AGEN DETEC AMPLI PROBE: HCPCS | Performed by: SURGERY

## 2021-08-18 PROCEDURE — C9803 HOPD COVID-19 SPEC COLLECT: HCPCS

## 2021-08-19 NOTE — SIGNIFICANT NOTE
Patient educated on the following :    - If you are receiving Sedation for your procedure Nothing to Eat 6 hours and only clear liquids for 2 hours prior to your procedure.    -Your required COVID Test is Scheduled on    8/18/21      Between the Hours of [id number]  -You will only be notified if your COVID test Result is POSITIVE  -The importance of reducing your number of contacts by self quarantining after you COVID test until the date of your PROCEDURE  -You will need to have someone drive you home after your PROCEDURE and remain with you for 24 hours after the PROCEDURE  - The date of your procedure, your are welcome to have one visitor at bedside or remain within 10-15 minutes of [facility]  -You will need to arrive at         0815  on  8/20/21         for your PROCEDURE  -Please contact [facility] at: [phone] with any questions and/or concerns

## 2021-08-20 ENCOUNTER — ANESTHESIA (OUTPATIENT)
Dept: SURGERY | Facility: SURGERY CENTER | Age: 81
End: 2021-08-20

## 2021-08-20 ENCOUNTER — HOSPITAL ENCOUNTER (OUTPATIENT)
Dept: GENERAL RADIOLOGY | Facility: SURGERY CENTER | Age: 81
Setting detail: HOSPITAL OUTPATIENT SURGERY
End: 2021-08-20

## 2021-08-20 ENCOUNTER — ANESTHESIA EVENT (OUTPATIENT)
Dept: SURGERY | Facility: SURGERY CENTER | Age: 81
End: 2021-08-20

## 2021-08-20 ENCOUNTER — HOSPITAL ENCOUNTER (OUTPATIENT)
Facility: SURGERY CENTER | Age: 81
Setting detail: HOSPITAL OUTPATIENT SURGERY
Discharge: HOME | End: 2021-08-20
Attending: ANESTHESIOLOGY | Admitting: ANESTHESIOLOGY

## 2021-08-20 VITALS
WEIGHT: 179.5 LBS | SYSTOLIC BLOOD PRESSURE: 128 MMHG | RESPIRATION RATE: 16 BRPM | OXYGEN SATURATION: 97 % | HEART RATE: 63 BPM | TEMPERATURE: 97.5 F | BODY MASS INDEX: 28.11 KG/M2 | DIASTOLIC BLOOD PRESSURE: 75 MMHG

## 2021-08-20 DIAGNOSIS — M47.26 OSTEOARTHRITIS OF SPINE WITH RADICULOPATHY, LUMBAR REGION: ICD-10-CM

## 2021-08-20 DIAGNOSIS — M47.816 LUMBAR FACET ARTHROPATHY: ICD-10-CM

## 2021-08-20 PROCEDURE — 25010000002 METHYLPREDNISOLONE PER 80 MG: Performed by: ANESTHESIOLOGY

## 2021-08-20 PROCEDURE — 64494 INJ PARAVERT F JNT L/S 2 LEV: CPT | Performed by: ANESTHESIOLOGY

## 2021-08-20 PROCEDURE — 25010000003 LIDOCAINE 1 % SOLUTION: Performed by: ANESTHESIOLOGY

## 2021-08-20 PROCEDURE — 64493 INJ PARAVERT F JNT L/S 1 LEV: CPT | Performed by: ANESTHESIOLOGY

## 2021-08-20 PROCEDURE — 3E0T3BZ INTRODUCTION OF ANESTHETIC AGENT INTO PERIPHERAL NERVES AND PLEXI, PERCUTANEOUS APPROACH: ICD-10-PCS | Performed by: ANESTHESIOLOGY

## 2021-08-20 PROCEDURE — 25010000002 MIDAZOLAM PER 1 MG: Performed by: ANESTHESIOLOGY

## 2021-08-20 PROCEDURE — 25010000002 PROPOFOL 10 MG/ML EMULSION: Performed by: ANESTHESIOLOGY

## 2021-08-20 PROCEDURE — 0 IOHEXOL 300 MG/ML SOLUTION: Performed by: ANESTHESIOLOGY

## 2021-08-20 PROCEDURE — 76000 FLUOROSCOPY <1 HR PHYS/QHP: CPT

## 2021-08-20 PROCEDURE — BR16ZZZ FLUOROSCOPY OF LUMBAR FACET JOINT(S): ICD-10-PCS | Performed by: ANESTHESIOLOGY

## 2021-08-20 RX ORDER — DIPHENHYDRAMINE HYDROCHLORIDE 50 MG/ML
12.5 INJECTION INTRAMUSCULAR; INTRAVENOUS
Status: DISCONTINUED | OUTPATIENT
Start: 2021-08-20 | End: 2021-08-20 | Stop reason: HOSPADM

## 2021-08-20 RX ORDER — PROPOFOL 10 MG/ML
VIAL (ML) INTRAVENOUS AS NEEDED
Status: DISCONTINUED | OUTPATIENT
Start: 2021-08-20 | End: 2021-08-20 | Stop reason: SURG

## 2021-08-20 RX ORDER — KETAMINE HYDROCHLORIDE 50 MG/ML
INJECTION, SOLUTION, CONCENTRATE INTRAMUSCULAR; INTRAVENOUS AS NEEDED
Status: DISCONTINUED | OUTPATIENT
Start: 2021-08-20 | End: 2021-08-20 | Stop reason: SURG

## 2021-08-20 RX ORDER — SODIUM CHLORIDE 0.9 % (FLUSH) 0.9 %
10 SYRINGE (ML) INJECTION EVERY 12 HOURS SCHEDULED
Status: DISCONTINUED | OUTPATIENT
Start: 2021-08-20 | End: 2021-08-20 | Stop reason: HOSPADM

## 2021-08-20 RX ORDER — METHYLPREDNISOLONE ACETATE 80 MG/ML
INJECTION, SUSPENSION INTRA-ARTICULAR; INTRALESIONAL; INTRAMUSCULAR; SOFT TISSUE AS NEEDED
Status: DISCONTINUED | OUTPATIENT
Start: 2021-08-20 | End: 2021-08-20 | Stop reason: HOSPADM

## 2021-08-20 RX ORDER — FLUMAZENIL 0.1 MG/ML
0.2 INJECTION INTRAVENOUS AS NEEDED
Status: DISCONTINUED | OUTPATIENT
Start: 2021-08-20 | End: 2021-08-20 | Stop reason: HOSPADM

## 2021-08-20 RX ORDER — LIDOCAINE HYDROCHLORIDE 10 MG/ML
INJECTION, SOLUTION INFILTRATION; PERINEURAL AS NEEDED
Status: DISCONTINUED | OUTPATIENT
Start: 2021-08-20 | End: 2021-08-20 | Stop reason: HOSPADM

## 2021-08-20 RX ORDER — FENTANYL CITRATE 50 UG/ML
50 INJECTION, SOLUTION INTRAMUSCULAR; INTRAVENOUS
Status: DISCONTINUED | OUTPATIENT
Start: 2021-08-20 | End: 2021-08-20 | Stop reason: HOSPADM

## 2021-08-20 RX ORDER — IBUPROFEN 200 MG
600 TABLET ORAL ONCE AS NEEDED
Status: DISCONTINUED | OUTPATIENT
Start: 2021-08-20 | End: 2021-08-20 | Stop reason: HOSPADM

## 2021-08-20 RX ORDER — ACETAMINOPHEN/DIPHENHYDRAMINE 500MG-25MG
1 TABLET ORAL NIGHTLY PRN
COMMUNITY

## 2021-08-20 RX ORDER — BUPIVACAINE HYDROCHLORIDE 5 MG/ML
INJECTION, SOLUTION PERINEURAL AS NEEDED
Status: DISCONTINUED | OUTPATIENT
Start: 2021-08-20 | End: 2021-08-20 | Stop reason: HOSPADM

## 2021-08-20 RX ORDER — SODIUM CHLORIDE 0.9 % (FLUSH) 0.9 %
10 SYRINGE (ML) INJECTION AS NEEDED
Status: DISCONTINUED | OUTPATIENT
Start: 2021-08-20 | End: 2021-08-20 | Stop reason: HOSPADM

## 2021-08-20 RX ORDER — SODIUM CHLORIDE, SODIUM LACTATE, POTASSIUM CHLORIDE, CALCIUM CHLORIDE 600; 310; 30; 20 MG/100ML; MG/100ML; MG/100ML; MG/100ML
30 INJECTION, SOLUTION INTRAVENOUS CONTINUOUS
Status: DISCONTINUED | OUTPATIENT
Start: 2021-08-20 | End: 2021-08-20 | Stop reason: HOSPADM

## 2021-08-20 RX ORDER — MIDAZOLAM HYDROCHLORIDE 1 MG/ML
INJECTION INTRAMUSCULAR; INTRAVENOUS AS NEEDED
Status: DISCONTINUED | OUTPATIENT
Start: 2021-08-20 | End: 2021-08-20 | Stop reason: SURG

## 2021-08-20 RX ORDER — DIPHENHYDRAMINE HCL 25 MG
25 TABLET ORAL
Status: DISCONTINUED | OUTPATIENT
Start: 2021-08-20 | End: 2021-08-20 | Stop reason: HOSPADM

## 2021-08-20 RX ORDER — SODIUM CHLORIDE 9 MG/ML
INJECTION INTRAVENOUS AS NEEDED
Status: DISCONTINUED | OUTPATIENT
Start: 2021-08-20 | End: 2021-08-20 | Stop reason: HOSPADM

## 2021-08-20 RX ORDER — NALOXONE HCL 0.4 MG/ML
0.2 VIAL (ML) INJECTION AS NEEDED
Status: DISCONTINUED | OUTPATIENT
Start: 2021-08-20 | End: 2021-08-20 | Stop reason: HOSPADM

## 2021-08-20 RX ADMIN — SODIUM CHLORIDE, POTASSIUM CHLORIDE, SODIUM LACTATE AND CALCIUM CHLORIDE 30 ML/HR: 600; 310; 30; 20 INJECTION, SOLUTION INTRAVENOUS at 08:32

## 2021-08-20 RX ADMIN — MIDAZOLAM 1 MG: 1 INJECTION INTRAMUSCULAR; INTRAVENOUS at 09:27

## 2021-08-20 RX ADMIN — PROPOFOL 50 MG: 10 INJECTION, EMULSION INTRAVENOUS at 09:27

## 2021-08-20 RX ADMIN — KETAMINE HYDROCHLORIDE 10 MG: 50 INJECTION, SOLUTION INTRAMUSCULAR; INTRAVENOUS at 09:31

## 2021-08-20 RX ADMIN — MIDAZOLAM 0.5 MG: 1 INJECTION INTRAMUSCULAR; INTRAVENOUS at 09:45

## 2021-08-20 RX ADMIN — KETAMINE HYDROCHLORIDE 10 MG: 50 INJECTION, SOLUTION INTRAMUSCULAR; INTRAVENOUS at 09:29

## 2021-08-20 RX ADMIN — KETAMINE HYDROCHLORIDE 10 MG: 50 INJECTION, SOLUTION INTRAMUSCULAR; INTRAVENOUS at 09:45

## 2021-08-20 RX ADMIN — PROPOFOL INJECTABLE EMULSION 50 MCG/KG/MIN: 10 INJECTION, EMULSION INTRAVENOUS at 09:27

## 2021-08-20 NOTE — ANESTHESIA POSTPROCEDURE EVALUATION
Patient: Darwin Sparks    Procedure Summary     Date: 08/20/21 Room / Location: [facility]    Anesthesia Start: 0924 Anesthesia Stop: 1000    Procedure: MEDIAL BRANCH BLOCK--bilateral lumbar3-lumbar5 (Bilateral ) Diagnosis:       Lumbar facet arthropathy      (Lumbar facet arthropathy [M47.816])    Surgeons: Kb Rodriguez MD Provider: Hilario Pizano MD    Anesthesia Type: MAC ASA Status: 2          Anesthesia Type: MAC    Vitals  Vitals Value Taken Time   /75 08/20/21 1013   Temp 36.4 °C (97.5 °F) 08/20/21 1000   Pulse 63 08/20/21 1013   Resp 16 08/20/21 1013   SpO2 97 % 08/20/21 1013           Post Anesthesia Care and Evaluation    Patient location during evaluation: bedside  Patient participation: complete - patient participated  Level of consciousness: awake  Pain management: adequate  Airway patency: patent  Anesthetic complications: No anesthetic complications  PONV Status: none  Cardiovascular status: acceptable  Respiratory status: acceptable  Hydration status: acceptable  Post Neuraxial Block status: Motor and sensory function returned to baseline

## 2021-08-20 NOTE — ANESTHESIA PREPROCEDURE EVALUATION
Anesthesia Evaluation     Patient summary reviewed and Nursing notes reviewed   NPO Solid Status: > 8 hours  NPO Liquid Status: > 2 hours           Airway   Mallampati: II  Dental - normal exam   (+) upper dentures and lower dentures    Pulmonary - normal exam   Cardiovascular - normal exam    (+) hypertension well controlled less than 2 medications, hyperlipidemia,       Neuro/Psych  (+) psychiatric history Anxiety,     GI/Hepatic/Renal/Endo    (+)   thyroid problem hypothyroidism    Musculoskeletal     Abdominal    Substance History      OB/GYN          Other   arthritis,    history of cancer                      Anesthesia Plan    ASA 2     MAC       Anesthetic plan, all risks, benefits, and alternatives have been provided, discussed and informed consent has been obtained with: patient.

## 2021-08-20 NOTE — OP NOTE
Bilateral L3-5 Lumbar Medial Branch Blockade  [facility]      PREOPERATIVE DIAGNOSIS:  Lumbar spondylosis without myelopathy    POSTOPERATIVE DIAGNOSIS:  Lumbar spondylosis without myelopathy    PROCEDURE:   Diagnostic Bilateral Lumbar Medial Branch Nerve Blockades, with fluoroscopy:  L3, L4, and L5 nerves (at the L4 & L5 transverse processes and the sacral alar groove) to block facet joints L4-5, and L5-S1  1. 17184-74 -- Bilateral Lumbar Facet blocks, 1st Level  2. 72055-69 -- Bilateral Lumbar Facet blocks, 2nd  Level    PRE-PROCEDURE DISCUSSION WITH PATIENT:    Risks and complications were discussed with the patient prior to starting the procedure and informed consent was obtained.      SURGEON:  Kb Rodriguez MD    REASON FOR PROCEDURE:    The patient complains of pain that seems to have a significant axial component and Previous diagnostic positivity of a Lumbar Medial Branch Blockade at the same levels    SEDATION:  Monitored Anesthesia Care (MAC)  ANESTHETIC:  Marcaine 0.5%  STEROID:  Methylprednisolone (DEPO MEDROL) 60mg  TOTAL VOLUME OF SOLUTION:  6ml    DESCRIPTON OF PROCEDURE:  After obtaining informed consent, IV access was obtained in the preoperative area.   The patient was taken to the operating room.  The patient was placed in the prone position with a pillow under the abdomen. All pressure points were well padded.  EKG, blood pressure, and pulse oximeter were monitored.  The patient was monitored and sedated by Dr Pizano from the anesthesiology group. The lumbosacral area was prepped with Chloraprep and draped in a sterile fashion. Under fluoroscopic guidance the transverse processes of the L4 and L5 vertebrae at the junctions of the superior articular processes were identified on the right. Also identified was the groove between the ala and the superior articular process of the sacrum on the ipsilateral side.  Skin and subcutaneous tissue were anesthetized with 1% lidocaine  above each of these points. A 22-gauge spinal needle was introduced under fluoroscopic guidance at the above junctions. Aspiration was negative for blood and CSF.  After confirming the position of the needle with fluoroscope in all views, 0.25 mL of Omnipaque was injected, and after seeing the proper spread a total of 1 mL of the anesthetic solution noted above was injected at each of these points.  Needles were removed intact from each of the areas.  A similar procedure was repeated to block the L3, L4, and L5 nerves on the contralateral side.   Onset of analgesia was noted.  Vital signs remained stable throughout.      ESTIMATED BLOOD LOSS:  <5 mL  SPECIMENS:  none    COMPLICATIONS:   No complications were noted., There was no indication of vascular uptake on live injection of contrast dye., There was no indication of intrathecal uptake on live injection of contrast dye., There was not any evidence of dural puncture.   and The patient did not have any signs of postprocedure numbness nor weakness.    TOLERANCE & DISCHARGE CONDITION:    The patient tolerated the procedure well.  The patient was transported to the recovery area without difficulties.  The patient was discharged to home under the care of family in stable and satisfactory condition.    PLAN OF CARE:  1. The patient was given our standard instruction sheet.  2. We discussed that Lumbar Medial Branch Blockade is a diagnostic procedure in consideration for radiofrequency ablation if two diagnostic procedures prove to be positive for significant benefit.  If sustained relief of 6 to eight weeks is obtained, then an alternative plan could be therapeutic lumbar branch blockades.  3. The patient is asked to keep a pain log each hour for 8 hours after the procedure today.  4. The patient will  Return to clinic 1-2 wks.  5. The patient will resume all medications as per the medication reconciliation sheet.

## 2021-09-07 ENCOUNTER — PREP FOR SURGERY (OUTPATIENT)
Dept: SURGERY | Facility: SURGERY CENTER | Age: 81
End: 2021-09-07

## 2021-09-07 ENCOUNTER — OFFICE VISIT (OUTPATIENT)
Dept: PAIN MEDICINE | Facility: CLINIC | Age: 81
End: 2021-09-07

## 2021-09-07 VITALS
HEIGHT: 67 IN | OXYGEN SATURATION: 98 % | SYSTOLIC BLOOD PRESSURE: 143 MMHG | RESPIRATION RATE: 16 BRPM | HEART RATE: 71 BPM | TEMPERATURE: 97.1 F | BODY MASS INDEX: 28.05 KG/M2 | WEIGHT: 178.7 LBS | DIASTOLIC BLOOD PRESSURE: 77 MMHG

## 2021-09-07 DIAGNOSIS — G89.4 CHRONIC PAIN SYNDROME: Primary | ICD-10-CM

## 2021-09-07 DIAGNOSIS — M47.26 OSTEOARTHRITIS OF SPINE WITH RADICULOPATHY, LUMBAR REGION: ICD-10-CM

## 2021-09-07 DIAGNOSIS — M47.816 LUMBAR FACET ARTHROPATHY: ICD-10-CM

## 2021-09-07 DIAGNOSIS — M47.816 LUMBAR FACET ARTHROPATHY: Primary | ICD-10-CM

## 2021-09-07 PROCEDURE — 99214 OFFICE O/P EST MOD 30 MIN: CPT | Performed by: NURSE PRACTITIONER

## 2021-09-07 RX ORDER — SODIUM CHLORIDE 0.9 % (FLUSH) 0.9 %
10 SYRINGE (ML) INJECTION EVERY 12 HOURS SCHEDULED
Status: CANCELLED | OUTPATIENT
Start: 2021-09-07

## 2021-09-07 RX ORDER — SODIUM CHLORIDE 0.9 % (FLUSH) 0.9 %
10 SYRINGE (ML) INJECTION AS NEEDED
Status: CANCELLED | OUTPATIENT
Start: 2021-09-07

## 2021-09-07 NOTE — PROGRESS NOTES
"CHIEF COMPLAINT  F/U procedure.MEDIAL BRANCH BLOCK--bilateral lumbar3-lumbar5. For back pain . Pt states his pain improved up to 80% .     Subjective   Darwin Sparks is a 80 y.o. male  who presents to the office for follow-up of procedure.  He completed a bilateral L3-L5 MBB WITH MAC   on  8-20-21 performed by Dr. GOMES for management of LOW BACK PAIN. Patient reports 80% relief from the procedure for 1 week.  Noted improvement in activity and \"I could even stand up straighter.\"    Complains of pain in his low back. Today his pain is 6/10VAS. Describes the pain as nearly continuous aching and throbbing. Pain increases with activity, walking, standing; pain decreases with rest and procedures. ADL's by self. Denies any bowel or bladder changes.     He completed a Bilateral L3-5 Lumbar Medial Branch Blockade   on  7/30/2021 performed by Dr. Gomes for management of back pain. Patient reports 50-75% relief from the procedure for 2 weeks. Noticed improvement and ability to cook. He was also able to sleep much better.     Patient was initially evaluated as a new patient by Dr. Lazaro Gomes on 7/19/2021.  He was referred here by his PCP James Epley, APRN.  Has a history of chronic low back pain. Previously had 3 rounds of epidurals done [facility] that did not help.  He was previously seen many years ago here at this clinic.  Discussed about advanced neuromodulation with patient did not want anything invasive at that time.  Plan for bilateral L3-L5 MBB under MAC care.  If diagnostic but not sustained relief then repeat injections would be diagnostic only for potential consideration of RF ablation.  Alternative plan would be to trial a spinal cord stimulator.  Do not believe opioid therapy is indicated.    Patient remained masked during entire encounter. No cough present. I donned a mask and eye protection throughout entire visit. Prior to donning mask and eye protection, hand hygiene was performed, as well " as when it was doffed.  I was closer than 6 feet, but not for an extended period of time. No obvious exposure to any bodily fluids.    Back Pain  This is a chronic problem. The current episode started more than 1 year ago. The problem has been waxing and waning since onset. The quality of the pain is described as aching. The pain does not radiate. The pain is at a severity of 6/10. The symptoms are aggravated by bending, standing and twisting. Associated symptoms include headaches. Pertinent negatives include no abdominal pain, bladder incontinence, bowel incontinence, chest pain, dysuria, fever, numbness or weakness.      PEG Assessment   What number best describes your pain on average in the past week?6  What number best describes how, during the past week, pain has interfered with your enjoyment of life?6  What number best describes how, during the past week, pain has interfered with your general activity?  6    The following portions of the patient's history were reviewed and updated as appropriate: allergies, current medications, past family history, past medical history, past social history, past surgical history and problem list.    Review of Systems   Constitutional: Negative for activity change, fatigue and fever.   HENT: Negative for congestion.    Eyes: Negative for visual disturbance.   Respiratory: Negative for cough and chest tightness.    Cardiovascular: Negative for chest pain.   Gastrointestinal: Negative for abdominal pain, bowel incontinence, constipation and diarrhea.   Genitourinary: Negative for bladder incontinence, difficulty urinating and dysuria.   Musculoskeletal: Positive for back pain.   Neurological: Positive for headaches. Negative for dizziness, weakness, light-headedness and numbness.   Psychiatric/Behavioral: Positive for agitation. Negative for sleep disturbance and suicidal ideas. The patient is not nervous/anxious.      I have reviewed and confirmed the accuracy of the ROS as  "documented by the MA/LPN/RN Caitlin Loera, MICHAEL       Vitals:    09/07/21 1312   BP: 143/77   Pulse: 71   Resp: 16   Temp: 97.1 °F (36.2 °C)   TempSrc: Temporal   SpO2: 98%   Weight: 81.1 kg (178 lb 11.2 oz)   Height: 170.2 cm (67\")   PainSc:   6   PainLoc: Back     Objective   Physical Exam  Vitals and nursing note reviewed.   Constitutional:       Appearance: He is well-developed.   HENT:      Head: Normocephalic and atraumatic.   Musculoskeletal:      Lumbar back: Tenderness and bony tenderness (bilateral moderate L3-L5 facet tenderness; + loading manuever) present. Decreased range of motion.   Neurological:      Mental Status: He is alert.      Gait: Gait abnormal.   Psychiatric:         Speech: Speech normal.         Behavior: Behavior normal.         Thought Content: Thought content normal.         Judgment: Judgment normal.         Assessment/Plan   Diagnoses and all orders for this visit:    1. Chronic pain syndrome (Primary)    2. Lumbar facet arthropathy    3. Osteoarthritis of spine with radiculopathy, lumbar region      --- bilateral L3-L5 RFL  WITH MAC. No blood thinners. Reviewed the procedure at length with the patient.  Included in the review was expectations, complications, risk and benefits.The procedure was described in detail and the risks, benefits and alternatives were discussed with the patient (including but not limited to: bleeding, infection, nerve damage, worsening of pain, inability to perform injection, paralysis, seizures, and death) who agreed to proceed.  Discussed the potential for sedation if warranted/wanted.  The procedure will plan to be performed at [facility] with fluoroscopic guidance(unless ultrasound is indicated) and could potentially have steroids and contrast dye used. Questions were answered and in a way the patient could understand.  Patient verbalized understanding and wishes to proceed.  This intervention will be ordered.  Discussed with " "patient that all procedures are part of a multimodal plan of care and include either formal PT or a home exercise program.  Patient has no evidence of coagulopathy or current infection.  --- Follow-up after procedure or sooner if needed    -------  Education about Medial Branch Blockade and RF Therapy:    This medial branch blockade (MBB) suggested is intended for diagnostic purposes, with the intent of offering the patient Radiofrequency thermal rhizotomy (RF) if the MBB is diagnostically effective.  The diagnostic blockade is necessary to determine the likelihood that RF therapy could be efficacious in providing long term relief to the patient.    Medial branches are sensory nerve branches that connect to a facet joint and transmit sensations & pain signals from that joint.  Facet is a term for the type of joints found in the spine.  Medial branches are the nerves that go to a facet, and therefore are also sometimes called \"facet joint nerves\" (FJNs).      In a medial branch blockade procedure, xray fluoroscopy is used to verify the locations of the outside of the joint lines which are being targeted.  Under xray guidance, needles are placed to these areas.  Contrast dye is injected to confirm proper placement, with dye flowing over the joint area, and to ensure that the dye does not flow into unintended areas such as a vein.  When this is confirmed, local anesthetic is injected to block the medial branch at that joint level.      If MBBs are diagnostically successful in blocking pain, then the patient is most likely a great candidate for Radiofrequency of those facet joint nerves.  In the RF procedure, needles are placed to the joint lines in the same fashion, and after testing, the needle tips are heated to thermally treat the nerves, blocking the nerves by in essence damaging the nerves with the heat treatment.       Medically, a successful RF procedure should provide a patient with 50% pain relief or more for " at least 6 months.  Clinical experience suggests that successful patients receive relief more in the range of 12 months on average.  We also discussed that a fortunate minority of patients receive therapeutic success from the MBB, and may not require RF ablation.  If a patient receives more than 8 weeks of relief from MBB, then occasional repeat MBB for therapeutic purposes is a very reasonable alternative therapy.  This course of therapy is consistent with our LCDs according to our CMS  in the area, and therefore other insurance providers should follow accordingly.  We will monitor our patients to screen for these therapeutic responders and will offer RF therapy only when necessary.        We discussed that MBB & RF are not without risks.  Guidelines regarding anticoagulant use & neuraxial procedures will be respected.  Patients that are ill or otherwise may be at risk for sepsis will not have their spines accessed by neuraxial injections of any type.  This patient will not be offered these therapies if there is an increased risk.   We discussed that there is a risk of postprocedural pain and also a risk of worsening of clinical picture with these procedures as with any neuraxial procedure.    -------         SYDNI REPORT  As the clinician, I personally reviewed the SYDNI from9 9-7-21 while the patient was in the office today.             Dictated utilizing Dragon dictation.

## 2021-09-17 NOTE — DISCHARGE INSTRUCTIONS
[facility] - Post-procedure Instructions          --  While there are no absolute restrictions, it is recommended that you do not perform strenuous activity today. In the morning, you may resume your level of activity as before your block.    --  If you have a band-aid at your injection site, please remove it later today. Observe the area for any redness, swelling, pus-like drainage, or a temperature over 101°. If any of these symptoms occur, please call your doctor at [phone]. If after office hours, leave a message and the on-call provider will return your call.    --  Ice may be applied to your injection site. It is recommended you avoid direct heat (heating pad; hot tub) for 1-2 days.    --  Call [facility] at [phone] if you experience persistent headache, persistent bleeding from the injection site, or severe pain not relieved by heat or oral medication.    --  Do not make important decisions today.    --  Due to the effects of the block and/or the I.V. Sedation, DO NOT drive or operate hazardous machinery for 12 hours.  Local anesthetics may cause numbness after procedure and precautions must be taken with regards to operating equipment as well as with walking, even if ambulating with assistance of another person or with an assistive device.    --  Do not drink alcohol for 12 hours.    -- You may return to work tomorrow, or as directed by your referring doctor.    --  Occasionally you may notice a slight increase in your pain after the procedure. This should start to improve within the next 24-48 hours. Radiofrequency ablation procedure pain may last 3-4 weeks.    --  It may take as long as 3-4 days before you notice a gradual improvement in your pain and/or other symptoms.    -- You may continue to take your prescribed pain medication as needed.    --  Some normal possible side effects of steroid use could include fluid retention, increased blood sugar, dull headache,   Ongoing SW/CM Assessment/Plan of Care Note     See SW/CM flowsheets for goals and other objective data.    Progress note:  Stress test today    Update 1706  [facility] order received and referral sent to [facility]- pending acceptance.     Current Status  Physical Therapy: Recommends non-daily skilled therapy.  Occupational Therapy: Not ordered.    Barriers to discharge:   Medical clearance     Discharge plan:  Home with home health     CM/SW team to continue to follow for discharge needs.     increased sweating, increased appetite, mood swings and flushing.    --  Diabetics are recommended to watch their blood glucose level closely for 24-48 hours after the injection.    --  Must stay in PACU for 20 min upon arrival and prove no leg weakness before being discharged.    --  IN THE EVENT OF A LIFE THREATENING EMERGENCY, (CHEST PAIN, BREATHING DIFFICULTIES, PARALYSIS…) YOU SHOULD GO TO YOUR NEAREST EMERGENCY ROOM.    --  You should be contacted by our office within 2-3 days to schedule follow up or next appointment date.  If not contacted within 7 days, please call the office at [phone]

## 2021-09-21 ENCOUNTER — TRANSCRIBE ORDERS (OUTPATIENT)
Dept: SURGERY | Facility: SURGERY CENTER | Age: 81
End: 2021-09-21

## 2021-09-21 DIAGNOSIS — M47.816 LUMBAR FACET ARTHROPATHY: Primary | ICD-10-CM

## 2021-10-06 ENCOUNTER — TRANSCRIBE ORDERS (OUTPATIENT)
Dept: LAB | Facility: SURGERY CENTER | Age: 81
End: 2021-10-06

## 2021-10-06 ENCOUNTER — LAB (OUTPATIENT)
Dept: LAB | Facility: SURGERY CENTER | Age: 81
End: 2021-10-06

## 2021-10-06 DIAGNOSIS — Z01.818 OTHER SPECIFIED PRE-OPERATIVE EXAMINATION: Primary | ICD-10-CM

## 2021-10-06 DIAGNOSIS — Z01.818 OTHER SPECIFIED PRE-OPERATIVE EXAMINATION: ICD-10-CM

## 2021-10-06 LAB — SARS-COV-2 ORF1AB RESP QL NAA+PROBE: NOT DETECTED

## 2021-10-06 PROCEDURE — C9803 HOPD COVID-19 SPEC COLLECT: HCPCS

## 2021-10-06 PROCEDURE — U0004 COV-19 TEST NON-CDC HGH THRU: HCPCS | Performed by: SURGERY

## 2021-10-06 PROCEDURE — U0005 INFEC AGEN DETEC AMPLI PROBE: HCPCS | Performed by: SURGERY

## 2021-10-07 ENCOUNTER — ANESTHESIA EVENT (OUTPATIENT)
Dept: SURGERY | Facility: SURGERY CENTER | Age: 81
End: 2021-10-07

## 2021-10-07 PROCEDURE — S0260 H&P FOR SURGERY: HCPCS | Performed by: ANESTHESIOLOGY

## 2021-10-07 NOTE — H&P
Brief Pre-procedural / Pre-operative H&P        -----    Pertinent Diagnosis:   Lumbar spondylosis/lumbar facet arthropathy    Proposed Procedure: Lumbar medial branch radiofrequency ablation      Subjective   Darwin Spakrs is a 80 y.o. male  who presents for intervention.  He has a history of back pain with axial elements.      History of Present Illness     Diagnostic lumbar medial branch blockade's x2 to the L3 and L4 and L5 medial branches.  Recurrent pain.  RF is indicated.    -------    The following portions of the patient's history were reviewed and updated as appropriate: allergies, current medications, past family history, past medical history, past social history, past surgical history and problem list.    Allergies   Allergen Reactions   • Milk-Related Compounds Other (See Comments)     Constipation   • Pregabalin      Other reaction(s): Visual Disturbance   • Neomycin-Bacitracin Zn-Polymyx Rash         Current Facility-Administered Medications:   •  lactated ringers infusion, 20 mL/hr, Intravenous, Continuous, Kb Rodriguez MD, Last Rate: 20 mL/hr at 10/08/21 0811, 20 mL/hr at 10/08/21 0811  •  sodium chloride 0.9 % flush 10 mL, 10 mL, Intravenous, Q12H, Kb Rodriguez MD  •  sodium chloride 0.9 % flush 10 mL, 10 mL, Intravenous, PRN, Kb Rodriguez MD    No current facility-administered medications on file prior to encounter.     Current Outpatient Medications on File Prior to Encounter   Medication Sig Dispense Refill   • KLOR-CON 20 MEQ CR tablet TAKE ONE TABLET BY MOUTH DAILY 90 tablet 0   • atenolol (TENORMIN) 50 MG tablet TAKE ONE TABLET BY MOUTH DAILY 30 tablet 4   • Cholecalciferol (VITAMIN D) 1000 UNITS tablet Take  by mouth.     • diphenhydrAMINE-acetaminophen (Tylenol PM Extra Strength)  MG tablet per tablet Take 1 tablet by mouth At Night As Needed for Sleep.     • levothyroxine (SYNTHROID, LEVOTHROID) 50 MCG tablet TAKE ONE TABLET BY MOUTH DAILY FOR THYROID 30 tablet 5    • Linzess 72 MCG capsule capsule TAKE ONE CAPSULE BY MOUTH EVERY MORNING BEFORE BREAKFAST 30 capsule 3   • lisinopril (PRINIVIL,ZESTRIL) 10 MG tablet TAKE ONE TABLET BY MOUTH DAILY FOR BLOOD PRESSURE 30 tablet 4   • lisinopril (PRINIVIL,ZESTRIL) 5 MG tablet TAKE ONE TABLET BY MOUTH DAILY FOR BLOOD PRESSURE 30 tablet 5   • meloxicam (MOBIC) 15 MG tablet TAKE ONE TABLET BY MOUTH DAILY WITH FOOD AND WATER 90 tablet 0   • Misc Natural Products (OSTEO BI-FLEX ADV TRIPLE ST) tablet Take  by mouth.     • MULTIPLE VITAMINS ESSENTIAL PO Take  by mouth.     • niacin 250 MG tablet Take 1 tablet by mouth Daily With Breakfast. 30 tablet 6   • nystatin-triamcinolone (MYCOLOG) 730076-2.1 UNIT/GM-% ointment APPLY TO APPROPRIATE AREA(S) AS DIRECTED EVERY 12 HOURS SPARINGLY UNTIL RESOVED 60 g 0   • simvastatin (ZOCOR) 40 MG tablet TAKE ONE TABLET BY MOUTH ONCE NIGHTLY 30 tablet 4       Patient Active Problem List   Diagnosis   • BP (high blood pressure)   • Hyperlipidemia   • Adult hypothyroidism   • Insomnia   • Anxiety   • Dermatitis, eczematoid   • Bronchitis   • Seasonal allergies   • Health care maintenance   • Chronic midline low back pain without sciatica   • Penis pain   • Tinea cruris   • Primary osteoarthritis involving multiple joints   • Benzodiazepine dependence (HCC)   • Constipation   • Abnormal finding on CT scan   • Scoliosis of lumbar spine   • Lumbar facet arthropathy       Past Medical History:   Diagnosis Date   • Cataract    • Hyperlipidemia    • Hypertension    • Hypothyroidism    • Osteoarthritis    • Prostate cancer (HCC)        Past Surgical History:   Procedure Laterality Date   • CATARACT EXTRACTION     • CIRCUMCISION     • COLONOSCOPY     • COLONOSCOPY N/A 5/27/2021    Procedure: COLONOSCOPY TO CECUM/TI;  Surgeon: Jamel Michaels MD;  Location: [facility] ENDOSCOPY;  Service: Gastroenterology;  Laterality: N/A;  Pre op: Abnormal cat scan, constipation, RLQ pain  Post op: Diverticulosis, Hemorrhoids,  "otherwise normal to and including TI.   • MEDIAL BRANCH BLOCK Bilateral 7/30/2021    Procedure: LUMBAR MEDIAL BRANCH BLOCK;  Surgeon: Kb Rodriguez MD;  Location: [facility];  Service: Pain Management;  Laterality: Bilateral;   • MEDIAL BRANCH BLOCK Bilateral 8/20/2021    Procedure: MEDIAL BRANCH BLOCK--bilateral lumbar3-lumbar5;  Surgeon: Kb Rodriguez MD;  Location: [facility];  Service: Pain Management;  Laterality: Bilateral;   • TONSILLECTOMY     • TOOTH EXTRACTION         Family History   Problem Relation Age of Onset   • Diabetes Mother    • Hypertension Mother    • Diabetes Father    • Hypertension Father    • Bipolar disorder Father    • Malig Hyperthermia Neg Hx        Social History     Socioeconomic History   • Marital status:      Spouse name: Not on file   • Number of children: Not on file   • Years of education: Not on file   • Highest education level: Not on file   Tobacco Use   • Smoking status: Former Smoker   • Smokeless tobacco: Never Used   Vaping Use   • Vaping Use: Never used   Substance and Sexual Activity   • Alcohol use: Yes   • Drug use: Not Currently     Types: Marijuana     Comment: used in the 1970's   • Sexual activity: Defer       -------       Review of Systems  No Fever, No Chills, No ear pain, No sinus pressure or drainage, No eye pain or drainage, No cough, No SOB, No chest tightness nor chest pain, no palpitations.          Vitals:    10/08/21 0759   BP: 135/70   BP Location: Left arm   Patient Position: Sitting   Pulse: 63   Resp: 16   Temp: 96.9 °F (36.1 °C)   TempSrc: Temporal   SpO2: 96%   Weight: 79.4 kg (175 lb)   Height: 170.2 cm (67\")         Objective   Physical Exam  VSS, NNR, NCAT, NMNA, NRD, AAOx3.      -------    Assessment & Plan:  - as noted above, the stated intervention is indicated  - Follow-up plan will be noted in the operative report        We will plan on seeing him back in about 6 weeks per routine      EMR Dragon/Transcription " disclaimer:   Typed items in this encounter note may have been created by electronic transcription/translation software which converts spoken language to printed text. The electronic translation of spoken language may permit erroneous, or at times, nonsensical words or phrases to be inadvertently transcribed; Although I have reviewed the note for such errors, some may still exist.

## 2021-10-08 ENCOUNTER — HOSPITAL ENCOUNTER (OUTPATIENT)
Facility: SURGERY CENTER | Age: 81
Setting detail: HOSPITAL OUTPATIENT SURGERY
Discharge: HOME | End: 2021-10-08
Attending: ANESTHESIOLOGY | Admitting: ANESTHESIOLOGY

## 2021-10-08 ENCOUNTER — HOSPITAL ENCOUNTER (OUTPATIENT)
Dept: GENERAL RADIOLOGY | Facility: SURGERY CENTER | Age: 81
Setting detail: HOSPITAL OUTPATIENT SURGERY
End: 2021-10-08

## 2021-10-08 ENCOUNTER — ANESTHESIA (OUTPATIENT)
Dept: SURGERY | Facility: SURGERY CENTER | Age: 81
End: 2021-10-08

## 2021-10-08 VITALS
BODY MASS INDEX: 27.47 KG/M2 | RESPIRATION RATE: 16 BRPM | OXYGEN SATURATION: 97 % | HEIGHT: 67 IN | WEIGHT: 175 LBS | DIASTOLIC BLOOD PRESSURE: 98 MMHG | HEART RATE: 71 BPM | SYSTOLIC BLOOD PRESSURE: 152 MMHG | TEMPERATURE: 97.4 F

## 2021-10-08 DIAGNOSIS — M47.816 LUMBAR FACET ARTHROPATHY: ICD-10-CM

## 2021-10-08 PROCEDURE — 76000 FLUOROSCOPY <1 HR PHYS/QHP: CPT

## 2021-10-08 PROCEDURE — 64635 DESTROY LUMB/SAC FACET JNT: CPT | Performed by: ANESTHESIOLOGY

## 2021-10-08 PROCEDURE — 64636 DESTROY L/S FACET JNT ADDL: CPT | Performed by: ANESTHESIOLOGY

## 2021-10-08 PROCEDURE — 25010000002 PROPOFOL 10 MG/ML EMULSION: Performed by: ANESTHESIOLOGY

## 2021-10-08 PROCEDURE — 3E0T3TZ INTRODUCTION OF DESTRUCTIVE AGENT INTO PERIPHERAL NERVES AND PLEXI, PERCUTANEOUS APPROACH: ICD-10-PCS | Performed by: ANESTHESIOLOGY

## 2021-10-08 RX ORDER — PROPOFOL 10 MG/ML
VIAL (ML) INTRAVENOUS AS NEEDED
Status: DISCONTINUED | OUTPATIENT
Start: 2021-10-08 | End: 2021-10-08 | Stop reason: SURG

## 2021-10-08 RX ORDER — ONDANSETRON 2 MG/ML
4 INJECTION INTRAMUSCULAR; INTRAVENOUS ONCE AS NEEDED
Status: DISCONTINUED | OUTPATIENT
Start: 2021-10-08 | End: 2021-10-08 | Stop reason: HOSPADM

## 2021-10-08 RX ORDER — SODIUM CHLORIDE 0.9 % (FLUSH) 0.9 %
10 SYRINGE (ML) INJECTION AS NEEDED
Status: DISCONTINUED | OUTPATIENT
Start: 2021-10-08 | End: 2021-10-08 | Stop reason: HOSPADM

## 2021-10-08 RX ORDER — SODIUM CHLORIDE, SODIUM LACTATE, POTASSIUM CHLORIDE, CALCIUM CHLORIDE 600; 310; 30; 20 MG/100ML; MG/100ML; MG/100ML; MG/100ML
20 INJECTION, SOLUTION INTRAVENOUS CONTINUOUS
Status: DISCONTINUED | OUTPATIENT
Start: 2021-10-08 | End: 2021-10-08 | Stop reason: HOSPADM

## 2021-10-08 RX ORDER — SODIUM CHLORIDE 0.9 % (FLUSH) 0.9 %
10 SYRINGE (ML) INJECTION EVERY 12 HOURS SCHEDULED
Status: DISCONTINUED | OUTPATIENT
Start: 2021-10-08 | End: 2021-10-08 | Stop reason: HOSPADM

## 2021-10-08 RX ORDER — FENTANYL CITRATE 50 UG/ML
25 INJECTION, SOLUTION INTRAMUSCULAR; INTRAVENOUS
Status: DISCONTINUED | OUTPATIENT
Start: 2021-10-08 | End: 2021-10-08 | Stop reason: HOSPADM

## 2021-10-08 RX ADMIN — PROPOFOL 75 MCG/KG/MIN: 10 INJECTION, EMULSION INTRAVENOUS at 10:02

## 2021-10-08 RX ADMIN — PROPOFOL 50 MG: 10 INJECTION, EMULSION INTRAVENOUS at 10:02

## 2021-10-08 RX ADMIN — SODIUM CHLORIDE, POTASSIUM CHLORIDE, SODIUM LACTATE AND CALCIUM CHLORIDE 20 ML/HR: 600; 310; 30; 20 INJECTION, SOLUTION INTRAVENOUS at 08:11

## 2021-10-08 NOTE — OP NOTE
Bilateral L3-5 Lumbar Medial Branch RADIOFREQUENCY  [facility]      PREOPERATIVE DIAGNOSIS:  Lumbar spondylosis without myelopathy    POSTOPERATIVE DIAGNOSIS:  Lumbar spondylosis without myelopathy    PROCEDURE:   Diagnostic Bilateral Lumbar Medial Branch Nerve thermal radiofrequency lesioning, with fluoroscopy:  L3, L4, and L5 nerves (at the L4 and L5 transverse processes and the sacral alar groove) to thermally treat the innervation to facet joints L4-5 and L5-S1  1. 98723-76 -- Bilateral L/S facet neuro destr., 1st Level  2. 06056-05 -- Bilateral L/S facet neuro destr., 2nd  Level    PRE-PROCEDURE DISCUSSION WITH PATIENT:    Risks and complications were discussed with the patient prior to starting the procedure and informed consent was obtained.      SURGEON:  Kb Rodriguez MD    REASON FOR PROCEDURE:    The patient complains of pain that seems to have a significant axial component and Previous diagnostic positivity of two Lumbar Medial Branch Blockades at the same levels    SEDATION:  Monitored Anesthesia Care (MAC), The use of MAC care was carefully considered, and for this particular patient the need for additional procedural sedation seemed necessary in this instance to safely perform the procedure. and The patient had higher than average levels of procedural anxiety and the need to provide additional procedural sedation was needed to safely proceed.      ANESTHETIC:  Lidocaine 2%  STEROID:  NONE      DESCRIPTON OF PROCEDURE:  After obtaining informed consent, IV access  was obtained in the preoperative area.   The patient was taken to the operating room.  The patient was placed in the prone position with a pillow under the abdomen. All pressure points were well padded.  EKG, blood pressure, and pulse oximeter were monitored.  The patient was monitored and sedated by Dr. Del Rosario from the anesthesiology group. The lumbosacral area was prepped with Chloraprep and draped in a sterile fashion.      Under fluoroscopic guidance the transverse processes of the L4 and L5 vertebrae at the junctions of the superior articular processes were identified on the right.  Also identified was the groove between the ala and the superior articular process of the sacrum on the ipsilateral side.  Skin and subcutaneous tissue were anesthetized with 1ml of 1% lidocaine above each of these points. Then, radiofrequency probe needles were advanced in this fluoro view to the above junctions.  Aspiration was negative for blood and CSF.  After confirming the position of the needle with fluoroscope in all views, testing was initiated. Motor testing was confirmed to be negative at 3V and 2Hz for any radicular stimulation.  Then 1mL of the local anesthetic was instilled in each needle.  Two minutes elapsed, and during this time a lateral fluoroscopic view was confirmed again to ensure the needles had not advanced nor retracted.  Then, Radiofrequency Lesioning was initiated for 1.5 minutes at 85 degrees Celsius.  Needles were removed intact from each of the areas.     A similar procedure was repeated to address the L3, L4, and L5 nerves on the contralateral side.   Onset of analgesia was noted.  Vital signs remained stable throughout.      ESTIMATED BLOOD LOSS:  <5 mL  SPECIMENS:  none    COMPLICATIONS:   No complications were noted.    TOLERANCE & DISCHARGE CONDITION:    The patient tolerated the procedure well.  The patient was transported to the recovery area without difficulties.  The patient was discharged to home under the care of family in stable and satisfactory condition.    PLAN OF CARE:  1. The patient was given our standard instruction sheet.  2. The patient will  Return to clinic 6 wks.  3. The patient will resume all medications as per the medication reconciliation sheet.

## 2021-10-08 NOTE — ANESTHESIA POSTPROCEDURE EVALUATION
Patient: Darwin Sparks    Procedure Summary     Date: 10/08/21 Room / Location: [facility] OR [facility] / [facility]    Anesthesia Start: 1000 Anesthesia Stop: 1033    Procedure: RADIOFREQUENCY ABLATION LUMBAR--bilateral lumbar3-5 WITH MAC (Bilateral ) Diagnosis:       Lumbar facet arthropathy      (Lumbar facet arthropathy [M47.816])    Surgeons: Kb Rodriguez MD Provider: Fran Del Rosario MD    Anesthesia Type: MAC ASA Status: 3          Anesthesia Type: MAC    Vitals  Vitals Value Taken Time   /98 10/08/21 1050   Temp 36.3 °C (97.4 °F) 10/08/21 1033   Pulse 71 10/08/21 1048   Resp 16 10/08/21 1048   SpO2 97 % 10/08/21 1048   Vitals shown include unvalidated device data.        Post Anesthesia Care and Evaluation    Patient location during evaluation: bedside  Pain management: adequate  Airway patency: patent  Anesthetic complications: No anesthetic complications    Cardiovascular status: acceptable  Respiratory status: acceptable  Hydration status: acceptable

## 2021-10-08 NOTE — DISCHARGE INSTRUCTIONS
[facility] - Post-procedure Instructions          --  While there are no absolute restrictions, it is recommended that you do not perform strenuous activity today. In the morning, you may resume your level of activity as before your block.    --  If you have a band-aid at your injection site, please remove it later today. Observe the area for any redness, swelling, pus-like drainage, or a temperature over 101°. If any of these symptoms occur, please call your doctor at [phone]. If after office hours, leave a message and the on-call provider will return your call.    --  Ice may be applied to your injection site. It is recommended you avoid direct heat (heating pad; hot tub) for 1-2 days.    --  Call [facility] at [phone] if you experience persistent headache, persistent bleeding from the injection site, or severe pain not relieved by heat or oral medication.    --  Do not make important decisions today.    --  Due to the effects of the block and/or the I.V. Sedation, DO NOT drive or operate hazardous machinery for 12 hours.  Local anesthetics may cause numbness after procedure and precautions must be taken with regards to operating equipment as well as with walking, even if ambulating with assistance of another person or with an assistive device.    --  Do not drink alcohol for 12 hours.    -- You may return to work tomorrow, or as directed by your referring doctor.    --  Occasionally you may notice a slight increase in your pain after the procedure. This should start to improve within the next 24-48 hours. Radiofrequency ablation procedure pain may last 3-4 weeks.    --  It may take as long as 3-4 days before you notice a gradual improvement in your pain and/or other symptoms.    -- You may continue to take your prescribed pain medication as needed.    --  Some normal possible side effects of steroid use could include fluid retention, increased blood sugar, dull headache,  increased sweating, increased appetite, mood swings and flushing.    --  Diabetics are recommended to watch their blood glucose level closely for 24-48 hours after the injection.    --  Must stay in PACU for 20 min upon arrival and prove no leg weakness before being discharged.    --  IN THE EVENT OF A LIFE THREATENING EMERGENCY, (CHEST PAIN, BREATHING DIFFICULTIES, PARALYSIS…) YOU SHOULD GO TO YOUR NEAREST EMERGENCY ROOM.    --  You should be contacted by our office within 2-3 days to schedule follow up or next appointment date.  If not contacted within 7 days, please call the office at [phone]

## 2021-10-08 NOTE — ANESTHESIA PREPROCEDURE EVALUATION
Anesthesia Evaluation     NPO Solid Status: > 8 hours             Airway   Mallampati: II  TM distance: >3 FB  Neck ROM: full  Dental    (+) lower dentures and upper dentures    Pulmonary - normal exam   Cardiovascular - normal exam    (+) hypertension, hyperlipidemia,   (-) past MI      Neuro/Psych  (+) psychiatric history Anxiety,     GI/Hepatic/Renal/Endo    (+)   thyroid problem hypothyroidism    Musculoskeletal     Abdominal    Substance History      OB/GYN          Other   arthritis,                      Anesthesia Plan    ASA 3     MAC       Anesthetic plan, all risks, benefits, and alternatives have been provided, discussed and informed consent has been obtained with: patient.

## 2021-10-28 ENCOUNTER — LAB (OUTPATIENT)
Dept: FAMILY MEDICINE CLINIC | Facility: CLINIC | Age: 81
End: 2021-10-28

## 2021-10-28 DIAGNOSIS — Z12.5 PROSTATE CANCER SCREENING: ICD-10-CM

## 2021-10-28 DIAGNOSIS — E78.2 MIXED HYPERLIPIDEMIA: Primary | ICD-10-CM

## 2021-10-28 DIAGNOSIS — I10 ESSENTIAL HYPERTENSION: ICD-10-CM

## 2021-10-28 DIAGNOSIS — E03.9 ADULT HYPOTHYROIDISM: ICD-10-CM

## 2021-10-28 DIAGNOSIS — Z00.00 HEALTH CARE MAINTENANCE: ICD-10-CM

## 2021-10-29 LAB
ALBUMIN SERPL-MCNC: 3.9 G/DL (ref 3.6–4.6)
ALBUMIN/GLOB SERPL: 1.5 {RATIO} (ref 1.2–2.2)
ALP SERPL-CCNC: 88 IU/L (ref 44–121)
ALT SERPL-CCNC: 29 IU/L (ref 0–44)
AST SERPL-CCNC: 39 IU/L (ref 0–40)
BASOPHILS # BLD AUTO: 0.1 X10E3/UL (ref 0–0.2)
BASOPHILS NFR BLD AUTO: 1 %
BILIRUB SERPL-MCNC: 0.4 MG/DL (ref 0–1.2)
BUN SERPL-MCNC: 11 MG/DL (ref 8–27)
BUN/CREAT SERPL: 12 (ref 10–24)
CALCIUM SERPL-MCNC: 9.3 MG/DL (ref 8.6–10.2)
CHLORIDE SERPL-SCNC: 103 MMOL/L (ref 96–106)
CHOLEST SERPL-MCNC: 199 MG/DL (ref 100–199)
CO2 SERPL-SCNC: 19 MMOL/L (ref 20–29)
CREAT SERPL-MCNC: 0.93 MG/DL (ref 0.76–1.27)
EOSINOPHIL # BLD AUTO: 0.3 X10E3/UL (ref 0–0.4)
EOSINOPHIL NFR BLD AUTO: 5 %
ERYTHROCYTE [DISTWIDTH] IN BLOOD BY AUTOMATED COUNT: 12.8 % (ref 11.6–15.4)
GLOBULIN SER CALC-MCNC: 2.6 G/DL (ref 1.5–4.5)
GLUCOSE SERPL-MCNC: 93 MG/DL (ref 65–99)
HCT VFR BLD AUTO: 46.3 % (ref 37.5–51)
HDLC SERPL-MCNC: 67 MG/DL
HGB BLD-MCNC: 15.9 G/DL (ref 13–17.7)
IMM GRANULOCYTES # BLD AUTO: 0 X10E3/UL (ref 0–0.1)
IMM GRANULOCYTES NFR BLD AUTO: 0 %
LDLC SERPL CALC-MCNC: 87 MG/DL (ref 0–99)
LYMPHOCYTES # BLD AUTO: 3.7 X10E3/UL (ref 0.7–3.1)
LYMPHOCYTES NFR BLD AUTO: 70 %
MCH RBC QN AUTO: 35.3 PG (ref 26.6–33)
MCHC RBC AUTO-ENTMCNC: 34.3 G/DL (ref 31.5–35.7)
MCV RBC AUTO: 103 FL (ref 79–97)
MONOCYTES # BLD AUTO: 0.3 X10E3/UL (ref 0.1–0.9)
MONOCYTES NFR BLD AUTO: 5 %
MORPHOLOGY BLD-IMP: ABNORMAL
NEUTROPHILS # BLD AUTO: 1 X10E3/UL (ref 1.4–7)
NEUTROPHILS NFR BLD AUTO: 19 %
PLATELET # BLD AUTO: 355 X10E3/UL (ref 150–450)
POTASSIUM SERPL-SCNC: 4.8 MMOL/L (ref 3.5–5.2)
PROT SERPL-MCNC: 6.5 G/DL (ref 6–8.5)
PSA SERPL-MCNC: 0.6 NG/ML (ref 0–4)
RBC # BLD AUTO: 4.51 X10E6/UL (ref 4.14–5.8)
SODIUM SERPL-SCNC: 141 MMOL/L (ref 134–144)
T4 FREE SERPL-MCNC: 0.97 NG/DL (ref 0.82–1.77)
TRIGL SERPL-MCNC: 274 MG/DL (ref 0–149)
TSH SERPL DL<=0.005 MIU/L-ACNC: 4.77 UIU/ML (ref 0.45–4.5)
UNABLE TO VOID: NORMAL
VLDLC SERPL CALC-MCNC: 45 MG/DL (ref 5–40)
WBC # BLD AUTO: 5.4 X10E3/UL (ref 3.4–10.8)

## 2021-11-04 ENCOUNTER — OFFICE VISIT (OUTPATIENT)
Dept: FAMILY MEDICINE CLINIC | Facility: CLINIC | Age: 81
End: 2021-11-04

## 2021-11-04 VITALS
BODY MASS INDEX: 28.55 KG/M2 | WEIGHT: 181.9 LBS | HEIGHT: 67 IN | RESPIRATION RATE: 12 BRPM | SYSTOLIC BLOOD PRESSURE: 120 MMHG | OXYGEN SATURATION: 96 % | TEMPERATURE: 96.6 F | HEART RATE: 68 BPM | DIASTOLIC BLOOD PRESSURE: 60 MMHG

## 2021-11-04 DIAGNOSIS — G89.29 CHRONIC MIDLINE LOW BACK PAIN WITHOUT SCIATICA: ICD-10-CM

## 2021-11-04 DIAGNOSIS — M15.9 PRIMARY OSTEOARTHRITIS INVOLVING MULTIPLE JOINTS: ICD-10-CM

## 2021-11-04 DIAGNOSIS — M54.50 CHRONIC MIDLINE LOW BACK PAIN WITHOUT SCIATICA: ICD-10-CM

## 2021-11-04 DIAGNOSIS — E03.9 ACQUIRED HYPOTHYROIDISM: Primary | ICD-10-CM

## 2021-11-04 DIAGNOSIS — E78.2 MIXED HYPERLIPIDEMIA: ICD-10-CM

## 2021-11-04 PROCEDURE — 99213 OFFICE O/P EST LOW 20 MIN: CPT | Performed by: NURSE PRACTITIONER

## 2021-11-04 NOTE — PATIENT INSTRUCTIONS
Discharge instructions  Continue present plan you look like you are doing well, cut back on the breads and pasta your triglycerides are high  Decreased portion sizes across the board  This would allow you some slow steady weight loss over the next 6 months to 12 months  Consider intermittent fasting 64 ounces of water daily    Synthroid generic 50 mcg daily except  Take 2 tablets on Fridays and Tuesdays  1 tablet the remaining days  Outpatient TSH in 8 weeks    Covid booster when available as soon as possible should you get Covid reach out to me immediately so I can arrange for antibodies high fever chest pain shortness of breath severe fatigue emergency room immediately early recognition for condition change and signs and symptoms of worsening is critical with Covid    If you get Covid symptoms early checking for Covid and repeat to 3 days later  As false positive more frequently possible.    As long as you are doing well follow-up in 6 months and fasting labs prior to next appointment

## 2021-11-09 NOTE — PROGRESS NOTES
"Chief Complaint  No chief complaint on file.    Subjective          Darwin Sparks presents to [facility]  Very pleasant gentleman follow-up acquired hypothyroid compliant with replacement therapy mixed hyperlipidemia takes a statin  Chronic back pain chronic osteoarthritis multiple joints, gait difficulties no change, just to get around be active, takes extra precautions against falls.  He is fully vaccinated for Covid.  He has a walk-in tub or pole at home which is quite helpful      Objective   Vital Signs:   /60   Pulse 68   Temp 96.6 °F (35.9 °C) (Infrared)   Resp 12   Ht 170.2 cm (67\")   Wt 82.5 kg (181 lb 14.4 oz)   SpO2 96%   BMI 28.49 kg/m²     Physical Exam  Vitals reviewed.   Constitutional:       Appearance: He is well-developed.   HENT:      Head: Normocephalic.      Nose: Nose normal.   Eyes:      General: No scleral icterus.     Conjunctiva/sclera: Conjunctivae normal.      Pupils: Pupils are equal, round, and reactive to light.   Neck:      Thyroid: No thyromegaly.      Vascular: No JVD.   Cardiovascular:      Rate and Rhythm: Normal rate and regular rhythm.      Heart sounds: Normal heart sounds. No murmur heard.  No friction rub. No gallop.    Pulmonary:      Effort: Pulmonary effort is normal. No respiratory distress.      Breath sounds: Normal breath sounds. No stridor. No wheezing or rales.   Abdominal:      General: Bowel sounds are normal. There is no distension.      Palpations: Abdomen is soft.      Tenderness: There is no abdominal tenderness.      Comments: No hepatosplenomegaly, no ascites,   Musculoskeletal:         General: No tenderness.      Cervical back: Neck supple.      Comments: Gait abnormality   Lymphadenopathy:      Cervical: No cervical adenopathy.   Skin:     General: Skin is warm and dry.      Findings: No erythema or rash.   Neurological:      Mental Status: He is alert and oriented to person, place, and time.      Deep Tendon " Reflexes: Reflexes are normal and symmetric.   Psychiatric:         Behavior: Behavior normal.         Thought Content: Thought content normal.         Judgment: Judgment normal.        Result Review :                 Assessment and Plan    Diagnoses and all orders for this visit:    1. Acquired hypothyroidism (Primary)  -     TSH; Future    2. Mixed hyperlipidemia    3. 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 Primary osteoarthritis involving multiple joints        Follow Up   Return in about 6 months (around 5/4/2022), or Schedule nonfasting lab 2 months, fasting lab 6 months before next appointment thank you.  Patient was given instructions and counseling regarding his condition or for health maintenance advice. Please see specific information pulled into the AVS if appropriate.     Discharge instructions  Continue present plan you look like you are doing well, cut back on the breads and pasta your triglycerides are high  Decreased portion sizes across the board  This would allow you some slow steady weight loss over the next 6 months to 12 months  Consider intermittent fasting 64 ounces of water daily    Synthroid generic 50 mcg daily except  Take 2 tablets on Fridays and Tuesdays  1 tablet the remaining days  Outpatient TSH in 8 weeks    Covid booster when available as soon as possible should you get Covid reach out to me immediately so I can arrange for antibodies high fever chest pain shortness of breath severe fatigue emergency room immediately early recognition for condition change and signs and symptoms of worsening is critical with Covid    If you get Covid symptoms early checking for Covid and repeat to 3 days later  As false positive more frequently possible.    As long as you are doing well follow-up in 6 months and fasting labs prior to next appointment

## 2022-05-05 LAB
ALBUMIN SERPL-MCNC: 3.7 G/DL (ref 3.6–4.6)
ALBUMIN/GLOB SERPL: 1.5 {RATIO} (ref 1.2–2.2)
ALP SERPL-CCNC: 96 IU/L (ref 44–121)
ALT SERPL-CCNC: 12 IU/L (ref 0–44)
APPEARANCE UR: CLEAR
AST SERPL-CCNC: 20 IU/L (ref 0–40)
BACTERIA #/AREA URNS HPF: ABNORMAL /[HPF]
BASOPHILS # BLD AUTO: 0.1 X10E3/UL (ref 0–0.2)
BASOPHILS NFR BLD AUTO: 1 %
BILIRUB SERPL-MCNC: 0.4 MG/DL (ref 0–1.2)
BILIRUB UR QL STRIP: NEGATIVE
BUN SERPL-MCNC: 15 MG/DL (ref 8–27)
BUN/CREAT SERPL: 17 (ref 10–24)
CALCIUM SERPL-MCNC: 9.7 MG/DL (ref 8.6–10.2)
CASTS URNS QL MICRO: ABNORMAL /LPF
CHLORIDE SERPL-SCNC: 104 MMOL/L (ref 96–106)
CHOLEST SERPL-MCNC: 270 MG/DL (ref 100–199)
CO2 SERPL-SCNC: 23 MMOL/L (ref 20–29)
COLOR UR: YELLOW
CREAT SERPL-MCNC: 0.88 MG/DL (ref 0.76–1.27)
CRYSTALS URNS MICRO: ABNORMAL
EGFRCR SERPLBLD CKD-EPI 2021: 85 ML/MIN/1.73
EOSINOPHIL # BLD AUTO: 0.1 X10E3/UL (ref 0–0.4)
EOSINOPHIL NFR BLD AUTO: 3 %
EPI CELLS #/AREA URNS HPF: ABNORMAL /HPF (ref 0–10)
ERYTHROCYTE [DISTWIDTH] IN BLOOD BY AUTOMATED COUNT: 12.6 % (ref 11.6–15.4)
GLOBULIN SER CALC-MCNC: 2.4 G/DL (ref 1.5–4.5)
GLUCOSE SERPL-MCNC: 89 MG/DL (ref 65–99)
GLUCOSE UR QL STRIP: NEGATIVE
HCT VFR BLD AUTO: 42.6 % (ref 37.5–51)
HDLC SERPL-MCNC: 53 MG/DL
HGB BLD-MCNC: 14.4 G/DL (ref 13–17.7)
HGB UR QL STRIP: NEGATIVE
IMM GRANULOCYTES # BLD AUTO: 0 X10E3/UL (ref 0–0.1)
IMM GRANULOCYTES NFR BLD AUTO: 0 %
KETONES UR QL STRIP: NEGATIVE
LDLC SERPL CALC-MCNC: 178 MG/DL (ref 0–99)
LEUKOCYTE ESTERASE UR QL STRIP: NEGATIVE
LYMPHOCYTES # BLD AUTO: 2.8 X10E3/UL (ref 0.7–3.1)
LYMPHOCYTES NFR BLD AUTO: 51 %
MCH RBC QN AUTO: 33.6 PG (ref 26.6–33)
MCHC RBC AUTO-ENTMCNC: 33.8 G/DL (ref 31.5–35.7)
MCV RBC AUTO: 99 FL (ref 79–97)
MICRO URNS: NORMAL
MICRO URNS: NORMAL
MONOCYTES # BLD AUTO: 0.4 X10E3/UL (ref 0.1–0.9)
MONOCYTES NFR BLD AUTO: 8 %
MUCOUS THREADS URNS QL MICRO: PRESENT
NEUTROPHILS # BLD AUTO: 2 X10E3/UL (ref 1.4–7)
NEUTROPHILS NFR BLD AUTO: 37 %
NITRITE UR QL STRIP: NEGATIVE
PH UR STRIP: 5.5 [PH] (ref 5–7.5)
PLATELET # BLD AUTO: 286 X10E3/UL (ref 150–450)
POTASSIUM SERPL-SCNC: 5.1 MMOL/L (ref 3.5–5.2)
PROT SERPL-MCNC: 6.1 G/DL (ref 6–8.5)
PROT UR QL STRIP: NEGATIVE
RBC # BLD AUTO: 4.29 X10E6/UL (ref 4.14–5.8)
RBC #/AREA URNS HPF: ABNORMAL /HPF (ref 0–2)
SODIUM SERPL-SCNC: 144 MMOL/L (ref 134–144)
SP GR UR STRIP: 1.02 (ref 1–1.03)
TRIGL SERPL-MCNC: 209 MG/DL (ref 0–149)
TSH SERPL DL<=0.005 MIU/L-ACNC: 21.8 UIU/ML (ref 0.45–4.5)
UNIDENT CRYS URNS QL MICRO: PRESENT
UROBILINOGEN UR STRIP-MCNC: 0.2 MG/DL (ref 0.2–1)
VLDLC SERPL CALC-MCNC: 39 MG/DL (ref 5–40)
WBC # BLD AUTO: 5.5 X10E3/UL (ref 3.4–10.8)
WBC #/AREA URNS HPF: ABNORMAL /HPF (ref 0–5)

## 2022-05-11 ENCOUNTER — OFFICE VISIT (OUTPATIENT)
Dept: FAMILY MEDICINE CLINIC | Facility: CLINIC | Age: 82
End: 2022-05-11

## 2022-05-11 VITALS
OXYGEN SATURATION: 96 % | WEIGHT: 180 LBS | BODY MASS INDEX: 28.25 KG/M2 | HEART RATE: 67 BPM | SYSTOLIC BLOOD PRESSURE: 150 MMHG | TEMPERATURE: 97.1 F | HEIGHT: 67 IN | RESPIRATION RATE: 14 BRPM | DIASTOLIC BLOOD PRESSURE: 78 MMHG

## 2022-05-11 DIAGNOSIS — G89.29 CHRONIC MIDLINE LOW BACK PAIN WITHOUT SCIATICA: ICD-10-CM

## 2022-05-11 DIAGNOSIS — E78.2 MIXED HYPERLIPIDEMIA: ICD-10-CM

## 2022-05-11 DIAGNOSIS — M54.50 CHRONIC MIDLINE LOW BACK PAIN WITHOUT SCIATICA: ICD-10-CM

## 2022-05-11 DIAGNOSIS — E03.9 ADULT HYPOTHYROIDISM: Primary | ICD-10-CM

## 2022-05-11 PROCEDURE — 99213 OFFICE O/P EST LOW 20 MIN: CPT | Performed by: NURSE PRACTITIONER

## 2022-05-11 RX ORDER — LISINOPRIL 5 MG/1
5 TABLET ORAL DAILY
Qty: 90 TABLET | Refills: 1 | Status: SHIPPED | OUTPATIENT
Start: 2022-05-11 | End: 2022-11-07

## 2022-05-11 RX ORDER — ATENOLOL 50 MG/1
50 TABLET ORAL DAILY
Qty: 90 TABLET | Refills: 1 | Status: SHIPPED | OUTPATIENT
Start: 2022-05-11 | End: 2022-11-07

## 2022-05-11 RX ORDER — LEVOTHYROXINE SODIUM 0.05 MG/1
50 TABLET ORAL DAILY
Qty: 30 TABLET | Refills: 5 | Status: CANCELLED | OUTPATIENT
Start: 2022-05-11

## 2022-05-11 RX ORDER — NYSTATIN AND TRIAMCINOLONE ACETONIDE 100000; 1 [USP'U]/G; MG/G
OINTMENT TOPICAL
Qty: 60 G | Refills: 0 | Status: CANCELLED | OUTPATIENT
Start: 2022-05-11

## 2022-05-11 RX ORDER — LEVOTHYROXINE SODIUM 88 UG/1
88 TABLET ORAL DAILY
Qty: 90 TABLET | Refills: 0 | Status: SHIPPED | OUTPATIENT
Start: 2022-05-11 | End: 2022-08-09

## 2022-05-11 RX ORDER — CYCLOBENZAPRINE HCL 10 MG
10 TABLET ORAL NIGHTLY
Qty: 30 TABLET | Refills: 5 | Status: SHIPPED | OUTPATIENT
Start: 2022-05-11 | End: 2023-01-25 | Stop reason: SDUPTHER

## 2022-05-11 RX ORDER — NIACIN 250 MG
250 TABLET ORAL
Qty: 30 TABLET | Refills: 6 | Status: CANCELLED | OUTPATIENT
Start: 2022-05-11

## 2022-05-11 RX ORDER — LISINOPRIL 10 MG/1
10 TABLET ORAL DAILY
Qty: 90 TABLET | Refills: 1 | Status: SHIPPED | OUTPATIENT
Start: 2022-05-11 | End: 2022-11-07

## 2022-05-11 RX ORDER — SIMVASTATIN 40 MG
40 TABLET ORAL NIGHTLY
Qty: 90 TABLET | Refills: 1 | Status: SHIPPED | OUTPATIENT
Start: 2022-05-11 | End: 2022-11-07

## 2022-05-11 RX ORDER — MELOXICAM 15 MG/1
15 TABLET ORAL DAILY
Qty: 90 TABLET | Refills: 1 | Status: SHIPPED | OUTPATIENT
Start: 2022-05-11

## 2022-05-11 RX ORDER — POTASSIUM CHLORIDE 20 MEQ/1
20 TABLET, EXTENDED RELEASE ORAL DAILY
Qty: 90 TABLET | Refills: 1 | Status: SHIPPED | OUTPATIENT
Start: 2022-05-11 | End: 2022-11-07

## 2022-05-11 NOTE — PATIENT INSTRUCTIONS
Discharge instructions    Generic Synthroid 88 mcg daily do not miss any doses please    Resume simvastatin medications.  Check blood pressure weekly should be less than 150 systolic top number  Greater than 110 top number on average    Outpatient TSH  Nonfasting okay in 1 month important    His lungs are doing better and your blood pressures controlled you are taking the medications okay then follow-up  In 6 months with labs before your next appointment please.    Temporary trial cyclobenzaprine muscle relaxer for improved sleep, muscle relaxation give this at least a 3-week trial  Caution if next-day sedation we should decrease the dose.  Falls precaution while taking this medication his lungs are doing better follow-up in 6 months    Follow-up with Dr. Rodriguez as discussed to see if there is other options available to you for back pain and quality life

## 2022-05-26 NOTE — PROGRESS NOTES
"Chief Complaint  Follow-up (6 month f/u)    Subjective          Darwin Sparks presents to [facility]  Pleasant patient here today follow-up essential hypertension controlled  A little above goal today but he checks this regularly  No chest pain shortness of breath,  Hypothyroid compliant with medication he is a little subtherapeutic needs adjusted medication chronic low back pain no change  No worsening sees Dr. Rodriguez up-to-date care  Does not wish any further work-up at this time no night sweats chest pain unexplained weight loss or other issues overall he is doing well he is vaccinated for COVID.  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    No back surgery it was too bad.        Objective   Vital Signs:  /78   Pulse 67   Temp 97.1 °F (36.2 °C) (Infrared)   Resp 14   Ht 170.2 cm (67\")   Wt 81.6 kg (180 lb)   SpO2 96%   BMI 28.19 kg/m²   BMI has not been calculated during today's encounter.       Physical Exam  Vitals reviewed.   Constitutional:       Appearance: Normal appearance. He is well-developed.      Comments: Rafael appears well   HENT:      Head: Normocephalic.      Nose: Nose normal.   Eyes:      General: No scleral icterus.     Conjunctiva/sclera: Conjunctivae normal.      Pupils: Pupils are equal, round, and reactive to light.   Neck:      Thyroid: No thyromegaly.      Vascular: No JVD.   Cardiovascular:      Rate and Rhythm: Normal rate and regular rhythm.      Heart sounds: Normal heart sounds. No murmur heard.    No friction rub. No gallop.   Pulmonary:      Effort: Pulmonary effort is normal. No respiratory distress.      Breath sounds: Normal breath sounds. No stridor. No wheezing or rales.   Abdominal:      General: Bowel sounds are normal. There is no distension.      Palpations: Abdomen is soft.      Tenderness: There is no abdominal tenderness.      Comments: No hepatosplenomegaly, no ascites,   Musculoskeletal:    "      General: No tenderness.      Cervical back: Neck supple.      Comments: Chronic lower extremity weakness disability using braces trace edema   Lymphadenopathy:      Cervical: No cervical adenopathy.   Skin:     General: Skin is warm and dry.      Findings: No erythema or rash.   Neurological:      Mental Status: He is alert and oriented to person, place, and time.      Deep Tendon Reflexes: Reflexes are normal and symmetric.   Psychiatric:         Behavior: Behavior normal.         Thought Content: Thought content normal.         Judgment: Judgment normal.        Result Review :                 Assessment and Plan    Diagnoses and all orders for this visit:    1. Adult hypothyroidism (Primary)    2. Mixed hyperlipidemia    3. Chronic midline low back pain without sciatica    Other orders  -     atenolol (TENORMIN) 50 MG tablet; Take 1 tablet by mouth Daily.  Dispense: 90 tablet; Refill: 1  -     potassium chloride (KLOR-CON) 20 MEQ CR tablet; Take 1 tablet by mouth Daily.  Dispense: 90 tablet; Refill: 1  -     lisinopril (PRINIVIL,ZESTRIL) 10 MG tablet; Take 1 tablet by mouth Daily. FOR BLOOD PRESSURE  Dispense: 90 tablet; Refill: 1  -     lisinopril (PRINIVIL,ZESTRIL) 5 MG tablet; Take 1 tablet by mouth Daily. FOR BLOOD PRESSURE  Dispense: 90 tablet; Refill: 1  -     meloxicam (MOBIC) 15 MG tablet; Take 1 tablet by mouth Daily.  Dispense: 90 tablet; Refill: 1  -     simvastatin (ZOCOR) 40 MG tablet; Take 1 tablet by mouth Every Night.  Dispense: 90 tablet; Refill: 1  -     levothyroxine (Synthroid) 88 MCG tablet; Take 1 tablet by mouth Daily.  Dispense: 90 tablet; Refill: 0  -     cyclobenzaprine (FLEXERIL) 10 MG tablet; Take 1 tablet by mouth Every Night. For muscle relaxer and improved sleep  Dispense: 30 tablet; Refill: 5             Follow Up   Return in about 6 months (around 11/11/2022), or lab 1 mos and 6 mos.  Patient was given instructions and counseling regarding his condition or for health  maintenance advice. Please see specific information pulled into the AVS if appropriate.     Discharge instructions    Generic Synthroid 88 mcg daily do not miss any doses please    Resume simvastatin medications.  Check blood pressure weekly should be less than 150 systolic top number  Greater than 110 top number on average    Outpatient TSH  Nonfasting okay in 1 month important    His lungs are doing better and your blood pressures controlled you are taking the medications okay then follow-up  In 6 months with labs before your next appointment please.    Temporary trial cyclobenzaprine muscle relaxer for improved sleep, muscle relaxation give this at least a 3-week trial  Caution if next-day sedation we should decrease the dose.  Falls precaution while taking this medication his lungs are doing better follow-up in 6 months    Follow-up with Dr. Rodriguez as discussed to see if there is other options available to you for back pain and quality life

## 2022-08-09 RX ORDER — LEVOTHYROXINE SODIUM 88 UG/1
TABLET ORAL
Qty: 90 TABLET | Refills: 3 | Status: SHIPPED | OUTPATIENT
Start: 2022-08-09 | End: 2022-11-18

## 2022-08-09 NOTE — TELEPHONE ENCOUNTER
Rx Refill Note  Requested Prescriptions     Pending Prescriptions Disp Refills   • levothyroxine (SYNTHROID, LEVOTHROID) 88 MCG tablet [Pharmacy Med Name: LEVOTHYROXINE 88 MCG TABLET] 90 tablet 0     Sig: TAKE ONE TABLET BY MOUTH DAILY      Last office visit with prescribing clinician: 5/11/2022      Next office visit with prescribing clinician: 11/18/2022            Shannan Rebolledo MA  08/09/22, 09:49 EDT

## 2022-11-07 RX ORDER — LISINOPRIL 10 MG/1
TABLET ORAL
Qty: 90 TABLET | Refills: 1 | Status: SHIPPED | OUTPATIENT
Start: 2022-11-07

## 2022-11-07 RX ORDER — SIMVASTATIN 40 MG
TABLET ORAL
Qty: 90 TABLET | Refills: 1 | Status: SHIPPED | OUTPATIENT
Start: 2022-11-07

## 2022-11-07 RX ORDER — LISINOPRIL 5 MG/1
TABLET ORAL
Qty: 90 TABLET | Refills: 1 | Status: SHIPPED | OUTPATIENT
Start: 2022-11-07

## 2022-11-07 RX ORDER — ATENOLOL 50 MG/1
TABLET ORAL
Qty: 90 TABLET | Refills: 1 | Status: SHIPPED | OUTPATIENT
Start: 2022-11-07

## 2022-11-07 RX ORDER — POTASSIUM CHLORIDE 1500 MG/1
TABLET, FILM COATED, EXTENDED RELEASE ORAL
Qty: 90 TABLET | Refills: 1 | Status: SHIPPED | OUTPATIENT
Start: 2022-11-07

## 2022-11-07 NOTE — TELEPHONE ENCOUNTER
Rx Refill Note  Requested Prescriptions     Pending Prescriptions Disp Refills   • lisinopril (PRINIVIL,ZESTRIL) 5 MG tablet [Pharmacy Med Name: LISINOPRIL 5 MG TABLET] 90 tablet 1     Sig: TAKE ONE TABLET BY MOUTH DAILY FOR BLOOD PRESSURE   • lisinopril (PRINIVIL,ZESTRIL) 10 MG tablet [Pharmacy Med Name: LISINOPRIL 10 MG TABLET] 90 tablet 1     Sig: TAKE ONE TABLET BY MOUTH DAILY FOR BLOOD PRESSURE   • potassium chloride ER (K-TAB) 20 MEQ tablet controlled-release ER tablet [Pharmacy Med Name: POTASSIUM CHLORIDE ER 20 MEQ TABLET] 90 tablet      Sig: TAKE ONE TABLET BY MOUTH DAILY   • atenolol (TENORMIN) 50 MG tablet [Pharmacy Med Name: ATENOLOL 50 MG TABLET] 90 tablet 1     Sig: TAKE ONE TABLET BY MOUTH DAILY   • simvastatin (ZOCOR) 40 MG tablet [Pharmacy Med Name: SIMVASTATIN 40 MG TABLET] 90 tablet 1     Sig: TAKE ONE TABLET BY MOUTH ONCE NIGHTLY      Last office visit with prescribing clinician: 5/11/2022      Next office visit with prescribing clinician: 11/18/2022            Annamarie Swanson Rep  11/07/22, 08:52 EST

## 2022-11-14 ENCOUNTER — TELEPHONE (OUTPATIENT)
Dept: FAMILY MEDICINE CLINIC | Facility: CLINIC | Age: 82
End: 2022-11-14

## 2022-11-14 DIAGNOSIS — E03.9 ADULT HYPOTHYROIDISM: ICD-10-CM

## 2022-11-14 DIAGNOSIS — Z00.00 MEDICARE ANNUAL WELLNESS VISIT, SUBSEQUENT: Primary | ICD-10-CM

## 2022-11-14 DIAGNOSIS — I10 ESSENTIAL HYPERTENSION: ICD-10-CM

## 2022-11-14 DIAGNOSIS — E03.9 ACQUIRED HYPOTHYROIDISM: ICD-10-CM

## 2022-11-14 DIAGNOSIS — E78.2 MIXED HYPERLIPIDEMIA: Primary | ICD-10-CM

## 2022-11-14 DIAGNOSIS — Z12.5 PROSTATE CANCER SCREENING: ICD-10-CM

## 2022-11-14 DIAGNOSIS — Z00.00 HEALTH CARE MAINTENANCE: ICD-10-CM

## 2022-11-14 DIAGNOSIS — E78.2 MIXED HYPERLIPIDEMIA: ICD-10-CM

## 2022-11-15 LAB
ALBUMIN SERPL-MCNC: 4.1 G/DL (ref 3.5–5.2)
ALBUMIN/GLOB SERPL: 1.6 G/DL
ALP SERPL-CCNC: 99 U/L (ref 39–117)
ALT SERPL-CCNC: 22 U/L (ref 1–41)
AST SERPL-CCNC: 31 U/L (ref 1–40)
BASOPHILS # BLD AUTO: 0.05 10*3/MM3 (ref 0–0.2)
BASOPHILS NFR BLD AUTO: 0.8 % (ref 0–1.5)
BILIRUB SERPL-MCNC: 0.3 MG/DL (ref 0–1.2)
BUN SERPL-MCNC: 11 MG/DL (ref 8–23)
BUN/CREAT SERPL: 13.3 (ref 7–25)
CALCIUM SERPL-MCNC: 9.5 MG/DL (ref 8.6–10.5)
CHLORIDE SERPL-SCNC: 103 MMOL/L (ref 98–107)
CHOLEST SERPL-MCNC: 230 MG/DL (ref 0–200)
CO2 SERPL-SCNC: 24.5 MMOL/L (ref 22–29)
CREAT SERPL-MCNC: 0.83 MG/DL (ref 0.76–1.27)
EGFRCR SERPLBLD CKD-EPI 2021: 86.3 ML/MIN/1.73
EOSINOPHIL # BLD AUTO: 0.32 10*3/MM3 (ref 0–0.4)
EOSINOPHIL NFR BLD AUTO: 5.2 % (ref 0.3–6.2)
ERYTHROCYTE [DISTWIDTH] IN BLOOD BY AUTOMATED COUNT: 13.2 % (ref 12.3–15.4)
GLOBULIN SER CALC-MCNC: 2.5 GM/DL
GLUCOSE SERPL-MCNC: 92 MG/DL (ref 65–99)
HCT VFR BLD AUTO: 44 % (ref 37.5–51)
HDLC SERPL-MCNC: 65 MG/DL (ref 40–60)
HGB BLD-MCNC: 14.9 G/DL (ref 13–17.7)
IMM GRANULOCYTES # BLD AUTO: 0.02 10*3/MM3 (ref 0–0.05)
IMM GRANULOCYTES NFR BLD AUTO: 0.3 % (ref 0–0.5)
LDLC SERPL CALC-MCNC: 117 MG/DL (ref 0–100)
LYMPHOCYTES # BLD AUTO: 3.03 10*3/MM3 (ref 0.7–3.1)
LYMPHOCYTES NFR BLD AUTO: 48.8 % (ref 19.6–45.3)
MCH RBC QN AUTO: 34.1 PG (ref 26.6–33)
MCHC RBC AUTO-ENTMCNC: 33.9 G/DL (ref 31.5–35.7)
MCV RBC AUTO: 100.7 FL (ref 79–97)
MONOCYTES # BLD AUTO: 0.37 10*3/MM3 (ref 0.1–0.9)
MONOCYTES NFR BLD AUTO: 6 % (ref 5–12)
NEUTROPHILS # BLD AUTO: 2.42 10*3/MM3 (ref 1.7–7)
NEUTROPHILS NFR BLD AUTO: 38.9 % (ref 42.7–76)
NRBC BLD AUTO-RTO: 0 /100 WBC (ref 0–0.2)
PLATELET # BLD AUTO: 269 10*3/MM3 (ref 140–450)
POTASSIUM SERPL-SCNC: 4.6 MMOL/L (ref 3.5–5.2)
PROT SERPL-MCNC: 6.6 G/DL (ref 6–8.5)
RBC # BLD AUTO: 4.37 10*6/MM3 (ref 4.14–5.8)
SODIUM SERPL-SCNC: 142 MMOL/L (ref 136–145)
TRIGL SERPL-MCNC: 277 MG/DL (ref 0–150)
TSH SERPL DL<=0.005 MIU/L-ACNC: 13.4 UIU/ML (ref 0.27–4.2)
UNABLE TO VOID: NORMAL
VLDLC SERPL CALC-MCNC: 48 MG/DL (ref 5–40)
WBC # BLD AUTO: 6.21 10*3/MM3 (ref 3.4–10.8)

## 2022-11-18 ENCOUNTER — OFFICE VISIT (OUTPATIENT)
Dept: FAMILY MEDICINE CLINIC | Facility: CLINIC | Age: 82
End: 2022-11-18

## 2022-11-18 VITALS
WEIGHT: 186 LBS | OXYGEN SATURATION: 98 % | HEART RATE: 72 BPM | RESPIRATION RATE: 14 BRPM | SYSTOLIC BLOOD PRESSURE: 149 MMHG | HEIGHT: 67 IN | DIASTOLIC BLOOD PRESSURE: 83 MMHG | TEMPERATURE: 97.1 F | BODY MASS INDEX: 29.19 KG/M2

## 2022-11-18 DIAGNOSIS — M54.50 CHRONIC MIDLINE LOW BACK PAIN WITHOUT SCIATICA: ICD-10-CM

## 2022-11-18 DIAGNOSIS — G89.29 CHRONIC MIDLINE LOW BACK PAIN WITHOUT SCIATICA: ICD-10-CM

## 2022-11-18 DIAGNOSIS — M15.9 PRIMARY OSTEOARTHRITIS INVOLVING MULTIPLE JOINTS: Primary | ICD-10-CM

## 2022-11-18 PROCEDURE — 99213 OFFICE O/P EST LOW 20 MIN: CPT | Performed by: NURSE PRACTITIONER

## 2022-11-18 RX ORDER — LEVOTHYROXINE SODIUM 0.12 MG/1
125 TABLET ORAL DAILY
Qty: 90 TABLET | Refills: 0 | Status: SHIPPED | OUTPATIENT
Start: 2022-11-18 | End: 2023-02-17

## 2022-11-18 NOTE — PATIENT INSTRUCTIONS
Discharge instructions    Follow-up with Dr. Verduzco    Increase levothyroxine 125 mcg daily  In 1 month a TSH outpatient  Nonfasting rhea    Is loge doing well follow-up in 6 months  Sooner for any difficulties continue healthy diet and exercise lots of water

## 2022-11-18 NOTE — PROGRESS NOTES
"Chief Complaint  Hypothyroidism (6 month f/u), Back Pain (Pt states lower back), and Leg Pain (Pt states down left leg)    Subjective        Darwin Sparks presents to [facility]  History of Present Illness    Mr. Sparks is a 82-year-old male who presents today for a follow up of acquired hypothyroid. He also has chronic degenerative disc disease and low back pain.      The patient reports that he is feeling well today.  He states that he is currently seeing Dr. Rodriguez to treat his symptoms of low back pain. The patient endorses that his blood pressure normally runs higher. He patient expresses concern about his recent stress level.         Mr. Sparks reports that he tried ablation treatment at his previous pain management visit with Dr. Rodriguez. He notes that he did not see improvement from the ablation treatment, but he would like to seek other treatment options. The patient endorses that he is currently taking lisinopril, levothyroxine, and simvastatin. He reports that he noticed some weight gain after taking gabapentin  Hyperlipidemia mixed takes a statin appropriately hypertension essential controlled appropriately monitors blood pressure we will recheck it is a little elevated today.     Objective   Vital Signs:  /83   Pulse 72   Temp 97.1 °F (36.2 °C) (Infrared)   Resp 14   Ht 170.2 cm (67\")   Wt 84.4 kg (186 lb)   SpO2 98%   BMI 29.13 kg/m²   Estimated body mass index is 29.13 kg/m² as calculated from the following:    Height as of this encounter: 170.2 cm (67\").    Weight as of this encounter: 84.4 kg (186 lb).          Physical Exam  Vitals reviewed.   Constitutional:       General: He is not in acute distress.     Appearance: Normal appearance. He is well-developed. He is not ill-appearing, toxic-appearing or diaphoretic.   HENT:      Head: Normocephalic.      Nose: Nose normal.   Eyes:      General: No scleral icterus.     Conjunctiva/sclera: Conjunctivae " normal.      Pupils: Pupils are equal, round, and reactive to light.   Neck:      Thyroid: No thyromegaly.      Vascular: No JVD.   Cardiovascular:      Rate and Rhythm: Normal rate and regular rhythm.      Heart sounds: Normal heart sounds. No murmur heard.    No friction rub. No gallop.   Pulmonary:      Effort: Pulmonary effort is normal. No respiratory distress.      Breath sounds: Normal breath sounds. No stridor. No wheezing or rales.   Abdominal:      General: Bowel sounds are normal. There is no distension.      Palpations: Abdomen is soft.      Tenderness: There is no abdominal tenderness.      Comments: No hepatosplenomegaly, no ascites,   Musculoskeletal:         General: No tenderness.      Cervical back: Neck supple.      Comments: Patient presents in a wheelchair, decreased mobility    Lymphadenopathy:      Cervical: No cervical adenopathy.   Skin:     General: Skin is warm and dry.      Findings: No erythema or rash.   Neurological:      Mental Status: He is alert and oriented to person, place, and time.      Deep Tendon Reflexes: Reflexes are normal and symmetric.   Psychiatric:         Behavior: Behavior normal.         Thought Content: Thought content normal.         Judgment: Judgment normal.        Result Review :                Assessment and Plan   Diagnoses and all orders for this visit:    1. Primary osteoarthritis involving multiple joints (Primary)    2. Chronic midline low back pain without sciatica    Other orders  -     levothyroxine (Synthroid) 125 MCG tablet; Take 1 tablet by mouth Daily.  Dispense: 90 tablet; Refill: 0         1. Acquired hypothyroid.   - The patient will start taking an increased dose of levothyroxine 125 mcg daily. He will complete an outpatient non-fasting TSH in 1 month. He was advised to continue a healthy diet and exercise and increase. He was encouraged to increase his fluid intake. Follow up in 6 months or sooner with any new or worsening symptoms.     Caution  when starting the thyroid medication he will let me know if he has any difficulties increased anxiety palpitations insomnia          I spent 20  minutes caring for Darwin on this date of service. This time includes time spent by me in the following activities:preparing for the visit, reviewing tests, obtaining and/or reviewing a separately obtained history, performing a medically appropriate examination and/or evaluation , counseling and educating the patient/family/caregiver, ordering medications, tests, or procedures, documenting information in the medical record and care coordination  Follow Up   Return in about 6 months (around 5/18/2023).  Patient was given instructions and counseling regarding his condition or for health maintenance advice. Please see specific information pulled into the AVS if appropriate.     Patient Instructions   Discharge instructions    Follow-up with Dr. Verduzco    Increase levothyroxine 125 mcg daily  In 1 month a TSH outpatient  Nonfasting okay    Is loge doing well follow-up in 6 months  Sooner for any difficulties continue healthy diet and exercise lots of water           Transcribed from ambient dictation for James Epley, APRN by Randall Rebolledo.  11/18/22   16:25 EST    Patient or patient representative verbalized consent to the visit recording.  I have personally performed the services described in this document as transcribed by the above individual, and it is both accurate and complete.

## 2023-01-06 ENCOUNTER — OFFICE VISIT (OUTPATIENT)
Dept: FAMILY MEDICINE CLINIC | Facility: CLINIC | Age: 83
End: 2023-01-06
Payer: MEDICARE

## 2023-01-06 VITALS
RESPIRATION RATE: 14 BRPM | HEIGHT: 67 IN | OXYGEN SATURATION: 96 % | WEIGHT: 186 LBS | HEART RATE: 63 BPM | DIASTOLIC BLOOD PRESSURE: 80 MMHG | TEMPERATURE: 97 F | SYSTOLIC BLOOD PRESSURE: 151 MMHG | BODY MASS INDEX: 29.19 KG/M2

## 2023-01-06 DIAGNOSIS — R06.09 EXERTIONAL DYSPNEA: Primary | ICD-10-CM

## 2023-01-06 DIAGNOSIS — I10 PRIMARY HYPERTENSION: ICD-10-CM

## 2023-01-06 DIAGNOSIS — E78.2 MIXED HYPERLIPIDEMIA: ICD-10-CM

## 2023-01-06 DIAGNOSIS — E03.9 ADULT HYPOTHYROIDISM: ICD-10-CM

## 2023-01-06 PROCEDURE — 99213 OFFICE O/P EST LOW 20 MIN: CPT | Performed by: NURSE PRACTITIONER

## 2023-01-06 NOTE — PROGRESS NOTES
Chief Complaint  Results (Pt here to discuss lab results)    Subjective        Darwin Sparks presents to [facility]  History of Present Illness  Very pleasant patient here today for follow-up essential hypertension generally his blood pressure has run around 120 over the  months or so then little higher around 150 or so he is have no chest pain or acute shortness of breath however  After finding out his  Roger has heart failure he is wondering if he should be checked out a little bit more he has had some exertional dyspnea over the last couple years which is gradually increasing.  He has chronic weakness in lower extremity from failed back surgeries as well as he had prostate cancer treatments with over 40 radiation treatments he thinks it affected his back as well nonetheless this made keeping his weight down and mobility difficult.  He has no acute cough congestion fever chills or other worries.  Recent PSA 0.3.          Objective   Vital Signs:  /80   Pulse 63   Temp 97 °F (36.1 °C) (Infrared)   Resp 14   Ht 170.2 cm (67\")   Wt 84.4 kg (186 lb)   SpO2 96%   BMI 29.13 kg/m²   Estimated body mass index is 29.13 kg/m² as calculated from the following:    Height as of this encounter: 170.2 cm (67\").    Weight as of this encounter: 84.4 kg (186 lb).    BMI is >= 25 and <30. (Overweight) The following options were offered after discussion;: exercise counseling/recommendations and nutrition counseling/recommendations      Physical Exam  Vitals reviewed.   Constitutional:       General: He is not in acute distress.     Appearance: Normal appearance. He is well-developed. He is not diaphoretic.      Comments: Pleasant appears well   HENT:      Head: Normocephalic and atraumatic.      Nose: Nose normal.   Eyes:      Conjunctiva/sclera: Conjunctivae normal.      Pupils: Pupils are equal, round, and reactive to light.   Neck:      Vascular: No JVD.   Cardiovascular:       Rate and Rhythm: Normal rate and regular rhythm.      Heart sounds: Normal heart sounds. No murmur heard.     Comments: Regular rate and rhythm without murmur  Pulmonary:      Effort: Pulmonary effort is normal. No respiratory distress.      Breath sounds: Normal breath sounds. No wheezing.   Abdominal:      General: Bowel sounds are normal. There is no distension.      Palpations: Abdomen is soft. There is no mass.      Tenderness: There is no abdominal tenderness. There is no guarding.      Hernia: No hernia is present.   Musculoskeletal:         General: No tenderness.      Cervical back: Neck supple.      Comments: Unable to ambulate any distance and uses wheelchair to ambulate using his legs to walk forward,  No increased swelling or edema lower EXTR.   Lymphadenopathy:      Cervical: No cervical adenopathy.   Skin:     General: Skin is warm and dry.   Neurological:      Mental Status: He is alert and oriented to person, place, and time.   Psychiatric:         Mood and Affect: Mood normal.         Behavior: Behavior normal.         Thought Content: Thought content normal.         Judgment: Judgment normal.        Result Review :                Assessment and Plan   Diagnoses and all orders for this visit:    1. Exertional dyspnea (Primary)  -     XR Chest PA & Lateral  -     Ambulatory Referral to Cardiology  -     Ambulatory Referral to Pulmonology    2. Mixed hyperlipidemia    3. Primary hypertension    4. Adult hypothyroidism           I spent 20  minutes caring for Darwin on this date of service. This time includes time spent by me in the following activities:preparing for the visit, reviewing tests, obtaining and/or reviewing a separately obtained history, performing a medically appropriate examination and/or evaluation , counseling and educating the patient/family/caregiver, ordering medications, tests, or procedures, referring and communicating with other health care professionals , documenting  information in the medical record and care coordination  Follow Up   No follow-ups on file.  Patient was given instructions and counseling regarding his condition or for health maintenance advice. Please see specific information pulled into the AVS if appropriate.     There are no Patient Instructions on file for this visit.       Unable to get EKG today needs to go  his  at the doctor's office but he is having no acute complaints at this can wait for cardiology he will come back for chest x-ray    Since there may be a delay getting him into pulmonary I will have him get the x-ray  But I refer pulmonary to see if he get a PFT or other evaluation from a pulmonologist.    He will follow-up in 6 months sooner if any difficulties falls precaution hydrate well any increased chest pain shortness breath fever chills emergency room  Flu and COVID plan for early detection as discussed

## 2023-01-06 NOTE — PATIENT INSTRUCTIONS
Discharge instructions return to office for outpatient chest x-ray  If you are feeling good and unable and keep your appointment with pulmonary did not get it there but not to delay too long    Keep your appointment with cardiology  High fever chest pain shortness of breath emergency room falls precaution push fluids

## 2023-01-12 ENCOUNTER — OFFICE VISIT (OUTPATIENT)
Dept: CARDIOLOGY | Facility: CLINIC | Age: 83
End: 2023-01-12
Payer: MEDICARE

## 2023-01-12 VITALS
SYSTOLIC BLOOD PRESSURE: 132 MMHG | HEIGHT: 67 IN | DIASTOLIC BLOOD PRESSURE: 74 MMHG | BODY MASS INDEX: 29.19 KG/M2 | HEART RATE: 76 BPM | RESPIRATION RATE: 18 BRPM | WEIGHT: 186 LBS

## 2023-01-12 DIAGNOSIS — I10 HYPERTENSION, UNSPECIFIED TYPE: ICD-10-CM

## 2023-01-12 DIAGNOSIS — R06.02 SHORTNESS OF BREATH: ICD-10-CM

## 2023-01-12 DIAGNOSIS — E78.2 MIXED HYPERLIPIDEMIA: ICD-10-CM

## 2023-01-12 DIAGNOSIS — I10 PRIMARY HYPERTENSION: Primary | ICD-10-CM

## 2023-01-12 PROCEDURE — 99204 OFFICE O/P NEW MOD 45 MIN: CPT | Performed by: INTERNAL MEDICINE

## 2023-01-12 PROCEDURE — 93000 ELECTROCARDIOGRAM COMPLETE: CPT | Performed by: INTERNAL MEDICINE

## 2023-01-12 NOTE — PROGRESS NOTES
"      CARDIOLOGY    Yessica Hyatt MD    ENCOUNTER DATE:  01/12/2023    Darwin Sparks / 82 y.o. / male        CHIEF COMPLAINT / REASON FOR OFFICE VISIT     Heart Problem (New patient/___________/Exertional dyspnea )      HISTORY OF PRESENT ILLNESS       HPI    Darwin Sparks is a 82 y.o. male     This is a gentleman with hypertension and hyperlipidemia.  He says his partner has had some wheezing and when they go to the doctor all the questions they could ask about shortness of breath and fatigue seems to fit the patient so he thought he should probably get his heart checked out.  He has not had any heart testing, except for an EKG, for over a decade.  He says he is short of breath.  He has weakness.  He says his blood pressure is higher usually than it is today.  He has a history of prostate cancer.  He has thyroid disease, hypertension, and hyperlipidemia.  He has never smoked.  Heart disease runs on the maternal side of the family, but he really did not know details.          REVIEW OF SYSTEMS     Review of Systems   Constitutional: Negative for chills, fever, weight gain and weight loss.   Cardiovascular: Negative for leg swelling.   Respiratory: Negative for cough, snoring and wheezing.    Hematologic/Lymphatic: Negative for bleeding problem. Does not bruise/bleed easily.   Skin: Negative for color change.   Musculoskeletal: Negative for falls, joint pain and myalgias.   Gastrointestinal: Negative for melena.   Genitourinary: Negative for hematuria.   Neurological: Negative for excessive daytime sleepiness.   Psychiatric/Behavioral: Negative for depression. The patient is not nervous/anxious.          VITAL SIGNS     Visit Vitals  /74 (BP Location: Left arm, Patient Position: Sitting, Cuff Size: Adult)   Pulse 76   Resp 18   Ht 170.2 cm (67\")   Wt 84.4 kg (186 lb)   BMI 29.13 kg/m²         Wt Readings from Last 3 Encounters:   01/12/23 84.4 kg (186 lb)   01/06/23 84.4 kg (186 lb)   11/18/22 84.4 kg " (186 lb)     Body mass index is 29.13 kg/m².      PHYSICAL EXAMINATION     Constitutional:       General: Not in acute distress.  Neck:      Vascular: No carotid bruit or JVD.   Pulmonary:      Effort: Pulmonary effort is normal.      Breath sounds: Normal breath sounds.   Cardiovascular:      Normal rate. Regular rhythm.      Murmurs: There is no murmur.   Psychiatric:         Mood and Affect: Mood and affect normal.           REVIEWED DATA       ECG 12 Lead    Date/Time: 1/12/2023 12:54 PM  Performed by: Yessica Hyatt MD  Authorized by: Yessica Haytt MD   Comparison: compared with previous ECG from 9/10/2018  Similar to previous ECG  Rhythm: sinus rhythm  BPM: 76  Conduction: conduction normal  ST Segments: ST segments normal  T Waves: T waves normal    Clinical impression: normal ECG              Lipid Panel    Lipid Panel 5/4/22 11/14/22 1/3/23   Total Cholesterol 270 (A) 230 (A) 200   Triglycerides 209 (A) 277 (A) 173 (A)   HDL Cholesterol 53 65 (A) 63 (A)   VLDL Cholesterol 39 48 (A) 30   LDL Cholesterol  178 (A) 117 (A) 107 (A)   (A) Abnormal value       Comments are available for some flowsheets but are not being displayed.             Lab Results   Component Value Date    GLUCOSE 98 01/03/2023    BUN 18 01/03/2023    CREATININE 1.00 01/03/2023    EGFRIFNONA 76 10/28/2021    EGFRIFAFRI 88 10/28/2021    BCR 18.0 01/03/2023    K 4.7 01/03/2023    CO2 27.7 01/03/2023    CALCIUM 9.8 01/03/2023    PROTENTOTREF 6.4 01/03/2023    ALBUMIN 4.1 01/03/2023    LABIL2 1.8 01/03/2023    AST 42 (H) 01/03/2023    ALT 37 01/03/2023       ASSESSMENT & PLAN      Diagnosis Plan   1. Primary hypertension  Adult Transthoracic Echo Complete W/ Cont if Necessary Per Protocol      2. Mixed hyperlipidemia  Adult Transthoracic Echo Complete W/ Cont if Necessary Per Protocol      3. Shortness of breath  Adult Transthoracic Echo Complete W/ Cont if Necessary Per Protocol      4. Hypertension, unspecified type  Adult  Transthoracic Echo Complete W/ Cont if Necessary Per Protocol          Shortness of breath. He looks short of breath just looking at him. He does not have any significant edema and his lungs sound clear. I am going to check an echocardiogram to start with, to look for any LV systolic or diastolic dysfunction. Consider a Lexiscan nuclear stress test. He cannot exercise on a treadmill because of a bad back.   Hypertension. I have asked him to monitor his blood pressure at home.  Hyperlipidemia, on Niacin and simvastatin.   Thyroid disease, on replacement.   History of prostate cancer.     One of my nurses or I will go over the results of his echocardiogram when it is performed and determine if he needs any further cardiac testing.         Orders Placed This Encounter   Procedures   • ECG 12 Lead     This order was created via procedure documentation     Order Specific Question:   Release to patient     Answer:   Routine Release   • Adult Transthoracic Echo Complete W/ Cont if Necessary Per Protocol     Standing Status:   Future     Standing Expiration Date:   1/12/2024     Order Specific Question:   Reason for exam?     Answer:   Dyspnea           MEDICATIONS         Discharge Medications          Accurate as of January 12, 2023 12:54 PM. If you have any questions, ask your nurse or doctor.            Continue These Medications      Instructions Start Date   atenolol 50 MG tablet  Commonly known as: TENORMIN   TAKE ONE TABLET BY MOUTH DAILY      cyclobenzaprine 10 MG tablet  Commonly known as: FLEXERIL   10 mg, Oral, Nightly, For muscle relaxer and improved sleep      levothyroxine 125 MCG tablet  Commonly known as: Synthroid   125 mcg, Oral, Daily      lisinopril 5 MG tablet  Commonly known as: PRINIVIL,ZESTRIL   TAKE ONE TABLET BY MOUTH DAILY FOR BLOOD PRESSURE      lisinopril 10 MG tablet  Commonly known as: PRINIVIL,ZESTRIL   TAKE ONE TABLET BY MOUTH DAILY FOR BLOOD PRESSURE      meloxicam 15 MG tablet  Commonly  known as: MOBIC   15 mg, Oral, Daily      multivitamin tablet tablet  Commonly known as: THERAGRAN   Oral      niacin 250 MG tablet   250 mg, Oral, Daily With Breakfast      Osteo Bi-Flex Adv Triple St tablet   Oral      potassium chloride ER 20 MEQ tablet controlled-release ER tablet  Commonly known as: K-TAB   TAKE ONE TABLET BY MOUTH DAILY      simvastatin 40 MG tablet  Commonly known as: ZOCOR   TAKE ONE TABLET BY MOUTH ONCE NIGHTLY      Tylenol PM Extra Strength  MG tablet per tablet  Generic drug: diphenhydrAMINE-acetaminophen   1 tablet, Oral, Nightly PRN         Stop These Medications    cholecalciferol 25 MCG (1000 UT) tablet  Commonly known as: VITAMIN D3  Stopped by: MD Yessica Munoz MD  01/12/23  12:54 EST    Part of this note may be an electronic transcription/translation of spoken language to printed text using the Dragon dictation system.

## 2023-01-25 RX ORDER — CYCLOBENZAPRINE HCL 10 MG
10 TABLET ORAL NIGHTLY
Qty: 90 TABLET | Refills: 2 | Status: SHIPPED | OUTPATIENT
Start: 2023-01-25 | End: 2023-01-27 | Stop reason: SDUPTHER

## 2023-01-25 NOTE — TELEPHONE ENCOUNTER
Rx Refill Note  Requested Prescriptions     Pending Prescriptions Disp Refills   • cyclobenzaprine (FLEXERIL) 10 MG tablet 30 tablet 5     Sig: Take 1 tablet by mouth Every Night. For muscle relaxer and improved sleep      Last office visit with prescribing clinician: 1/6/2023   Last telemedicine visit with prescribing clinician: 5/18/2023   Next office visit with prescribing clinician: 5/18/2023                         Would you like a call back once the refill request has been completed: [] Yes [] No    If the office needs to give you a call back, can they leave a voicemail: [] Yes [] No    Annamarie Swanson Rep  01/25/23, 11:03 EST

## 2023-01-25 NOTE — TELEPHONE ENCOUNTER
Caller: Darwin Sparks    Relationship: Self    Best call back number: [phone] (Mobile)    Requested Prescriptions:   cyclobenzaprine (FLEXERIL) 10 MG tablet     Pharmacy where request should be sent:  [id number] [address]  AT [facility] [address] - [phone]  - [phone] FX        Additional details provided by patient: PATIENT CALLED TO REQUEST A MEDICATION REFILL ON HIS MEDICATION. PATIENT STATES THAT HE IS COMPLETELY OUT MEDICATION.        Does the patient have less than a 3 day supply:  [x] Yes  [] No    Would you like a call back once the refill request has been completed: [x] Yes [] No    If the office needs to give you a call back, can they leave a voicemail: [x] Yes [] No    Annamarie Long Rep   01/25/23 10:58 EST         THANKS   No pertinent family history in first degree relatives

## 2023-01-27 RX ORDER — CYCLOBENZAPRINE HCL 10 MG
10 TABLET ORAL NIGHTLY
Qty: 90 TABLET | Refills: 2 | Status: SHIPPED | OUTPATIENT
Start: 2023-01-27

## 2023-01-27 NOTE — TELEPHONE ENCOUNTER
Rx Refill Note  Requested Prescriptions     Pending Prescriptions Disp Refills   • cyclobenzaprine (FLEXERIL) 10 MG tablet 90 tablet 2     Sig: Take 1 tablet by mouth Every Night. For muscle relaxer and improved sleep      Last office visit with prescribing clinician: 1/6/2023   Last telemedicine visit with prescribing clinician: 5/18/2023   Next office visit with prescribing clinician: 5/18/2023                         Would you like a call back once the refill request has been completed: [] Yes [] No    If the office needs to give you a call back, can they leave a voicemail: [] Yes [] No    Annamarie Swanson Rep  01/27/23, 15:59 EST

## 2023-01-27 NOTE — TELEPHONE ENCOUNTER
Caller: Darwin Sparks    Relationship: Self    Best call back number: [phone]  Requested Prescriptions:   Requested Prescriptions     Pending Prescriptions Disp Refills   • cyclobenzaprine (FLEXERIL) 10 MG tablet 90 tablet 2     Sig: Take 1 tablet by mouth Every Night. For muscle relaxer and improved sleep        Pharmacy where request should be sent: [facility] #[id number] [address] AT [facility] [address]  [facility] [phone] [facility] [phone] FX     Additional details provided by patient: THIS WAS SENT TO [facility] AND THE PATIENT WOULD LIKE FOR IT TO GO TO [facility]     Does the patient have less than a 3 day supply:  [x] Yes  [] No    Would you like a call back once the refill request has been completed: [x] Yes [] No    If the office needs to give you a call back, can they leave a voicemail: [x] Yes [] No    Annamarie Shah Rep   01/27/23 10:55 EST

## 2023-01-31 ENCOUNTER — HOSPITAL ENCOUNTER (OUTPATIENT)
Dept: CARDIOLOGY | Facility: HOSPITAL | Age: 83
Discharge: HOME | End: 2023-01-31
Admitting: INTERNAL MEDICINE
Payer: MEDICARE

## 2023-01-31 VITALS
SYSTOLIC BLOOD PRESSURE: 130 MMHG | OXYGEN SATURATION: 99 % | DIASTOLIC BLOOD PRESSURE: 80 MMHG | BODY MASS INDEX: 29.19 KG/M2 | WEIGHT: 186 LBS | HEIGHT: 67 IN | HEART RATE: 70 BPM

## 2023-01-31 DIAGNOSIS — R06.02 SHORTNESS OF BREATH: ICD-10-CM

## 2023-01-31 DIAGNOSIS — I10 PRIMARY HYPERTENSION: ICD-10-CM

## 2023-01-31 DIAGNOSIS — I10 HYPERTENSION, UNSPECIFIED TYPE: ICD-10-CM

## 2023-01-31 DIAGNOSIS — E78.2 MIXED HYPERLIPIDEMIA: ICD-10-CM

## 2023-01-31 LAB
AORTIC ARCH: 3.5 CM
ASCENDING AORTA: 3.7 CM
BH CV ECHO MEAS - ACS: 2.44 CM
BH CV ECHO MEAS - AO MAX PG: 4.1 MMHG
BH CV ECHO MEAS - AO MEAN PG: 2.49 MMHG
BH CV ECHO MEAS - AO ROOT DIAM: 4 CM
BH CV ECHO MEAS - AO V2 MAX: 101 CM/SEC
BH CV ECHO MEAS - AO V2 VTI: 21.1 CM
BH CV ECHO MEAS - AVA(I,D): 3.2 CM2
BH CV ECHO MEAS - EDV(CUBED): 71.6 ML
BH CV ECHO MEAS - EDV(MOD-SP2): 57 ML
BH CV ECHO MEAS - EDV(MOD-SP4): 86 ML
BH CV ECHO MEAS - EF(MOD-BP): 62.3 %
BH CV ECHO MEAS - EF(MOD-SP2): 63.2 %
BH CV ECHO MEAS - EF(MOD-SP4): 65.1 %
BH CV ECHO MEAS - ESV(CUBED): 34.1 ML
BH CV ECHO MEAS - ESV(MOD-SP2): 21 ML
BH CV ECHO MEAS - ESV(MOD-SP4): 30 ML
BH CV ECHO MEAS - FS: 21.9 %
BH CV ECHO MEAS - IVS/LVPW: 0.92 CM
BH CV ECHO MEAS - IVSD: 1.06 CM
BH CV ECHO MEAS - LAT PEAK E' VEL: 6.2 CM/SEC
BH CV ECHO MEAS - LV MASS(C)D: 155.4 GRAMS
BH CV ECHO MEAS - LV MAX PG: 2.38 MMHG
BH CV ECHO MEAS - LV MEAN PG: 1.52 MMHG
BH CV ECHO MEAS - LV V1 MAX: 77.1 CM/SEC
BH CV ECHO MEAS - LV V1 VTI: 18.2 CM
BH CV ECHO MEAS - LVIDD: 4.2 CM
BH CV ECHO MEAS - LVIDS: 3.2 CM
BH CV ECHO MEAS - LVOT AREA: 3.7 CM2
BH CV ECHO MEAS - LVOT DIAM: 2.17 CM
BH CV ECHO MEAS - LVPWD: 1.15 CM
BH CV ECHO MEAS - MED PEAK E' VEL: 6 CM/SEC
BH CV ECHO MEAS - MV A DUR: 0.13 SEC
BH CV ECHO MEAS - MV A MAX VEL: 103.5 CM/SEC
BH CV ECHO MEAS - MV DEC SLOPE: 392.3 CM/SEC2
BH CV ECHO MEAS - MV DEC TIME: 0.23 MSEC
BH CV ECHO MEAS - MV E MAX VEL: 94 CM/SEC
BH CV ECHO MEAS - MV E/A: 0.91
BH CV ECHO MEAS - MV MAX PG: 4.3 MMHG
BH CV ECHO MEAS - MV MEAN PG: 1.79 MMHG
BH CV ECHO MEAS - MV P1/2T: 64.9 MSEC
BH CV ECHO MEAS - MV V2 VTI: 36 CM
BH CV ECHO MEAS - MVA(P1/2T): 3.4 CM2
BH CV ECHO MEAS - MVA(VTI): 1.87 CM2
BH CV ECHO MEAS - PA ACC TIME: 0.06 SEC
BH CV ECHO MEAS - PA PR(ACCEL): 50.5 MMHG
BH CV ECHO MEAS - PA V2 MAX: 90.9 CM/SEC
BH CV ECHO MEAS - PI END-D VEL: 74.7 CM/SEC
BH CV ECHO MEAS - PULM A REVS DUR: 0.12 SEC
BH CV ECHO MEAS - PULM A REVS VEL: 25.7 CM/SEC
BH CV ECHO MEAS - PULM DIAS VEL: 40.5 CM/SEC
BH CV ECHO MEAS - PULM S/D: 1.4
BH CV ECHO MEAS - PULM SYS VEL: 56.7 CM/SEC
BH CV ECHO MEAS - QP/QS: 0.57
BH CV ECHO MEAS - RAP SYSTOLE: 3 MMHG
BH CV ECHO MEAS - RV MAX PG: 1.02 MMHG
BH CV ECHO MEAS - RV V1 MAX: 50.6 CM/SEC
BH CV ECHO MEAS - RV V1 VTI: 11.5 CM
BH CV ECHO MEAS - RVOT DIAM: 2.06 CM
BH CV ECHO MEAS - RVSP: 16 MMHG
BH CV ECHO MEAS - SV(LVOT): 67.2 ML
BH CV ECHO MEAS - SV(MOD-SP2): 36 ML
BH CV ECHO MEAS - SV(MOD-SP4): 56 ML
BH CV ECHO MEAS - SV(RVOT): 38.3 ML
BH CV ECHO MEAS - TAPSE (>1.6): 1.99 CM
BH CV ECHO MEAS - TR MAX PG: 13 MMHG
BH CV ECHO MEAS - TR MAX VEL: 180.5 CM/SEC
BH CV ECHO MEASUREMENTS AVERAGE E/E' RATIO: 15.41
BH CV VAS BP LEFT ARM: NORMAL MMHG
BH CV XLRA - RV BASE: 3 CM
BH CV XLRA - RV LENGTH: 7.8 CM
BH CV XLRA - RV MID: 3.3 CM
BH CV XLRA - TDI S': 12.4 CM/SEC
LEFT ATRIUM VOLUME INDEX: 49.4 ML/M2
MAXIMAL PREDICTED HEART RATE: 136 BPM
SINUS: 3.7 CM
STJ: 3.1 CM
STRESS TARGET HR: 116 BPM

## 2023-01-31 PROCEDURE — 93306 TTE W/DOPPLER COMPLETE: CPT

## 2023-01-31 PROCEDURE — 93306 TTE W/DOPPLER COMPLETE: CPT | Performed by: INTERNAL MEDICINE

## 2023-02-01 ENCOUNTER — TELEPHONE (OUTPATIENT)
Dept: CARDIOLOGY | Facility: CLINIC | Age: 83
End: 2023-02-01
Payer: MEDICARE

## 2023-02-01 DIAGNOSIS — I10 PRIMARY HYPERTENSION: ICD-10-CM

## 2023-02-01 DIAGNOSIS — E78.2 MIXED HYPERLIPIDEMIA: ICD-10-CM

## 2023-02-01 DIAGNOSIS — R06.02 SHORTNESS OF BREATH: Primary | ICD-10-CM

## 2023-02-01 NOTE — TELEPHONE ENCOUNTER
Called and left VM. Will continue to try to reach patient.     Fern Oneal RN  Triage [facility]

## 2023-02-01 NOTE — TELEPHONE ENCOUNTER
From my note 1/12/23       1. Shortness of breath. He looks short of breath just looking at him. He does not have any significant edema and his lungs sound clear. I am going to check an echocardiogram to start with, to look for any LV systolic or diastolic dysfunction. Consider a Lexiscan nuclear stress test. He cannot exercise on a treadmill because of a bad back.   2. Hypertension. I have asked him to monitor his blood pressure at home.  3. Hyperlipidemia, on Niacin and simvastatin.   4. Thyroid disease, on replacement.   5. History of prostate cancer.       Echocardiogram shows normal LV systolic function.  He does have grade 2 diastolic dysfunction.  No significant valve disease.  I would like him to come in for a Lexiscan nuclear stress test and while he is here check a BNP and BMP.  This will help me to determine if he has too much fluid in his system and might feel better if I put him on diuretics.  Stress test will help us to figure out if his shortness of breath is coming from any blockages in his arteries.

## 2023-02-02 NOTE — TELEPHONE ENCOUNTER
Notified patient of results/recommendations. Patient verbalized understanding. He will get labs drawn on the same day as the stress test at the outpatient lab.    Scheduling, please get patient scheduled for stress test.     Fern Oneal RN  Triage [facility] [place]

## 2023-02-09 ENCOUNTER — TELEPHONE (OUTPATIENT)
Dept: CARDIOLOGY | Facility: CLINIC | Age: 83
End: 2023-02-09
Payer: MEDICARE

## 2023-02-09 ENCOUNTER — HOSPITAL ENCOUNTER (OUTPATIENT)
Dept: CARDIOLOGY | Facility: HOSPITAL | Age: 83
Discharge: HOME | End: 2023-02-09
Admitting: INTERNAL MEDICINE
Payer: MEDICARE

## 2023-02-09 VITALS — BODY MASS INDEX: 29.2 KG/M2 | WEIGHT: 186.07 LBS | HEIGHT: 67 IN

## 2023-02-09 DIAGNOSIS — E78.2 MIXED HYPERLIPIDEMIA: ICD-10-CM

## 2023-02-09 DIAGNOSIS — R06.02 SHORTNESS OF BREATH: ICD-10-CM

## 2023-02-09 DIAGNOSIS — I10 PRIMARY HYPERTENSION: ICD-10-CM

## 2023-02-09 LAB
BH CV NUCLEAR PRIOR STUDY: 2
BH CV REST NUCLEAR ISOTOPE DOSE: 41.8 MCI
BH CV STRESS BP STAGE 1: NORMAL
BH CV STRESS COMMENTS STAGE 1: NORMAL
BH CV STRESS DOSE REGADENOSON STAGE 1: 0.4
BH CV STRESS DURATION MIN STAGE 1: 0
BH CV STRESS DURATION SEC STAGE 1: 10
BH CV STRESS HR STAGE 1: 79
BH CV STRESS NUCLEAR ISOTOPE DOSE: 41.8 MCI
BH CV STRESS PROTOCOL 1: NORMAL
BH CV STRESS RECOVERY BP: NORMAL MMHG
BH CV STRESS RECOVERY HR: 76 BPM
BH CV STRESS STAGE 1: 1
LV EF NUC BP: 72 %
MAXIMAL PREDICTED HEART RATE: 136 BPM
PERCENT MAX PREDICTED HR: 58.09 %
STRESS BASELINE BP: NORMAL MMHG
STRESS BASELINE HR: 75 BPM
STRESS PERCENT HR: 68 %
STRESS POST EXERCISE DUR SEC: 10 SEC
STRESS POST PEAK BP: NORMAL MMHG
STRESS POST PEAK HR: 79 BPM
STRESS TARGET HR: 116 BPM

## 2023-02-09 PROCEDURE — 25010000002 REGADENOSON 0.4 MG/5ML SOLUTION: Performed by: INTERNAL MEDICINE

## 2023-02-09 PROCEDURE — 78492 MYOCRD IMG PET MLT RST&STRS: CPT

## 2023-02-09 PROCEDURE — 0 RUBIDIUM CHLORIDE: Performed by: INTERNAL MEDICINE

## 2023-02-09 PROCEDURE — 93018 CV STRESS TEST I&R ONLY: CPT | Performed by: INTERNAL MEDICINE

## 2023-02-09 PROCEDURE — 93017 CV STRESS TEST TRACING ONLY: CPT

## 2023-02-09 PROCEDURE — A9555 RB82 RUBIDIUM: HCPCS | Performed by: INTERNAL MEDICINE

## 2023-02-09 PROCEDURE — 93016 CV STRESS TEST SUPVJ ONLY: CPT | Performed by: INTERNAL MEDICINE

## 2023-02-09 PROCEDURE — 78492 MYOCRD IMG PET MLT RST&STRS: CPT | Performed by: INTERNAL MEDICINE

## 2023-02-09 RX ADMIN — REGADENOSON 0.4 MG: 0.08 INJECTION, SOLUTION INTRAVENOUS at 09:25

## 2023-02-09 NOTE — TELEPHONE ENCOUNTER
Please inform patient chest x-ray shows no evidence of tightly blocked coronary artery.  I see his PCP has ordered chest x-ray to further evaluate his shortness of breath which is a good idea.  Let him know he can go over to the [facility] hospital have this done at any time during usual business hours as a walk-in.  Follow-up with Dr. Hyatt in 3 months.

## 2023-02-10 NOTE — TELEPHONE ENCOUNTER
I spoke with Darwin Sparks and updated pt on results of stress test from provider.  Pt verbalized understanding and has no further questions at this time.  I educated pt on where to go to have the chest x-ray completed.    Thank you,    Chloe Serrato, BETTY  Triage [facility]  02/10/23 08:51 EST

## 2023-02-17 RX ORDER — LEVOTHYROXINE SODIUM 0.12 MG/1
TABLET ORAL
Qty: 90 TABLET | Refills: 0 | Status: SHIPPED | OUTPATIENT
Start: 2023-02-17

## 2023-05-18 ENCOUNTER — HOSPITAL ENCOUNTER (OUTPATIENT)
Dept: GENERAL RADIOLOGY | Facility: HOSPITAL | Age: 83
Discharge: HOME | End: 2023-05-18
Admitting: NURSE PRACTITIONER
Payer: MEDICARE

## 2023-05-18 ENCOUNTER — OFFICE VISIT (OUTPATIENT)
Dept: FAMILY MEDICINE CLINIC | Facility: CLINIC | Age: 83
End: 2023-05-18
Payer: MEDICARE

## 2023-05-18 VITALS
OXYGEN SATURATION: 100 % | DIASTOLIC BLOOD PRESSURE: 67 MMHG | HEART RATE: 74 BPM | WEIGHT: 191 LBS | SYSTOLIC BLOOD PRESSURE: 134 MMHG | RESPIRATION RATE: 14 BRPM | TEMPERATURE: 96.9 F | BODY MASS INDEX: 29.98 KG/M2 | HEIGHT: 67 IN

## 2023-05-18 DIAGNOSIS — E78.2 MIXED HYPERLIPIDEMIA: ICD-10-CM

## 2023-05-18 DIAGNOSIS — E03.9 ADULT HYPOTHYROIDISM: ICD-10-CM

## 2023-05-18 DIAGNOSIS — R73.9 HYPERGLYCEMIA: ICD-10-CM

## 2023-05-18 DIAGNOSIS — R06.02 SHORTNESS OF BREATH: Primary | ICD-10-CM

## 2023-05-18 DIAGNOSIS — I10 PRIMARY HYPERTENSION: ICD-10-CM

## 2023-05-18 PROCEDURE — 3075F SYST BP GE 130 - 139MM HG: CPT | Performed by: NURSE PRACTITIONER

## 2023-05-18 PROCEDURE — 71046 X-RAY EXAM CHEST 2 VIEWS: CPT

## 2023-05-18 PROCEDURE — 99214 OFFICE O/P EST MOD 30 MIN: CPT | Performed by: NURSE PRACTITIONER

## 2023-05-18 PROCEDURE — 3078F DIAST BP <80 MM HG: CPT | Performed by: NURSE PRACTITIONER

## 2023-05-18 RX ORDER — CELECOXIB 200 MG/1
200 CAPSULE ORAL 2 TIMES DAILY
Qty: 180 CAPSULE | Refills: 1 | Status: SHIPPED | OUTPATIENT
Start: 2023-05-18

## 2023-05-18 RX ORDER — MULTIPLE VITAMINS W/ MINERALS TAB 9MG-400MCG
1 TAB ORAL DAILY
COMMUNITY

## 2023-05-18 NOTE — PATIENT INSTRUCTIONS
Discharge instructions  Continue present plan, will change meloxicam    Celebrex 200 mg 2 daily or 1 twice daily  With extra water monitor blood pressure make sure you are doing well you will need lab every 6 months, to monitor kidney function  As long as you hydrate well you should tolerate this well I expect you to get substantial relief.  But we should give it approximately 6 weeks to determine if this is something you want to continue  \Consider follow-up with Dr. Rodriguez considering other things such as tramadol which is less likely to cause constipation much more milder see if there is anything you can do to work with him for additional pain control    As long as you are doing well follow-up  In the next week or 2 we will get your chest x-ray today and when you come back we can do your wellness and talk to you about your dyspnea just to make sure were on top of things and see if you need a inhaler or something else    +

## 2023-05-18 NOTE — PROGRESS NOTES
"Chief Complaint  Hypertension, Back Pain, and Leg Pain (/Pt states right leg)    Subjective        Darwin Sparks presents to [facility]  Hypertension    Back Pain  Associated symptoms include leg pain.   Leg Pain         Objective   Vital Signs:  /67   Pulse 74   Temp 96.9 °F (36.1 °C) (Temporal)   Resp 14   Ht 170 cm (66.93\")   Wt 86.6 kg (191 lb)   SpO2 100%   BMI 29.98 kg/m²   Estimated body mass index is 29.98 kg/m² as calculated from the following:    Height as of this encounter: 170 cm (66.93\").    Weight as of this encounter: 86.6 kg (191 lb).    BMI is >= 25 and <30. (Overweight) The following options were offered after discussion;: exercise counseling/recommendations and nutrition counseling/recommendations      Physical Exam   Result Review :                Assessment and Plan   Diagnoses and all orders for this visit:    1. Shortness of breath (Primary)  -     XR Chest PA & Lateral  -     CBC & Differential; Future  -     Comprehensive Metabolic Panel; Future  -     Hemoglobin A1c; Future  -     Urinalysis With Microscopic If Indicated (No Culture) - Urine, Clean Catch; Future  -     TSH Rfx On Abnormal To Free T4; Future  -     BNP ([facility] Only); Future    2. Adult hypothyroidism  -     CBC & Differential; Future  -     Comprehensive Metabolic Panel; Future  -     Hemoglobin A1c; Future  -     Urinalysis With Microscopic If Indicated (No Culture) - Urine, Clean Catch; Future  -     TSH Rfx On Abnormal To Free T4; Future  -     BNP ([facility] Only); Future    3. Mixed hyperlipidemia  -     CBC & Differential; Future  -     Comprehensive Metabolic Panel; Future  -     Hemoglobin A1c; Future  -     Urinalysis With Microscopic If Indicated (No Culture) - Urine, Clean Catch; Future  -     TSH Rfx On Abnormal To Free T4; Future  -     BNP ([facility] Only); Future    4. Primary hypertension  -     CBC & Differential; Future  -     Comprehensive Metabolic Panel; " Future  -     Hemoglobin A1c; Future  -     Urinalysis With Microscopic If Indicated (No Culture) - Urine, Clean Catch; Future  -     TSH Rfx On Abnormal To Free T4; Future  -     BNP ([facility] Only); Future    5. Hyperglycemia  -     CBC & Differential; Future  -     Comprehensive Metabolic Panel; Future  -     Hemoglobin A1c; Future  -     Urinalysis With Microscopic If Indicated (No Culture) - Urine, Clean Catch; Future  -     TSH Rfx On Abnormal To Free T4; Future  -     BNP ([facility] Only); Future    Other orders  -     celecoxib (CeleBREX) 200 MG capsule; Take 1 capsule by mouth 2 (Two) Times a Day. With water, take lowest effective dose  Dispense: 180 capsule; Refill: 1             Follow Up   Return in about 2 weeks (around 6/1/2023) for Labs before next visit.  Patient was given instructions and counseling regarding his condition or for health maintenance advice. Please see specific information pulled into the AVS if appropriate.     Patient Instructions   Discharge instructions  Continue present plan, will change meloxicam    Celebrex 200 mg 2 daily or 1 twice daily  With extra water monitor blood pressure make sure you are doing well you will need lab every 6 months, to monitor kidney function  As long as you hydrate well you should tolerate this well I expect you to get substantial relief.  But we should give it approximately 6 weeks to determine if this is something you want to continue  \Consider follow-up with Dr. Rodriguez considering other things such as tramadol which is less likely to cause constipation much more milder see if there is anything you can do to work with him for additional pain control    As long as you are doing well follow-up  In the next week or 2 we will get your chest x-ray today and when you come back we can do your wellness and talk to you about your dyspnea just to make sure were on top of things and see if you need a inhaler or something else    +

## 2023-05-26 RX ORDER — LEVOTHYROXINE SODIUM 0.12 MG/1
TABLET ORAL
Qty: 90 TABLET | Refills: 3 | Status: SHIPPED | OUTPATIENT
Start: 2023-05-26

## 2023-05-26 NOTE — TELEPHONE ENCOUNTER
Rx Refill Note  Requested Prescriptions     Pending Prescriptions Disp Refills   • levothyroxine (SYNTHROID, LEVOTHROID) 125 MCG tablet [Pharmacy Med Name: LEVOTHYROXINE 125 MCG TABLET] 90 tablet 0     Sig: TAKE ONE TABLET BY MOUTH DAILY      Last office visit with prescribing clinician: 5/18/2023   Last telemedicine visit with prescribing clinician: Visit date not found   Next office visit with prescribing clinician: 6/6/2023                         Would you like a call back once the refill request has been completed: [] Yes [] No    If the office needs to give you a call back, can they leave a voicemail: [] Yes [] No    Nathalie Rausch MA  05/26/23, 09:29 EDT

## 2023-05-31 ENCOUNTER — OFFICE VISIT (OUTPATIENT)
Dept: CARDIOLOGY | Facility: CLINIC | Age: 83
End: 2023-05-31

## 2023-05-31 VITALS
OXYGEN SATURATION: 97 % | HEIGHT: 66 IN | RESPIRATION RATE: 16 BRPM | DIASTOLIC BLOOD PRESSURE: 76 MMHG | WEIGHT: 191 LBS | BODY MASS INDEX: 30.7 KG/M2 | HEART RATE: 82 BPM | SYSTOLIC BLOOD PRESSURE: 122 MMHG

## 2023-05-31 DIAGNOSIS — I10 PRIMARY HYPERTENSION: ICD-10-CM

## 2023-05-31 DIAGNOSIS — E78.2 MIXED HYPERLIPIDEMIA: Primary | ICD-10-CM

## 2023-05-31 NOTE — PROGRESS NOTES
"      CARDIOLOGY    Yessica Hyatt MD    ENCOUNTER DATE:  05/31/2023    Darwin Sparks / 82 y.o. / male        CHIEF COMPLAINT / REASON FOR OFFICE VISIT     Heart Problem (3 Month follow up /Primary hypertension )      HISTORY OF PRESENT ILLNESS       HPI    Darwin Sparks is a 82 y.o. male     This is a gentleman with hypertension and hyperlipidemia who I saw in January 2023 for dyspnea.  In January 2023 he had a transthoracic echo which showed normal LV systolic function ejection fraction 62%, grade 2 diastolic dysfunction, severe left atrial enlargement, moderate right atrial enlargement and mild aortic root dilation.  There was age-related changes to the aortic and mitral valves but overall normal function.  PET stress test in February 2023 showed no evidence of ischemia.    He comes in today without any significantly new symptoms.  Denies chest pain, shortness of breath, edema or palpitations    REVIEW OF SYSTEMS     Review of Systems   Constitutional: Negative for chills, fever, weight gain and weight loss.   Cardiovascular: Negative for leg swelling.   Respiratory: Negative for cough, snoring and wheezing.    Hematologic/Lymphatic: Negative for bleeding problem. Does not bruise/bleed easily.   Skin: Negative for color change.   Musculoskeletal: Positive for back pain, joint pain and myalgias. Negative for falls.   Gastrointestinal: Negative for melena.   Genitourinary: Negative for hematuria.   Neurological: Negative for excessive daytime sleepiness.   Psychiatric/Behavioral: Negative for depression. The patient is not nervous/anxious.          VITAL SIGNS     Visit Vitals  /76 (BP Location: Left arm, Patient Position: Sitting, Cuff Size: Adult)   Pulse 82   Resp 16   Ht 167.6 cm (66\")   Wt 86.6 kg (191 lb)   SpO2 97%   BMI 30.83 kg/m²         Wt Readings from Last 3 Encounters:   05/31/23 86.6 kg (191 lb)   05/18/23 86.6 kg (191 lb)   02/09/23 84.4 kg (186 lb 1.1 oz)     Body mass index is 30.83 " kg/m².      PHYSICAL EXAMINATION     Constitutional:       General: Not in acute distress.  Neck:      Vascular: No carotid bruit or JVD.   Pulmonary:      Effort: Pulmonary effort is normal.      Breath sounds: Normal breath sounds.   Cardiovascular:      Normal rate. Regular rhythm.      Murmurs: There is no murmur.   Psychiatric:         Mood and Affect: Mood and affect normal.           REVIEWED DATA       ECG 12 Lead    Date/Time: 5/31/2023 12:15 PM  Performed by: Yessica Hyatt MD  Authorized by: Yessica Hyatt MD   Comparison: compared with previous ECG from 1/12/2023  Comparison to previous ECG: PVCs are new  Rhythm: sinus rhythm  Ectopy: unifocal PVCs  BPM: 82  Other findings: left ventricular hypertrophy    Clinical impression: abnormal EKG              Lipid Panel        11/14/2022    09:31 1/3/2023    11:34   Lipid Panel   Total Cholesterol 230   200     Triglycerides 277   173     HDL Cholesterol 65   63     VLDL Cholesterol 48   30     LDL Cholesterol  117   107         Lab Results   Component Value Date    GLUCOSE 106 (H) 05/30/2023    BUN 28 (H) 05/30/2023    CREATININE 1.38 (H) 05/30/2023    EGFRRESULT 50 (L) 05/30/2023    BCR 20 05/30/2023    K 5.1 05/30/2023    CO2 23 05/30/2023    CALCIUM 9.8 05/30/2023    PROTENTOTREF 6.5 05/30/2023    ALBUMIN 4.3 05/30/2023    BILITOT 0.7 05/30/2023    AST 35 05/30/2023    ALT 31 05/30/2023       ASSESSMENT & PLAN      Diagnosis Plan   1. Mixed hyperlipidemia        2. Primary hypertension            1.  Hyperlipidemia.  Reviewed labs from January.  Improved.  2.  Hypertension.  Blood pressures controlled.  3.  Unifocal PVCs on today's EKG.  Asymptomatic.  4.  History of shortness of breath with a normal stress test.  Echo unremarkable except for some diastolic dysfunction.  BNP normal yesterday.  I am not going to change any medications.    Come back to see me as needed.        Orders Placed This Encounter   Procedures   • ECG 12 Lead     This order was  created via procedure documentation     Order Specific Question:   Release to patient     Answer:   Routine Release           MEDICATIONS         Discharge Medications          Accurate as of May 31, 2023 12:17 PM. If you have any questions, ask your nurse or doctor.            Continue These Medications      Instructions Start Date   atenolol 50 MG tablet  Commonly known as: TENORMIN   TAKE ONE TABLET BY MOUTH DAILY      celecoxib 200 MG capsule  Commonly known as: CeleBREX   200 mg, Oral, 2 Times Daily, With water, take lowest effective dose      cyclobenzaprine 10 MG tablet  Commonly known as: FLEXERIL   10 mg, Oral, Nightly, For muscle relaxer and improved sleep      levothyroxine 125 MCG tablet  Commonly known as: SYNTHROID, LEVOTHROID   TAKE ONE TABLET BY MOUTH DAILY      lisinopril 5 MG tablet  Commonly known as: PRINIVIL,ZESTRIL   TAKE ONE TABLET BY MOUTH DAILY FOR BLOOD PRESSURE      lisinopril 10 MG tablet  Commonly known as: PRINIVIL,ZESTRIL   TAKE ONE TABLET BY MOUTH DAILY FOR BLOOD PRESSURE      multivitamin tablet tablet  Commonly known as: THERAGRAN   Oral      multivitamin with minerals tablet tablet   1 tablet, Oral, Daily      niacin 250 MG tablet   250 mg, Oral, Daily With Breakfast      Osteo Bi-Flex Adv Triple St tablet   Oral      potassium chloride ER 20 MEQ tablet controlled-release ER tablet  Commonly known as: K-TAB   TAKE ONE TABLET BY MOUTH DAILY      simvastatin 40 MG tablet  Commonly known as: ZOCOR   TAKE ONE TABLET BY MOUTH ONCE NIGHTLY      Tylenol PM Extra Strength  MG tablet per tablet  Generic drug: diphenhydrAMINE-acetaminophen   1 tablet, Oral, Nightly PRN               Yessica Hyatt MD  05/31/23  12:17 EDT    Part of this note may be an electronic transcription/translation of spoken language to printed text using the Dragon dictation system.

## 2023-06-06 ENCOUNTER — OFFICE VISIT (OUTPATIENT)
Dept: FAMILY MEDICINE CLINIC | Facility: CLINIC | Age: 83
End: 2023-06-06
Payer: MEDICARE

## 2023-06-06 VITALS
DIASTOLIC BLOOD PRESSURE: 80 MMHG | TEMPERATURE: 97.1 F | HEART RATE: 54 BPM | SYSTOLIC BLOOD PRESSURE: 120 MMHG | HEIGHT: 66 IN | BODY MASS INDEX: 29.83 KG/M2 | WEIGHT: 185.6 LBS | RESPIRATION RATE: 14 BRPM | OXYGEN SATURATION: 96 %

## 2023-06-06 DIAGNOSIS — R68.89 DECREASED STRENGTH, ENDURANCE, AND MOBILITY: ICD-10-CM

## 2023-06-06 DIAGNOSIS — R26.81 GAIT INSTABILITY: ICD-10-CM

## 2023-06-06 DIAGNOSIS — R06.02 SHORTNESS OF BREATH: ICD-10-CM

## 2023-06-06 DIAGNOSIS — R53.1 DECREASED STRENGTH, ENDURANCE, AND MOBILITY: ICD-10-CM

## 2023-06-06 DIAGNOSIS — I10 PRIMARY HYPERTENSION: ICD-10-CM

## 2023-06-06 DIAGNOSIS — Z00.00 MEDICARE ANNUAL WELLNESS VISIT, SUBSEQUENT: Primary | ICD-10-CM

## 2023-06-06 DIAGNOSIS — L30.9 DERMATITIS: ICD-10-CM

## 2023-06-06 DIAGNOSIS — Z74.09 DECREASED STRENGTH, ENDURANCE, AND MOBILITY: ICD-10-CM

## 2023-06-06 RX ORDER — CLOTRIMAZOLE AND BETAMETHASONE DIPROPIONATE 10; .64 MG/G; MG/G
1 CREAM TOPICAL 2 TIMES DAILY
Qty: 45 G | Refills: 0 | Status: SHIPPED | OUTPATIENT
Start: 2023-06-06

## 2023-06-06 NOTE — PROGRESS NOTES
"Chief Complaint  No chief complaint on file.    Subjective    {Problem List  Visit Diagnosis   Encounters  Notes  Medications  Labs  Result Review Imaging  Media :23}    Darwin Sparks presents to [facility]  History of Present Illness  Patient here today for wellness visit. States he's been doing well. Hypertension, monitors blood pressure periodically at home, usually around 140/80, controlled with current medication regimen. Hyperlipidemia adequate coverage with statin use.     Objective   Vital Signs:  There were no vitals taken for this visit.  Estimated body mass index is 30.83 kg/m² as calculated from the following:    Height as of 5/31/23: 167.6 cm (66\").    Weight as of 5/31/23: 86.6 kg (191 lb).               Physical Exam  Constitutional:       Appearance: Normal appearance. He is not ill-appearing or diaphoretic.   HENT:      Head: Normocephalic and atraumatic.   Eyes:      Conjunctiva/sclera: Conjunctivae normal.      Pupils: Pupils are equal, round, and reactive to light.   Cardiovascular:      Rate and Rhythm: Normal rate and regular rhythm.   Pulmonary:      Effort: Pulmonary effort is normal.      Breath sounds: Normal breath sounds.   Abdominal:      General: Bowel sounds are normal.      Palpations: Abdomen is soft. There is no mass.      Tenderness: There is no abdominal tenderness.   Musculoskeletal:         General: No swelling.   Skin:     General: Skin is warm and dry.   Neurological:      General: No focal deficit present.      Mental Status: He is alert. Mental status is at baseline.   Psychiatric:         Mood and Affect: Mood normal.         Behavior: Behavior normal.      Result Review :{Labs  Result Review  Imaging  Med Tab  Media  Procedures  :23}  {The following data was reviewed by (Optional):35971}  {Ambulatory Labs (Optional):95707}  {Data reviewed (Optional):89892:::1}             Assessment and Plan {CC Problem List  Visit Diagnosis  "  ROS  Review (Popup)  Health Maintenance  Quality  BestPractice  Medications  SmartSets  SnapShot Encounters  Media :23}  Diagnoses and all orders for this visit:    1. Primary hypertension (Primary)    2. Mixed hyperlipidemia           {Time Spent (Optional):20509}  Follow Up {Instructions Charge Capture  Follow-up Communications :23}  No follow-ups on file.  Patient was given instructions and counseling regarding his condition or for health maintenance advice. Please see specific information pulled into the AVS if appropriate.

## 2023-06-06 NOTE — PATIENT INSTRUCTIONS
Discharge instructions      Physical therapy to help you increase your physical endurance to increase your strength of your heart, your diaphragmatic muscles,  All the muscles that improve your functioning to increase your air volume, your cardiac capacity the strength of the muscles these will help you feel better but this takes several months and should be maintained chronically    Should you need physical therapy  In the future reach out to me for this you will need this on and off throughout the rest your life likely.    Pulmonary will likely get a pulmonary function test and see if he will benefit from an inhaler to improve your air functioning and to give you more volume these can increase air volume by  mL of air approximately   Water aerobics  Would be excellent,    Try to incorporate exercises at home joint sparing exercises which you can do    As long as you are doing well follow-up in 4 months Labs before next visit.

## 2023-06-06 NOTE — PROGRESS NOTES
The ABCs of the Annual Wellness Visit  [place] to Medicare Visit    Subjective   {Wrapup  Review (Popup)  Advance Care Planning  Labs  CC  Problem List  Visit Diagnosis  Medications  Result Review  Imaging  [facility]  [facility]  [facility]  SnapShot  Encounters  Notes  Media  Procedures :23}  Darwin Sparks is a 82 y.o. male who presents for a  Welcome to Medicare Visit.    The following portions of the patient's history were reviewed and   updated as appropriate: allergies, current medications, past family history, past medical history, past social history, past surgical history, and problem list.     Compared to one year ago, the patient feels his physical   health is the same.    Compared to one year ago, the patient feels his mental   health is the same.    Recent Hospitalizations:  He was not admitted to the hospital during the last year.       Current Medical Providers:  Patient Care Team:  Epley, James, APRN as PCP - General (Family Medicine)    Outpatient Medications Prior to Visit   Medication Sig Dispense Refill    atenolol (TENORMIN) 50 MG tablet TAKE ONE TABLET BY MOUTH DAILY 90 tablet 1    cyclobenzaprine (FLEXERIL) 10 MG tablet Take 1 tablet by mouth Every Night. For muscle relaxer and improved sleep 90 tablet 2    diphenhydrAMINE-acetaminophen (Tylenol PM Extra Strength)  MG tablet per tablet Take 1 tablet by mouth At Night As Needed for Sleep.      levothyroxine (SYNTHROID, LEVOTHROID) 125 MCG tablet TAKE ONE TABLET BY MOUTH DAILY 90 tablet 3    lisinopril (PRINIVIL,ZESTRIL) 10 MG tablet TAKE ONE TABLET BY MOUTH DAILY FOR BLOOD PRESSURE 90 tablet 1    lisinopril (PRINIVIL,ZESTRIL) 5 MG tablet TAKE ONE TABLET BY MOUTH DAILY FOR BLOOD PRESSURE 90 tablet 1    Misc Natural Products (OSTEO BI-FLEX ADV TRIPLE ST) tablet Take  by mouth.      MULTIPLE VITAMINS ESSENTIAL PO Take  by mouth.      multivitamin with minerals tablet tablet Take 1 tablet by mouth Daily.    "   niacin 250 MG tablet Take 1 tablet by mouth Daily With Breakfast. 30 tablet 6    potassium chloride ER (K-TAB) 20 MEQ tablet controlled-release ER tablet TAKE ONE TABLET BY MOUTH DAILY 90 tablet 1    simvastatin (ZOCOR) 40 MG tablet TAKE ONE TABLET BY MOUTH ONCE NIGHTLY 90 tablet 1    celecoxib (CeleBREX) 200 MG capsule Take 1 capsule by mouth 2 (Two) Times a Day. With water, take lowest effective dose (Patient not taking: Reported on 6/6/2023) 180 capsule 1     No facility-administered medications prior to visit.       No opioid medication identified on active medication list. I have reviewed chart for other potential  high risk medication/s and harmful drug interactions in the elderly.        Aspirin is not on active medication list.  Aspirin use is not indicated based on review of current medical condition/s. Risk of harm outweighs potential benefits.  .    Patient Active Problem List   Diagnosis    Primary hypertension    Mixed hyperlipidemia    Adult hypothyroidism    Insomnia    Anxiety    Dermatitis, eczematoid    Bronchitis    Seasonal allergies    Health care maintenance    Chronic midline low back pain without sciatica    Penis pain    Tinea cruris    Primary osteoarthritis involving multiple joints    Benzodiazepine dependence    Constipation    Abnormal finding on CT scan    Scoliosis of lumbar spine    Lumbar facet arthropathy     Advance Care Planning   Advance Care Planning     Advance Directive is not on file.  ACP discussion was held with the patient during this visit. Patient has an advance directive (not in EMR), copy requested.       Objective   Vitals:    06/06/23 0927   BP: 120/80   Pulse: 54   Resp: 14   Temp: 97.1 °F (36.2 °C)   TempSrc: Temporal   SpO2: 96%   Weight: 84.2 kg (185 lb 9.6 oz)   Height: 167.6 cm (66\")   PainSc:   6   PainLoc: Back     Estimated body mass index is 29.96 kg/m² as calculated from the following:    Height as of this encounter: 167.6 cm (66\").    Weight as of " this encounter: 84.2 kg (185 lb 9.6 oz).           Does the patient have evidence of cognitive impairment?   No    Lab Results   Component Value Date    HGBA1C 6.1 (H) 2023       Procedures       HEALTH RISK ASSESSMENT    Smoking Status:  Social History     Tobacco Use   Smoking Status Former   Smokeless Tobacco Never     Alcohol Consumption:  Social History     Substance and Sexual Activity   Alcohol Use Yes       Fall Risk Screen:    STEFFEN Fall Risk Assessment was completed, and patient is at MODERATE risk for falls. Assessment completed on:2023    Depression Screen:       2023    10:16 AM   PHQ-2/PHQ-9 Depression Screening   Little Interest or Pleasure in Doing Things 0-->not at all   Feeling Down, Depressed or Hopeless 1-->several days   Trouble Falling or Staying Asleep, or Sleeping Too Much 2-->more than half the days   Feeling Tired or Having Little Energy 2-->more than half the days   Poor Appetite or Overeating 0-->not at all   Feeling Bad about Yourself - or that You are a Failure or Have Let Yourself or Your Family Down 1-->several days   Trouble Concentrating on Things, Such as Reading the Newspaper or Watching Television 0-->not at all   Moving or Speaking So Slowly that Other People Could Have Noticed? Or the Opposite - Being So Fidgety 0-->not at all   Thoughts that You Would be Better Off Dead or of Hurting Yourself in Some Way 0-->not at all   PHQ-9: Brief Depression Severity Measure Score 6   If You Checked Off Any Problems, How Difficult Have These Problems Made It For You to Do Your Work, Take Care of Things at Home, or Get Along with Other People? somewhat difficult       Health Habits and Functional and Cognitive Screenin/6/2023     9:35 AM   Functional & Cognitive Status   Do you have difficulty preparing food and eating? No   Do you have difficulty bathing yourself, getting dressed or grooming yourself? No   Do you have difficulty using the toilet? No   Do you have  difficulty moving around from place to place? Yes   Do you have trouble with steps or getting out of a bed or a chair? Yes   Current Diet Other        Current Diet Comment pt states vegan    Dental Exam Up to date   Eye Exam Up to date   Exercise (times per week) 0 times per week   Current Exercises Include No Regular Exercise   Do you need help using the phone?  No   Are you deaf or do you have serious difficulty hearing?  No   Do you need help with transportation? Yes   Do you need help shopping? No   Do you need help preparing meals?  No   Do you need help with housework?  Yes   Do you need help with laundry? Yes   Do you need help taking your medications? No   Do you need help managing money? No   Do you ever drive or ride in a car without wearing a seat belt? No   Have you felt unusual stress, anger or loneliness in the last month? Yes   Who do you live with? Other   If you need help, do you have trouble finding someone available to you? No   Have you been bothered in the last four weeks by sexual problems? No   Do you have difficulty concentrating, remembering or making decisions? No       Visual Acuity:    No results found.    Age-appropriate Screening Schedule:  Refer to the list below for future screening recommendations based on patient's age, sex and/or medical conditions. Orders for these recommended tests are listed in the plan section. The patient has been provided with a written plan.    Health Maintenance   Topic Date Due    ZOSTER VACCINE (2 of 2) 12/13/2016    Pneumococcal Vaccine 65+ (2 - PPSV23 if available, else PCV20) 10/18/2017    ANNUAL WELLNESS VISIT  10/17/2018    COVID-19 Vaccine (5 - Pfizer series) 05/05/2023    INFLUENZA VACCINE  08/01/2023    LIPID PANEL  01/03/2024    TDAP/TD VACCINES (2 - Td or Tdap) 10/18/2026        CMS Preventative Services Quick Reference  Risk Factors Identified During Encounter    Fall Risk-High or Moderate: Discussed Fall Prevention in the home  The above  "risks/problems have been discussed with the patient.  Pertinent information has been shared with the patient in the After Visit Summary.    Follow Up:   Initial Medicare Visit in one year    An After Visit Summary and PPPS were made available to the patient.  {Wrapup  Review (Popup)  Advance Care Planning  Labs  CC  Problem List  Visit Diagnosis  Medications  Result Review  Imaging  Health Maintenance  Quality  Best[facility]ractice  SmartSets  SnapShot  Encounters  Notes  Media  Procedures :23}    Additional E&M Note during same encounter follows:  Patient has multiple medical problems which are significant and separately identifiable that require additional work above and beyond the Medicare Wellness Visit.      Chief Complaint  Medicare Wellness-subsequent    Subjective    {Problem List  Visit Diagnosis   Encounters  Notes  Medications  Labs  Result Review Imaging  Media :23}    HPI  Darwin Sparks is also being seen today for ***         Objective   Vital Signs:  /80   Pulse 54   Temp 97.1 °F (36.2 °C) (Temporal)   Resp 14   Ht 167.6 cm (66\")   Wt 84.2 kg (185 lb 9.6 oz)   SpO2 96%   BMI 29.96 kg/m²     Physical Exam     {The following data was reviewed by (Optional):82853}  {Ambulatory Labs (Optional):34480}  {Data reviewed (Optional):36493:::1}        Assessment and Plan {CC Problem List  Visit Diagnosis   ROS  Review (Popup)  [facility]  BestPractice  Medications  SmartSets  SnapShot Encounters  Media :23}  Diagnoses and all orders for this visit:    1. Medicare annual wellness visit, subsequent (Primary)    2. Shortness of breath  -     Ambulatory Referral to Pulmonology    3. Decreased strength, endurance, and mobility  -     Ambulatory Referral to Physical Therapy Evaluate and treat    4. Gait instability  -     Ambulatory Referral to Physical Therapy Evaluate and treat    5. Primary hypertension    6. Dermatitis    Other orders  -     " clotrimazole-betamethasone (Lotrisone) 1-0.05 % cream; Apply 1 application topically to the appropriate area as directed 2 (Two) Times a Day.  Dispense: 45 g; Refill: 0           {Time Spent (Optional):87952}  Follow Up {Instructions Charge Capture  Follow-up Communications :23}  Return in about 4 months (around 10/6/2023) for Labs before next visit.  Patient was given instructions and counseling regarding his condition or for health maintenance advice. Please see specific information pulled into the AVS if appropriate.

## 2023-06-06 NOTE — PROGRESS NOTES
The ABCs of the Annual Wellness Visit  Subsequent Medicare Wellness Visit    Subjective    Darwin Sparks is a 82 y.o. male who presents for a Subsequent Medicare Wellness Visit.    The following portions of the patient's history were reviewed and   updated as appropriate: allergies, current medications, past family history, past medical history, past social history, past surgical history, and problem list.    Compared to one year ago, the patient feels his physical   health is the same.    Compared to one year ago, the patient feels his mental   health is the same.    Recent Hospitalizations:  He was not admitted to the hospital during the last year.       Current Medical Providers:  Patient Care Team:  Epley, James, APRN as PCP - General (Family Medicine)    Outpatient Medications Prior to Visit   Medication Sig Dispense Refill   • atenolol (TENORMIN) 50 MG tablet TAKE ONE TABLET BY MOUTH DAILY 90 tablet 1   • cyclobenzaprine (FLEXERIL) 10 MG tablet Take 1 tablet by mouth Every Night. For muscle relaxer and improved sleep 90 tablet 2   • diphenhydrAMINE-acetaminophen (Tylenol PM Extra Strength)  MG tablet per tablet Take 1 tablet by mouth At Night As Needed for Sleep.     • levothyroxine (SYNTHROID, LEVOTHROID) 125 MCG tablet TAKE ONE TABLET BY MOUTH DAILY 90 tablet 3   • lisinopril (PRINIVIL,ZESTRIL) 10 MG tablet TAKE ONE TABLET BY MOUTH DAILY FOR BLOOD PRESSURE 90 tablet 1   • lisinopril (PRINIVIL,ZESTRIL) 5 MG tablet TAKE ONE TABLET BY MOUTH DAILY FOR BLOOD PRESSURE 90 tablet 1   • Misc Natural Products (OSTEO BI-FLEX ADV TRIPLE ST) tablet Take  by mouth.     • MULTIPLE VITAMINS ESSENTIAL PO Take  by mouth.     • multivitamin with minerals tablet tablet Take 1 tablet by mouth Daily.     • niacin 250 MG tablet Take 1 tablet by mouth Daily With Breakfast. 30 tablet 6   • potassium chloride ER (K-TAB) 20 MEQ tablet controlled-release ER tablet TAKE ONE TABLET BY MOUTH DAILY 90 tablet 1   • simvastatin  "(ZOCOR) 40 MG tablet TAKE ONE TABLET BY MOUTH ONCE NIGHTLY 90 tablet 1   • celecoxib (CeleBREX) 200 MG capsule Take 1 capsule by mouth 2 (Two) Times a Day. With water, take lowest effective dose (Patient not taking: Reported on 6/6/2023) 180 capsule 1     No facility-administered medications prior to visit.       No opioid medication identified on active medication list. I have reviewed chart for other potential  high risk medication/s and harmful drug interactions in the elderly.        Aspirin is not on active medication list.  Aspirin use is not indicated based on review of current medical condition/s. Risk of harm outweighs potential benefits.  .    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  ACP discussion was held with the patient during this visit. Patient has an advance directive (not in EMR), copy requested.     Objective    Vitals:    06/06/23 0927   BP: 120/80   Pulse: 54   Resp: 14   Temp: 97.1 °F (36.2 °C)   TempSrc: Temporal   SpO2: 96%   Weight: 84.2 kg (185 lb 9.6 oz)   Height: 167.6 cm (66\")   PainSc:   6   PainLoc: Back     Estimated body mass index is 29.96 kg/m² as calculated from the following:    Height as of this encounter: 167.6 cm (66\").    Weight as of this encounter: 84.2 kg (185 lb 9.6 oz).           Does the patient have evidence of cognitive impairment? No    Lab Results   Component Value Date    HGBA1C 6.1 (H) 05/30/2023        HEALTH RISK " ASSESSMENT    Smoking Status:  Social History     Tobacco Use   Smoking Status Former   Smokeless Tobacco Never     Alcohol Consumption:  Social History     Substance and Sexual Activity   Alcohol Use Yes     Fall Risk Screen:    HERMINIAADI Fall Risk Assessment was completed, and patient is at MODERATE risk for falls. Assessment completed on:2023    Depression Screenin/6/2023    10:16 AM   PHQ-2/PHQ-9 Depression Screening   Little Interest or Pleasure in Doing Things 0-->not at all   Feeling Down, Depressed or Hopeless 1-->several days   Trouble Falling or Staying Asleep, or Sleeping Too Much 2-->more than half the days   Feeling Tired or Having Little Energy 2-->more than half the days   Poor Appetite or Overeating 0-->not at all   Feeling Bad about Yourself - or that You are a Failure or Have Let Yourself or Your Family Down 1-->several days   Trouble Concentrating on Things, Such as Reading the Newspaper or Watching Television 0-->not at all   Moving or Speaking So Slowly that Other People Could Have Noticed? Or the Opposite - Being So Fidgety 0-->not at all   Thoughts that You Would be Better Off Dead or of Hurting Yourself in Some Way 0-->not at all   PHQ-9: Brief Depression Severity Measure Score 6   If You Checked Off Any Problems, How Difficult Have These Problems Made It For You to Do Your Work, Take Care of Things at Home, or Get Along with Other People? somewhat difficult       Health Habits and Functional and Cognitive Screenin/6/2023     9:35 AM   Functional & Cognitive Status   Do you have difficulty preparing food and eating? No   Do you have difficulty bathing yourself, getting dressed or grooming yourself? No   Do you have difficulty using the toilet? No   Do you have difficulty moving around from place to place? Yes   Do you have trouble with steps or getting out of a bed or a chair? Yes   Current Diet Other        Current Diet Comment pt states vegan    Dental Exam Up to date    Eye Exam Up to date   Exercise (times per week) 0 times per week   Current Exercises Include No Regular Exercise   Do you need help using the phone?  No   Are you deaf or do you have serious difficulty hearing?  No   Do you need help with transportation? Yes   Do you need help shopping? No   Do you need help preparing meals?  No   Do you need help with housework?  Yes   Do you need help with laundry? Yes   Do you need help taking your medications? No   Do you need help managing money? No   Do you ever drive or ride in a car without wearing a seat belt? No   Have you felt unusual stress, anger or loneliness in the last month? Yes   Who do you live with? Other   If you need help, do you have trouble finding someone available to you? No   Have you been bothered in the last four weeks by sexual problems? No   Do you have difficulty concentrating, remembering or making decisions? No       Age-appropriate Screening Schedule:  Refer to the list below for future screening recommendations based on patient's age, sex and/or medical conditions. Orders for these recommended tests are listed in the plan section. The patient has been provided with a written plan.    Health Maintenance   Topic Date Due   • ZOSTER VACCINE (2 of 2) 12/13/2016   • Pneumococcal Vaccine 65+ (2 - PPSV23 if available, else PCV20) 10/18/2017   • ANNUAL WELLNESS VISIT  10/17/2018   • COVID-19 Vaccine (5 - Pfizer series) 05/05/2023   • INFLUENZA VACCINE  08/01/2023   • LIPID PANEL  01/03/2024   • TDAP/TD VACCINES (2 - Td or Tdap) 10/18/2026                  CMS Preventative Services Quick Reference  Risk Factors Identified During Encounter  Fall Risk-High or Moderate: Discussed Fall Prevention in the home  The above risks/problems have been discussed with the patient.  Pertinent information has been shared with the patient in the After Visit Summary.  An After Visit Summary and PPPS were made available to the patient.    Follow Up:   Next Medicare  "Wellness visit to be scheduled in 1 year.       Additional E&M Note during same encounter follows:  Patient has multiple medical problems which are significant and separately identifiable that require additional work above and beyond the Medicare Wellness Visit.      Chief Complaint  Medicare Wellness-subsequent    Subjective        Patient is here today for Medicare wellness as well as to discuss shortness of breath which has not been worked up  The admission to couple weeks ago we went ahead and ordered a chest x-ray which is clear  He has exertional dyspnea just more over the last year Gradual he had evaluation with cardiology in January had normal stress test echocardiogram without any significant failure or severe valvular disease   Has a rash on his right leg over the last year itchy has been using antifungal thinks it is a fungus, does not bother him a whole lot    He has gait instability chronic stenosis low back,  Has no exertional chest pain no night sweats unexplained weight loss, is no acute sudden dyspnea no calf pain or history of DVT PE tachycardia or anything to suggest a PE.  He  has to assist his , who is quite ill large man, patient is not able to do what he could do previously had a hard time functioning.  With his ADLs,    Essential hypertension controlled,            Darwin Sparks is also being seen today for shortness of breath evaluation, dermatitis, endurance gait instability    Review of Systems   Constitutional: Negative.    HENT: Negative.     Respiratory:  Positive for shortness of breath. Negative for chest tightness.    Gastrointestinal: Negative.    Musculoskeletal:  Positive for back pain and gait problem.   Psychiatric/Behavioral: Negative.       Objective   Vital Signs:  /80   Pulse 54   Temp 97.1 °F (36.2 °C) (Temporal)   Resp 14   Ht 167.6 cm (66\")   Wt 84.2 kg (185 lb 9.6 oz)   SpO2 96%   BMI 29.96 kg/m²     Physical Exam                    Assessment and " Plan   Diagnoses and all orders for this visit:    1. Medicare annual wellness visit, subsequent (Primary)    2. Shortness of breath  -     Ambulatory Referral to Pulmonology    3. Decreased strength, endurance, and mobility  -     Ambulatory Referral to Physical Therapy Evaluate and treat    4. Gait instability  -     Ambulatory Referral to Physical Therapy Evaluate and treat    5. Primary hypertension    6. Dermatitis    Other orders  -     clotrimazole-betamethasone (Lotrisone) 1-0.05 % cream; Apply 1 application topically to the appropriate area as directed 2 (Two) Times a Day.  Dispense: 45 g; Refill: 0             Follow Up   Return in about 4 months (around 10/6/2023) for Labs before next visit.  Patient was given instructions and counseling regarding his condition or for health maintenance advice. Please see specific information pulled into the AVS if appropriate.     We will refer patient to pulmonary for better evaluation he may have some emphysema or COPD, although he is never smoked tobacco,  As well as some endurance issues,  He will try Lotrisone for his dermatitis see dermatology if not resolved over a couple months, avoid chronic use he can use this up to 6 weeks,  Falls precaution falls precaution hydration hydration hydration            Physical therapy to help you increase your physical endurance to increase your strength of your heart, your diaphragmatic muscles,  All the muscles that improve your functioning to increase your air volume, your cardiac capacity the strength of the muscles these will help you feel better but this takes several months and should be maintained chronically    Should you need physical therapy  In the future reach out to me for this you will need this on and off throughout the rest your life likely.    Pulmonary will likely get a pulmonary function test and see if he will benefit from an inhaler to improve your air functioning and to give you more volume these can  increase air volume by  mL of air approximately   Water aerobics  Would be excellent,    Try to incorporate exercises at home joint sparing exercises which you can do    As long as you are doing well follow-up in 4 months Labs before next visit.

## 2023-06-06 NOTE — PROGRESS NOTES
"Chief Complaint  Medicare Wellness-subsequent    Subjective    {Problem List  Visit Diagnosis   Encounters  Notes  Medications  Labs  Result Review Imaging  Media :23}    Darwin Sparks presents to [facility]  History of Present Illness    Objective   Vital Signs:  /80   Pulse 54   Temp 97.1 °F (36.2 °C) (Temporal)   Resp 14   Ht 167.6 cm (66\")   Wt 84.2 kg (185 lb 9.6 oz)   SpO2 96%   BMI 29.96 kg/m²   Estimated body mass index is 29.96 kg/m² as calculated from the following:    Height as of this encounter: 167.6 cm (66\").    Weight as of this encounter: 84.2 kg (185 lb 9.6 oz).            Physical Exam   Result Review :{Labs  Result Review  Imaging  Med Tab  Media  Procedures  :23}  {The following data was reviewed by (Optional):20254}  {Ambulatory Labs (Optional):35806}  {Data reviewed (Optional):78302:::1}          Assessment and Plan {CC Problem List  Visit Diagnosis   ROS  Review (Popup)  Health Maintenance  Quality  BestPractice  Medications  SmartSets  SnapShot Encounters  Media :23}  Diagnoses and all orders for this visit:    1. Primary hypertension (Primary)    2. Mixed hyperlipidemia           {Time Spent (Optional):17136}  Follow Up {Instructions Charge Capture  Follow-up Communications :23}  No follow-ups on file.  Patient was given instructions and counseling regarding his condition or for health maintenance advice. Please see specific information pulled into the AVS if appropriate.     There are no Patient Instructions on file for this visit.         "

## 2023-10-20 ENCOUNTER — OFFICE VISIT (OUTPATIENT)
Dept: FAMILY MEDICINE CLINIC | Facility: CLINIC | Age: 83
End: 2023-10-20
Payer: MEDICARE

## 2023-10-20 VITALS
SYSTOLIC BLOOD PRESSURE: 160 MMHG | TEMPERATURE: 96.6 F | BODY MASS INDEX: 30.15 KG/M2 | DIASTOLIC BLOOD PRESSURE: 80 MMHG | HEART RATE: 95 BPM | HEIGHT: 66 IN | WEIGHT: 187.6 LBS | OXYGEN SATURATION: 96 %

## 2023-10-20 DIAGNOSIS — I10 PRIMARY HYPERTENSION: Primary | ICD-10-CM

## 2023-10-20 DIAGNOSIS — M54.50 CHRONIC MIDLINE LOW BACK PAIN WITHOUT SCIATICA: ICD-10-CM

## 2023-10-20 DIAGNOSIS — E03.9 ADULT HYPOTHYROIDISM: ICD-10-CM

## 2023-10-20 DIAGNOSIS — G89.29 CHRONIC MIDLINE LOW BACK PAIN WITHOUT SCIATICA: ICD-10-CM

## 2023-10-20 DIAGNOSIS — E78.2 MIXED HYPERLIPIDEMIA: ICD-10-CM

## 2023-10-20 DIAGNOSIS — M15.9 PRIMARY OSTEOARTHRITIS INVOLVING MULTIPLE JOINTS: ICD-10-CM

## 2023-10-20 PROCEDURE — 3079F DIAST BP 80-89 MM HG: CPT | Performed by: NURSE PRACTITIONER

## 2023-10-20 PROCEDURE — 99214 OFFICE O/P EST MOD 30 MIN: CPT | Performed by: NURSE PRACTITIONER

## 2023-10-20 PROCEDURE — 3077F SYST BP >= 140 MM HG: CPT | Performed by: NURSE PRACTITIONER

## 2023-10-20 NOTE — PATIENT INSTRUCTIONS
Discharge instructions continue present care you are doing very well,  Continue healthy diet,  Lots of fiber,  As long as you are doing well we will get labs today and I will see you back in 4 months falls precaution as always should you need physical therapy for endurance,  Assistance with transfer gait or other chronic issues simply let me know if you have a new pain then return to office for further evaluation.      Consider regarding mobility this conversation, improved quality life,    Wegovy excellent medication weekly injection to decrease appetite and patients typically lose quite a bit of weight over 6 to 18 months likely this  Would be a good option for you, however your insurance it appears does not cover  But check with your insurance and see if this is covered if so we will do a prior authorization and I will give you more information about this to make sure this is something you want to move forward       If your insurance at the end of the day does not cover this 1 option would be consider a choice, that one that may cover this better for you also make sure were covered in your network as well.    But let me know if I need to do a prior authorization I be happy to do this.

## 2023-10-21 LAB
ALBUMIN SERPL-MCNC: 4.4 G/DL (ref 3.5–5.2)
ALBUMIN/GLOB SERPL: 1.8 G/DL
ALP SERPL-CCNC: 123 U/L (ref 39–117)
ALT SERPL-CCNC: 46 U/L (ref 1–41)
AST SERPL-CCNC: 63 U/L (ref 1–40)
BASOPHILS # BLD AUTO: 0.05 10*3/MM3 (ref 0–0.2)
BASOPHILS NFR BLD AUTO: 0.8 % (ref 0–1.5)
BILIRUB SERPL-MCNC: 0.7 MG/DL (ref 0–1.2)
BUN SERPL-MCNC: 15 MG/DL (ref 8–23)
BUN/CREAT SERPL: 14.7 (ref 7–25)
CALCIUM SERPL-MCNC: 9.8 MG/DL (ref 8.6–10.5)
CHLORIDE SERPL-SCNC: 100 MMOL/L (ref 98–107)
CHOLEST SERPL-MCNC: 189 MG/DL (ref 0–200)
CO2 SERPL-SCNC: 28 MMOL/L (ref 22–29)
CREAT SERPL-MCNC: 1.02 MG/DL (ref 0.76–1.27)
EGFRCR SERPLBLD CKD-EPI 2021: 72.5 ML/MIN/1.73
EOSINOPHIL # BLD AUTO: 0.12 10*3/MM3 (ref 0–0.4)
EOSINOPHIL NFR BLD AUTO: 2 % (ref 0.3–6.2)
ERYTHROCYTE [DISTWIDTH] IN BLOOD BY AUTOMATED COUNT: 12.8 % (ref 12.3–15.4)
GLOBULIN SER CALC-MCNC: 2.4 GM/DL
GLUCOSE SERPL-MCNC: 103 MG/DL (ref 65–99)
HCT VFR BLD AUTO: 44.3 % (ref 37.5–51)
HDLC SERPL-MCNC: 74 MG/DL (ref 40–60)
HGB BLD-MCNC: 15.4 G/DL (ref 13–17.7)
IMM GRANULOCYTES # BLD AUTO: 0.02 10*3/MM3 (ref 0–0.05)
IMM GRANULOCYTES NFR BLD AUTO: 0.3 % (ref 0–0.5)
LDLC SERPL CALC-MCNC: 96 MG/DL (ref 0–100)
LDLC/HDLC SERPL: 1.27 {RATIO}
LYMPHOCYTES # BLD AUTO: 2.66 10*3/MM3 (ref 0.7–3.1)
LYMPHOCYTES NFR BLD AUTO: 44.4 % (ref 19.6–45.3)
MCH RBC QN AUTO: 34.9 PG (ref 26.6–33)
MCHC RBC AUTO-ENTMCNC: 34.8 G/DL (ref 31.5–35.7)
MCV RBC AUTO: 100.5 FL (ref 79–97)
MONOCYTES # BLD AUTO: 0.56 10*3/MM3 (ref 0.1–0.9)
MONOCYTES NFR BLD AUTO: 9.3 % (ref 5–12)
NEUTROPHILS # BLD AUTO: 2.58 10*3/MM3 (ref 1.7–7)
NEUTROPHILS NFR BLD AUTO: 43.2 % (ref 42.7–76)
NRBC BLD AUTO-RTO: 0 /100 WBC (ref 0–0.2)
PLATELET # BLD AUTO: 275 10*3/MM3 (ref 140–450)
POTASSIUM SERPL-SCNC: 4 MMOL/L (ref 3.5–5.2)
PROT SERPL-MCNC: 6.8 G/DL (ref 6–8.5)
RBC # BLD AUTO: 4.41 10*6/MM3 (ref 4.14–5.8)
SODIUM SERPL-SCNC: 141 MMOL/L (ref 136–145)
T4 FREE SERPL-MCNC: 0.97 NG/DL (ref 0.93–1.7)
TRIGL SERPL-MCNC: 106 MG/DL (ref 0–150)
TSH SERPL DL<=0.005 MIU/L-ACNC: 23.3 UIU/ML (ref 0.27–4.2)
UNABLE TO VOID: NORMAL
VLDLC SERPL CALC-MCNC: 19 MG/DL (ref 5–40)
WBC # BLD AUTO: 5.99 10*3/MM3 (ref 3.4–10.8)

## 2023-10-30 NOTE — PROGRESS NOTES
"Chief Complaint  Follow-up    Subjective        Darwin Sparks presents to [facility]  History of Present Illness  Pleasant patient here today follow-up essential hypertension controlled did not take his medicine this morning but he monitors it closely at home  And will take when he gets home mixed hyperlipidemia compliant with statin hypothyroid acquired takes replacement  Difficult DVT with immobility, as well as chronic low back pain degenerative changes, chronic low pain  Stable, no worsening, Celebrex helps she understands risk and benefits thoroughly mitigated risk with extra fluids taking with food        Objective   Vital Signs:  /80 Comment: didnt take meds this morning  Pulse 95   Temp 96.6 °F (35.9 °C)   Ht 167.6 cm (65.98\")   Wt 85.1 kg (187 lb 9.6 oz)   SpO2 96%   BMI 30.29 kg/m²   Estimated body mass index is 30.29 kg/m² as calculated from the following:    Height as of this encounter: 167.6 cm (65.98\").    Weight as of this encounter: 85.1 kg (187 lb 9.6 oz).            Physical Exam  Vitals reviewed.   Constitutional:       General: He is not in acute distress.     Appearance: Normal appearance. He is well-developed. He is not ill-appearing, toxic-appearing or diaphoretic.   HENT:      Head: Normocephalic.      Nose: Nose normal.   Eyes:      General: No scleral icterus.     Conjunctiva/sclera: Conjunctivae normal.      Pupils: Pupils are equal, round, and reactive to light.   Neck:      Thyroid: No thyromegaly.      Vascular: No JVD.   Cardiovascular:      Rate and Rhythm: Normal rate and regular rhythm.      Heart sounds: Normal heart sounds. No murmur heard.     No friction rub. No gallop.   Pulmonary:      Effort: Pulmonary effort is normal. No respiratory distress.      Breath sounds: Normal breath sounds. No stridor. No wheezing or rales.   Abdominal:      General: Bowel sounds are normal. There is no distension.      Palpations: Abdomen is soft.      " Tenderness: There is no abdominal tenderness.      Comments: No hepatosplenomegaly, no ascites,   Musculoskeletal:         General: No tenderness.      Cervical back: Neck supple.      Comments: Wheelchair due to chronic low pain trace edema no calf tender   Lymphadenopathy:      Cervical: No cervical adenopathy.   Skin:     General: Skin is warm and dry.      Findings: No erythema or rash.   Neurological:      General: No focal deficit present.      Mental Status: He is alert and oriented to person, place, and time. Mental status is at baseline.      Deep Tendon Reflexes: Reflexes are normal and symmetric.   Psychiatric:         Mood and Affect: Mood normal.         Behavior: Behavior normal.         Thought Content: Thought content normal.         Judgment: Judgment normal.        Result Review :                Assessment and Plan   Diagnoses and all orders for this visit:    1. Primary hypertension (Primary)  -     CBC & Differential  -     Comprehensive Metabolic Panel  -     TSH Rfx On Abnormal To Free T4  -     Lipid Panel With LDL / HDL Ratio  -     Urinalysis With Microscopic If Indicated (No Culture) - Urine, Clean Catch    2. Mixed hyperlipidemia  -     CBC & Differential  -     Comprehensive Metabolic Panel  -     TSH Rfx On Abnormal To Free T4  -     Lipid Panel With LDL / HDL Ratio  -     Urinalysis With Microscopic If Indicated (No Culture) - Urine, Clean Catch    3. Adult hypothyroidism  -     CBC & Differential  -     Comprehensive Metabolic Panel  -     TSH Rfx On Abnormal To Free T4  -     Lipid Panel With LDL / HDL Ratio  -     Urinalysis With Microscopic If Indicated (No Culture) - Urine, Clean Catch    4. Primary osteoarthritis involving multiple joints  -     CBC & Differential  -     Comprehensive Metabolic Panel  -     TSH Rfx On Abnormal To Free T4  -     Lipid Panel With LDL / HDL Ratio  -     Urinalysis With Microscopic If Indicated (No Culture) - Urine, Clean Catch    5. Chronic midline  low back pain without sciatica  -     CBC & Differential  -     Comprehensive Metabolic Panel  -     TSH Rfx On Abnormal To Free T4  -     Lipid Panel With LDL / HDL Ratio  -     Urinalysis With Microscopic If Indicated (No Culture) - Urine, Clean Catch    Other orders  -     T4F  -     Unable To Void           I spent 30  minutes caring for Darwin on this date of service. This time includes time spent by me in the following activities:preparing for the visit, reviewing tests, obtaining and/or reviewing a separately obtained history, performing a medically appropriate examination and/or evaluation , counseling and educating the patient/family/caregiver, ordering medications, tests, or procedures, documenting information in the medical record, and care coordination  Follow Up   Return in about 4 months (around 2/20/2024) for Labs today.  Patient was given instructions and counseling regarding his condition or for health maintenance advice. Please see specific information pulled into the AVS if appropriate.     Patient Instructions   Discharge instructions continue present care you are doing very well,  Continue healthy diet,  Lots of fiber,  As long as you are doing well we will get labs today and I will see you back in 4 months falls precaution as always should you need physical therapy for endurance,  Assistance with transfer gait or other chronic issues simply let me know if you have a new pain then return to office for further evaluation.      Consider regarding mobility this conversation, improved quality life,    Wegovy excellent medication weekly injection to decrease appetite and patients typically lose quite a bit of weight over 6 to 18 months likely this  Would be a good option for you, however your insurance it appears does not cover  But check with your insurance and see if this is covered if so we will do a prior authorization and I will give you more information about this to make sure this is something  you want to move forward       If your insurance at the end of the day does not cover this 1 option would be consider a choice, that one that may cover this better for you also make sure were covered in your network as well.    But let me know if I need to do a prior authorization I be happy to do this.

## 2024-02-20 ENCOUNTER — HOSPITAL ENCOUNTER (OUTPATIENT)
Dept: GENERAL RADIOLOGY | Facility: HOSPITAL | Age: 84
Discharge: HOME | End: 2024-02-20
Admitting: NURSE PRACTITIONER
Payer: MEDICARE

## 2024-02-20 ENCOUNTER — OFFICE VISIT (OUTPATIENT)
Dept: FAMILY MEDICINE CLINIC | Facility: CLINIC | Age: 84
End: 2024-02-20
Payer: MEDICARE

## 2024-02-20 VITALS
RESPIRATION RATE: 16 BRPM | SYSTOLIC BLOOD PRESSURE: 132 MMHG | OXYGEN SATURATION: 96 % | HEART RATE: 96 BPM | WEIGHT: 186 LBS | DIASTOLIC BLOOD PRESSURE: 74 MMHG | BODY MASS INDEX: 29.89 KG/M2 | TEMPERATURE: 98.6 F | HEIGHT: 66 IN

## 2024-02-20 DIAGNOSIS — Z63.6 CAREGIVER STRESS: ICD-10-CM

## 2024-02-20 DIAGNOSIS — M54.42 CHRONIC MIDLINE LOW BACK PAIN WITH BILATERAL SCIATICA: ICD-10-CM

## 2024-02-20 DIAGNOSIS — M54.41 CHRONIC MIDLINE LOW BACK PAIN WITH BILATERAL SCIATICA: ICD-10-CM

## 2024-02-20 DIAGNOSIS — E78.2 MIXED HYPERLIPIDEMIA: ICD-10-CM

## 2024-02-20 DIAGNOSIS — F32.2 SEVERE DEPRESSION: Primary | ICD-10-CM

## 2024-02-20 DIAGNOSIS — E03.9 ADULT HYPOTHYROIDISM: ICD-10-CM

## 2024-02-20 DIAGNOSIS — G89.29 CHRONIC MIDLINE LOW BACK PAIN WITH BILATERAL SCIATICA: ICD-10-CM

## 2024-02-20 DIAGNOSIS — C61 PROSTATE CANCER: ICD-10-CM

## 2024-02-20 DIAGNOSIS — M15.9 PRIMARY OSTEOARTHRITIS INVOLVING MULTIPLE JOINTS: ICD-10-CM

## 2024-02-20 DIAGNOSIS — R06.02 SHORTNESS OF BREATH: ICD-10-CM

## 2024-02-20 PROCEDURE — 71046 X-RAY EXAM CHEST 2 VIEWS: CPT

## 2024-02-20 PROCEDURE — 99214 OFFICE O/P EST MOD 30 MIN: CPT | Performed by: NURSE PRACTITIONER

## 2024-02-20 PROCEDURE — 3078F DIAST BP <80 MM HG: CPT | Performed by: NURSE PRACTITIONER

## 2024-02-20 PROCEDURE — 3075F SYST BP GE 130 - 139MM HG: CPT | Performed by: NURSE PRACTITIONER

## 2024-02-20 RX ORDER — SIMVASTATIN 40 MG
40 TABLET ORAL NIGHTLY
Qty: 90 TABLET | Refills: 3 | Status: SHIPPED | OUTPATIENT
Start: 2024-02-20

## 2024-02-20 RX ORDER — ATENOLOL 50 MG/1
50 TABLET ORAL DAILY
Qty: 90 TABLET | Refills: 3 | Status: SHIPPED | OUTPATIENT
Start: 2024-02-20

## 2024-02-20 RX ORDER — ALBUTEROL SULFATE 90 UG/1
2 AEROSOL, METERED RESPIRATORY (INHALATION) EVERY 4 HOURS PRN
Qty: 6.7 G | Refills: 2 | Status: SHIPPED | OUTPATIENT
Start: 2024-02-20

## 2024-02-20 RX ORDER — CELECOXIB 200 MG/1
200 CAPSULE ORAL 2 TIMES DAILY
Qty: 180 CAPSULE | Refills: 1 | Status: SHIPPED | OUTPATIENT
Start: 2024-02-20

## 2024-02-20 RX ORDER — LEVOTHYROXINE SODIUM 0.12 MG/1
125 TABLET ORAL DAILY
Qty: 90 TABLET | Refills: 3 | Status: SHIPPED | OUTPATIENT
Start: 2024-02-20

## 2024-02-20 RX ORDER — POTASSIUM CHLORIDE 1500 MG/1
20 TABLET, EXTENDED RELEASE ORAL DAILY
Qty: 90 TABLET | Refills: 3 | Status: SHIPPED | OUTPATIENT
Start: 2024-02-20

## 2024-02-20 SDOH — SOCIAL STABILITY - SOCIAL INSECURITY: DEPENDENT RELATIVE NEEDING CARE AT HOME: Z63.6

## 2024-02-20 NOTE — PATIENT INSTRUCTIONS
Discharge instructions  Lab and chest x-ray today pulmonary for breathing evaluation  There are some inhalers and likely will help you use albuterol 3-4 times a day as needed    2 inhalations  Follow-up with cardiology but likely there is be some inhalers that may help    Increase shortness of breath chest pain fever chills emergency room  for resources, physical therapy when you are ready pain management, as well resume your blood pressure medicines check weekly    Follow-up here in 2 weeks sooner for other difficulties,  Consider antidepressant therapy if you have increased depression or at your follow-up will discuss more anytime return to office this to let

## 2024-02-20 NOTE — PROGRESS NOTES
"Chief Complaint  Back Pain (Having issue in lumbar area) and Follow-up (4 month follow up)    Subjective        Darwin Sparks presents to [facility]  History of Present Illness  Pleasant patient here today he has stopped his blood pressure medication thyroid medication just stopped everything is overwhelmed frustrated,  His significant other Roger, chronically ill Mr. Arciniega is the caretaker, he can barely get him out of bed, purchased a bed last night  Just feels overwhelmed, with chronic back pain, chronic medical problems himself he gets really short of breath with little activities,  No chest pain no shortness of breath presently no lethargy weakness presently,    Back Pain      Objective   Vital Signs:  /74   Pulse 96   Temp 98.6 °F (37 °C) (Infrared)   Resp 16   Ht 167.6 cm (65.98\")   Wt 84.4 kg (186 lb)   SpO2 96%   BMI 30.04 kg/m²   Estimated body mass index is 30.04 kg/m² as calculated from the following:    Height as of this encounter: 167.6 cm (65.98\").    Weight as of this encounter: 84.4 kg (186 lb).            Physical Exam  Vitals reviewed.   Constitutional:       General: He is not in acute distress.     Appearance: He is well-developed. He is not ill-appearing, toxic-appearing or diaphoretic.   HENT:      Head: Normocephalic.      Nose: Nose normal.   Eyes:      General: No scleral icterus.     Conjunctiva/sclera: Conjunctivae normal.      Pupils: Pupils are equal, round, and reactive to light.   Neck:      Thyroid: No thyromegaly.      Vascular: No JVD.   Cardiovascular:      Rate and Rhythm: Normal rate and regular rhythm.      Heart sounds: Normal heart sounds. No murmur heard.     No friction rub. No gallop.   Pulmonary:      Effort: Pulmonary effort is normal. No respiratory distress.      Breath sounds: Normal breath sounds. No stridor. No wheezing or rales.      Comments: Unlabored  Abdominal:      General: Bowel sounds are normal. There is no " distension.      Palpations: Abdomen is soft.      Tenderness: There is no abdominal tenderness.      Comments: No hepatosplenomegaly, no ascites,   Musculoskeletal:         General: No tenderness.      Cervical back: Neck supple.      Comments: Propels self in wheelchair     Lymphadenopathy:      Cervical: No cervical adenopathy.   Skin:     General: Skin is warm and dry.      Findings: No erythema or rash.   Neurological:      Mental Status: He is alert and oriented to person, place, and time.      Deep Tendon Reflexes: Reflexes are normal and symmetric.   Psychiatric:         Mood and Affect: Mood normal.         Behavior: Behavior normal.         Thought Content: Thought content normal.         Judgment: Judgment normal.      Comments: Surprisingly affect is not too bad his baseline         Result Review :                Assessment and Plan   Diagnoses and all orders for this visit:    1. Severe depression (Primary)    2. Mixed hyperlipidemia  -     CBC & Differential  -     Comprehensive Metabolic Panel  -     Lipid Panel With LDL / HDL Ratio  -     TSH    3. Adult hypothyroidism  -     CBC & Differential  -     Comprehensive Metabolic Panel  -     Lipid Panel With LDL / HDL Ratio  -     TSH    4. Primary osteoarthritis involving multiple joints  -     CBC & Differential  -     Comprehensive Metabolic Panel  -     Lipid Panel With LDL / HDL Ratio  -     TSH  -     Ambulatory Referral to Pain Management    5. Caregiver stress  -     Ambulatory Referral to Case Management    6. Chronic midline low back pain with bilateral sciatica  -     MRI Lumbar Spine With & Without Contrast    7. Prostate cancer  -     MRI Lumbar Spine With & Without Contrast    8. Shortness of breath  -     XR Chest PA & Lateral  -     Ambulatory Referral to Cardiology  -     Ambulatory Referral to Pulmonology    Other orders  -     atenolol (TENORMIN) 50 MG tablet; Take 1 tablet by mouth Daily.  Dispense: 90 tablet; Refill: 3  -      celecoxib (CeleBREX) 200 MG capsule; Take 1 capsule by mouth 2 (Two) Times a Day. With water, take lowest effective dose  Dispense: 180 capsule; Refill: 1  -     simvastatin (ZOCOR) 40 MG tablet; Take 1 tablet by mouth Every Night.  Dispense: 90 tablet; Refill: 3  -     potassium chloride ER (K-TAB) 20 MEQ tablet controlled-release ER tablet; Take 1 tablet by mouth Daily.  Dispense: 90 tablet; Refill: 3  -     levothyroxine (SYNTHROID, LEVOTHROID) 125 MCG tablet; Take 1 tablet by mouth Daily.  Dispense: 90 tablet; Refill: 3  -     albuterol sulfate  (90 Base) MCG/ACT inhaler; Inhale 2 puffs Every 4 (Four) Hours As Needed for Wheezing or Shortness of Air.  Dispense: 6.7 g; Refill: 2           I spent 30  minutes caring for Darwin on this date of service. This time includes time spent by me in the following activities:preparing for the visit, reviewing tests, obtaining and/or reviewing a separately obtained history, performing a medically appropriate examination and/or evaluation , counseling and educating the patient/family/caregiver, ordering medications, tests, or procedures, referring and communicating with other health care professionals , documenting information in the medical record, and care coordination  Follow Up   Return in about 1 week (around 2/27/2024), or chest xray, for Labs today.  Patient was given instructions and counseling regarding his condition or for health maintenance advice. Please see specific information pulled into the AVS if appropriate.     Much is situational beyond his control encourage patient this is not a case meeting antidepressant today, will have a short follow-up and discuss this at his follow-up,  Pain management referral MRI low back,  Most of his pain levels are just chronic not a gigantic increase to be worried about recurrence of prostate cancer nonetheless we should check an MRI with contraReferral to case managementst  Other potential options that may help  Offered  home health patient declines at this time but will consider      Patient Instructions   Discharge instructions  Lab and chest x-ray today pulmonary for breathing evaluation  There are some inhalers and likely will help you use albuterol 3-4 times a day as needed    2 inhalations  Follow-up with cardiology but likely there is be some inhalers that may help    Increase shortness of breath chest pain fever chills emergency room  for resources, physical therapy when you are ready pain management, as well resume your blood pressure medicines check weekly    Follow-up here in 2 weeks sooner for other difficulties,  Consider antidepressant therapy if you have increased depression or at your follow-up will discuss more anytime return to office this to let

## 2024-02-21 ENCOUNTER — REFERRAL TRIAGE (OUTPATIENT)
Dept: CASE MANAGEMENT | Facility: OTHER | Age: 84
End: 2024-02-21
Payer: MEDICARE

## 2024-02-21 LAB
ALBUMIN SERPL-MCNC: 3.9 G/DL (ref 3.5–5.2)
ALBUMIN/GLOB SERPL: 1.6 G/DL
ALP SERPL-CCNC: 112 U/L (ref 39–117)
ALT SERPL-CCNC: 22 U/L (ref 1–41)
AST SERPL-CCNC: 32 U/L (ref 1–40)
BASOPHILS # BLD AUTO: 0.05 10*3/MM3 (ref 0–0.2)
BASOPHILS NFR BLD AUTO: 0.9 % (ref 0–1.5)
BILIRUB SERPL-MCNC: 0.5 MG/DL (ref 0–1.2)
BUN SERPL-MCNC: 11 MG/DL (ref 8–23)
BUN/CREAT SERPL: 11.2 (ref 7–25)
CALCIUM SERPL-MCNC: 9.5 MG/DL (ref 8.6–10.5)
CHLORIDE SERPL-SCNC: 104 MMOL/L (ref 98–107)
CHOLEST SERPL-MCNC: 229 MG/DL (ref 0–200)
CO2 SERPL-SCNC: 20.4 MMOL/L (ref 22–29)
CREAT SERPL-MCNC: 0.98 MG/DL (ref 0.76–1.27)
EGFRCR SERPLBLD CKD-EPI 2021: 75.6 ML/MIN/1.73
EOSINOPHIL # BLD AUTO: 0.04 10*3/MM3 (ref 0–0.4)
EOSINOPHIL NFR BLD AUTO: 0.7 % (ref 0.3–6.2)
ERYTHROCYTE [DISTWIDTH] IN BLOOD BY AUTOMATED COUNT: 13 % (ref 12.3–15.4)
GLOBULIN SER CALC-MCNC: 2.4 GM/DL
GLUCOSE SERPL-MCNC: 101 MG/DL (ref 65–99)
HCT VFR BLD AUTO: 44.5 % (ref 37.5–51)
HDLC SERPL-MCNC: 73 MG/DL (ref 40–60)
HGB BLD-MCNC: 15.7 G/DL (ref 13–17.7)
IMM GRANULOCYTES # BLD AUTO: 0.02 10*3/MM3 (ref 0–0.05)
IMM GRANULOCYTES NFR BLD AUTO: 0.4 % (ref 0–0.5)
LDLC SERPL CALC-MCNC: 138 MG/DL (ref 0–100)
LDLC/HDLC SERPL: 1.86 {RATIO}
LYMPHOCYTES # BLD AUTO: 2.25 10*3/MM3 (ref 0.7–3.1)
LYMPHOCYTES NFR BLD AUTO: 41.2 % (ref 19.6–45.3)
MCH RBC QN AUTO: 35.8 PG (ref 26.6–33)
MCHC RBC AUTO-ENTMCNC: 35.3 G/DL (ref 31.5–35.7)
MCV RBC AUTO: 101.6 FL (ref 79–97)
MONOCYTES # BLD AUTO: 0.56 10*3/MM3 (ref 0.1–0.9)
MONOCYTES NFR BLD AUTO: 10.3 % (ref 5–12)
NEUTROPHILS # BLD AUTO: 2.54 10*3/MM3 (ref 1.7–7)
NEUTROPHILS NFR BLD AUTO: 46.5 % (ref 42.7–76)
NRBC BLD AUTO-RTO: 0 /100 WBC (ref 0–0.2)
PLATELET # BLD AUTO: 260 10*3/MM3 (ref 140–450)
POTASSIUM SERPL-SCNC: 4.3 MMOL/L (ref 3.5–5.2)
PROT SERPL-MCNC: 6.3 G/DL (ref 6–8.5)
RBC # BLD AUTO: 4.38 10*6/MM3 (ref 4.14–5.8)
SODIUM SERPL-SCNC: 142 MMOL/L (ref 136–145)
TRIGL SERPL-MCNC: 102 MG/DL (ref 0–150)
TSH SERPL DL<=0.005 MIU/L-ACNC: 13.5 UIU/ML (ref 0.27–4.2)
VLDLC SERPL CALC-MCNC: 18 MG/DL (ref 5–40)
WBC # BLD AUTO: 5.46 10*3/MM3 (ref 3.4–10.8)

## 2024-02-29 ENCOUNTER — PATIENT OUTREACH (OUTPATIENT)
Dept: CASE MANAGEMENT | Facility: OTHER | Age: 84
End: 2024-02-29
Payer: MEDICARE

## 2024-02-29 NOTE — OUTREACH NOTE
AMBULATORY CASE MANAGEMENT NOTE    Name and Relationship of Patient/Support Person: Darwin Sparks E - Self    Adult Patient Profile  Questions/Answers      Flowsheet Row Most Recent Value   Symptoms/Conditions Managed at Home musculoskeletal   Musculoskeletal Symptoms/Conditions back pain   Musculoskeletal Management Strategies other (see comments)  [Patient cares for partner that is wheelchair bound.]   Musculoskeletal Self-Management Outcome 3 (uncertain)   Musculoskeletal Comment Patient looking for resources to make caregiving easier.   Major Change/Loss/Stressor/Fears medical condition, family, medical condition, self   Health Facilitated by healthy diet   Primary Source of Support/Comfort spouse   People in Home spouse   Name(s) of People in Home Roger   Primary Roles/Responsibilities caregiver for other(s)   Current Living Arrangements home        Patient Outreach    Introduced self, explained ACM RN role and provided contact information. Spoke with patient regarding health and wellness. He cares for his spouse who is wheelchair bound. Their house has most renovations made to allow wheelchair access to all rooms. Spouse has a power lift recliner. He also has a bed with a motorized elevated head of bed, but is waiting on some parts from [employer]. They have a  that cleans on Wednesdays and grocery shops for them. The only need the patient can think of at this time is a lift to transition his spouse from wheelchair to bed. The patient has back pain and this is difficult to accomplish. They eat a vegan diet. They also need another grab bar in the shower, but their contractor should be able to install the grab bar soon. ACM also reviewed PT/OT to work on transfers. Patient states his spouse may not benefit from more PT. Follow up scheduled next week. PCP notified.   Education Documentation  No documentation found.        Nia ORELLANA  Ambulatory Case Management    2/29/2024, 18:38 EST

## 2024-03-07 ENCOUNTER — OFFICE VISIT (OUTPATIENT)
Dept: FAMILY MEDICINE CLINIC | Facility: CLINIC | Age: 84
End: 2024-03-07
Payer: MEDICARE

## 2024-03-07 VITALS
SYSTOLIC BLOOD PRESSURE: 144 MMHG | HEART RATE: 93 BPM | WEIGHT: 186 LBS | TEMPERATURE: 97.8 F | RESPIRATION RATE: 16 BRPM | BODY MASS INDEX: 29.89 KG/M2 | OXYGEN SATURATION: 95 % | HEIGHT: 66 IN | DIASTOLIC BLOOD PRESSURE: 91 MMHG

## 2024-03-07 DIAGNOSIS — F32.2 SEVERE MAJOR DEPRESSION: Primary | ICD-10-CM

## 2024-03-07 PROCEDURE — 99213 OFFICE O/P EST LOW 20 MIN: CPT | Performed by: NURSE PRACTITIONER

## 2024-03-07 PROCEDURE — 3077F SYST BP >= 140 MM HG: CPT | Performed by: NURSE PRACTITIONER

## 2024-03-07 PROCEDURE — 3080F DIAST BP >= 90 MM HG: CPT | Performed by: NURSE PRACTITIONER

## 2024-03-07 RX ORDER — ESCITALOPRAM OXALATE 5 MG/1
5 TABLET ORAL DAILY
Qty: 30 TABLET | Refills: 1 | Status: SHIPPED | OUTPATIENT
Start: 2024-03-07

## 2024-03-07 NOTE — PROGRESS NOTES
"Chief Complaint  Follow-up and Anxiety (Pt is Overwhelmed at home )    Subjective        Darwin Sparks presents to [facility]  History of Present Illness  Pleasant patient here today follow-up anxiety depression situational due to his partner , Roger, has been requiring much more care as well as patient's home health has been declining the last several years, just feels overwhelmed, has not been taking care of himself depressed anxiety, no suicidal ideation but stopped taking his medication    Hypertension uncontrolled without chest pain he has some chronic shortness of breath has been referred to pulmonary and cardiology for this, needs to resume his medication including thyroid, just was not able to make it to the pharmacy due to all the complications I referred him to case management she is contacting him hopefully they can find some other options.  Anxiety          Objective   Vital Signs:  /91   Pulse 93   Temp 97.8 °F (36.6 °C) (Infrared)   Resp 16   Ht 167.6 cm (65.98\")   Wt 84.4 kg (186 lb)   SpO2 95%   BMI 30.04 kg/m²   Estimated body mass index is 30.04 kg/m² as calculated from the following:    Height as of this encounter: 167.6 cm (65.98\").    Weight as of this encounter: 84.4 kg (186 lb).            Physical Exam  Constitutional:       General: He is not in acute distress.     Appearance: He is not ill-appearing, toxic-appearing or diaphoretic.      Comments: Improved baseline today no distress pleasant smiling a little more   Eyes:      Conjunctiva/sclera: Conjunctivae normal.      Pupils: Pupils are equal, round, and reactive to light.   Cardiovascular:      Rate and Rhythm: Normal rate and regular rhythm.   Pulmonary:      Effort: Pulmonary effort is normal.      Breath sounds: Normal breath sounds.   Musculoskeletal:      Comments: Wheelchair, to ambulate, able to propel self   Neurological:      Mental Status: He is alert.   Psychiatric:         " Mood and Affect: Mood normal.         Behavior: Behavior normal.         Thought Content: Thought content normal.         Judgment: Judgment normal.        Result Review :                Assessment and Plan   Diagnoses and all orders for this visit:    1. Severe major depression (Primary)    Other orders  -     escitalopram (Lexapro) 5 MG tablet; Take 1 tablet by mouth Daily. For improved mood  Dispense: 30 tablet; Refill: 1           I spent 23 minutes caring for Darwin on this date of service. This time includes time spent by me in the following activities:preparing for the visit, reviewing tests, obtaining and/or reviewing a separately obtained history, performing a medically appropriate examination and/or evaluation , counseling and educating the patient/family/caregiver, ordering medications, tests, or procedures, documenting information in the medical record, and care coordination  Follow Up   Return in about 6 weeks (around 4/18/2024), or print instructions.  Patient was given instructions and counseling regarding his condition or for health maintenance advice. Please see specific information pulled into the AVS if appropriate.     Patient Instructions   Discharge instructions resume your medications resume Celebrex 200 mg daily increase it to twice a day only if needed if not helpful  Then discontinue and take omeprazole 20 mg over-the-counter with ibuprofen OTC 3 times a day  With food and water to protect your stomach hydrate well      Start Lexapro low-dose 5 mg 1/2 tablet in the morning starting tomorrow  Increase it to 1 tablet in 2 weeks update me your condition in 3 weeks or so    Recheck here 1 month we can increase this at any time okay to increase this as well in 3 weeks let me know otherwise given time slow steady changes to avoid any difficulty initiating the medication which is tricky  Keep me involved so we do not lose good time,    Severe depression and suicidal ideation emergency room  immediately

## 2024-03-07 NOTE — PATIENT INSTRUCTIONS
Discharge instructions resume your medications resume Celebrex 200 mg daily increase it to twice a day only if needed if not helpful  Then discontinue and take omeprazole 20 mg over-the-counter with ibuprofen OTC 3 times a day  With food and water to protect your stomach hydrate well      Start Lexapro low-dose 5 mg 1/2 tablet in the morning starting tomorrow  Increase it to 1 tablet in 2 weeks update me your condition in 3 weeks or so    Recheck here 1 month we can increase this at any time okay to increase this as well in 3 weeks let me know otherwise given time slow steady changes to avoid any difficulty initiating the medication which is tricky  Keep me involved so we do not lose good time,    Severe depression and suicidal ideation emergency room immediately

## 2024-03-11 ENCOUNTER — PATIENT OUTREACH (OUTPATIENT)
Dept: CASE MANAGEMENT | Facility: OTHER | Age: 84
End: 2024-03-11
Payer: MEDICARE

## 2024-03-11 NOTE — OUTREACH NOTE
AMBULATORY CASE MANAGEMENT NOTE    Name and Relationship of Patient/Support Person:  -     Spoke with Amisha. Patient can get an order for a lift. Doctor's notes will need to be provided. It is $100 per month private pay without the order. Roger denies need for a lift. [facility]. Referral placed. They currently have several aides helping with care, but will need to stop using one as the cost is too expensive. Follow up scheduled in 2 weeks.     Education Documentation  No documentation found.        Nia ORELLANA  Ambulatory Case Management    3/11/2024, 16:19 EDT

## 2024-04-12 ENCOUNTER — PATIENT OUTREACH (OUTPATIENT)
Dept: CASE MANAGEMENT | Facility: OTHER | Age: 84
End: 2024-04-12
Payer: MEDICARE

## 2024-04-12 NOTE — OUTREACH NOTE
AMBULATORY CASE MANAGEMENT NOTE    Name and Relationship of Patient/Support Person: Darwin Sparks E - Self    Patient Outreach    Left message with patient. Referral packet for [facility] services sent on 3/12/24. Follow up scheduled in 2 weeks to review the packet and address needs if they arise.     Education Documentation  No documentation found.        Nia ORELLANA  Ambulatory Case Management    4/12/2024, 14:29 EDT

## 2024-04-18 ENCOUNTER — OFFICE VISIT (OUTPATIENT)
Dept: FAMILY MEDICINE CLINIC | Facility: CLINIC | Age: 84
End: 2024-04-18
Payer: MEDICARE

## 2024-04-18 VITALS
HEART RATE: 55 BPM | OXYGEN SATURATION: 96 % | SYSTOLIC BLOOD PRESSURE: 132 MMHG | WEIGHT: 185.4 LBS | DIASTOLIC BLOOD PRESSURE: 78 MMHG | HEIGHT: 66 IN | RESPIRATION RATE: 18 BRPM | TEMPERATURE: 97.5 F | BODY MASS INDEX: 29.8 KG/M2

## 2024-04-18 DIAGNOSIS — F41.8 SITUATIONAL ANXIETY: ICD-10-CM

## 2024-04-18 DIAGNOSIS — I10 PRIMARY HYPERTENSION: ICD-10-CM

## 2024-04-18 DIAGNOSIS — F32.2 SEVERE MAJOR DEPRESSION: Primary | ICD-10-CM

## 2024-04-18 PROCEDURE — 99214 OFFICE O/P EST MOD 30 MIN: CPT | Performed by: NURSE PRACTITIONER

## 2024-04-18 PROCEDURE — 3075F SYST BP GE 130 - 139MM HG: CPT | Performed by: NURSE PRACTITIONER

## 2024-04-18 PROCEDURE — 3078F DIAST BP <80 MM HG: CPT | Performed by: NURSE PRACTITIONER

## 2024-04-18 RX ORDER — CELECOXIB 200 MG/1
200 CAPSULE ORAL 2 TIMES DAILY
Qty: 180 CAPSULE | Refills: 1 | Status: SHIPPED | OUTPATIENT
Start: 2024-04-18

## 2024-04-18 RX ORDER — ESCITALOPRAM OXALATE 5 MG/1
5 TABLET ORAL DAILY
Qty: 30 TABLET | Refills: 1 | Status: SHIPPED | OUTPATIENT
Start: 2024-04-18

## 2024-04-18 RX ORDER — LEVOTHYROXINE SODIUM 0.12 MG/1
125 TABLET ORAL DAILY
Qty: 90 TABLET | Refills: 3 | Status: SHIPPED | OUTPATIENT
Start: 2024-04-18 | End: 2024-05-01 | Stop reason: SDUPTHER

## 2024-04-18 NOTE — PROGRESS NOTES
"Chief Complaint  Depression (Patient stated he hasn't took any medications  all in several months, but has all these on hand)    Subjective        Darwin Sparks presents to [facility]  History of Present Illness  Follow-up recent depression severe,  Situational anxiety,  His  Roger has declined in health, patient has poor health himself and just had a hard time managing,  They have been referred to ambulatory care to help with resources and they do have 2 different people, mental health,  He is just unable to help out lifting like he was in the past with his  has not been taking his medication blood pressure  Blood pressure only mildly elevated 132/70 today  Depression  His past medical history is significant for depression.       Objective   Vital Signs:  /78 (BP Location: Right arm, Patient Position: Sitting, Cuff Size: Adult)   Pulse 55   Temp 97.5 °F (36.4 °C) (Temporal)   Resp 18   Ht 167.6 cm (65.98\")   Wt 84.1 kg (185 lb 6.4 oz)   SpO2 96%   BMI 29.94 kg/m²   Estimated body mass index is 29.94 kg/m² as calculated from the following:    Height as of this encounter: 167.6 cm (65.98\").    Weight as of this encounter: 84.1 kg (185 lb 6.4 oz).            Physical Exam  Constitutional:       General: He is not in acute distress.     Appearance: He is not ill-appearing, toxic-appearing or diaphoretic.      Comments: Wheelchair no distress patient's baseline pleasant as always   Eyes:      Conjunctiva/sclera: Conjunctivae normal.      Pupils: Pupils are equal, round, and reactive to light.   Cardiovascular:      Rate and Rhythm: Normal rate and regular rhythm.   Pulmonary:      Effort: Pulmonary effort is normal. No respiratory distress.      Breath sounds: No stridor.   Abdominal:      General: Abdomen is flat. There is no distension.      Tenderness: There is no abdominal tenderness.   Skin:     General: Skin is warm and dry.   Neurological:      Mental " Status: He is alert.      Cranial Nerves: No cranial nerve deficit.      Comments: Chronic weakness legs gait abnormality no change   Psychiatric:         Mood and Affect: Mood normal.         Behavior: Behavior normal.         Thought Content: Thought content normal.         Judgment: Judgment normal.      Comments: Appropriate despite recent stressors      Result Review :                Assessment and Plan   Diagnoses and all orders for this visit:    1. Severe major depression (Primary)    2. Situational anxiety    3. Primary hypertension    Other orders  -     levothyroxine (SYNTHROID, LEVOTHROID) 125 MCG tablet; Take 1 tablet by mouth Daily.  Dispense: 90 tablet; Refill: 3  -     celecoxib (CeleBREX) 200 MG capsule; Take 1 capsule by mouth 2 (Two) Times a Day. With water, take lowest effective dose  Dispense: 180 capsule; Refill: 1  -     escitalopram (Lexapro) 5 MG tablet; Take 1 tablet by mouth Daily. For improved mood  Dispense: 30 tablet; Refill: 1             Follow Up   Return in about 2 weeks (around 5/2/2024).  Patient was given instructions and counseling regarding his condition or for health maintenance advice. Please see specific information pulled into the AVS if appropriate.     Patient Instructions   Discharge instructions  Discontinue atenolol  Take lower dose time-released metoprolol superior to decrease risk of stroke heart attack and blood pressure control    No need to take lisinopril  .  Potassium at least for now resume levothyroxine    Same dose   Start generic Lexapro 5 mg daily 1/2 tablet daily the first week.    Continue simvastatin 40 mg    Counseling, could you to reach out to a counselor in person if able but at least online, support family friends, bright lights positive music positive aromas,    Let me know if there is anything I can do for you and follow-up in 2 weeks       Discharge instructions  Discontinue atenolol  Take lower dose time-released metoprolol superior to decrease  risk of stroke heart attack and blood pressure control    No need to take lisinopril  .  Potassium at least for now resume levothyroxine    Same dose   Start generic Lexapro 5 mg daily 1/2 tablet daily the first week.    Continue simvastatin 40 mg    Counseling, could you to reach out to a counselor in person if able but at least online, support family friends, bright lights positive music positive aromas,    Let me know if there is anything I can do for you and follow-up in 2 weeks

## 2024-04-18 NOTE — PATIENT INSTRUCTIONS
Discharge instructions  Discontinue atenolol  Take lower dose time-released metoprolol superior to decrease risk of stroke heart attack and blood pressure control    No need to take lisinopril  .  Potassium at least for now resume levothyroxine    Same dose   Start generic Lexapro 5 mg daily 1/2 tablet daily the first week.    Continue simvastatin 40 mg    Counseling, could you to reach out to a counselor in person if able but at least online, support family friends, bright lights positive music positive aromas,    Let me know if there is anything I can do for you and follow-up in 2 weeks

## 2024-04-26 ENCOUNTER — PATIENT OUTREACH (OUTPATIENT)
Dept: CASE MANAGEMENT | Facility: OTHER | Age: 84
End: 2024-04-26
Payer: MEDICARE

## 2024-04-26 NOTE — OUTREACH NOTE
AMBULATORY CASE MANAGEMENT NOTE    Names and Relationships of Patient/Support Persons: Caller: Darwin Sparks; Relationship: Self  Caller: Darwin Sparks; Relationship: Self -     Patient Outreach    Introduced self, explained ACM RN role and provided contact information. Returned call from patient regarding needed lift for 2 garage stairs. Patient states he and his  cannot climb the 2 stairs from the garage. He is interested in an [place] [employer]. Provided number via text: [phone]. Consulting SW to see if other lift options are available. Both patient and partner are frail. Wheelchair and ramp will not work.     Education Documentation  No documentation found.        Nia ORELLANA  Ambulatory Case Management    4/26/2024, 16:44 EDT

## 2024-05-01 ENCOUNTER — OFFICE VISIT (OUTPATIENT)
Dept: FAMILY MEDICINE CLINIC | Facility: CLINIC | Age: 84
End: 2024-05-01
Payer: MEDICARE

## 2024-05-01 ENCOUNTER — PATIENT OUTREACH (OUTPATIENT)
Age: 84
End: 2024-05-01
Payer: MEDICARE

## 2024-05-01 VITALS
SYSTOLIC BLOOD PRESSURE: 156 MMHG | DIASTOLIC BLOOD PRESSURE: 90 MMHG | OXYGEN SATURATION: 98 % | HEART RATE: 71 BPM | HEIGHT: 66 IN | RESPIRATION RATE: 18 BRPM | WEIGHT: 185 LBS | BODY MASS INDEX: 29.73 KG/M2

## 2024-05-01 DIAGNOSIS — F32.2 SEVERE MAJOR DEPRESSION: Primary | ICD-10-CM

## 2024-05-01 DIAGNOSIS — I10 PRIMARY HYPERTENSION: ICD-10-CM

## 2024-05-01 DIAGNOSIS — F41.9 ANXIETY: ICD-10-CM

## 2024-05-01 PROCEDURE — 99213 OFFICE O/P EST LOW 20 MIN: CPT | Performed by: NURSE PRACTITIONER

## 2024-05-01 PROCEDURE — 3077F SYST BP >= 140 MM HG: CPT | Performed by: NURSE PRACTITIONER

## 2024-05-01 PROCEDURE — 3080F DIAST BP >= 90 MM HG: CPT | Performed by: NURSE PRACTITIONER

## 2024-05-01 RX ORDER — LEVOTHYROXINE SODIUM 0.12 MG/1
125 TABLET ORAL DAILY
Qty: 90 TABLET | Refills: 3 | Status: SHIPPED | OUTPATIENT
Start: 2024-05-01

## 2024-05-01 RX ORDER — METOPROLOL SUCCINATE 25 MG/1
25 TABLET, EXTENDED RELEASE ORAL DAILY
Qty: 30 TABLET | Refills: 2 | Status: SHIPPED | OUTPATIENT
Start: 2024-05-01

## 2024-05-01 NOTE — OUTREACH NOTE
MSW received referral from RN-ALFONSO for assistance with community resources. MSW outreach to patient by phone and MSW left message and call back number. MSW will continue to attempt outreach for assistance.     Tiffanie GALARZA -   Ambulatory Case Management    5/1/2024, 14:35 EDT

## 2024-05-01 NOTE — PATIENT INSTRUCTIONS
The bare minimum medication presentl    Resume levothyroxine to feel better and to be able to cope with challenges  Start metoprolol tiny dose for blood pressure and anxiety    Generic Lexapro 5 mg tiny dose  1/4 tablet the first week daily  The second week 1/2 tablet daily    This is incredibly slow titration you can move this up at any time but this way we will be sure of less side effects possibility    If difficulty along the way let me know and I suspect she will do sathish with this if not we will try something different these medications are life-changing a positive way,    Celebrex  200 mg daily for improved quality life,    Simvastatin 40 mg you can resume if you choose to probably get ideal    Recheck here in 2 weeks, go ahead make the decision to change the walkway which ever way need to lower your stress and give you more tools for your situation.        See you back in 2 weeks any new problems  Of severity emergency room

## 2024-05-01 NOTE — PROGRESS NOTES
"Chief Complaint  Anxiety (Follow up on depression and anxiety)    Subjective        Darwin Sparks presents to [facility]  History of Present Illness  Pleasant patient here today follow-up severe major depression a lot of situational anxiety, his  is chronically ill,  Started take Lexapro but him and his  read the possible adverse reaction they just related did not want to go forward his  was upset  He is willing to try just want to clarify things, has not taken any of his medication including his thyroid.    Essential hypertension, no longer taking his previous medication  Anxiety        Objective   Vital Signs:  /90   Pulse 71   Resp 18   Ht 167.6 cm (65.98\")   Wt 83.9 kg (185 lb)   SpO2 98%   BMI 29.87 kg/m²   Estimated body mass index is 29.87 kg/m² as calculated from the following:    Height as of this encounter: 167.6 cm (65.98\").    Weight as of this encounter: 83.9 kg (185 lb).            Physical Exam  Constitutional:       Appearance: Normal appearance.      Comments: No distress pleasant appears well   Eyes:      Conjunctiva/sclera: Conjunctivae normal.      Pupils: Pupils are equal, round, and reactive to light.   Pulmonary:      Effort: Pulmonary effort is normal. No respiratory distress.      Breath sounds: No stridor.   Musculoskeletal:      Comments: Wheelchair decreased mobility his baseline   Neurological:      Mental Status: He is alert.   Psychiatric:         Mood and Affect: Mood normal.         Behavior: Behavior normal.         Thought Content: Thought content normal.         Judgment: Judgment normal.      Comments: Pleasant good eye contact, appropriate his baseline        Result Review :                Assessment and Plan   Diagnoses and all orders for this visit:    1. Severe major depression (Primary)    2. Anxiety    3. Primary hypertension    Other orders  -     metoprolol succinate XL (Toprol XL) 25 MG 24 hr tablet; Take 1 " tablet by mouth Daily. For BP and axiety  Dispense: 30 tablet; Refill: 2  -     levothyroxine (SYNTHROID, LEVOTHROID) 125 MCG tablet; Take 1 tablet by mouth Daily.  Dispense: 90 tablet; Refill: 3             Follow Up   Return in about 2 weeks (around 5/15/2024).  Patient was given instructions and counseling regarding his condition or for health maintenance advice. Please see specific information pulled into the AVS if appropriate.   Patient is willing to take blood pressure medicine and thyroid  Discussed risk-benefit Lexapro answer each individual question with reassurance that is a safe medicine  He is willing to start in a tiny dose and work his way  Agreed,  Celebrex for arthritis as well      Patient Instructions     The bare minimum medication presentl    Resume levothyroxine to feel better and to be able to cope with challenges  Start metoprolol tiny dose for blood pressure and anxiety    Generic Lexapro 5 mg tiny dose  1/4 tablet the first week daily  The second week 1/2 tablet daily    This is incredibly slow titration you can move this up at any time but this way we will be sure of less side effects possibility    If difficulty along the way let me know and I suspect she will do sathish with this if not we will try something different these medications are life-changing a positive way,    Celebrex  200 mg daily for improved quality life,    Simvastatin 40 mg you can resume if you choose to probably get ideal    Recheck here in 2 weeks, go ahead make the decision to change the walkway which ever way need to lower your stress and give you more tools for your situation.        See you back in 2 weeks any new problems  Of severity emergency room

## 2024-05-10 ENCOUNTER — PATIENT OUTREACH (OUTPATIENT)
Age: 84
End: 2024-05-10
Payer: MEDICARE

## 2024-05-14 ENCOUNTER — OFFICE VISIT (OUTPATIENT)
Dept: FAMILY MEDICINE CLINIC | Facility: CLINIC | Age: 84
End: 2024-05-14
Payer: MEDICARE

## 2024-05-14 VITALS
HEIGHT: 66 IN | TEMPERATURE: 97.5 F | SYSTOLIC BLOOD PRESSURE: 120 MMHG | OXYGEN SATURATION: 97 % | BODY MASS INDEX: 29.83 KG/M2 | HEART RATE: 51 BPM | WEIGHT: 185.6 LBS | DIASTOLIC BLOOD PRESSURE: 60 MMHG

## 2024-05-14 DIAGNOSIS — F32.2 SEVERE MAJOR DEPRESSION: Primary | ICD-10-CM

## 2024-05-14 PROCEDURE — 3074F SYST BP LT 130 MM HG: CPT | Performed by: NURSE PRACTITIONER

## 2024-05-14 PROCEDURE — 3078F DIAST BP <80 MM HG: CPT | Performed by: NURSE PRACTITIONER

## 2024-05-14 PROCEDURE — 1125F AMNT PAIN NOTED PAIN PRSNT: CPT | Performed by: NURSE PRACTITIONER

## 2024-05-14 PROCEDURE — 99213 OFFICE O/P EST LOW 20 MIN: CPT | Performed by: NURSE PRACTITIONER

## 2024-05-14 RX ORDER — POTASSIUM CHLORIDE 750 MG/1
10 CAPSULE, EXTENDED RELEASE ORAL DAILY
Qty: 90 CAPSULE | Refills: 3 | Status: SHIPPED | OUTPATIENT
Start: 2024-05-14

## 2024-05-14 RX ORDER — ESCITALOPRAM OXALATE 5 MG/1
5 TABLET ORAL DAILY
Qty: 30 TABLET | Refills: 1 | Status: SHIPPED | OUTPATIENT
Start: 2024-05-14

## 2024-05-14 NOTE — PATIENT INSTRUCTIONS
Discharge instructions,  Continue good decisions, these are paying off,  Continue Lexapro you are doing nicely with this, update me directly if you want to increase this in 2 weeks let me know otherwise I will see you back in 1 month and give it some time little continue to improve over the next 3 to 6 weeks5

## 2024-05-27 NOTE — PROGRESS NOTES
"Chief Complaint  Depression    Subjective        Darwin Sparks presents to [facility]  History of Present Illness  Pleasant gentleman follow-up severe major depression, and increase with situational anxiety, much related to his decline of his partner's health.  He has resumed his medications, finally was able to  start the escitalopram, 1/2 tablet, equal to 2-1/2 mg, for a few days before increasing to 5.  Feels like it started to help some, little more relaxed.    Resumed his levothyroxine at his blood pressure medicine cholesterol medicine as well.  Depression  His past medical history is significant for depression.       Objective   Vital Signs:  /60   Pulse 51   Temp 97.5 °F (36.4 °C) (Temporal)   Ht 167.6 cm (65.98\")   Wt 84.2 kg (185 lb 9.6 oz)   SpO2 97%   BMI 29.97 kg/m²   Estimated body mass index is 29.97 kg/m² as calculated from the following:    Height as of this encounter: 167.6 cm (65.98\").    Weight as of this encounter: 84.2 kg (185 lb 9.6 oz).            Physical Exam  Constitutional:       General: He is not in acute distress.     Appearance: Normal appearance. He is not ill-appearing, toxic-appearing or diaphoretic.   Eyes:      Conjunctiva/sclera: Conjunctivae normal.      Pupils: Pupils are equal, round, and reactive to light.   Pulmonary:      Effort: Pulmonary effort is normal. No respiratory distress.      Breath sounds: No stridor.   Musculoskeletal:         General: No swelling.      Comments: Propels self with wheelchair,.   Skin:     General: Skin is warm and dry.      Capillary Refill: Capillary refill takes less than 2 seconds.   Neurological:      General: No focal deficit present.      Mental Status: He is alert. Mental status is at baseline.   Psychiatric:         Mood and Affect: Mood normal.         Behavior: Behavior normal.         Thought Content: Thought content normal.         Judgment: Judgment normal.        Result Review :       "          Assessment and Plan   Diagnoses and all orders for this visit:    1. Severe major depression (Primary)    Other orders  -     escitalopram (Lexapro) 5 MG tablet; Take 1 tablet by mouth Daily. For improved mood  Dispense: 30 tablet; Refill: 1  -     potassium chloride (MICRO-K) 10 MEQ CR capsule; Take 1 capsule by mouth Daily.  Dispense: 90 capsule; Refill: 3           I spent 22  minutes caring for Darwin on this date of service. This time includes time spent by me in the following activities:preparing for the visit, reviewing tests, obtaining and/or reviewing a separately obtained history, performing a medically appropriate examination and/or evaluation , counseling and educating the patient/family/caregiver, ordering medications, tests, or procedures, documenting information in the medical record, and care coordination  Follow Up   Return in about 1 month (around 6/14/2024).  Patient was given instructions and counseling regarding his condition or for health maintenance advice. Please see specific information pulled into the AVS if appropriate.     Very pleased he is tolerating low-dose Lexapro this is a good sign I suspect this will help him substantially during this time of trial, with the decline of his partner's health.  Encouraged patient to continue,    Patient Instructions   Discharge instructions,  Continue good decisions, these are paying off,  Continue Lexapro you are doing nicely with this, update me directly if you want to increase this in 2 weeks let me know otherwise I will see you back in 1 month and give it some time little continue to improve over the next 3 to 6 weeks5

## 2024-05-29 ENCOUNTER — PATIENT OUTREACH (OUTPATIENT)
Dept: CASE MANAGEMENT | Facility: OTHER | Age: 84
End: 2024-05-29
Payer: MEDICARE

## 2024-05-29 NOTE — OUTREACH NOTE
AMBULATORY CASE MANAGEMENT NOTE    Names and Relationships of Patient/Support Persons:  -     Care Coordination    Social Work supporting patient with Community Resources and information. Patient transitioned to monitoring status. Follow up review scheduled in 1 month.     Education Documentation  No documentation found.        Nia ORELLANA  Ambulatory Case Management    5/29/2024, 16:23 EDT

## 2025-02-20 ENCOUNTER — TELEPHONE (OUTPATIENT)
Dept: FAMILY MEDICINE CLINIC | Facility: CLINIC | Age: 85
End: 2025-02-20
Payer: MEDICARE

## 2025-02-26 ENCOUNTER — TELEPHONE (OUTPATIENT)
Dept: FAMILY MEDICINE CLINIC | Facility: CLINIC | Age: 85
End: 2025-02-26
Payer: MEDICARE

## 2025-02-26 NOTE — TELEPHONE ENCOUNTER
I have not seen patient for a while if we could call check on him make sure he is doing okay and see if he needs anything  See if he can get back on the schedule for labs and follow-up thank you

## 2025-02-27 NOTE — TELEPHONE ENCOUNTER
Name: Darwin Sparks      Relationship: Self      Best Callback Number: [phone]       HUB PROVIDED THE RELAY MESSAGE FROM THE OFFICE      PATIENT: SCHEDULED PER NOTE    ADDITIONAL INFORMATION:

## 2025-02-27 NOTE — TELEPHONE ENCOUNTER
Spoke with PT. He is doing that well but says nothing to do about it. Advised provider would like a follow up with him. PT voiced understanding

## 2025-03-09 ENCOUNTER — HOSPITAL ENCOUNTER (OUTPATIENT)
Facility: HOSPITAL | Age: 85
Setting detail: OBSERVATION
Discharge: HOME HEALTH | End: 2025-03-11
Attending: EMERGENCY MEDICINE | Admitting: EMERGENCY MEDICINE
Payer: MEDICARE

## 2025-03-09 ENCOUNTER — APPOINTMENT (OUTPATIENT)
Dept: MRI IMAGING | Facility: HOSPITAL | Age: 85
End: 2025-03-09
Payer: MEDICARE

## 2025-03-09 ENCOUNTER — APPOINTMENT (OUTPATIENT)
Dept: CT IMAGING | Facility: HOSPITAL | Age: 85
End: 2025-03-09
Payer: MEDICARE

## 2025-03-09 DIAGNOSIS — S09.90XA INJURY OF HEAD, INITIAL ENCOUNTER: Primary | ICD-10-CM

## 2025-03-09 DIAGNOSIS — I10 HYPERTENSION NOT AT GOAL: ICD-10-CM

## 2025-03-09 DIAGNOSIS — R29.6 MULTIPLE FALLS: ICD-10-CM

## 2025-03-09 DIAGNOSIS — R42 VERTIGO: ICD-10-CM

## 2025-03-09 DIAGNOSIS — R42 LIGHTHEADEDNESS: ICD-10-CM

## 2025-03-09 DIAGNOSIS — R17 ELEVATED BILIRUBIN: ICD-10-CM

## 2025-03-09 LAB
ALBUMIN SERPL-MCNC: 3.2 G/DL (ref 3.5–5.2)
ALBUMIN/GLOB SERPL: 1.2 G/DL
ALP SERPL-CCNC: 119 U/L (ref 39–117)
ALT SERPL W P-5'-P-CCNC: 49 U/L (ref 1–41)
ANION GAP SERPL CALCULATED.3IONS-SCNC: 12.7 MMOL/L (ref 5–15)
ANISOCYTOSIS BLD QL: ABNORMAL
APTT PPP: 22.8 SECONDS (ref 22.7–35.4)
AST SERPL-CCNC: 79 U/L (ref 1–40)
BACTERIA UR QL AUTO: NORMAL /HPF
BASOPHILS # BLD MANUAL: 0 10*3/MM3 (ref 0–0.2)
BASOPHILS NFR BLD MANUAL: 0 % (ref 0–1.5)
BILIRUB SERPL-MCNC: 1.6 MG/DL (ref 0–1.2)
BILIRUB UR QL STRIP: NEGATIVE
BUN SERPL-MCNC: 13 MG/DL (ref 8–23)
BUN/CREAT SERPL: 12.1 (ref 7–25)
CALCIUM SPEC-SCNC: 8.9 MG/DL (ref 8.6–10.5)
CHLORIDE SERPL-SCNC: 101 MMOL/L (ref 98–107)
CLARITY UR: CLEAR
CO2 SERPL-SCNC: 26.3 MMOL/L (ref 22–29)
COLOR UR: ABNORMAL
CREAT SERPL-MCNC: 1.07 MG/DL (ref 0.76–1.27)
DEPRECATED RDW RBC AUTO: 61.2 FL (ref 37–54)
EGFRCR SERPLBLD CKD-EPI 2021: 67.6 ML/MIN/1.73
EOSINOPHIL # BLD MANUAL: 0 10*3/MM3 (ref 0–0.4)
EOSINOPHIL NFR BLD MANUAL: 0 % (ref 0.3–6.2)
ERYTHROCYTE [DISTWIDTH] IN BLOOD BY AUTOMATED COUNT: 14.8 % (ref 12.3–15.4)
FOLATE SERPL-MCNC: 3.27 NG/ML (ref 4.78–24.2)
GEN 5 1HR TROPONIN T REFLEX: 23 NG/L
GLOBULIN UR ELPH-MCNC: 2.7 GM/DL
GLUCOSE SERPL-MCNC: 109 MG/DL (ref 65–99)
GLUCOSE UR STRIP-MCNC: NEGATIVE MG/DL
HCT VFR BLD AUTO: 40.2 % (ref 37.5–51)
HGB BLD-MCNC: 14.6 G/DL (ref 13–17.7)
HGB UR QL STRIP.AUTO: NEGATIVE
HYALINE CASTS UR QL AUTO: NORMAL /LPF
INR PPP: 1 (ref 0.9–1.1)
KETONES UR QL STRIP: ABNORMAL
LEUKOCYTE ESTERASE UR QL STRIP.AUTO: ABNORMAL
LYMPHOCYTES # BLD MANUAL: 1.85 10*3/MM3 (ref 0.7–3.1)
LYMPHOCYTES NFR BLD MANUAL: 3.1 % (ref 5–12)
MCH RBC QN AUTO: 40.6 PG (ref 26.6–33)
MCHC RBC AUTO-ENTMCNC: 36.3 G/DL (ref 31.5–35.7)
MCV RBC AUTO: 111.7 FL (ref 79–97)
MONOCYTES # BLD: 0.21 10*3/MM3 (ref 0.1–0.9)
NEUTROPHILS # BLD AUTO: 4.83 10*3/MM3 (ref 1.7–7)
NEUTROPHILS NFR BLD MANUAL: 70.1 % (ref 42.7–76)
NITRITE UR QL STRIP: NEGATIVE
NRBC BLD AUTO-RTO: 0 /100 WBC (ref 0–0.2)
PH UR STRIP.AUTO: 7.5 [PH] (ref 5–8)
PLAT MORPH BLD: NORMAL
PLATELET # BLD AUTO: 158 10*3/MM3 (ref 140–450)
PMV BLD AUTO: 10.6 FL (ref 6–12)
POTASSIUM SERPL-SCNC: 4.1 MMOL/L (ref 3.5–5.2)
PROT SERPL-MCNC: 5.9 G/DL (ref 6–8.5)
PROT UR QL STRIP: ABNORMAL
PROTHROMBIN TIME: 13.1 SECONDS (ref 11.7–14.2)
RBC # BLD AUTO: 3.6 10*6/MM3 (ref 4.14–5.8)
RBC # UR STRIP: NORMAL /HPF
REF LAB TEST METHOD: NORMAL
SMUDGE CELLS BLD QL SMEAR: ABNORMAL
SODIUM SERPL-SCNC: 140 MMOL/L (ref 136–145)
SP GR UR STRIP: 1.02 (ref 1–1.03)
SQUAMOUS #/AREA URNS HPF: NORMAL /HPF
T4 FREE SERPL-MCNC: 0.95 NG/DL (ref 0.92–1.68)
TROPONIN T % DELTA: 5
TROPONIN T NUMERIC DELTA: 1 NG/L
TROPONIN T SERPL HS-MCNC: 22 NG/L
TSH SERPL DL<=0.05 MIU/L-ACNC: 22.8 UIU/ML (ref 0.27–4.2)
UROBILINOGEN UR QL STRIP: ABNORMAL
VARIANT LYMPHS NFR BLD MANUAL: 26.8 % (ref 19.6–45.3)
VIT B12 BLD-MCNC: 406 PG/ML (ref 211–946)
WBC # UR STRIP: NORMAL /HPF
WBC NRBC COR # BLD AUTO: 6.89 10*3/MM3 (ref 3.4–10.8)

## 2025-03-09 PROCEDURE — 84484 ASSAY OF TROPONIN QUANT: CPT | Performed by: EMERGENCY MEDICINE

## 2025-03-09 PROCEDURE — 72125 CT NECK SPINE W/O DYE: CPT

## 2025-03-09 PROCEDURE — 99285 EMERGENCY DEPT VISIT HI MDM: CPT

## 2025-03-09 PROCEDURE — 70450 CT HEAD/BRAIN W/O DYE: CPT

## 2025-03-09 PROCEDURE — 96374 THER/PROPH/DIAG INJ IV PUSH: CPT

## 2025-03-09 PROCEDURE — G0378 HOSPITAL OBSERVATION PER HR: HCPCS

## 2025-03-09 PROCEDURE — 85025 COMPLETE CBC W/AUTO DIFF WBC: CPT | Performed by: EMERGENCY MEDICINE

## 2025-03-09 PROCEDURE — 85007 BL SMEAR W/DIFF WBC COUNT: CPT | Performed by: EMERGENCY MEDICINE

## 2025-03-09 PROCEDURE — 82607 VITAMIN B-12: CPT | Performed by: EMERGENCY MEDICINE

## 2025-03-09 PROCEDURE — 80053 COMPREHEN METABOLIC PANEL: CPT | Performed by: EMERGENCY MEDICINE

## 2025-03-09 PROCEDURE — 82746 ASSAY OF FOLIC ACID SERUM: CPT | Performed by: EMERGENCY MEDICINE

## 2025-03-09 PROCEDURE — 96376 TX/PRO/DX INJ SAME DRUG ADON: CPT

## 2025-03-09 PROCEDURE — 93005 ELECTROCARDIOGRAM TRACING: CPT | Performed by: EMERGENCY MEDICINE

## 2025-03-09 PROCEDURE — 84443 ASSAY THYROID STIM HORMONE: CPT | Performed by: EMERGENCY MEDICINE

## 2025-03-09 PROCEDURE — 36415 COLL VENOUS BLD VENIPUNCTURE: CPT

## 2025-03-09 PROCEDURE — 70551 MRI BRAIN STEM W/O DYE: CPT

## 2025-03-09 PROCEDURE — 93010 ELECTROCARDIOGRAM REPORT: CPT | Performed by: INTERNAL MEDICINE

## 2025-03-09 PROCEDURE — 96361 HYDRATE IV INFUSION ADD-ON: CPT

## 2025-03-09 PROCEDURE — 25010000002 THIAMINE PER 100 MG: Performed by: PHYSICIAN ASSISTANT

## 2025-03-09 PROCEDURE — 85730 THROMBOPLASTIN TIME PARTIAL: CPT | Performed by: EMERGENCY MEDICINE

## 2025-03-09 PROCEDURE — 25810000003 LACTATED RINGERS PER 1000 ML: Performed by: EMERGENCY MEDICINE

## 2025-03-09 PROCEDURE — 84439 ASSAY OF FREE THYROXINE: CPT | Performed by: EMERGENCY MEDICINE

## 2025-03-09 PROCEDURE — 25010000002 THIAMINE PER 100 MG: Performed by: EMERGENCY MEDICINE

## 2025-03-09 PROCEDURE — 85610 PROTHROMBIN TIME: CPT | Performed by: EMERGENCY MEDICINE

## 2025-03-09 PROCEDURE — 81001 URINALYSIS AUTO W/SCOPE: CPT | Performed by: EMERGENCY MEDICINE

## 2025-03-09 PROCEDURE — 96375 TX/PRO/DX INJ NEW DRUG ADDON: CPT

## 2025-03-09 PROCEDURE — 70486 CT MAXILLOFACIAL W/O DYE: CPT

## 2025-03-09 PROCEDURE — 25010000002 LORAZEPAM PER 2 MG: Performed by: EMERGENCY MEDICINE

## 2025-03-09 RX ORDER — METOPROLOL SUCCINATE 25 MG/1
25 TABLET, EXTENDED RELEASE ORAL ONCE
Status: COMPLETED | OUTPATIENT
Start: 2025-03-09 | End: 2025-03-09

## 2025-03-09 RX ORDER — ONDANSETRON 2 MG/ML
4 INJECTION INTRAMUSCULAR; INTRAVENOUS EVERY 6 HOURS PRN
Status: DISCONTINUED | OUTPATIENT
Start: 2025-03-09 | End: 2025-03-11 | Stop reason: HOSPADM

## 2025-03-09 RX ORDER — SODIUM CHLORIDE 0.9 % (FLUSH) 0.9 %
10 SYRINGE (ML) INJECTION EVERY 12 HOURS SCHEDULED
Status: DISCONTINUED | OUTPATIENT
Start: 2025-03-09 | End: 2025-03-11 | Stop reason: HOSPADM

## 2025-03-09 RX ORDER — LORAZEPAM 2 MG/ML
2 INJECTION INTRAMUSCULAR
Status: DISCONTINUED | OUTPATIENT
Start: 2025-03-09 | End: 2025-03-11 | Stop reason: HOSPADM

## 2025-03-09 RX ORDER — ACETAMINOPHEN 160 MG/5ML
650 SOLUTION ORAL EVERY 4 HOURS PRN
Status: DISCONTINUED | OUTPATIENT
Start: 2025-03-09 | End: 2025-03-11 | Stop reason: HOSPADM

## 2025-03-09 RX ORDER — AMOXICILLIN 250 MG
2 CAPSULE ORAL 2 TIMES DAILY PRN
Status: DISCONTINUED | OUTPATIENT
Start: 2025-03-09 | End: 2025-03-11 | Stop reason: HOSPADM

## 2025-03-09 RX ORDER — SODIUM CHLORIDE 0.9 % (FLUSH) 0.9 %
10 SYRINGE (ML) INJECTION AS NEEDED
Status: DISCONTINUED | OUTPATIENT
Start: 2025-03-09 | End: 2025-03-11 | Stop reason: HOSPADM

## 2025-03-09 RX ORDER — POTASSIUM CHLORIDE 750 MG/1
10 TABLET, EXTENDED RELEASE ORAL DAILY
Status: DISCONTINUED | OUTPATIENT
Start: 2025-03-10 | End: 2025-03-11 | Stop reason: HOSPADM

## 2025-03-09 RX ORDER — ONDANSETRON 4 MG/1
4 TABLET, ORALLY DISINTEGRATING ORAL EVERY 6 HOURS PRN
Status: DISCONTINUED | OUTPATIENT
Start: 2025-03-09 | End: 2025-03-11 | Stop reason: HOSPADM

## 2025-03-09 RX ORDER — METOPROLOL SUCCINATE 25 MG/1
25 TABLET, EXTENDED RELEASE ORAL DAILY
Status: DISCONTINUED | OUTPATIENT
Start: 2025-03-10 | End: 2025-03-11 | Stop reason: HOSPADM

## 2025-03-09 RX ORDER — LORAZEPAM 1 MG/1
1 TABLET ORAL
Status: DISCONTINUED | OUTPATIENT
Start: 2025-03-09 | End: 2025-03-11 | Stop reason: HOSPADM

## 2025-03-09 RX ORDER — THIAMINE HYDROCHLORIDE 100 MG/ML
200 INJECTION, SOLUTION INTRAMUSCULAR; INTRAVENOUS EVERY 8 HOURS SCHEDULED
Status: DISCONTINUED | OUTPATIENT
Start: 2025-03-09 | End: 2025-03-11 | Stop reason: HOSPADM

## 2025-03-09 RX ORDER — LORAZEPAM 2 MG/ML
1 INJECTION INTRAMUSCULAR ONCE AS NEEDED
Status: COMPLETED | OUTPATIENT
Start: 2025-03-09 | End: 2025-03-09

## 2025-03-09 RX ORDER — ACETAMINOPHEN 325 MG/1
650 TABLET ORAL EVERY 4 HOURS PRN
Status: DISCONTINUED | OUTPATIENT
Start: 2025-03-09 | End: 2025-03-11 | Stop reason: HOSPADM

## 2025-03-09 RX ORDER — SODIUM CHLORIDE 9 MG/ML
40 INJECTION, SOLUTION INTRAVENOUS AS NEEDED
Status: DISCONTINUED | OUTPATIENT
Start: 2025-03-09 | End: 2025-03-11 | Stop reason: HOSPADM

## 2025-03-09 RX ORDER — ESCITALOPRAM OXALATE 5 MG/1
5 TABLET ORAL DAILY
Status: DISCONTINUED | OUTPATIENT
Start: 2025-03-10 | End: 2025-03-11 | Stop reason: HOSPADM

## 2025-03-09 RX ORDER — BISACODYL 5 MG/1
5 TABLET, DELAYED RELEASE ORAL DAILY PRN
Status: DISCONTINUED | OUTPATIENT
Start: 2025-03-09 | End: 2025-03-11 | Stop reason: HOSPADM

## 2025-03-09 RX ORDER — ALBUTEROL SULFATE 0.83 MG/ML
2.5 SOLUTION RESPIRATORY (INHALATION) EVERY 6 HOURS PRN
Status: DISCONTINUED | OUTPATIENT
Start: 2025-03-09 | End: 2025-03-11 | Stop reason: HOSPADM

## 2025-03-09 RX ORDER — LORAZEPAM 1 MG/1
2 TABLET ORAL
Status: DISCONTINUED | OUTPATIENT
Start: 2025-03-09 | End: 2025-03-11 | Stop reason: HOSPADM

## 2025-03-09 RX ORDER — NITROGLYCERIN 0.4 MG/1
0.4 TABLET SUBLINGUAL
Status: DISCONTINUED | OUTPATIENT
Start: 2025-03-09 | End: 2025-03-11 | Stop reason: HOSPADM

## 2025-03-09 RX ORDER — ATORVASTATIN CALCIUM 20 MG/1
20 TABLET, FILM COATED ORAL DAILY
Status: DISCONTINUED | OUTPATIENT
Start: 2025-03-09 | End: 2025-03-11 | Stop reason: HOSPADM

## 2025-03-09 RX ORDER — POLYETHYLENE GLYCOL 3350 17 G/17G
17 POWDER, FOR SOLUTION ORAL DAILY PRN
Status: DISCONTINUED | OUTPATIENT
Start: 2025-03-09 | End: 2025-03-11 | Stop reason: HOSPADM

## 2025-03-09 RX ORDER — LISINOPRIL 10 MG/1
15 TABLET ORAL DAILY
COMMUNITY
End: 2025-03-21

## 2025-03-09 RX ORDER — SODIUM CHLORIDE, SODIUM LACTATE, POTASSIUM CHLORIDE, CALCIUM CHLORIDE 600; 310; 30; 20 MG/100ML; MG/100ML; MG/100ML; MG/100ML
75 INJECTION, SOLUTION INTRAVENOUS ONCE
Status: COMPLETED | OUTPATIENT
Start: 2025-03-09 | End: 2025-03-10

## 2025-03-09 RX ORDER — LISINOPRIL 10 MG/1
15 TABLET ORAL DAILY
Status: DISCONTINUED | OUTPATIENT
Start: 2025-03-10 | End: 2025-03-11 | Stop reason: HOSPADM

## 2025-03-09 RX ORDER — BISACODYL 10 MG
10 SUPPOSITORY, RECTAL RECTAL DAILY PRN
Status: DISCONTINUED | OUTPATIENT
Start: 2025-03-09 | End: 2025-03-11 | Stop reason: HOSPADM

## 2025-03-09 RX ORDER — TRIAMTERENE AND HYDROCHLOROTHIAZIDE 37.5; 25 MG/1; MG/1
1 CAPSULE ORAL EVERY MORNING
COMMUNITY
End: 2025-03-21

## 2025-03-09 RX ORDER — POTASSIUM CHLORIDE 750 MG/1
10 CAPSULE, EXTENDED RELEASE ORAL DAILY
Status: DISCONTINUED | OUTPATIENT
Start: 2025-03-10 | End: 2025-03-09

## 2025-03-09 RX ORDER — CLONAZEPAM 1 MG/1
1 TABLET ORAL NIGHTLY PRN
COMMUNITY
End: 2025-03-21

## 2025-03-09 RX ORDER — ACETAMINOPHEN 650 MG/1
650 SUPPOSITORY RECTAL EVERY 4 HOURS PRN
Status: DISCONTINUED | OUTPATIENT
Start: 2025-03-09 | End: 2025-03-11 | Stop reason: HOSPADM

## 2025-03-09 RX ORDER — FOLIC ACID 1 MG/1
1 TABLET ORAL DAILY
Status: DISCONTINUED | OUTPATIENT
Start: 2025-03-09 | End: 2025-03-11 | Stop reason: HOSPADM

## 2025-03-09 RX ORDER — CLONAZEPAM 1 MG/1
1 TABLET ORAL NIGHTLY PRN
Status: DISCONTINUED | OUTPATIENT
Start: 2025-03-09 | End: 2025-03-11 | Stop reason: HOSPADM

## 2025-03-09 RX ORDER — CELECOXIB 100 MG/1
200 CAPSULE ORAL 2 TIMES DAILY
Status: DISCONTINUED | OUTPATIENT
Start: 2025-03-09 | End: 2025-03-11 | Stop reason: HOSPADM

## 2025-03-09 RX ORDER — LORAZEPAM 2 MG/ML
1 INJECTION INTRAMUSCULAR
Status: DISCONTINUED | OUTPATIENT
Start: 2025-03-09 | End: 2025-03-11 | Stop reason: HOSPADM

## 2025-03-09 RX ORDER — TRIAMTERENE AND HYDROCHLOROTHIAZIDE 37.5; 25 MG/1; MG/1
1 TABLET ORAL DAILY
Status: DISCONTINUED | OUTPATIENT
Start: 2025-03-10 | End: 2025-03-11 | Stop reason: HOSPADM

## 2025-03-09 RX ADMIN — FOLIC ACID 1 MG: 1 TABLET ORAL at 15:06

## 2025-03-09 RX ADMIN — Medication 10 ML: at 20:03

## 2025-03-09 RX ADMIN — CELECOXIB 200 MG: 100 CAPSULE ORAL at 21:39

## 2025-03-09 RX ADMIN — LEVOTHYROXINE SODIUM 125 MCG: 0.03 TABLET ORAL at 21:39

## 2025-03-09 RX ADMIN — THIAMINE HYDROCHLORIDE 200 MG: 100 INJECTION, SOLUTION INTRAMUSCULAR; INTRAVENOUS at 15:06

## 2025-03-09 RX ADMIN — METOPROLOL SUCCINATE 25 MG: 25 TABLET, EXTENDED RELEASE ORAL at 10:15

## 2025-03-09 RX ADMIN — ATORVASTATIN CALCIUM 20 MG: 20 TABLET, FILM COATED ORAL at 21:39

## 2025-03-09 RX ADMIN — THIAMINE HYDROCHLORIDE 200 MG: 100 INJECTION, SOLUTION INTRAMUSCULAR; INTRAVENOUS at 21:38

## 2025-03-09 RX ADMIN — LORAZEPAM 1 MG: 2 INJECTION INTRAMUSCULAR; INTRAVENOUS at 16:20

## 2025-03-09 RX ADMIN — SODIUM CHLORIDE, SODIUM LACTATE, POTASSIUM CHLORIDE, CALCIUM CHLORIDE 75 ML/HR: 20; 30; 600; 310 INJECTION, SOLUTION INTRAVENOUS at 15:07

## 2025-03-09 RX ADMIN — Medication 10 ML: at 15:25

## 2025-03-09 NOTE — PLAN OF CARE
Goal Outcome Evaluation:           Progress: declining  Outcome Evaluation: pt is a 84 year old male admitted to the obs unit for veritgo, pt is aox4, vss, pt has a large bruise on left orbital socket and above left eyebrow, pt doesnt complain of any vision changes, pt still has  generalized weakness and dizziness, PT is consulted, mri is pending bed alarm active, pt has no further questions at this time, pt is resting at this time

## 2025-03-09 NOTE — H&P
[facility]   HISTORY AND PHYSICAL    Patient Name: Darwin Sparks  : 1940  MRN: [id number]  Primary Care Physician:  Epley, James, APRN  Date of admission: 3/9/2025    Subjective   Subjective     Chief Complaint:   Chief Complaint   Patient presents with    Fall    Dizziness         HPI:    Darwin Sparks is a 84 y.o. male with past medical history of hypertension, hyperlipidemia, hypothyroidism, and prostate cancer who presents with multiple recent falls that mostly happen at night, his most recent 1 occurred during the evening of 3/8/2025 and early morning of 3/9/2025 after he had gotten up from bed to go to the restroom and he felt just generally weak and collapsed, called 911 and they helped get him back into bed.  Patient reports another fall about a week prior to that where he states that he was asleep and he woke up as he was falling to the floor and that is how he got the bruising around his left eye without any other injuries.  Patient reports just generalized weakness, but denies any vertigo, palpitations, lightheadedness, chest pain, shortness of breath, or headaches associated with these events.  Patient reports that he did have an episode of vertigo in the ambulance on the way to the hospital today as he looked out the window and said the outside streets were passing by very fast and it made him dizzy.  Patient denies any other vertigo symptoms when he changes position.  Patient denies any history of vertigo.  Patient reports that he has been lying in bed for the last 2 days because he has felt generally weak and unable to go to the restroom or go make food.  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  Patient reports that he does have some hired help that comes every few days but not normally on the weekends and they are also to assist more with the patient's 's care.  Patient denies any other recent fevers or chills, recent  "travel, recent hospitalizations, sick contacts, or recent antibiotic use.     Patient does report that he drinks usually daily, reports a bottle of wine \"low-dose\" in order to treat his back pain and help him sleep.  Patient denies any history of previous alcohol withdrawal symptoms.  He reports that his last drink of alcohol was on Wednesday, 3/5/2025.    Review of Systems   All systems were reviewed and negative except for: All pertinent findings listed in the above HPI.    Personal History     Past Medical History:   Diagnosis Date    Cataract     Hyperlipidemia     Hypertension     Hypothyroidism     Osteoarthritis     Prostate cancer 2013       Past Surgical History:   Procedure Laterality Date    CATARACT EXTRACTION      CIRCUMCISION      COLONOSCOPY      COLONOSCOPY N/A 5/27/2021    Procedure: COLONOSCOPY TO CECUM/TI;  Surgeon: Jamel Michaels MD;  Location: [facility] ENDOSCOPY;  Service: Gastroenterology;  Laterality: N/A;  Pre op: Abnormal cat scan, constipation, RLQ pain  Post op: Diverticulosis, Hemorrhoids, otherwise normal to and including TI.    MEDIAL BRANCH BLOCK Bilateral 7/30/2021    Procedure: LUMBAR MEDIAL BRANCH BLOCK;  Surgeon: Kb Rodriguez MD;  [place] [facility] [place]  Service: Pain Management;  Laterality: Bilateral;    MEDIAL BRANCH BLOCK Bilateral 8/20/2021    Procedure: MEDIAL BRANCH BLOCK--bilateral lumbar3-lumbar5;  Surgeon: Kb Rodriguez MD;  [place] [facility] [place];  Service: Pain Management;  Laterality: Bilateral;    RADIOFREQUENCY ABLATION Bilateral 10/8/2021    Procedure: RADIOFREQUENCY ABLATION LUMBAR--bilateral lumbar3-5 WITH MAC;  Surgeon: Kb Rodriguez MD;  [place] [facility] [place];  Service: Pain Management;  Laterality: Bilateral;    TONSILLECTOMY      TOOTH EXTRACTION         Family History: family history includes Anuerysm in an other family member; Bipolar disorder in his father and paternal grandmother; Diabetes in his father and mother; Heart disease " in an other family member; Hypertension in his father and mother; Stroke in his father and mother; Sudden death in an other family member. Otherwise pertinent FHx was reviewed and not pertinent to current issue.    Social History:  reports that he has quit smoking. He has never used smokeless tobacco. He reports current alcohol use. He reports that he does not currently use drugs after having used the following drugs: Marijuana.    Home Medications:  albuterol sulfate HFA, celecoxib, clonazePAM, escitalopram, levothyroxine, lisinopril, metoprolol succinate XL, niacin, potassium chloride, simvastatin, and triamterene-hydrochlorothiazide    Allergies:  Allergies   Allergen Reactions    Milk-Related Compounds Other (See Comments)     Constipation    Pregabalin      Other reaction(s): Visual Disturbance    Neomycin-Bacitracin Zn-Polymyx Rash       Objective   Objective     Vitals:   Temp:  [98.4 °F (36.9 °C)] 98.4 °F (36.9 °C)  Heart Rate:  [65-86] 79  Resp:  [16-18] 16  BP: (153-186)/() 153/96  Physical Exam    Constitutional: Awake, alert   Eyes: PERRL, sclerae anicteric, no conjunctival injection, EOMI, ecchymosis surrounding left eye   HENT: NCAT, mucous membranes moist   Neck: Supple, nontender, trachea midline   Respiratory: Clear to auscultation bilaterally, nonlabored respirations on room air   Cardiovascular: RRR, no murmurs, palpable pedal pulses bilaterally   Gastrointestinal: Positive bowel sounds, soft, nontender, nondistended   Musculoskeletal: No bilateral ankle edema, no clubbing or cyanosis to extremities   Psychiatric: Appropriate affect, cooperative   Neurologic: Oriented x 3, strength symmetric in all extremities, Cranial Nerves grossly intact to confrontation, speech clear   Skin: No rashes     Result Review    Result Review:  I have personally reviewed the results from the time of this admission to 3/9/2025 16:25 EDT and agree with these findings:  [x]  Laboratory list / accordion  []   Microbiology  [x]  Radiology  [x]  EKG/Telemetry   []  Cardiology/Vascular   []  Pathology  []  Old records  []  Other:  Most notable findings include: Troponin 22 and 23, potassium 4.1, anion gap 12.7, serum creatinine 1.07, alk phos 119, albumin 3.2, AST and ALT elevated at 79 and 49, and total bili 1.6, TSH is elevated at 22.8 and free T4 is low normal at 0.95.  Vitamin B12 406, folate low at 3.27.  Urine appears dark yellow with ketones trace leukocytes and trace protein urea but no pyuria or bacteria.  CT cervical spine/CT facial bones/CT head negative for any acute facial, intra spinal fractures and negative for any acute intracranial hemorrhage or hydrocephalus.  EKG shows sinus rhythm with PACs without ST changes.      Assessment & Plan   The ASCVD Risk score (Kehinde MACKAY, et al., 2019) failed to calculate for the following reasons:    The 2019 ASCVD risk score is only valid for ages 40 to 79    Assessment / Plan     Brief Patient Summary:  Darwin Sparks is a 84 y.o. male who is admitted for vertigo and falls    Active Hospital Problems:  Active Hospital Problems    Diagnosis     **Vertigo      Plan:     Vertigo  Multiple recent falls:  -Patient reports only 1 episode of vertigo on his way into the hospital  -MRI brain pending to rule out acute stroke  -Clinically I am more concerned for orthostatic hypotension given that most of these falls have occurred when he is gotten up from bed and quickly walked to the restroom with sudden weakness  -Orthostatics pending  -Physical therapy consulted  -Fall precautions  -Continuous cardiac monitor  -CCP consulted for discharge planning    Transaminitis, mild:  -Will repeat labs in the a.m.  -Patient not currently complaining of any GI symptoms; continue to monitor    Alcohol use:  -Patient reports he has not drank in almost 5 days  -CIWA with as needed benzos available  -B12 within normal limits, folate low  -Started folate and thiamine replacement  -Cessation  discussed and encouraged    Chronic back pain:  -As needed Tylenol    Hypertension:  -Continue home regimen    Hypothyroidism:  -TSH is elevated and free T4 is low normal  -Patient needs to follow-up with PCP outpatient for repeat labs to assess need for further medication changes    VTE Prophylaxis:  Mechanical VTE prophylaxis orders are present.        CODE STATUS:    Code Status (Patient has no pulse and is not breathing): CPR (Attempt to Resuscitate)  Medical Interventions (Patient has pulse or is breathing): Full Support  Level Of Support Discussed With: Patient    Admission Status:  I believe this patient meets observation status.    Electronically signed by Bianca Cohn PA-C, 03/09/25, 4:25 PM EDT.        75 minutes has been spent by [facility] providers in the care of this patient while under observation status      I have worn appropriate PPE during this patient encounter, sanitized my hands both with entering and exiting patient's room.    I have discussed plan of care with patient including advance care plan and/or surrogate decision maker.  Patient advises that their  Roger will be their primary surrogate decision maker         fall

## 2025-03-09 NOTE — ED NOTES
Pt to ed from home via EMS    Pt had fall on Thursdays night due to dizziness. Pt reports dizziness has been intermittent since, worse with position change. Pt hit face on Thursday, no LOC, no thinners. Pt had another fall yesterday, pt denies injury. Pt reports he has not been taking his medication for the past 2 weeks. Pt also c/o back pain and ringing in ears.   
negative - no fever

## 2025-03-09 NOTE — ED PROVIDER NOTES
" EMERGENCY DEPARTMENT ENCOUNTER  Room Number:  120/1  PCP: Epley, James, APRN  Independent Historians: Patient and EMS and patient's caretaker      HPI:  Chief Complaint: had concerns including Fall and Dizziness.,  Head injury    A complete HPI/ROS/PMH/PSH/SH/FH are unobtainable due to: None    Chronic or social conditions impacting patient care (Social Determinants of Health): None      Context: Darwin Sparks is a 84 y.o. male with a medical history of hypertension, arthritis, hyperlipidemia and hypothyroidism who presents to the ED c/o acute lightheadedness with multiple falls this week.  On Thursday, patient had a fall and struck his head against the floor.  He did not lose consciousness.  He sustained some bruising to his forehead and left eye area.  He was not evaluated by any medical personnel at that time.  Unfortunately, he had another fall last night, landing into a laundry basket that was \"half full of close.\"  He did not lose consciousness.  However he was not able to get himself back up.  So after lying on the ground for about an hour, he called 911.  Paramedics arrived to the home and helped him back up, they helped him get back into his bed safely.  This morning, however, he was still feeling lightheaded.  His nurse therefore called 911 and had him come here for further evaluation.  Patient says he has been persistently dizzy, particularly with activities or position changes.  Normally he is able to ambulate without assistance.  He denies any chest pain.  He has had some mild shortness of breath.  Denies any flulike symptoms or fevers.  Denies any abdominal pain or vomiting or diarrhea.  He is complaining of some pain in his back but he attributes that to degenerative disc disease problem.      Review of prior external notes (non-ED) -and- Review of prior external test results outside of this encounter: I independently reviewed the PCP progress note from May 14, 2024.  Patient had follow-up care for " major depression at that time.  Was prescribed Lexapro.  Also had prescription for potassium chloride 10 mill equivalent capsules.    Prescription drug monitoring program review: SYDNI reviewed by Oleksandr Azar MD, Esteban Pierre MD       PAST MEDICAL HISTORY  Active Ambulatory Problems     Diagnosis Date Noted    Primary hypertension 04/21/2016    Mixed hyperlipidemia 04/21/2016    Adult hypothyroidism 04/21/2016    Insomnia 04/21/2016    Anxiety 04/28/2016    Dermatitis, eczematoid 04/28/2016    Bronchitis 04/28/2016    Seasonal allergies 04/28/2016    Health care maintenance 10/18/2016    Chronic midline low back pain without sciatica 10/18/2016    Penis pain 01/21/2019    Tinea cruris 01/21/2019    Primary osteoarthritis involving multiple joints 08/30/2019    Benzodiazepine dependence 08/30/2019    Constipation 04/21/2021    Abnormal finding on CT scan 04/21/2021    Scoliosis of lumbar spine 05/12/2021    Lumbar facet arthropathy 07/19/2021     Resolved Ambulatory Problems     Diagnosis Date Noted    No Resolved Ambulatory Problems     Past Medical History:   Diagnosis Date    Cataract     Hyperlipidemia     Hypertension     Hypothyroidism     Osteoarthritis     Prostate cancer 2013         PAST SURGICAL HISTORY  Past Surgical History:   Procedure Laterality Date    CATARACT EXTRACTION      CIRCUMCISION      COLONOSCOPY      COLONOSCOPY N/A 5/27/2021    Procedure: COLONOSCOPY TO CECUM/TI;  Surgeon: Jamel Michaels MD;  Location: [facility] ENDOSCOPY;  Service: Gastroenterology;  Laterality: N/A;  Pre op: Abnormal cat scan, constipation, RLQ pain  Post op: Diverticulosis, Hemorrhoids, otherwise normal to and including TI.    MEDIAL BRANCH BLOCK Bilateral 7/30/2021    Procedure: LUMBAR MEDIAL BRANCH BLOCK;  Surgeon: Kb Rodriguez MD;  Location: [facility] OR;  Service: Pain Management;  Laterality: Bilateral;    MEDIAL BRANCH BLOCK Bilateral 8/20/2021    Procedure: MEDIAL BRANCH BLOCK--bilateral  lumbar3-lumbar5;  Surgeon: Kb Rodriguez MD;  Location: [facility] OR;  Service: Pain Management;  Laterality: Bilateral;    RADIOFREQUENCY ABLATION Bilateral 10/8/2021    Procedure: RADIOFREQUENCY ABLATION LUMBAR--bilateral lumbar3-5 WITH MAC;  Surgeon: Kb Rodriguez MD;  Location: [facility];  Service: Pain Management;  Laterality: Bilateral;    TONSILLECTOMY      TOOTH EXTRACTION           FAMILY HISTORY  Family History   Problem Relation Age of Onset    Diabetes Mother     Hypertension Mother     Stroke Mother     Diabetes Father     Hypertension Father     Bipolar disorder Father     Stroke Father     Bipolar disorder Paternal Grandmother     Heart disease Other     Sudden death Other     Anuerysm Other     Malig Hyperthermia Neg Hx          SOCIAL HISTORY  Social History     Socioeconomic History    Marital status:    Tobacco Use    Smoking status: Former    Smokeless tobacco: Never   Vaping Use    Vaping status: Never Used   Substance and Sexual Activity    Alcohol use: Yes    Drug use: Not Currently     Types: Marijuana     Comment: used in the 1970's    Sexual activity: Defer         ALLERGIES  Milk-related compounds, Pregabalin, and Neomycin-bacitracin zn-polymyx      REVIEW OF SYSTEMS  Review of Systems  Included in HPI  All systems reviewed and negative except for those discussed in HPI.      PHYSICAL EXAM    I have reviewed the triage vital signs and nursing notes.    ED Triage Vitals   Temp Heart Rate Resp BP SpO2   03/09/25 0933 03/09/25 0931 03/09/25 0931 03/09/25 0931 03/09/25 0931   98.4 °F (36.9 °C) 84 18 (!) 182/105 97 %      Temp src Heart Rate Source Patient Position BP Location FiO2 (%)   -- 03/09/25 0931 -- -- --    Monitor          Physical Exam  GENERAL: alert, no acute distress  SKIN: Warm, dry  HENT: Normocephalic, there is some bruising noted to the left forehead and left periorbital soft tissues.  This does not appear acute, but rather seems to be a few days old.   There is no laceration or bleeding.  EYES: no scleral icterus, normal conjunctivae, EOMI, normal pupils  CV: regular rhythm, regular rate, normal perfusion  RESPIRATORY: normal effort, lungs clear bilaterally  ABDOMEN: soft, nondistended, nontender in all quadrants  MUSCULOSKELETAL: no deformity, no asymmetry extremity  NEURO: alert, moves all extremities, follows commands speech is normal      LAB RESULTS  Recent Results (from the past 24 hours)   Protime-INR    Collection Time: 03/09/25  9:50 AM    Specimen: Blood   Result Value Ref Range    Protime 13.1 11.7 - 14.2 Seconds    INR 1.00 0.90 - 1.10   aPTT    Collection Time: 03/09/25  9:50 AM    Specimen: Blood   Result Value Ref Range    PTT 22.8 22.7 - 35.4 seconds   CBC Auto Differential    Collection Time: 03/09/25  9:50 AM    Specimen: Blood   Result Value Ref Range    WBC 6.89 3.40 - 10.80 10*3/mm3    RBC 3.60 (L) 4.14 - 5.80 10*6/mm3    Hemoglobin 14.6 13.0 - 17.7 g/dL    Hematocrit 40.2 37.5 - 51.0 %    .7 (H) 79.0 - 97.0 fL    MCH 40.6 (H) 26.6 - 33.0 pg    MCHC 36.3 (H) 31.5 - 35.7 g/dL    RDW 14.8 12.3 - 15.4 %    RDW-SD 61.2 (H) 37.0 - 54.0 fl    MPV 10.6 6.0 - 12.0 fL    Platelets 158 140 - 450 10*3/mm3    nRBC 0.0 0.0 - 0.2 /100 WBC   Manual Differential    Collection Time: 03/09/25  9:50 AM    Specimen: Blood   Result Value Ref Range    Neutrophil % 70.1 42.7 - 76.0 %    Lymphocyte % 26.8 19.6 - 45.3 %    Monocyte % 3.1 (L) 5.0 - 12.0 %    Eosinophil % 0.0 (L) 0.3 - 6.2 %    Basophil % 0.0 0.0 - 1.5 %    Neutrophils Absolute 4.83 1.70 - 7.00 10*3/mm3    Lymphocytes Absolute 1.85 0.70 - 3.10 10*3/mm3    Monocytes Absolute 0.21 0.10 - 0.90 10*3/mm3    Eosinophils Absolute 0.00 0.00 - 0.40 10*3/mm3    Basophils Absolute 0.00 0.00 - 0.20 10*3/mm3    Anisocytosis Mod/2+ None Seen    Smudge Cells Slight/1+ None Seen    Platelet Morphology Normal Normal   ECG 12 Lead Other; Lightheadedness    Collection Time: 03/09/25  9:52 AM   Result Value Ref  Range    QT Interval 438 ms    QTC Interval 500 ms   Comprehensive Metabolic Panel    Collection Time: 03/09/25 10:40 AM    Specimen: Arm, Right; Blood   Result Value Ref Range    Glucose 109 (H) 65 - 99 mg/dL    BUN 13 8 - 23 mg/dL    Creatinine 1.07 0.76 - 1.27 mg/dL    Sodium 140 136 - 145 mmol/L    Potassium 4.1 3.5 - 5.2 mmol/L    Chloride 101 98 - 107 mmol/L    CO2 26.3 22.0 - 29.0 mmol/L    Calcium 8.9 8.6 - 10.5 mg/dL    Total Protein 5.9 (L) 6.0 - 8.5 g/dL    Albumin 3.2 (L) 3.5 - 5.2 g/dL    ALT (SGPT) 49 (H) 1 - 41 U/L    AST (SGOT) 79 (H) 1 - 40 U/L    Alkaline Phosphatase 119 (H) 39 - 117 U/L    Total Bilirubin 1.6 (H) 0.0 - 1.2 mg/dL    Globulin 2.7 gm/dL    A/G Ratio 1.2 g/dL    BUN/Creatinine Ratio 12.1 7.0 - 25.0    Anion Gap 12.7 5.0 - 15.0 mmol/L    eGFR 67.6 >60.0 mL/min/1.73   High Sensitivity Troponin T    Collection Time: 03/09/25 10:40 AM    Specimen: Arm, Right; Blood   Result Value Ref Range    HS Troponin T 22 (H) <22 ng/L   Urinalysis With Microscopic If Indicated (No Culture) - Urine, Clean Catch    Collection Time: 03/09/25 11:46 AM    Specimen: Urine, Clean Catch   Result Value Ref Range    Color, UA Dark Yellow (A) Yellow, Straw    Appearance, UA Clear Clear    pH, UA 7.5 5.0 - 8.0    Specific Gravity, UA 1.020 1.005 - 1.030    Glucose, UA Negative Negative    Ketones, UA 15 mg/dL (1+) (A) Negative    Bilirubin, UA Negative Negative    Blood, UA Negative Negative    Protein, UA Trace (A) Negative    Leuk Esterase, UA Trace (A) Negative    Nitrite, UA Negative Negative    Urobilinogen, UA 2.0 E.U./dL (A) 0.2 - 1.0 E.U./dL   Urinalysis, Microscopic Only - Urine, Clean Catch    Collection Time: 03/09/25 11:46 AM    Specimen: Urine, Clean Catch   Result Value Ref Range    RBC, UA 0-2 None Seen, 0-2 /HPF    WBC, UA 0-2 None Seen, 0-2 /HPF    Bacteria, UA None Seen None Seen /HPF    Squamous Epithelial Cells, UA 0-2 None Seen, 0-2 /HPF    Hyaline Casts, UA 0-2 None Seen /LPF    Methodology  Automated Microscopy    High Sensitivity Troponin T 1Hr    Collection Time: 03/09/25 11:54 AM    Specimen: Arm, Right; Blood   Result Value Ref Range    HS Troponin T 23 (H) <22 ng/L    Troponin T Numeric Delta 1 ng/L    Troponin T % Delta 5 Abnormal if >/= 20%   TSH Rfx On Abnormal To Free T4    Collection Time: 03/09/25 11:54 AM    Specimen: Arm, Right; Blood   Result Value Ref Range    TSH 22.800 (H) 0.270 - 4.200 uIU/mL   T4, Free    Collection Time: 03/09/25 11:54 AM    Specimen: Arm, Right; Blood   Result Value Ref Range    Free T4 0.95 0.92 - 1.68 ng/dL         RADIOLOGY  CT Head Without Contrast  CT Head Without Contrast, CT Cervical Spine Without Contrast  CT Head Without Contrast, CT Cervical Spine Without Contrast, CT Facial Bones Without Contrast  Result Date: 3/9/2025  CT HEAD WO CONTRAST-, CT FACIAL BONES WO CONTRAST-, CT CERVICAL SPINE WO CONTRAST-  INDICATIONS: Trauma  TECHNIQUE: Radiation dose reduction techniques were utilized, including automated exposure control and exposure modulation based on body size. Noncontrast head CT, facial CT, cervical spine CT  COMPARISON: None available  FINDINGS:  Head CT:  No acute intracranial hemorrhage, midline shift or mass effect. No acute territorial infarct is identified.  Mild periventricular hypodensities suggest chronic small vessel ischemic change in a patient this age.  Arterial calcifications are seen at the base of the brain.  Ventricles, cisterns, cerebral sulci are unremarkable for patient age.     Facial CT:  No acute facial fracture is identified.  The orbital walls appear intact. No retrobulbar hematoma.   Minimal paranasal sinus mucosal thickening is present. The visualized paranasal sinuses, orbits, mastoid air cells are otherwise unremarkable. Degenerative changes are seen at the temporomandibular joints.    Cervical spine CT:  No acute fracture is identified. Mild anterolisthesis of C2 on C3, C4 on C5, C7 on T1. Mild retrolisthesis of C3 on  C4, C6 on C7.  Facet and uncovertebral joint hypertrophy contribute to neuroforaminal narrowing, more prominent on the right at C4/5, C5/6, C6/7. Disc osteophyte complex appears to result in mild central stenosis at C3/4, C4/5, C5/6, C6/7, assessment of intracanalicular contents being limited without intrathecal contrast material.  Bilateral carotid arterial calcification is present. Pleural calcifications are apparent at the partly included lung apices.        1. No acute facial fracture identified.  2. No acute intracranial hemorrhage or hydrocephalus. No acute cervical fracture identified. If there is further clinical concern, MRI could be considered for further evaluation.  This report was finalized on 3/9/2025 12:19 PM by Dr. Nirmal Harris M.D on Workstation: [id number]          MEDICATIONS GIVEN IN ER  Medications   sodium chloride 0.9 % flush 10 mL (has no administration in time range)   sodium chloride 0.9 % flush 10 mL (10 mL Intravenous Given 3/9/25 1525)   sodium chloride 0.9 % flush 10 mL (has no administration in time range)   sodium chloride 0.9 % infusion 40 mL (has no administration in time range)   ondansetron ODT (ZOFRAN-ODT) disintegrating tablet 4 mg (has no administration in time range)     Or   ondansetron (ZOFRAN) injection 4 mg (has no administration in time range)   nitroglycerin (NITROSTAT) SL tablet 0.4 mg (has no administration in time range)   Potassium Replacement - Follow Nurse / BPA Driven Protocol (has no administration in time range)   Magnesium Standard Dose Replacement - Follow Nurse / BPA Driven Protocol (has no administration in time range)   Phosphorus Replacement - Follow Nurse / BPA Driven Protocol (has no administration in time range)   Calcium Replacement - Follow Nurse / BPA Driven Protocol (has no administration in time range)   acetaminophen (TYLENOL) tablet 650 mg (has no administration in time range)     Or   acetaminophen (TYLENOL) 160 MG/5ML oral solution 650 mg  (has no administration in time range)     Or   acetaminophen (TYLENOL) suppository 650 mg (has no administration in time range)   sennosides-docusate (PERICOLACE) 8.6-50 MG per tablet 2 tablet (has no administration in time range)     And   polyethylene glycol (MIRALAX) packet 17 g (has no administration in time range)     And   bisacodyl (DULCOLAX) EC tablet 5 mg (has no administration in time range)     And   bisacodyl (DULCOLAX) suppository 10 mg (has no administration in time range)   thiamine (B-1) injection 200 mg (200 mg Intravenous Given 3/9/25 1506)     Followed by   thiamine (VITAMIN B-1) tablet 100 mg (has no administration in time range)   folic acid (FOLVITE) tablet 1 mg (1 mg Oral Given 3/9/25 1506)   LORazepam (ATIVAN) tablet 1 mg (has no administration in time range)     Or   LORazepam (ATIVAN) injection 1 mg (has no administration in time range)     Or   LORazepam (ATIVAN) tablet 2 mg (has no administration in time range)     Or   LORazepam (ATIVAN) injection 2 mg (has no administration in time range)     Or   LORazepam (ATIVAN) injection 2 mg (has no administration in time range)     Or   LORazepam (ATIVAN) injection 2 mg (has no administration in time range)   LORazepam (ATIVAN) injection 1 mg (has no administration in time range)   metoprolol succinate XL (TOPROL-XL) 24 hr tablet 25 mg (25 mg Oral Given 3/9/25 1015)   lactated ringers infusion (75 mL/hr Intravenous New Bag 3/9/25 1507)         ORDERS PLACED DURING THIS VISIT:  Orders Placed This Encounter   Procedures    CT Head Without Contrast    CT Cervical Spine Without Contrast    CT Facial Bones Without Contrast    MRI Brain Without Contrast    Comprehensive Metabolic Panel    Protime-INR    aPTT    Urinalysis With Microscopic If Indicated (No Culture) - Urine, Clean Catch    High Sensitivity Troponin T    CBC Auto Differential    Manual Differential    High Sensitivity Troponin T 1Hr    Urinalysis, Microscopic Only - Urine, Clean Catch     Comprehensive Metabolic Panel    CBC Auto Differential    TSH Rfx On Abnormal To Free T4    Vitamin B12    Folate    T4, Free    Diet: Regular/House; Fluid Consistency: Thin (IDDSI 0)    Monitor Blood Pressure    Vital Signs    Up With Assistance    Advance Diet As Tolerated -    Intake & Output    Weigh Patient    Neuro Checks    Place Sequential Compression Device    Maintain Sequential Compression Device    Maintain IV Access    Telemetry - Place Orders & Notify Provider of Results When Patient Experiences Acute Chest Pain, Dysrhythmia or Respiratory Distress    May Be Off Telemetry for Tests    Vital Signs    Continuous Pulse Oximetry    Obtain Baseline Clinical [place] Withdrawal Assessment - Ar (CIWA-Ar), Sedation Scale & Vital Signs    Clinical [place] Withdrawal Assessment (CIWA-Ar)    If CIWA-Ar Score Less Than 8 For 3 Consecutive Assessments, Monitor Every 4 Hours & Discontinue Assessment When CIWA-Ar Less Than 8 for 24 Hours    Obtain Pre & Post Sedation Scores With Every Sedative Dose - Hold For POSS Greater Than 2 or RASS of -3 or Less    Notify Provider - Withdrawal    Notify Provider of Abnormal Lab Results    Notify Provider - Vitals    Code Status and Medical Interventions: CPR (Attempt to Resuscitate); Full Support    Inpatient Case Management  Consult    PT Consult: Eval & Treat Discharge Placement Assessment    ECG 12 Lead Other; Lightheadedness    Insert Peripheral IV    Insert Peripheral IV    Initiate Emergency Department Observation Status    Fall Precautions    Aspiration Precautions    Seizure Precautions    Safety Precautions    CBC & Differential         OUTPATIENT MEDICATION MANAGEMENT:  Current Facility-Administered Medications Ordered in Epic   Medication Dose Route Frequency Provider Last Rate Last Admin    acetaminophen (TYLENOL) tablet 650 mg  650 mg Oral Q4H PRN Oleksandr Azar MD        Or    acetaminophen (TYLENOL) 160 MG/5ML oral solution 650 mg  650 mg Oral  Q4H PRN Oleksandr Azar MD        Or    acetaminophen (TYLENOL) suppository 650 mg  650 mg Rectal Q4H PRN Oleksandr Azar MD        sennosides-docusate (PERICOLACE) 8.6-50 MG per tablet 2 tablet  2 tablet Oral BID PRN Oleksandr Azar MD        And    polyethylene glycol (MIRALAX) packet 17 g  17 g Oral Daily PRN Oleksandr Azar MD        And    bisacodyl (DULCOLAX) EC tablet 5 mg  5 mg Oral Daily PRN Oleksandr Azar MD        And    bisacodyl (DULCOLAX) suppository 10 mg  10 mg Rectal Daily PRN Oleksandr Azar MD        Calcium Replacement - Follow Nurse / BPA Driven Protocol   Not Applicable PRN Oleksandr Azar MD        folic acid (FOLVITE) tablet 1 mg  1 mg Oral Daily Oleksandr Azar MD   1 mg at 03/09/25 1506    LORazepam (ATIVAN) tablet 1 mg  1 mg Oral Q1H PRN Oleksandr Azar MD        Or    LORazepam (ATIVAN) injection 1 mg  1 mg Intravenous Q1H PRN Oleksandr Azar MD        Or    LORazepam (ATIVAN) tablet 2 mg  2 mg Oral Q1H PRN Oleksandr Azar MD        Or    LORazepam (ATIVAN) injection 2 mg  2 mg Intravenous Q1H PRN Oleksandr Azar MD        Or    LORazepam (ATIVAN) injection 2 mg  2 mg Intravenous Q15 Min PRN Oleksandr Azar MD        Or    LORazepam (ATIVAN) injection 2 mg  2 mg Intramuscular Q15 Min PRN Oleksandr Azar MD        LORazepam (ATIVAN) injection 1 mg  1 mg Intravenous Once PRN Oleksandr Azar MD        Magnesium Standard Dose Replacement - Follow Nurse / BPA Driven Protocol   Not Applicable PRN Oleksandr Azar MD        nitroglycerin (NITROSTAT) SL tablet 0.4 mg  0.4 mg Sublingual Q5 Min PRN Oleksandr Azar MD        ondansetron ODT (ZOFRAN-ODT) disintegrating tablet 4 mg  4 mg Oral Q6H PRN Oleksandr Azar MD        Or    ondansetron (ZOFRAN) injection 4 mg  4 mg Intravenous Q6H PRN Oleksandr Azar MD        Phosphorus Replacement - Follow Nurse / BPA Driven Protocol   Not Applicable PRN Oleksandr Azar MD        Potassium Replacement - Follow Nurse / BPA Driven Protocol   Not Applicable PRN  Oleksandr Azar MD        sodium chloride 0.9 % flush 10 mL  10 mL Intravenous PRN Esteban Pierre MD        sodium chloride 0.9 % flush 10 mL  10 mL Intravenous Q12H Oleksandr Azar MD   10 mL at 03/09/25 1525    sodium chloride 0.9 % flush 10 mL  10 mL Intravenous PRN Oleksandr Azar MD        sodium chloride 0.9 % infusion 40 mL  40 mL Intravenous PRN Oleksandr Azar MD        thiamine (B-1) injection 200 mg  200 mg Intravenous Q8H Oleksandr Azar MD   200 mg at 03/09/25 1506    Followed by    [START ON 3/14/2025] thiamine (VITAMIN B-1) tablet 100 mg  100 mg Oral Daily Oleksandr Azar MD         No current [facility]-ordered outpatient medications on file.         PROCEDURES  Procedures          PROGRESS, DATA ANALYSIS, CONSULTS, AND MEDICAL DECISION MAKING  All labs have been independently interpreted by me.  All radiology studies have been reviewed by me. All EKG's have been independently viewed and interpreted by me.  Discussion below represents my analysis of pertinent findings related to patient's condition, differential diagnosis, treatment plan and final disposition.    Differential diagnosis includes but is not limited to skull fracture, subdural hematoma, subarachnoid hemorrhage, facial bone fracture, vertigo, stroke, dehydration.    Clinical Scores:                   ED Course as of 03/09/25 1613   Sun Mar 09, 2025   0953 EKG         EKG time/Interp time: 0952/0953  Rhythm/Rate: Sinus rhythm, 80 bpm  P waves and [place]: Present, 157 ms, normal interval  QRS, axis: 88 ms, narrow complex, normal axis  ST and T waves: No ST segment elevations.  No acute ischemic features.  QTc is 500 ms, borderline prolonged.  Independently interpreted by me contemporaneously with treatment   [LISET]   0954 Told the patient has not had his regular prescription medications for the past 2 weeks.  Blood pressure is quite elevated here.  Will give his normal home dose of metoprolol for now and continue to monitor blood pressure and vital  signs carefully. [LISET]   1046 Hemoglobin: 14.6 [LISET]   1046 Hematocrit: 40.2 [LISET]   1046 BP(!): 182/105 [LISET]   1229 Troponin T Numeric Delta: 1 [LISET]   1229 I independently interpreted the Head CT w/o Contrast and my findings are: No acute hemorrhage, no midline shift   [LISET]   1230 I independently interpreted the CT cervical spine without contrast study my findings are: No acute fractures. [LISET]   1320 ALT (SGPT)(!): 49 [LISET]   1321 AST (SGOT)(!): 79 [LISET]   1321 Alkaline Phosphatase(!): 119 [LISET]   1321 Total Bilirubin(!): 1.6 [LISET]   1321 I reviewed all the test results with the patient and his caretaker in the room.  They both have reservations about the patient going home since he is still feeling lightheaded.  We will make arrangements to place him in the observation unit for further workup and management needs.  He would probably benefit from consultation with PT as well. [LISET]   1321 I discussed with Bianca in the observation unit.  She agrees to admit him for further medical management on behalf of Dr. Azar [LISET]      ED Course User Index  [LISET] Esteban Pierre MD             AS OF 16:13 EDT VITALS:    BP - 153/96  HR - 79  TEMP - 98.4 °F (36.9 °C) (Oral)  O2 SATS - 100%    COMPLEXITY OF CARE  The patient requires admission.      DIAGNOSIS  Final diagnoses:   Injury of head, initial encounter   Lightheadedness   Hypertension not at goal   Elevated bilirubin         DISPOSITION  ED Disposition       ED Disposition   Intended Admit    Condition   --    Comment   --                Please note that portions of this document were completed with a voice recognition program.    Note Disclaimer: At [facility], we believe that sharing information builds trust and better relationships. You are receiving this note because you recently visited [facility]. It is possible you will see health information before a provider has talked with you about it. This kind of information can be easy to misunderstand. To help you fully  understand what it means for your health, we urge you to discuss this note with your provider.         Esteban Pierre MD  03/09/25 9881

## 2025-03-10 PROBLEM — R29.6 MULTIPLE FALLS: Status: ACTIVE | Noted: 2025-03-10

## 2025-03-10 LAB
ALBUMIN SERPL-MCNC: 2.9 G/DL (ref 3.5–5.2)
ALBUMIN/GLOB SERPL: 1.2 G/DL
ALP SERPL-CCNC: 110 U/L (ref 39–117)
ALT SERPL W P-5'-P-CCNC: 41 U/L (ref 1–41)
ANION GAP SERPL CALCULATED.3IONS-SCNC: 8.1 MMOL/L (ref 5–15)
AST SERPL-CCNC: 60 U/L (ref 1–40)
BASOPHILS # BLD AUTO: 0.03 10*3/MM3 (ref 0–0.2)
BASOPHILS NFR BLD AUTO: 0.5 % (ref 0–1.5)
BILIRUB SERPL-MCNC: 0.8 MG/DL (ref 0–1.2)
BUN SERPL-MCNC: 14 MG/DL (ref 8–23)
BUN/CREAT SERPL: 14.3 (ref 7–25)
CALCIUM SPEC-SCNC: 8.4 MG/DL (ref 8.6–10.5)
CHLORIDE SERPL-SCNC: 103 MMOL/L (ref 98–107)
CO2 SERPL-SCNC: 27.9 MMOL/L (ref 22–29)
CREAT SERPL-MCNC: 0.98 MG/DL (ref 0.76–1.27)
DEPRECATED RDW RBC AUTO: 62.6 FL (ref 37–54)
EGFRCR SERPLBLD CKD-EPI 2021: 75.1 ML/MIN/1.73
EOSINOPHIL # BLD AUTO: 0.13 10*3/MM3 (ref 0–0.4)
EOSINOPHIL NFR BLD AUTO: 2.2 % (ref 0.3–6.2)
ERYTHROCYTE [DISTWIDTH] IN BLOOD BY AUTOMATED COUNT: 14.9 % (ref 12.3–15.4)
GLOBULIN UR ELPH-MCNC: 2.5 GM/DL
GLUCOSE SERPL-MCNC: 107 MG/DL (ref 65–99)
HCT VFR BLD AUTO: 38.1 % (ref 37.5–51)
HGB BLD-MCNC: 13.3 G/DL (ref 13–17.7)
IMM GRANULOCYTES # BLD AUTO: 0.01 10*3/MM3 (ref 0–0.05)
IMM GRANULOCYTES NFR BLD AUTO: 0.2 % (ref 0–0.5)
LYMPHOCYTES # BLD AUTO: 2.65 10*3/MM3 (ref 0.7–3.1)
LYMPHOCYTES NFR BLD AUTO: 44.3 % (ref 19.6–45.3)
MCH RBC QN AUTO: 40.2 PG (ref 26.6–33)
MCHC RBC AUTO-ENTMCNC: 34.9 G/DL (ref 31.5–35.7)
MCV RBC AUTO: 115.1 FL (ref 79–97)
MONOCYTES # BLD AUTO: 0.34 10*3/MM3 (ref 0.1–0.9)
MONOCYTES NFR BLD AUTO: 5.7 % (ref 5–12)
NEUTROPHILS NFR BLD AUTO: 2.82 10*3/MM3 (ref 1.7–7)
NEUTROPHILS NFR BLD AUTO: 47.1 % (ref 42.7–76)
PLATELET # BLD AUTO: 141 10*3/MM3 (ref 140–450)
PMV BLD AUTO: 10.6 FL (ref 6–12)
POTASSIUM SERPL-SCNC: 3.8 MMOL/L (ref 3.5–5.2)
PROT SERPL-MCNC: 5.4 G/DL (ref 6–8.5)
QT INTERVAL: 438 MS
QTC INTERVAL: 500 MS
RBC # BLD AUTO: 3.31 10*6/MM3 (ref 4.14–5.8)
SODIUM SERPL-SCNC: 139 MMOL/L (ref 136–145)
WBC NRBC COR # BLD AUTO: 5.98 10*3/MM3 (ref 3.4–10.8)

## 2025-03-10 PROCEDURE — 97530 THERAPEUTIC ACTIVITIES: CPT

## 2025-03-10 PROCEDURE — 97162 PT EVAL MOD COMPLEX 30 MIN: CPT

## 2025-03-10 PROCEDURE — 85025 COMPLETE CBC W/AUTO DIFF WBC: CPT | Performed by: PHYSICIAN ASSISTANT

## 2025-03-10 PROCEDURE — G0378 HOSPITAL OBSERVATION PER HR: HCPCS

## 2025-03-10 PROCEDURE — 80053 COMPREHEN METABOLIC PANEL: CPT | Performed by: PHYSICIAN ASSISTANT

## 2025-03-10 PROCEDURE — 96361 HYDRATE IV INFUSION ADD-ON: CPT

## 2025-03-10 PROCEDURE — 96376 TX/PRO/DX INJ SAME DRUG ADON: CPT

## 2025-03-10 PROCEDURE — 25010000002 THIAMINE PER 100 MG: Performed by: PHYSICIAN ASSISTANT

## 2025-03-10 RX ADMIN — THIAMINE HYDROCHLORIDE 200 MG: 100 INJECTION, SOLUTION INTRAMUSCULAR; INTRAVENOUS at 21:08

## 2025-03-10 RX ADMIN — ESCITALOPRAM 5 MG: 5 TABLET, FILM COATED ORAL at 08:42

## 2025-03-10 RX ADMIN — METOPROLOL SUCCINATE 25 MG: 25 TABLET, EXTENDED RELEASE ORAL at 05:27

## 2025-03-10 RX ADMIN — LEVOTHYROXINE SODIUM 125 MCG: 0.03 TABLET ORAL at 08:42

## 2025-03-10 RX ADMIN — CELECOXIB 200 MG: 100 CAPSULE ORAL at 08:42

## 2025-03-10 RX ADMIN — FOLIC ACID 1 MG: 1 TABLET ORAL at 08:41

## 2025-03-10 RX ADMIN — THIAMINE HYDROCHLORIDE 200 MG: 100 INJECTION, SOLUTION INTRAMUSCULAR; INTRAVENOUS at 14:24

## 2025-03-10 RX ADMIN — ATORVASTATIN CALCIUM 20 MG: 20 TABLET, FILM COATED ORAL at 08:42

## 2025-03-10 RX ADMIN — ACETAMINOPHEN 650 MG: 325 TABLET, FILM COATED ORAL at 19:34

## 2025-03-10 RX ADMIN — LISINOPRIL 15 MG: 10 TABLET ORAL at 05:27

## 2025-03-10 RX ADMIN — Medication 10 ML: at 08:43

## 2025-03-10 RX ADMIN — CELECOXIB 200 MG: 100 CAPSULE ORAL at 21:07

## 2025-03-10 RX ADMIN — Medication 10 ML: at 21:13

## 2025-03-10 RX ADMIN — ACETAMINOPHEN 650 MG: 325 TABLET, FILM COATED ORAL at 08:42

## 2025-03-10 RX ADMIN — CLONAZEPAM 1 MG: 1 TABLET ORAL at 22:05

## 2025-03-10 RX ADMIN — THIAMINE HYDROCHLORIDE 200 MG: 100 INJECTION, SOLUTION INTRAMUSCULAR; INTRAVENOUS at 05:27

## 2025-03-10 RX ADMIN — POTASSIUM CHLORIDE 10 MEQ: 750 TABLET, EXTENDED RELEASE ORAL at 08:42

## 2025-03-10 RX ADMIN — TRIAMTERENE AND HYDROCHLOROTHIAZIDE 1 TABLET: 25; 37.5 TABLET ORAL at 08:42

## 2025-03-10 NOTE — PROGRESS NOTES
Pt admitted to the observation unit from the emergency department with concerns for vertigo, multiple falls.  Does have a left periorbital hematoma from a couple falls recently.  Patient does drink alcohol chronically, last intake was about 4 days ago.  Currently at rest he states that he feels well with no dizziness.  Denies any complaints currently.     On exam,   General: No acute distress, nontoxic  HEENT: EOMI, left periorbital ecchymosis  Pulm: Symmetric chest rise, nonlabored breathing  CV: Regular rate and rhythm  GI: Nondistended  MSK: No deformity  Skin: Warm, dry  Neuro: Awake, alert, oriented x 4, no facial droop, no dysarthria or aphasia, no significant tremor, moving all extremities, no focal deficits  Psych: Calm, cooperative    Vital signs and nursing notes reviewed.           Plan: MRI brain negative for any acute emergent abnormalities, CT facial bones was negative for fracture, CT C-spine negative for fractures.  No intracranial hemorrhage. Labs showing elevated LFTs as would be expected with the underlying chronic alcohol use, no significant emergent findings otherwise.  Does have some chronic hypothyroid and his TSH is elevated as compared to a year ago.  Free T4 is in the normal range.  Supportive care overnight, plan for case management and physical therapy to evaluate tomorrow.  DELMA ordered.         MD Attestation Note    SHARED VISIT: This visit was performed by BOTH a physician and an APC. The substantive portion of the medical decision making was performed by this attesting physician who made or approved the management plan and takes responsibility for patient management. All studies in the APC note (if performed) were independently interpreted by me.

## 2025-03-10 NOTE — CASE MANAGEMENT/SOCIAL WORK
Discharge Planning Assessment  [facility]     Patient Name: Darwin Sparks  MRN: [id number]  Today's Date: 3/10/2025    Admit Date: 3/9/2025    Plan: Home with caregivers and spouse and HH. HH pending. Jaswinder will be at patients home tomorrow between 9-1 and she can assist him into his home. [employer] wheelchair van scheduled for tomorrow @ 11a   Discharge Needs Assessment       Row Name 03/10/25 1518       Living Environment    People in Home spouse    Current Living Arrangements home    Potentially Unsafe Housing Conditions none    Primary Care Provided by self    Provides Primary Care For spouse    Family Caregiver if Needed none;other (see comments)  Has a caregiver 4xa week    Quality of Family Relationships helpful;involved;supportive    Able to Return to Prior Arrangements yes       Resource/Environmental Concerns    Resource/Environmental Concerns home accessibility    Home Accessibility Concerns stairs to enter home    Transportation Concerns none       Transition Planning    Patient/Family Anticipates Transition to home with help/services;home with family    Patient/Family Anticipated Services at Transition none    Transportation Anticipated health plan transportation       Discharge Needs Assessment    Readmission Within the Last 30 Days no previous admission in last 30 days    Equipment Currently Used at Home bp cuff    Anticipated Changes Related to Illness none    Equipment Needed After Discharge none                   Discharge Plan       Row Name 03/10/25 1448       Plan    Plan Home with caregivers and spouse and HH. HH pending.    Patient/Family in Agreement with Plan yes    Provided Post Acute Provider List? N/A    Provided Post Acute Provider Quality & Resource List? N/A    Plan Comments CCP met with patient. Introduced self and explained role of CCP. Patient confirmed the information on his face sheet is accurate. Patients PCP is James Epley. Patient is enrolled [facility]. Patient lives at home  with spouse and has a caregiver there four days a week and a  that comes on the days caregiver is not there who helps the patient as well. PT saw patient and recommended home with 2/47 care, home health, and SNF. Discussed this with patient. Patient does not want to go to SNF. Patient is wanting to return home with spouse and caregivers with HH. No preference on HH. HH referral made. Patient has a stair lift in home and a walker at home. Patient has three steps in garage but her states he has rails and can do the steps. Caregiver will be at the home tomorrow afternoon to be able to help the patient into the home. [employer] wheelchair van @ 11a tomorrow .                   Continued Care and Services - Admitted Since 3/9/2025       Home Medical Care       Service Provider Request Status Services Address Phone Fax Patient Preferred    [facility] Pending - Request Sent -- [address] [place] [address] [facility] [id number] [phone] --                  Expected Discharge Date and Time       Expected Discharge Date Expected Discharge Time    Mar 11, 2025            Demographic Summary       Row Name 03/10/25 1516       General Information    Admission Type observation    Arrived From emergency department    Required Notices Provided Observation Status Notice    Referral Source admission list    Reason for Consult discharge planning    Preferred Language English                   Functional Status       Row Name 03/10/25 1517       Functional Status    Usual Activity Tolerance fair    Current Activity Tolerance fair       Functional Status, IADL    Medications assistive person    Meal Preparation assistive person    Housekeeping assistive person    Laundry assistive person    Shopping assistive person       Mental Status    General Appearance WDL WDL       Mental Status Summary    Recent Changes in Mental Status/Cognitive Functioning no changes       Employment/     Employment Status retired                   Psychosocial    No documentation.                  Abuse/Neglect    No documentation.                  Legal    No documentation.                  Substance Abuse    No documentation.                  Patient Forms    No documentation.

## 2025-03-10 NOTE — PROGRESS NOTES
ED OBSERVATION PROGRESS/DISCHARGE SUMMARY    Date of Admission: 3/9/2025   LOS: 0 days   PCP: Epley, James, APRN      Subjective: Multiple falls    Hospital Outcome:   84-year-old male with a past medical history including but not limited to hypertension, hyperlipidemia, hypothyroidism, chronic alcohol use, and prostate cancer who was admitted to the observation unit for further evaluation of generalized weakness with multiple falls at home.  In the ER, CT of the C-spine and facial bones showed no evidence for fractures.  CT head showed no intracranial hemorrhage.  Labs noted some mild transaminitis.  TSH elevated but free T4 within normal range.  Folate level was low at 3.27.  Patient started on folic acid.    MRI of the brain without shows senescent changes not exceptional for age with no evidence of acute intracranial abnormality.  Patient's orthostatic vital signs negative.  Plan for consultation to PT and CCP today for discharge disposition.    Physical therapy has evaluated.  They recommend SNF versus 24/7 assistance and HH PT at discharge.  CCP is also evaluated the patient.  Patient refuses SNF.  He wants to return home with caregivers and with home health.  Home health referral was made.  Patient has stair lift at home and walker.  Caregiver will be at home tomorrow afternoon and will be able to help the patient.  [email or web address] wheelchair van will be present tomorrow at 11 AM for discharge home.  Patient will remain with us overnight.    ROS:  General: no fevers, chills  Respiratory: no cough, dyspnea  Cardiovascular: no chest pain, palpitations  Abdomen: No abdominal pain, nausea, vomiting, or diarrhea  Neurologic: No focal weakness    Objective   Physical Exam:  I have reviewed the vital signs.  Temp:  [97.5 °F (36.4 °C)-99 °F (37.2 °C)] 98.2 °F (36.8 °C)  Heart Rate:  [64-84] 64  Resp:  [16-18] 16  BP: (106-175)/() 106/70  General Appearance:    Alert, cooperative, no distress  Head:    Normocephalic,  atraumatic  Eyes:    Sclerae anicteric  Neck:   Supple, no mass  Lungs: Clear to auscultation bilaterally, respirations unlabored  Heart: Regular rate and rhythm, S1 and S2 normal, no murmur, rub or gallop  Abdomen:  Soft, nontender, bowel sounds active, nondistended  Extremities: No clubbing, cyanosis, or edema to lower extremities  Pulses:  2+ and symmetric in distal lower extremities  Skin: No rashes   Neurologic: Oriented x3, Normal strength to extremities    Results Review:    I have reviewed the labs, radiology results and diagnostic studies.    Results from last 7 days   Lab Units 03/10/25  0306   WBC 10*3/mm3 5.98   HEMOGLOBIN g/dL 13.3   HEMATOCRIT % 38.1   PLATELETS 10*3/mm3 141     Results from last 7 days   Lab Units 03/10/25  0306 03/09/25  1040   SODIUM mmol/L 139 140   POTASSIUM mmol/L 3.8 4.1   CHLORIDE mmol/L 103 101   CO2 mmol/L 27.9 26.3   BUN mg/dL 14 13   CREATININE mg/dL 0.98 1.07   CALCIUM mg/dL 8.4* 8.9   BILIRUBIN mg/dL 0.8 1.6*   ALK PHOS U/L 110 119*   ALT (SGPT) U/L 41 49*   AST (SGOT) U/L 60* 79*   GLUCOSE mg/dL 107* 109*     Imaging Results (Last 24 Hours)       Procedure Component Value Units Date/Time    MRI Brain Without Contrast [[id number]] Collected: 03/09/25 1714     Updated: 03/09/25 1733    Narrative:      MRI BRAIN without contrast.     HISTORY: Vertigo.     TECHNIQUE: Routine brain MRI without contrast.     COMPARISON: Head CT 3/9/2025.     FINDINGS: There is no MR evidence of acute infarct or other restricted  diffusion. There is no MR evidence of acute or chronic intracranial  hemorrhage. There is moderately advanced volume loss, although not  exceptional for age. There is no evidence of hydrocephalus or  extra-axial fluid collection.     There are mild nonspecific white matter changes, typical of chronic  small vessel ischemic disease, though again not exceptional for age.  Bone marrow signal is within normal limits. The extracranial soft  tissues are unremarkable, and  normal flow voids are seen in the cerebral  vessels.       Impression:      IMPRESSION : Senescent changes, not exceptional for age, no evidence of  acute intracranial abnormality.     This report was finalized on 3/9/2025 5:30 PM by Dr. Avni Townsend M.D  on Workstation: [id number]               I have reviewed the medications.     Discharge Medications        ASK your doctor about these medications        Instructions Start Date   albuterol sulfate  (90 Base) MCG/ACT inhaler  Commonly known as: PROVENTIL HFA;VENTOLIN HFA;PROAIR HFA   2 puffs, Inhalation, Every 4 Hours PRN      celecoxib 200 MG capsule  Commonly known as: CeleBREX   200 mg, Oral, 2 Times Daily, With water, take lowest effective dose      clonazePAM 1 MG tablet  Commonly known as: KlonoPIN   1 mg, Nightly PRN      escitalopram 5 MG tablet  Commonly known as: Lexapro   5 mg, Oral, Daily, For improved mood      levothyroxine 125 MCG tablet  Commonly known as: SYNTHROID, LEVOTHROID   125 mcg, Oral, Daily      lisinopril 10 MG tablet  Commonly known as: PRINIVIL,ZESTRIL   15 mg, Daily      metoprolol succinate XL 25 MG 24 hr tablet  Commonly known as: Toprol XL   25 mg, Oral, Daily, For BP and axiety      niacin 250 MG tablet   250 mg, Oral, Daily With Breakfast      potassium chloride 10 MEQ CR capsule  Commonly known as: MICRO-K   10 mEq, Oral, Daily      simvastatin 40 MG tablet  Commonly known as: ZOCOR   40 mg, Oral, Nightly      triamterene-hydrochlorothiazide 37.5-25 MG per capsule  Commonly known as: DYAZIDE   1 capsule, Every Morning             ---------------------------------------------------------------------------------------------  Assessment & Plan   Assessment/Problem List    Multiple falls    Vertigo      Plan:    Multiple recent falls:  -Patient reports only 1 episode of vertigo on his way into the hospital  -MRI brain no evidence for acute cranial abnormality  -Static vital signs negative  -Physical therapy  consulted  -Fall precautions  -Continuous cardiac monitor  -CCP PT evaluated with the plan above     Transaminitis, mild:  -Improved on morning labs  -Patient not currently complaining of any GI symptoms; continue to monitor     Alcohol use:  -Patient reports he has not drank in almost 5 days  -CIWA with as needed benzos available  -B12 within normal limits, folate low  -Started folate and thiamine replacement  -Cessation discussed and encouraged     Chronic back pain:  -As needed Tylenol     Hypertension:  -Continue home regimen     Hypothyroidism:  -TSH is elevated and free T4 is low normal  -Patient needs to follow-up with PCP outpatient for repeat labs to assess need for further medication changes    Disposition: Pending    Follow-up after Discharge: Pending    This note will serve as progress      51 minutes have been spent by [facility] providers in the care of this patient while under observation status.    Appropriate PPE worn during patient encounter.  Hand hygeine performed before and after seeing the patient.      Davi Barger III, PA 03/10/25 16:18 EDT

## 2025-03-10 NOTE — PLAN OF CARE
Goal Outcome Evaluation:  Plan of Care Reviewed With: patient           Outcome Evaluation: Patient is an 84 y.o male who presented to [facility] due to multiple falls. Patient lives at home with his spouse whom he reports he cares for. Patient initially reports he does not use an AD at home but then reports he uses forearm crutches. Patient reports he is independent at baseline and still drives. Patient reports having caregivers 4x/wk.  Patient reported feeling lightheaded throughout session. Orthostatics checked just prior to session with nsg. Patient sat up to EOB with CGA. Patient stood from EOB with Leonard. Patient ambulated 15ft with rwx and CGA. Gait very slow with fwd flexed posture and cues needed to stay close to rwx. Patient declined further ambulation. Concerned for patients ability to safely mobilize around home to care for both himself and his spouse at d/c. Would recommend SNF vs 24/7 assist and HHPT at d/c.    Anticipated Discharge Disposition (PT): home with 24/7 care, home with home health, skilled nursing facility

## 2025-03-10 NOTE — PLAN OF CARE
Goal Outcome Evaluation:  Plan of Care Reviewed With: patient        Progress: improving  Outcome Evaluation: Pt. reported mild intermitent diziness today with activity. Pt. met with PT and case management today. Pt. to be sent home tomorrow with support of caregiver, and referal to home health made.       Problem: Adult Inpatient Plan of Care  Goal: Plan of Care Review  Outcome: Progressing  Flowsheets (Taken 3/10/2025 1535)  Progress: improving  Outcome Evaluation: Pt. reported mild intermitent diziness today with activity. Pt. met with PT and case management today. Pt. to be sent home tomorrow with support of caregiver, and referal to home health made.  Plan of Care Reviewed With: patient  Goal: Patient-Specific Goal (Individualized)  Outcome: Progressing  Goal: Absence of Hospital-Acquired Illness or Injury  Outcome: Progressing  Intervention: Identify and Manage Fall Risk  Recent Flowsheet Documentation  Taken 3/10/2025 1450 by Mitesh Perdue RN  Safety Promotion/Fall Prevention:   activity supervised   fall prevention program maintained   lighting adjusted   safety round/check completed  Taken 3/10/2025 1253 by Mitesh Perdue RN  Safety Promotion/Fall Prevention:   activity supervised   fall prevention program maintained   lighting adjusted   safety round/check completed  Taken 3/10/2025 1050 by Mitesh Perdue RN  Safety Promotion/Fall Prevention:   activity supervised   fall prevention program maintained   lighting adjusted   safety round/check completed  Taken 3/10/2025 0812 by Mitesh Perdue RN  Safety Promotion/Fall Prevention:   activity supervised   fall prevention program maintained   lighting adjusted   safety round/check completed  Intervention: Prevent Skin Injury  Recent Flowsheet Documentation  Taken 3/10/2025 0812 by Mitesh Perdue RN  Body Position: position changed independently  Intervention: Prevent and Manage VTE (Venous Thromboembolism) Risk  Recent Flowsheet Documentation  Taken 3/10/2025  0812 by Mitesh Perdue RN  VTE Prevention/Management:   SCDs (sequential compression devices) off   patient refused intervention  Intervention: Prevent Infection  Recent Flowsheet Documentation  Taken 3/10/2025 1450 by Mitesh Perdue RN  Infection Prevention:   hand hygiene promoted   single patient room provided   rest/sleep promoted  Taken 3/10/2025 1253 by Mitesh Perdue RN  Infection Prevention:   hand hygiene promoted   single patient room provided   rest/sleep promoted  Taken 3/10/2025 1050 by Mitesh Perdue RN  Infection Prevention:   hand hygiene promoted   single patient room provided   rest/sleep promoted  Taken 3/10/2025 0812 by Mitesh Perdue RN  Infection Prevention:   hand hygiene promoted   single patient room provided   rest/sleep promoted  Goal: Optimal Comfort and Wellbeing  Outcome: Progressing  Intervention: Monitor Pain and Promote Comfort  Recent Flowsheet Documentation  Taken 3/10/2025 0812 by Mitesh Perdue RN  Pain Management Interventions: care clustered  Intervention: Provide Person-Centered Care  Recent Flowsheet Documentation  Taken 3/10/2025 0812 by Mitesh Perdue RN  Trust Relationship/Rapport:   care explained   questions answered   questions encouraged  Goal: Readiness for Transition of Care  Outcome: Progressing     Problem: Fall Injury Risk  Goal: Absence of Fall and Fall-Related Injury  Outcome: Progressing  Intervention: Identify and Manage Contributors  Recent Flowsheet Documentation  Taken 3/10/2025 1450 by Mitesh Perdue RN  Medication Review/Management: medications reviewed  Self-Care Promotion: independence encouraged  Taken 3/10/2025 1253 by Mitesh Perdue, RN  Medication Review/Management: medications reviewed  Taken 3/10/2025 1050 by Mitesh Perdue, RN  Medication Review/Management: medications reviewed  Taken 3/10/2025 0812 by Mitesh Perdue, RN  Medication Review/Management: medications reviewed  Self-Care Promotion: independence encouraged  Intervention: Promote  Injury-Free Environment  Recent Flowsheet Documentation  Taken 3/10/2025 1450 by Mitesh Perdue RN  Safety Promotion/Fall Prevention:   activity supervised   fall prevention program maintained   lighting adjusted   safety round/check completed  Taken 3/10/2025 1253 by Mitesh Perdue RN  Safety Promotion/Fall Prevention:   activity supervised   fall prevention program maintained   lighting adjusted   safety round/check completed  Taken 3/10/2025 1050 by Mitesh Perdue RN  Safety Promotion/Fall Prevention:   activity supervised   fall prevention program maintained   lighting adjusted   safety round/check completed  Taken 3/10/2025 0812 by Mitesh Perdue RN  Safety Promotion/Fall Prevention:   activity supervised   fall prevention program maintained   lighting adjusted   safety round/check completed     Problem: Comorbidity Management  Goal: Maintenance of Behavioral Health Symptom Control  Outcome: Progressing  Intervention: Maintain Behavioral Health Symptom Control  Recent Flowsheet Documentation  Taken 3/10/2025 1450 by Mitesh Perdue RN  Medication Review/Management: medications reviewed  Taken 3/10/2025 1253 by Mitesh Perdue RN  Medication Review/Management: medications reviewed  Taken 3/10/2025 1050 by Mitesh Perdue RN  Medication Review/Management: medications reviewed  Taken 3/10/2025 0812 by Mitesh Perdue RN  Medication Review/Management: medications reviewed  Goal: Blood Pressure in Desired Range  Outcome: Progressing  Intervention: Maintain Blood Pressure Management  Recent Flowsheet Documentation  Taken 3/10/2025 1450 by Mitesh Perdue RN  Medication Review/Management: medications reviewed  Taken 3/10/2025 1253 by Mitesh Perdue, RN  Medication Review/Management: medications reviewed  Taken 3/10/2025 1050 by Mitesh Perdue, RN  Medication Review/Management: medications reviewed  Taken 3/10/2025 0812 by Mitesh Perdue, RN  Medication Review/Management: medications reviewed     Problem: Skin Injury Risk  Increased  Goal: Skin Health and Integrity  Outcome: Progressing  Intervention: Optimize Skin Protection  Recent Flowsheet Documentation  Taken 3/10/2025 1450 by Mitesh Perdue, RN  Activity Management:   activity encouraged   up to bedside commode  Taken 3/10/2025 1253 by Mitesh Perdue, RN  Activity Management:   activity encouraged   back to bed  Taken 3/10/2025 1050 by Mitesh Perdue, RN  Activity Management: back to bed  Taken 3/10/2025 0812 by Mitesh Perdue, RN  Activity Management:   activity encouraged   back to bed  Pressure Reduction Techniques: frequent weight shift encouraged  Head of Bed (HOB) Positioning: HOB elevated

## 2025-03-10 NOTE — PROGRESS NOTES
JANE RUDD Attestation Note    I supervised care provided by the midlevel provider.    The ROXANE and I have discussed this patient's history, physical exam, and treatment plan. I have reviewed the documentation and personally had a face to face interaction with the patient  I affirm the documentation and agree with the treatment and plan. I provided a substantive portion of the care of this patient.  I personally performed the physical exam, in its entirety.  My personal findings are documented in below:    History:  84-year-old male with multiple medical problems has been having frequent falls at home recently was admitted for further evaluation and treatment.  Patient reports he still feels quite weak this morning and.  When I ask him what his goals are, he tells me that he would like to live 5 more years because he needs to finish several paintings and to be able to go home.  When asking if this was realistic, he agreed that he is at high risk for falls if he goes home currently.  He has multiple complicating social issues at make disposition planning for this patient difficult.    Physical Exam:  General: No acute distress.  HENT: Ecchymosis left periorbital.  Eyes: no scleral icterus.  Neck: Painless range of motion  CV: Pink warm and well-perfused throughout  Respiratory: No distress or increased work of breathing  Musculoskeletal: no deformity.  Neuro: Alert, speech fluent and easily intelligible  Skin: warm, dry.    Assessment and Plan:  Recurrent falls: Multifactorial.  CT is unremarkable for acute traumatic injury.  MRI brain negative for acute CVA.  Await PT evaluation and case management evaluation to further assist with disposition planning.

## 2025-03-10 NOTE — THERAPY EVALUATION
Patient Name: Darwin Sparks  : 1940    MRN: [id number]                              Today's Date: 3/10/2025       Admit Date: 3/9/2025    Visit Dx:     ICD-10-CM ICD-9-CM   1. Injury of head, initial encounter  S09.90XA 959.01   2. Lightheadedness  R42 780.4   3. Hypertension not at goal  I10 401.9   4. Elevated bilirubin  R17 277.4     Patient Active Problem List   Diagnosis    Primary hypertension    Mixed hyperlipidemia    Adult hypothyroidism    Insomnia    Anxiety    Dermatitis, eczematoid    Bronchitis    Seasonal allergies    Health care maintenance    Chronic midline low back pain without sciatica    Penis pain    Tinea cruris    Primary osteoarthritis involving multiple joints    Benzodiazepine dependence    Constipation    Abnormal finding on CT scan    Scoliosis of lumbar spine    Lumbar facet arthropathy    Vertigo    Multiple falls     Past Medical History:   Diagnosis Date    Cataract     Hyperlipidemia     Hypertension     Hypothyroidism     Osteoarthritis     Prostate cancer      Past Surgical History:   Procedure Laterality Date    CATARACT EXTRACTION      CIRCUMCISION      COLONOSCOPY      COLONOSCOPY N/A 2021    Procedure: COLONOSCOPY TO CECUM/TI;  Surgeon: Jamel Michaels MD;  Location: [facility] ENDOSCOPY;  Service: Gastroenterology;  Laterality: N/A;  Pre op: Abnormal cat scan, constipation, RLQ pain  Post op: Diverticulosis, Hemorrhoids, otherwise normal to and including TI.    MEDIAL BRANCH BLOCK Bilateral 2021    Procedure: LUMBAR MEDIAL BRANCH BLOCK;  Surgeon: Kb Rodriguez MD;  [place] [facility] [place];  Service: Pain Management;  Laterality: Bilateral;    MEDIAL BRANCH BLOCK Bilateral 2021    Procedure: MEDIAL BRANCH BLOCK--bilateral lumbar3-lumbar5;  Surgeon: Kb Rodriguez MD;  Location: [facility] [place] [place];  Service: Pain Management;  Laterality: Bilateral;    RADIOFREQUENCY ABLATION Bilateral 10/8/2021    Procedure: RADIOFREQUENCY ABLATION  LUMBAR--bilateral lumbar3-5 WITH MAC;  Surgeon: Kb Rodriguez MD;  Location: [facility] OR;  Service: Pain Management;  Laterality: Bilateral;    TONSILLECTOMY      TOOTH EXTRACTION        General Information       Row Name 03/10/25 1126          Physical Therapy Time and Intention    Document Type evaluation  -     Mode of Treatment individual therapy;physical therapy  -       Row Name 03/10/25 1126          General Information    Patient Profile Reviewed yes  -     Prior Level of Function independent:  -     Existing Precautions/Restrictions fall  -       Row Name 03/10/25 1126          Living Environment    Current Living Arrangements home  -     People in Home spouse  -       Row Name 03/10/25 1126          Home Main Entrance    Number of Stairs, Main Entrance two  -       Row Name 03/10/25 1126          Cognition    Orientation Status (Cognition) oriented x 3  -       Row Name 03/10/25 1126          Safety Issues/Impairments Affecting Functional Mobility    Impairments Affecting Function (Mobility) balance;endurance/activity tolerance;cognition;strength  -     Cognitive Impairments, Mobility Safety/Performance insight into deficits/self-awareness  -               User Key  (r) = Recorded By, (t) = Taken By, (c) = Cosigned By      Initials Name Provider Type     Christina Fajardo, PT Physical Therapist                   Mobility       Row Name 03/10/25 1126          Bed Mobility    Bed Mobility supine-sit;sit-supine  -     Supine-Sit [place] (Bed Mobility) contact guard  -     Sit-Supine [place] (Bed Mobility) contact guard  -     Assistive Device (Bed Mobility) head of bed elevated;bed rails  -       Row Name 03/10/25 1126          Sit-Stand Transfer    Sit-Stand [place] (Transfers) contact guard;minimum assist (75% patient effort)  -     Assistive Device (Sit-Stand Transfers) walker, front-wheeled  -       Row Name 03/10/25 1126          Gait/Stairs  (Locomotion)    [place] Level (Gait) contact guard;verbal cues  -     Assistive Device (Gait) walker, front-wheeled  -     Distance in Feet (Gait) 15  -SM     Deviations/Abnormal Patterns (Gait) carolyn decreased;gait speed decreased  -     Bilateral Gait Deviations forward flexed posture  -     Comment, (Gait/Stairs) Gait very slow with fwd flexed posture and cues needed to stay close to rwx.  -               User Key  (r) = Recorded By, (t) = Taken By, (c) = Cosigned By      Initials Name Provider Type     Christina Fajardo PT Physical Therapist                   Obj/Interventions       Row Name 03/10/25 1127          Range of Motion Comprehensive    General Range of Motion bilateral lower extremity ROM WFL  -       Row Name 03/10/25 1127          Strength Comprehensive (MMT)    General Manual Muscle Testing (MMT) Assessment lower extremity strength deficits identified  -     Comment, General Manual Muscle Testing (MMT) Assessment Generalized B LE weakness  -       Row Name 03/10/25 1127          Balance    Balance Assessment sitting static balance;sitting dynamic balance;standing static balance;standing dynamic balance  -     Static Sitting Balance standby assist  -     Dynamic Sitting Balance contact guard  -     Position, Sitting Balance sitting edge of bed  -     Static Standing Balance contact guard  -     Dynamic Standing Balance contact guard;minimal assist  -     Position/Device Used, Standing Balance supported;walker, front-wheeled  -     Balance Interventions sitting;standing;sit to stand;supported;static;dynamic  -               User Key  (r) = Recorded By, (t) = Taken By, (c) = Cosigned By      Initials Name Provider Type     Christina Fajardo PT Physical Therapist                   Goals/Plan       Row Name 03/10/25 1150          Bed Mobility Goal 1 (PT)    Activity/Assistive Device (Bed Mobility Goal 1, PT) bed mobility activities, all  -     [place]  Level/Cues Needed (Bed Mobility Goal 1, PT) standby assist  -SM     Time Frame (Bed Mobility Goal 1, PT) 2 weeks  -SM       Row Name 03/10/25 1150          Transfer Goal 1 (PT)    Activity/Assistive Device (Transfer Goal 1, PT) sit-to-stand/stand-to-sit;bed-to-chair/chair-to-bed;walker, rolling  -SM     [place] Level/Cues Needed (Transfer Goal 1, PT) standby assist  -SM     Time Frame (Transfer Goal 1, PT) 2 weeks  -SM       Row Name 03/10/25 1150          Gait Training Goal 1 (PT)    Activity/Assistive Device (Gait Training Goal 1, PT) gait (walking locomotion);walker, rolling  -SM     [place] Level (Gait Training Goal 1, PT) standby assist  -SM     Distance (Gait Training Goal 1, PT) 100ft  -SM     Time Frame (Gait Training Goal 1, PT) 2 weeks  -SM               User Key  (r) = Recorded By, (t) = Taken By, (c) = Cosigned By      Initials Name Provider Type     Christina Fajardo, PT Physical Therapist                   Clinical Impression       Row Name 03/10/25 1128          Pain    Pretreatment Pain Rating 0/10 - no pain  -SM     Posttreatment Pain Rating 0/10 - no pain  -SM       Row Name 03/10/25 1128          Plan of Care Review    Plan of Care Reviewed With patient  -SM     Outcome Evaluation Patient is an 84 y.o male who presented to [facility] due to multiple falls. Patient lives at home with his spouse whom he reports he cares for. Patient initially reports he does not use an AD at home but then reports he uses forearm crutches. Patient reports he is independent at baseline and still drives. Patient reports having caregivers 4x/wk.  Patient reported feeling lightheaded throughout session. Orthostatics checked just prior to session with nsg. Patient sat up to EOB with CGA. Patient stood from EOB with Leonard. Patient ambulated 15ft with rwx and CGA. Gait very slow with fwd flexed posture and cues needed to stay close to rwx. Patient declined further ambulation. Concerned for patients ability to safely  mobilize around home to care for both himself and his spouse at d/c. Would recommend SNF vs 24/7 assist and HHPT at d/c.  -       Row Name 03/10/25 1128          Therapy Assessment/Plan (PT)    Rehab Potential (PT) fair  -     Criteria for Skilled Interventions Met (PT) yes;skilled treatment is necessary  -     Therapy Frequency (PT) 5 times/wk  -       Row Name 03/10/25 1128          Vital Signs    Pre Patient Position Supine  -     Intra Patient Position Standing  -SM     Post Patient Position Supine  -       Row Name 03/10/25 1128          Positioning and Restraints    Pre-Treatment Position in bed  -SM     Post Treatment Position bed  -SM     In Bed notified nsg;call light within reach;encouraged to call for assist;exit alarm on;fowlers  -               User Key  (r) = Recorded By, (t) = Taken By, (c) = Cosigned By      Initials Name Provider Type    Christina Lindo, ALEJANDRA Physical Therapist                   Outcome Measures       Row Name 03/10/25 1151 03/10/25 0812       How much help from another person do you currently need...    Turning from your back to your side while in flat bed without using bedrails? 3  -SM 3  -NK    Moving from lying on back to sitting on the side of a flat bed without bedrails? 2  -SM 3  -NK    Moving to and from a bed to a chair (including a wheelchair)? 3  -SM 2  -NK    Standing up from a chair using your arms (e.g., wheelchair, bedside chair)? 3  -SM 2  -NK    Climbing 3-5 steps with a railing? 2  -SM 2  -NK    To walk in hospital room? 2  -SM 2  -NK    AM-PAC 6 Clicks Score (PT) 15  -SM 14  -NK    Highest Level of Mobility Goal 4 --> Transfer to chair/commode  - 4 --> Transfer to chair/commode  -NK      Row Name 03/10/25 1151          Functional Assessment    Outcome Measure Options AM-PAC 6 Clicks Basic Mobility (PT)  -               User Key  (r) = Recorded By, (t) = Taken By, (c) = Cosigned By      Initials Name Provider Type    Christina Lindo, ALEJANDRA  Physical Therapist    Mitesh Jiang, RN Registered Nurse                                 Physical Therapy Education       Title: PT OT SLP Therapies (Done)       Topic: Physical Therapy (Done)       Point: Mobility training (Done)       Learning Progress Summary            Patient Acceptance, E, VU by  at 3/10/2025 1151                      Point: Home exercise program (Done)       Learning Progress Summary            Patient Acceptance, E, VU by  at 3/10/2025 1151                      Point: Body mechanics (Done)       Learning Progress Summary            Patient Acceptance, E, VU by  at 3/10/2025 1151                      Point: Precautions (Done)       Learning Progress Summary            Patient Acceptance, E, VU by  at 3/10/2025 1151                                      User Key       Initials Effective Dates Name Provider Type Discipline     05/02/22 -  Christina Fajardo, ALEJANDRA Physical Therapist PT                  PT Recommendation and Plan     Outcome Evaluation: Patient is an 84 y.o male who presented to [facility] due to multiple falls. Patient lives at home with his spouse whom he reports he cares for. Patient initially reports he does not use an AD at home but then reports he uses forearm crutches. Patient reports he is independent at baseline and still drives. Patient reports having caregivers 4x/wk.  Patient reported feeling lightheaded throughout session. Orthostatics checked just prior to session with nsg. Patient sat up to EOB with CGA. Patient stood from EOB with Leonard. Patient ambulated 15ft with rwx and CGA. Gait very slow with fwd flexed posture and cues needed to stay close to rwx. Patient declined further ambulation. Concerned for patients ability to safely mobilize around home to care for both himself and his spouse at d/c. Would recommend SNF vs 24/7 assist and HHPT at d/c.     Time Calculation:         PT Charges       Row Name 03/10/25 7004             Time Calculation    Start Time  0946  -      Stop Time 1001  -SM      Time Calculation (min) 15 min  -SM      PT Received On 03/10/25  -      PT - Next Appointment 03/11/25  -      PT Goal Re-Cert Due Date 03/24/25  -         Time Calculation- PT    Total Timed Code Minutes- PT 8 minute(s)  -SM         Timed Charges    62769 - PT Therapeutic Activity Minutes 8  -SM         Total Minutes    Timed Charges Total Minutes 8  -SM       Total Minutes 8  -SM                User Key  (r) = Recorded By, (t) = Taken By, (c) = Cosigned By      Initials Name Provider Type     Christina Fajardo PT Physical Therapist                  Therapy Charges for Today       Code Description Service Date Service Provider Modifiers Qty    50720335739 HC PT THERAPEUTIC ACT EA 15 MIN 3/10/2025 Christina Fajardo, PT GP 1    90149628758 HC PT EVAL MOD COMPLEXITY 3 3/10/2025 Christina Fajardo, PT GP 1            PT G-Codes  Outcome Measure Options: AM-PAC 6 Clicks Basic Mobility (PT)  AM-PAC 6 Clicks Score (PT): 15  PT Discharge Summary  Anticipated Discharge Disposition (PT): home with 24/7 care, home with home health, skilled nursing facility    Christina Fajardo PT  3/10/2025

## 2025-03-10 NOTE — PLAN OF CARE
Goal Outcome Evaluation:  Plan of Care Reviewed With: patient        Progress: no change  Outcome Evaluation: Assumed care of patient. pt is aox4, noted subsiding hematoma on left orbital socket and above left eyebrow, pt denies any vision changes, still has  some generalized weakness but denies dizziness. MRI has resulted, provider aware.Maintained on IV NS at 75ml/hr. Await PT/CCP to review in the morning. Pending AM labs/orthostatics. rollator walker/urinal provided at bedside. Promoted rest and sleep. Plan of care ongoing.

## 2025-03-10 NOTE — DISCHARGE PLACEMENT REQUEST
"Evelio Sparks (84 y.o. Male)       Date of Birth   1940    Social Security Number       Address   [address] [place] [address]    Home Phone   [phone]    MRN   [id number]       Baptism   None    Marital Status                               Admission Date   3/9/2025    Admission Type   Emergency    Admitting Provider   Oleksandr Azar MD    Attending Provider   Kleber Uribe MD    Department, Room/Bed   [facility] OBSERVATION, 120/1       Discharge Date       Discharge Disposition       Discharge Destination                                 Attending Provider: Kleber Uribe MD    Allergies: Milk-related Compounds, Pregabalin, Neomycin-bacitracin Zn-polymyx    Isolation: None   Infection: None   Code Status: CPR    Ht: 172.7 cm (68\")   Wt: 81.6 kg (180 lb)    Admission Cmt: None   Principal Problem: Multiple falls [R29.6]                   Active Insurance as of 3/9/2025       Primary Coverage       Payor Plan Insurance Group Employer/Plan Group    [employer] MEDICARE REPLACEMENT [employer] MEDICARE ADVANTAGE GROUP [id number]       Payor Plan Address Payor Plan Phone Number Payor Plan Fax Number Effective Dates    [address] [address] [phone]  1/1/2024 - None Entered    [facility] [id number]         Subscriber Name Subscriber Birth Date Member ID       EVELIO SPARKS 1940 [id number]                     Emergency Contacts        (Rel.) Home Phone Work Phone Mobile Phone    Roger David (Spouse) [phone] -- --    BERNICE ADAMS (Friend) [phone] -- --    ZEYNEP MEYERS (Care Giver) -- -- [phone]            "

## 2025-03-11 ENCOUNTER — READMISSION MANAGEMENT (OUTPATIENT)
Dept: CALL CENTER | Facility: HOSPITAL | Age: 85
End: 2025-03-11
Payer: MEDICARE

## 2025-03-11 ENCOUNTER — TELEPHONE (OUTPATIENT)
Dept: FAMILY MEDICINE CLINIC | Facility: CLINIC | Age: 85
End: 2025-03-11

## 2025-03-11 VITALS
TEMPERATURE: 98.1 F | DIASTOLIC BLOOD PRESSURE: 71 MMHG | OXYGEN SATURATION: 95 % | RESPIRATION RATE: 17 BRPM | HEART RATE: 68 BPM | HEIGHT: 68 IN | WEIGHT: 180 LBS | BODY MASS INDEX: 27.28 KG/M2 | SYSTOLIC BLOOD PRESSURE: 116 MMHG

## 2025-03-11 PROCEDURE — 96376 TX/PRO/DX INJ SAME DRUG ADON: CPT

## 2025-03-11 PROCEDURE — 25010000002 THIAMINE PER 100 MG: Performed by: PHYSICIAN ASSISTANT

## 2025-03-11 PROCEDURE — G0378 HOSPITAL OBSERVATION PER HR: HCPCS

## 2025-03-11 RX ORDER — FOLIC ACID 1 MG/1
1 TABLET ORAL DAILY
Qty: 30 TABLET | Refills: 0 | Status: SHIPPED | OUTPATIENT
Start: 2025-03-11 | End: 2025-03-21 | Stop reason: SDUPTHER

## 2025-03-11 RX ADMIN — THIAMINE HYDROCHLORIDE 200 MG: 100 INJECTION, SOLUTION INTRAMUSCULAR; INTRAVENOUS at 06:00

## 2025-03-11 RX ADMIN — LEVOTHYROXINE SODIUM 125 MCG: 0.03 TABLET ORAL at 08:31

## 2025-03-11 RX ADMIN — ESCITALOPRAM 5 MG: 5 TABLET, FILM COATED ORAL at 08:31

## 2025-03-11 RX ADMIN — Medication 10 ML: at 08:33

## 2025-03-11 RX ADMIN — POTASSIUM CHLORIDE 10 MEQ: 750 TABLET, EXTENDED RELEASE ORAL at 08:32

## 2025-03-11 RX ADMIN — TRIAMTERENE AND HYDROCHLOROTHIAZIDE 1 TABLET: 25; 37.5 TABLET ORAL at 08:31

## 2025-03-11 RX ADMIN — LISINOPRIL 15 MG: 10 TABLET ORAL at 08:31

## 2025-03-11 RX ADMIN — METOPROLOL SUCCINATE 25 MG: 25 TABLET, EXTENDED RELEASE ORAL at 08:32

## 2025-03-11 RX ADMIN — ATORVASTATIN CALCIUM 20 MG: 20 TABLET, FILM COATED ORAL at 08:32

## 2025-03-11 RX ADMIN — CELECOXIB 200 MG: 100 CAPSULE ORAL at 08:31

## 2025-03-11 RX ADMIN — FOLIC ACID 1 MG: 1 TABLET ORAL at 08:32

## 2025-03-11 NOTE — TELEPHONE ENCOUNTER
Caller: MARY HADDAD    Relationship: HOME HEALTH    Best call back number: [phone]     What orders are you requesting (i.e. lab or imaging): PHYSICAL THERAPY    Additional notes: STATES THAT PATIENT WAS RECENTLY IN THE ER AFTER RECENT FALLS. REQUESTS CALL BACK WITH VERBAL ORDERS FOR HOME HEALTH

## 2025-03-11 NOTE — PLAN OF CARE
Goal Outcome Evaluation:      Plan for discharge today with caregiver and HH. Pt alert and oriented x4, room air, vitals stable.        Problem: Adult Inpatient Plan of Care  Goal: Plan of Care Review  Outcome: Progressing  Goal: Patient-Specific Goal (Individualized)  Outcome: Progressing  Goal: Absence of Hospital-Acquired Illness or Injury  Outcome: Progressing  Intervention: Identify and Manage Fall Risk  Recent Flowsheet Documentation  Taken 3/11/2025 0412 by Trever Paiz RN  Safety Promotion/Fall Prevention:   safety round/check completed   room organization consistent   assistive device/personal items within reach   clutter free environment maintained   activity supervised  Taken 3/11/2025 0201 by Trever Paiz RN  Safety Promotion/Fall Prevention:   safety round/check completed   room organization consistent   clutter free environment maintained   assistive device/personal items within reach   activity supervised  Taken 3/11/2025 0058 by Trever Paiz RN  Safety Promotion/Fall Prevention:   safety round/check completed   room organization consistent   clutter free environment maintained   assistive device/personal items within reach  Taken 3/10/2025 2058 by Trever Paiz RN  Safety Promotion/Fall Prevention:   safety round/check completed   room organization consistent   clutter free environment maintained   assistive device/personal items within reach  Intervention: Prevent Skin Injury  Recent Flowsheet Documentation  Taken 3/10/2025 2058 by Trever Paiz RN  Body Position: position changed independently  Goal: Optimal Comfort and Wellbeing  Outcome: Progressing  Intervention: Provide Person-Centered Care  Recent Flowsheet Documentation  Taken 3/10/2025 2058 by Trever Paiz RN  Trust Relationship/Rapport:   care explained   choices provided  Goal: Readiness for Transition of Care  Outcome: Progressing     Problem: Fall Injury Risk  Goal: Absence of Fall and Fall-Related Injury  Outcome:  Progressing  Intervention: Promote Injury-Free Environment  Recent Flowsheet Documentation  Taken 3/11/2025 0412 by Trever Paiz RN  Safety Promotion/Fall Prevention:   safety round/check completed   room organization consistent   assistive device/personal items within reach   clutter free environment maintained   activity supervised  Taken 3/11/2025 0201 by Trever Paiz RN  Safety Promotion/Fall Prevention:   safety round/check completed   room organization consistent   clutter free environment maintained   assistive device/personal items within reach   activity supervised  Taken 3/11/2025 0058 by Trever Paiz RN  Safety Promotion/Fall Prevention:   safety round/check completed   room organization consistent   clutter free environment maintained   assistive device/personal items within reach  Taken 3/10/2025 2058 by Trever Paiz RN  Safety Promotion/Fall Prevention:   safety round/check completed   room organization consistent   clutter free environment maintained   assistive device/personal items within reach     Problem: Comorbidity Management  Goal: Maintenance of Behavioral Health Symptom Control  Outcome: Progressing  Goal: Blood Pressure in Desired Range  Outcome: Progressing     Problem: Skin Injury Risk Increased  Goal: Skin Health and Integrity  Outcome: Progressing  Intervention: Optimize Skin Protection  Recent Flowsheet Documentation  Taken 3/10/2025 2058 by Trever Paiz, RN  Head of Bed (HOB) Positioning: HOB elevated

## 2025-03-11 NOTE — OUTREACH NOTE
Prep Survey      Flowsheet Row Responses   [facility] facility patient discharged from? [facility]   Is LACE score < 7 ? Yes   Eligibility [facility]   Date of Admission 03/09/25   Date of Discharge 03/11/25   Discharge Disposition Home or Self Care   Discharge diagnosis Multiple falls   Does the patient have one of the following disease processes/diagnoses(primary or secondary)? Other   Does the patient have Home health ordered? Yes   What is the Home health agency?  [facility]    Is there a DME ordered? No   Prep survey completed? Yes            OSCAR MIRAMONTES - Registered Nurse

## 2025-03-11 NOTE — PLAN OF CARE
Goal Outcome Evaluation:  Plan of Care Reviewed With: patient        Progress: improving  Outcome Evaluation: Pt. D/C today. Pt. to follow up with PCP and referal to home health has been made. At this time VSS, pt. A&Ox4, and all questions answered.       Problem: Adult Inpatient Plan of Care  Goal: Plan of Care Review  3/11/2025 1131 by Mitesh Perdue RN  Outcome: Met  Flowsheets (Taken 3/11/2025 1131)  Progress: improving  Outcome Evaluation: Pt. D/C today. Pt. to follow up with PCP and referal to home health has been made. At this time VSS, pt. A&Ox4, and all questions answered.  Plan of Care Reviewed With: patient  3/11/2025 1131 by Mitesh Perdue RN  Outcome: Progressing  Flowsheets (Taken 3/11/2025 1131)  Progress: improving  Outcome Evaluation: Pt. D/C today. Pt. to follow up with PCP and referal to home health has been made. At this time VSS, pt. A&Ox4, and all questions answered.  Plan of Care Reviewed With: patient  Goal: Patient-Specific Goal (Individualized)  3/11/2025 1131 by Mitesh Perdue RN  Outcome: Met  3/11/2025 1131 by Mitesh Perdue RN  Outcome: Progressing  Goal: Absence of Hospital-Acquired Illness or Injury  3/11/2025 1131 by Mitesh Perdue RN  Outcome: Met  3/11/2025 1131 by Mitesh Perdue RN  Outcome: Progressing  Intervention: Identify and Manage Fall Risk  Recent Flowsheet Documentation  Taken 3/11/2025 1021 by Mitesh Perdue RN  Safety Promotion/Fall Prevention:   activity supervised   fall prevention program maintained   lighting adjusted   safety round/check completed  Taken 3/11/2025 0831 by Mitesh Perdue RN  Safety Promotion/Fall Prevention:   activity supervised   fall prevention program maintained   lighting adjusted   safety round/check completed  Intervention: Prevent Skin Injury  Recent Flowsheet Documentation  Taken 3/11/2025 0831 by Mitesh Perdue RN  Body Position: position changed independently  Intervention: Prevent and Manage VTE (Venous Thromboembolism) Risk  Recent  Flowsheet Documentation  Taken 3/11/2025 0831 by Mitesh Perdue RN  VTE Prevention/Management:   SCDs (sequential compression devices) off   patient refused intervention  Intervention: Prevent Infection  Recent Flowsheet Documentation  Taken 3/11/2025 1021 by Mitesh Perdue RN  Infection Prevention:   hand hygiene promoted   single patient room provided   rest/sleep promoted  Taken 3/11/2025 0831 by Mitesh Perdue RN  Infection Prevention:   hand hygiene promoted   single patient room provided   rest/sleep promoted  Goal: Optimal Comfort and Wellbeing  3/11/2025 1131 by Mitesh Perdue RN  Outcome: Met  3/11/2025 1131 by Mitesh Perdue RN  Outcome: Progressing  Intervention: Monitor Pain and Promote Comfort  Recent Flowsheet Documentation  Taken 3/11/2025 0831 by Mitesh Perdue RN  Pain Management Interventions:   care clustered   position adjusted   pillow support provided  Intervention: Provide Person-Centered Care  Recent Flowsheet Documentation  Taken 3/11/2025 0831 by Mitesh Perdue RN  Trust Relationship/Rapport:   care explained   choices provided   questions answered   questions encouraged  Goal: Readiness for Transition of Care  3/11/2025 1131 by Mitesh Perdue RN  Outcome: Met  3/11/2025 1131 by Mitesh Perdue RN  Outcome: Progressing     Problem: Fall Injury Risk  Goal: Absence of Fall and Fall-Related Injury  3/11/2025 1131 by Mitesh Perdue RN  Outcome: Met  3/11/2025 1131 by Mitesh Perdue RN  Outcome: Progressing  Intervention: Identify and Manage Contributors  Recent Flowsheet Documentation  Taken 3/11/2025 1021 by Mitesh Perdue, RN  Medication Review/Management: medications reviewed  Taken 3/11/2025 0831 by Mitesh Perdue RN  Medication Review/Management: medications reviewed  Self-Care Promotion: independence encouraged  Intervention: Promote Injury-Free Environment  Recent Flowsheet Documentation  Taken 3/11/2025 1021 by Mitesh Perdue RN  Safety Promotion/Fall Prevention:   activity supervised    fall prevention program maintained   lighting adjusted   safety round/check completed  Taken 3/11/2025 0831 by Mitesh Perdue, RN  Safety Promotion/Fall Prevention:   activity supervised   fall prevention program maintained   lighting adjusted   safety round/check completed     Problem: Comorbidity Management  Goal: Maintenance of Behavioral Health Symptom Control  3/11/2025 1131 by Mitesh Perdue RN  Outcome: Met  3/11/2025 1131 by Mitesh Perdue RN  Outcome: Progressing  Intervention: Maintain Behavioral Health Symptom Control  Recent Flowsheet Documentation  Taken 3/11/2025 1021 by Mitesh Perdue RN  Medication Review/Management: medications reviewed  Taken 3/11/2025 0831 by Mitesh Perdue RN  Medication Review/Management: medications reviewed  Goal: Blood Pressure in Desired Range  3/11/2025 1131 by Mitesh Perdue RN  Outcome: Met  3/11/2025 1131 by Mitesh Perdue RN  Outcome: Progressing  Intervention: Maintain Blood Pressure Management  Recent Flowsheet Documentation  Taken 3/11/2025 1021 by Mitesh Perdue RN  Medication Review/Management: medications reviewed  Taken 3/11/2025 0831 by Mitesh Perdue RN  Medication Review/Management: medications reviewed     Problem: Skin Injury Risk Increased  Goal: Skin Health and Integrity  3/11/2025 1131 by Mitesh Perdue RN  Outcome: Met  3/11/2025 1131 by Mitesh Perdue RN  Outcome: Progressing  Intervention: Optimize Skin Protection  Recent Flowsheet Documentation  Taken 3/11/2025 1021 by Mitesh Perdue, RN  Activity Management:   activity encouraged   back to bed  Taken 3/11/2025 0831 by Mitesh Perdue, RN  Activity Management:   activity encouraged   back to bed  Pressure Reduction Techniques: frequent weight shift encouraged  Head of Bed (HOB) Positioning: HOB elevated

## 2025-03-11 NOTE — CASE MANAGEMENT/SOCIAL WORK
"Discharge Planning Assessment  [facility]     Patient Name: Darwin Sparks  MRN: [id number]  Today's Date: 3/11/2025    Admit Date: 3/9/2025    Plan: Home with caregivers and spouse and HH. HH pending. Jaswinder will be at patients home tomorrow between 9-1 and she can assist him into his home. [employer] wheelchair van scheduled for tomorrow @ 11a   Discharge Needs Assessment    No documentation.                  Discharge Plan       Row Name 03/11/25 1117       Plan    Plan Comments Call received by obs provider advising patient is now agreeable to going to rehab- Entered room, introduced self and explained role; patient verbalized he doesnt know what to do now because he has to consider his partner as they \"have to go together.\" Advised PT recommended SNF- patient noted that hasnt worked out before- pt mentioned they have been looking into the [facility]- Provided RTR, information on [facility] and [facility]- advising patient these companies can assist with transitioning to a facility that can accomodate patient needs- (mentioning he is vegan) Pt has decided he will return home and pursue the [facility]; encouraged patient to increase caregivers in the interim prior to transitioning to assisted lliving; following up with home health- [employer] transport has arrived to transport patient                  Continued Care and Services - Discharged on 3/11/2025 Admission date: 3/9/2025 - Discharge disposition: Home or Self Care      Home Medical Care       Service Provider Request Status Services Address Phone Fax Patient Preferred    [facility] - [facility] Pending - Request Sent -- [id number], [facility] [id number] [phone] --    [facility] [place] Declined  Inadequate staffing -- [address], [facility] [address] [phone] --                  Expected Discharge Date and Time       Expected Discharge Date " Expected Discharge Time    Mar 11, 2025            Demographic Summary    No documentation.                  Functional Status    No documentation.                  Psychosocial    No documentation.                  Abuse/Neglect    No documentation.                  Legal    No documentation.                  Substance Abuse    No documentation.                  Patient Forms    No documentation.                     Caity Ruano RN

## 2025-03-11 NOTE — DISCHARGE SUMMARY
ED OBSERVATION PROGRESS/DISCHARGE SUMMARY    Date of Admission: 3/9/2025   LOS: 0 days   PCP: Epley, James, APRN      Subjective:Vertigo    Hospital Outcome:   84-year-old male with a past medical history including but not limited to hypertension, hyperlipidemia, hypothyroidism, chronic alcohol use, and prostate cancer who was admitted to the observation unit for further evaluation of generalized weakness with multiple falls at home.  In the ER, CT of the C-spine and facial bones showed no evidence for fractures.  CT head showed no intracranial hemorrhage.  Labs noted some mild transaminitis.  TSH elevated but free T4 within normal range.  Folate level was low at 3.27.  Patient started on folic acid.     MRI of the brain without shows senescent changes not exceptional for age with no evidence of acute intracranial abnormality.  Patient's orthostatic vital signs negative.      Physical therapy has evaluated.  They recommend SNF versus 24/7 assistance and HH PT at discharge.  [facility] is also evaluated the patient.  Patient refuses SNF.  He wants to return home with caregivers and with home health.  Home health referral was made.  Patient has stair lift at home and walker.  Patient was kept overnight due to scheduling with transportation for the wheelchair van.    Patient was again offered SNF placement and [facility] discussed with patient, but ultimately patient has decided to return home with HH PT and to try and coordinate further placement for himself and his  with one of the companies provided by [facility]. Transportation planned for 11 AM this morning.  Patient's caregiver will be present at the home today.  Patient denies any new complaints during the evening.    ROS:  General: no fevers, chills  Respiratory: no cough, dyspnea  Cardiovascular: no chest pain, palpitations  Abdomen: No abdominal pain, nausea, vomiting, or diarrhea  Neurologic: No focal weakness    Objective   Physical Exam:  I have reviewed the vital  signs.  Temp:  [97.9 °F (36.6 °C)-98.4 °F (36.9 °C)] 98.1 °F (36.7 °C)  Heart Rate:  [61-84] 61  Resp:  [16-18] 16  BP: ()/() 116/65  General Appearance:    Alert, cooperative, no distress  Head:    Normocephalic, atraumatic, mild hard of hearing  Eyes:    Sclerae anicteric, EOMI, significant bruising around left eye  Neck:   Supple, nontender  Lungs: Clear to auscultation bilaterally, respirations unlabored on room air  Heart: Regular rate and rhythm, S1 and S2 normal, no murmur  Abdomen:  Soft, nontender, bowel sounds active, nondistended  Extremities: No clubbing, cyanosis, or edema to lower extremities  Pulses:  2+ and symmetric in distal lower extremities  Skin: No rashes   Neurologic: Oriented x3, Normal strength to extremities    Results Review:    I have reviewed the labs, radiology results and diagnostic studies.    Results from last 7 days   Lab Units 03/10/25  0306   WBC 10*3/mm3 5.98   HEMOGLOBIN g/dL 13.3   HEMATOCRIT % 38.1   PLATELETS 10*3/mm3 141     Results from last 7 days   Lab Units 03/10/25  0306 03/09/25  1040   SODIUM mmol/L 139 140   POTASSIUM mmol/L 3.8 4.1   CHLORIDE mmol/L 103 101   CO2 mmol/L 27.9 26.3   BUN mg/dL 14 13   CREATININE mg/dL 0.98 1.07   CALCIUM mg/dL 8.4* 8.9   BILIRUBIN mg/dL 0.8 1.6*   ALK PHOS U/L 110 119*   ALT (SGPT) U/L 41 49*   AST (SGOT) U/L 60* 79*   GLUCOSE mg/dL 107* 109*     Imaging Results (Last 24 Hours)       ** No results found for the last 24 hours. **            I have reviewed the medications.     Discharge Medications        ASK your doctor about these medications        Instructions Start Date   albuterol sulfate  (90 Base) MCG/ACT inhaler  Commonly known as: PROVENTIL HFA;VENTOLIN HFA;PROAIR HFA   2 puffs, Inhalation, Every 4 Hours PRN      celecoxib 200 MG capsule  Commonly known as: CeleBREX   200 mg, Oral, 2 Times Daily, With water, take lowest effective dose      clonazePAM 1 MG tablet  Commonly known as: KlonoPIN   1 mg, Nightly  PRN      escitalopram 5 MG tablet  Commonly known as: Lexapro   5 mg, Oral, Daily, For improved mood      levothyroxine 125 MCG tablet  Commonly known as: SYNTHROID, LEVOTHROID   125 mcg, Oral, Daily      lisinopril 10 MG tablet  Commonly known as: PRINIVIL,ZESTRIL   15 mg, Daily      metoprolol succinate XL 25 MG 24 hr tablet  Commonly known as: Toprol XL   25 mg, Oral, Daily, For BP and axiety      niacin 250 MG tablet   250 mg, Oral, Daily With Breakfast      potassium chloride 10 MEQ CR capsule  Commonly known as: MICRO-K   10 mEq, Oral, Daily      simvastatin 40 MG tablet  Commonly known as: ZOCOR   40 mg, Oral, Nightly      triamterene-hydrochlorothiazide 37.5-25 MG per capsule  Commonly known as: DYAZIDE   1 capsule, Every Morning             ---------------------------------------------------------------------------------------------  Assessment & Plan   Assessment/Problem List    Multiple falls    Vertigo      Plan:    Multiple recent falls:  -Patient reports only 1 episode of vertigo on his way into the hospital  -MRI brain no evidence for acute cranial abnormality  -Orthostatic vital signs negative  -Physical therapy consulted, recommended SNF, patient has dependent spouse at home thus refused SNF will go home with caregivers and home health; home health order has been placed     Transaminitis, mild:  -Improved on morning labs  -Patient not currently complaining of any GI symptoms; continue to monitor     Alcohol use:  -Patient reports he has not drank in almost 5 days  -No episodes of withdrawal throughout admission  -B12 within normal limits, folate low  -Will discharge with folate prescription  -Cessation discussed and encouraged     Chronic back pain:  -As needed Tylenol     Hypertension:  -Continue home regimen     Hypothyroidism:  -TSH is elevated and free T4 is low normal  -Patient needs to follow-up with PCP outpatient for repeat labs to assess need for further medication changes    Disposition:  Discharge to home    Follow-up after Discharge: PCP in 1 to 2 weeks    This note will serve as discharge summary      39 minutes have been spent by [facility] providers in the care of this patient while under observation status.    Appropriate PPE worn during patient encounter.  Hand hygeine performed before and after seeing the patient.      Bianca Cohn PA-C 03/11/25 07:46 EDT

## 2025-03-12 ENCOUNTER — TRANSITIONAL CARE MANAGEMENT TELEPHONE ENCOUNTER (OUTPATIENT)
Dept: CALL CENTER | Facility: HOSPITAL | Age: 85
End: 2025-03-12
Payer: MEDICARE

## 2025-03-12 NOTE — CASE MANAGEMENT/SOCIAL WORK
Case Management Discharge Note      Final Note: Home with [facility] and caregiver     Provided Post Acute Provider List?: N/A  Provided Post Acute Provider Quality & Resource List?: N/A    Selected Continued Care - Discharged on 3/11/2025 Admission date: 3/9/2025 - Discharge disposition: Home or Self Care      Destination    No services have been selected for the patient.                Durable Medical Equipment    No services have been selected for the patient.                Dialysis/Infusion    No services have been selected for the patient.                Home Medical Care    No services have been selected for the patient.                Therapy    No services have been selected for the patient.                Community Resources    No services have been selected for the patient.                Community & DME    No services have been selected for the patient.                         Final Discharge Disposition Code: 06 - home with home health care

## 2025-03-12 NOTE — OUTREACH NOTE
Call Center TCM Note      Flowsheet Row Responses   [facility] patient discharged from? [place]   Does the patient have one of the following disease processes/diagnoses(primary or secondary)? Other   TCM attempt successful? Yes   Call start time 1028   Call end time 1030   Discharge diagnosis Multiple falls   Meds reviewed with patient/caregiver? Yes   Is the patient having any side effects they believe may be caused by any medication additions or changes? No   Does the patient have all medications ordered at discharge? Yes   Is the patient taking all medications as directed (includes completed medication regime)? Yes   Does the patient have an appointment with their PCP within 7-14 days of discharge? No appointments available   Nursing Interventions Routed TCM call to PCP office   What is the Home health agency?  [facility]    Has home health visited the patient within 72 hours of discharge? Call prior to 72 hours   Psychosocial issues? No   Did the patient receive a copy of their discharge instructions? Yes   Nursing interventions Reviewed instructions with patient   What is the patient's perception of their health status since discharge? Improving   Is the patient/caregiver able to teach back signs and symptoms related to disease process for when to call PCP? Yes   Is the patient/caregiver able to teach back signs and symptoms related to disease process for when to call 911? Yes   Is the patient/caregiver able to teach back the hierarchy of who to call/visit for symptoms/problems? PCP, Specialist, Home health nurse, Urgent Care, ED, 911 Yes   Additional teach back comments Fall precautions discussed.   TCM call completed? Yes   Wrap up additional comments Doing well, denies any questions or concerns, states he is being careful to avoid falls, needs a hospital follow up appt with PCP, discussed 24/7 nurse call line.   Call end time 1030   Would this patient benefit from a Referral to [facility] Social Work? No    Is the patient interested in additional calls from an ambulatory ? No            April Calvo RN    3/12/2025, 10:31 EDT

## 2025-03-21 ENCOUNTER — OFFICE VISIT (OUTPATIENT)
Dept: FAMILY MEDICINE CLINIC | Facility: CLINIC | Age: 85
End: 2025-03-21
Payer: MEDICARE

## 2025-03-21 VITALS
WEIGHT: 180 LBS | HEART RATE: 85 BPM | OXYGEN SATURATION: 97 % | SYSTOLIC BLOOD PRESSURE: 121 MMHG | HEIGHT: 68 IN | DIASTOLIC BLOOD PRESSURE: 64 MMHG | BODY MASS INDEX: 27.28 KG/M2

## 2025-03-21 DIAGNOSIS — F32.2 SEVERE MAJOR DEPRESSION: ICD-10-CM

## 2025-03-21 DIAGNOSIS — R68.89 DECREASED STRENGTH, ENDURANCE, AND MOBILITY: ICD-10-CM

## 2025-03-21 DIAGNOSIS — R26.81 GAIT INSTABILITY: ICD-10-CM

## 2025-03-21 DIAGNOSIS — Z74.09 DECREASED STRENGTH, ENDURANCE, AND MOBILITY: ICD-10-CM

## 2025-03-21 DIAGNOSIS — R53.1 DECREASED STRENGTH, ENDURANCE, AND MOBILITY: ICD-10-CM

## 2025-03-21 DIAGNOSIS — R29.6 RECURRENT FALLS: Primary | ICD-10-CM

## 2025-03-21 PROCEDURE — 99214 OFFICE O/P EST MOD 30 MIN: CPT | Performed by: NURSE PRACTITIONER

## 2025-03-21 PROCEDURE — 1125F AMNT PAIN NOTED PAIN PRSNT: CPT | Performed by: NURSE PRACTITIONER

## 2025-03-21 RX ORDER — CELECOXIB 200 MG/1
200 CAPSULE ORAL 2 TIMES DAILY
Qty: 180 CAPSULE | Refills: 1 | Status: SHIPPED | OUTPATIENT
Start: 2025-03-21

## 2025-03-21 RX ORDER — LEVOTHYROXINE SODIUM 125 UG/1
125 TABLET ORAL DAILY
Qty: 90 TABLET | Refills: 3 | Status: SHIPPED | OUTPATIENT
Start: 2025-03-21

## 2025-03-21 RX ORDER — AMMONIUM LACTATE 12 G/100G
LOTION TOPICAL AS NEEDED
Qty: 400 G | Refills: 3 | Status: SHIPPED | OUTPATIENT
Start: 2025-03-21

## 2025-03-21 RX ORDER — SERTRALINE HYDROCHLORIDE 25 MG/1
25 TABLET, FILM COATED ORAL DAILY
Qty: 30 TABLET | Refills: 1 | Status: SHIPPED | OUTPATIENT
Start: 2025-03-21 | End: 2025-04-14

## 2025-03-21 RX ORDER — FOLIC ACID 1 MG/1
1 TABLET ORAL DAILY
Qty: 90 TABLET | Refills: 3 | Status: SHIPPED | OUTPATIENT
Start: 2025-03-21

## 2025-03-21 NOTE — PATIENT INSTRUCTIONS
Discharge instruction    Regarding, who would be the best oral surgeon for implants,  Or implants of the entire all at once,  Probably get ideal to call a couple or several different dental offices and see who they referred her to.  For your specific question    Consider calling the right touch,  [place] and from the [facility] and soothe you a recommend they can do a full set of dentures the  At 1 time they recommended      More protein, diet,  Avoid all alcohol,  Celebrex lowest effective dose take 1 daily presently only 1 but started up to the first day okay to get the drug level up and prompt relief    You will feel better with your thyroid medication continue and continue folate,    Your blood pressure is controlled without medication no need for blood pressure medication  Okay to hold off on your statin for a while,    Sertraline for anxiety feelings of wellbeing anxiety this is an excellent medication  Very popular because at work  But start at 1/2 tablet daily the first week then 1 tablet daily thereafter  Likely you will need 50 or 100 mg maximum is 200 mg daily  I am starting a very low-dose just to 25 mg 1/2 tablet a day or even every other day for the first week or so and we will move it up as quick as possible to the target dose    No issues with 1 glass of wine daily, may be therapeutic  But no more    Update me weekly by messaging if you can just on your progress,  I will be there for you if you need me let me know if you need Occupational Therapy at home, or PT

## 2025-04-14 ENCOUNTER — TELEPHONE (OUTPATIENT)
Dept: FAMILY MEDICINE CLINIC | Facility: CLINIC | Age: 85
End: 2025-04-14

## 2025-04-14 ENCOUNTER — OFFICE VISIT (OUTPATIENT)
Dept: FAMILY MEDICINE CLINIC | Facility: CLINIC | Age: 85
End: 2025-04-14
Payer: MEDICARE

## 2025-04-14 VITALS
HEART RATE: 89 BPM | HEIGHT: 68 IN | OXYGEN SATURATION: 95 % | WEIGHT: 180 LBS | TEMPERATURE: 97.3 F | DIASTOLIC BLOOD PRESSURE: 66 MMHG | SYSTOLIC BLOOD PRESSURE: 109 MMHG | BODY MASS INDEX: 27.28 KG/M2

## 2025-04-14 DIAGNOSIS — F32.2 SEVERE MAJOR DEPRESSION: Primary | ICD-10-CM

## 2025-04-14 DIAGNOSIS — R29.6 RECURRENT FALLS: ICD-10-CM

## 2025-04-14 DIAGNOSIS — R26.81 GAIT INSTABILITY: ICD-10-CM

## 2025-04-14 PROBLEM — R53.1 DECREASED STRENGTH, ENDURANCE, AND MOBILITY: Status: ACTIVE | Noted: 2025-04-14

## 2025-04-14 PROBLEM — R68.89 DECREASED STRENGTH, ENDURANCE, AND MOBILITY: Status: ACTIVE | Noted: 2025-04-14

## 2025-04-14 PROBLEM — Z74.09 DECREASED STRENGTH, ENDURANCE, AND MOBILITY: Status: ACTIVE | Noted: 2025-04-14

## 2025-04-14 PROCEDURE — 1125F AMNT PAIN NOTED PAIN PRSNT: CPT | Performed by: NURSE PRACTITIONER

## 2025-04-14 PROCEDURE — 99213 OFFICE O/P EST LOW 20 MIN: CPT | Performed by: NURSE PRACTITIONER

## 2025-04-14 NOTE — PROGRESS NOTES
"Chief Complaint  Hospital Follow Up Visit    Subjective        Darwin Sparks presents to [facility]  History of Present Illness  Recurrent falls depression anxiety, weakness gait instability,  Has never taken antidepressant thinks time no suicidal ideation,  Chronic weakness gait instability low back pain, no chest pain shortness of breath fever night sweats unexplained weight loss      Objective   Vital Signs:  /64 (BP Location: Left arm, Patient Position: Sitting, Cuff Size: Adult)   Pulse 85   Ht 172.7 cm (68\")   Wt 81.6 kg (180 lb)   SpO2 97%   BMI 27.37 kg/m²   Estimated body mass index is 27.37 kg/m² as calculated from the following:    Height as of this encounter: 172.7 cm (68\").    Weight as of this encounter: 81.6 kg (180 lb).          Physical Exam  Constitutional:       General: He is not in acute distress.     Appearance: Normal appearance. He is not ill-appearing, toxic-appearing or diaphoretic.   Eyes:      Conjunctiva/sclera: Conjunctivae normal.      Pupils: Pupils are equal, round, and reactive to light.   Cardiovascular:      Rate and Rhythm: Normal rate and regular rhythm.   Pulmonary:      Effort: Pulmonary effort is normal. No respiratory distress.   Abdominal:      General: Abdomen is flat.   Musculoskeletal:      Comments: Unable to ambulate independently in a wheelchair, requires assistance, no significant edema lower extremities chronic weakness lower extremity   Neurological:      General: No focal deficit present.      Cranial Nerves: No cranial nerve deficit.   Psychiatric:         Mood and Affect: Mood normal.         Behavior: Behavior normal.         Thought Content: Thought content normal.         Judgment: Judgment normal.        Result Review :                Assessment and Plan   Diagnoses and all orders for this visit:    1. Recurrent falls (Primary)    2. Gait instability    3. Decreased strength, endurance, and mobility    4. Severe " major depression    Other orders  -     levothyroxine (SYNTHROID, LEVOTHROID) 125 MCG tablet; Take 1 tablet by mouth Daily.  Dispense: 90 tablet; Refill: 3  -     folic acid (FOLVITE) 1 MG tablet; Take 1 tablet by mouth Daily.  Dispense: 90 tablet; Refill: 3  -     celecoxib (CeleBREX) 200 MG capsule; Take 1 capsule by mouth 2 (Two) Times a Day. With water, take lowest effective dose  Dispense: 180 capsule; Refill: 1  -     sertraline (Zoloft) 25 MG tablet; Take 1 tablet by mouth Daily. For improved mood and feelings of wellbeing  Dispense: 30 tablet; Refill: 1  -     ammonium lactate (LAC-HYDRIN) 12 % lotion; Apply  topically to the appropriate area as directed As Needed for Dry Skin. Best for lower extremity dry chronically's skin  Dispense: 400 g; Refill: 3           I spent 33 minutes caring for Darwin on this date of service. This time includes time spent by me in the following activities:preparing for the visit, reviewing tests, performing a medically appropriate examination and/or evaluation , counseling and educating the patient/family/caregiver, ordering medications, tests, or procedures, documenting information in the medical record, and care coordination  Follow Up   Return in about 3 weeks (around 4/11/2025).  Patient was given instructions and counseling regarding his condition or for health maintenance advice. Please see specific information pulled into the AVS if appropriate.     Patient Instructions   Discharge instruction    Regarding, who would be the best oral surgeon for implants,  Or implants of the entire all at once,  Probably get ideal to call a couple or several different dental offices and see who they referred her to.  For your specific question    Consider calling the right [facility],  [facility] and from the [employer] and soothe you a recommend they can do a full set of dentures the  At 1 time they recommended      More protein, diet,  Avoid all alcohol,  Celebrex lowest effective dose take 1  daily presently only 1 but started up to the first day okay to get the drug level up and prompt relief    You will feel better with your thyroid medication continue and continue folate,    Your blood pressure is controlled without medication no need for blood pressure medication  Okay to hold off on your statin for a while,    Sertraline for anxiety feelings of wellbeing anxiety this is an excellent medication  Very popular because at work  But start at 1/2 tablet daily the first week then 1 tablet daily thereafter  Likely you will need 50 or 100 mg maximum is 200 mg daily  I am starting a very low-dose just to 25 mg 1/2 tablet a day or even every other day for the first week or so and we will move it up as quick as possible to the target dose    No issues with 1 glass of wine daily, may be therapeutic  But no more    Update me weekly by messaging if you can just on your progress,  I will be there for you if you need me let me know if you need Occupational Therapy at home, or PT

## 2025-04-14 NOTE — TELEPHONE ENCOUNTER
Spoke with Khai at the [facility]. She will relay message to Dr Morales and call the office back on whom she recommended.

## 2025-04-14 NOTE — PATIENT INSTRUCTIONS
Discharge instruction    Sunshine therapy is much as possible    Starting tomorrow increase sertraline to 50 mg daily this will continue to give you energy help you relax and feel better likely    If any difficulty along the way let me know  It should not cause any insomnia but if so the next week, let me know and we will increase more slowly  Otherwise, update me every 2 weeks, I will see you back in 1 month,  1 to make sure you continue to improve    Avoid alcohol,  Okay to have 1 glass of wine 6 ounces approximately  Before bedtime with caution

## 2025-04-14 NOTE — TELEPHONE ENCOUNTER
Please call Dr. Morales at the [facility]  In front of [employer] please speak to the  and she can forward a message to Dr. Morales          I have a 84-year-old gentleman,  Who would be a self-pay and he would like,  To either get dentures again or possibly, a large number of implants,  He would be considered higher risk  Who would she recommend if anybody oral surgeon that may do a more complicated, procedure in regards to dentures and/or implants he is leaning towards the implants,    If she would mind if able she can give me a name or to that he could call thank you    Thank you

## 2025-04-15 NOTE — PROGRESS NOTES
"Chief Complaint  recurrent falls (No further falls)    Subjective        Darwin Sparks presents to [facility]  History of Present Illness  Pleasant gentleman  Follow-up, recently started on sertraline for severe depression much is situational due to advanced aging, as well as his, significant other Roger, has chronic illness, and requires significant care  Assistance coming in the house, sertraline so far is helping has had no problems  would like to continue the medication,  He is diligent not to fall as he is high risk  No longer drinking alcohol      Objective   Vital Signs:  /66 (BP Location: Right arm, Patient Position: Sitting, Cuff Size: Adult)   Pulse 89   Temp 97.3 °F (36.3 °C) (Temporal)   Ht 172.7 cm (68\")   Wt 81.6 kg (180 lb)   SpO2 95%   BMI 27.37 kg/m²   Estimated body mass index is 27.37 kg/m² as calculated from the following:    Height as of this encounter: 172.7 cm (68\").    Weight as of this encounter: 81.6 kg (180 lb).          Physical Exam  Constitutional:       General: He is not in acute distress.     Appearance: He is not ill-appearing, toxic-appearing or diaphoretic.      Comments: No distress, pleasant   Eyes:      Conjunctiva/sclera: Conjunctivae normal.      Pupils: Pupils are equal, round, and reactive to light.   Cardiovascular:      Rate and Rhythm: Normal rate and regular rhythm.   Pulmonary:      Effort: Pulmonary effort is normal. No respiratory distress.   Skin:     General: Skin is warm and dry.   Neurological:      General: No focal deficit present.      Mental Status: Mental status is at baseline.   Psychiatric:         Mood and Affect: Mood normal.         Behavior: Behavior normal.         Thought Content: Thought content normal.         Judgment: Judgment normal.      Comments: Improved affect, pleasant as always dress appropriate mannerism appropriate cognitively intact        Result Review :                Assessment and Plan "   Diagnoses and all orders for this visit:    1. Severe major depression (Primary)    2. Recurrent falls    3. Gait instability    Other orders  -     sertraline (Zoloft) 50 MG tablet; Take 1 tablet by mouth Daily. For improved mood and feelings of wellbeing  Dispense: 30 tablet; Refill: 1             Follow Up   Return in about 1 month (around 5/14/2025) for Print discharge instructions/educational handouts.  Patient was given instructions and counseling regarding his condition or for health maintenance advice. Please see specific information pulled into the AVS if appropriate.     Patient Instructions   Discharge instruction    Sunshine therapy is much as possible    Starting tomorrow increase sertraline to 50 mg daily this will continue to give you energy help you relax and feel better likely    If any difficulty along the way let me know  It should not cause any insomnia but if so the next week, let me know and we will increase more slowly  Otherwise, update me every 2 weeks, I will see you back in 1 month,  1 to make sure you continue to improve    Avoid alcohol,  Okay to have 1 glass of wine 6 ounces approximately  Before bedtime with caution

## 2025-05-05 ENCOUNTER — HOSPITAL ENCOUNTER (INPATIENT)
Facility: HOSPITAL | Age: 85
LOS: 1 days | Discharge: HOME HEALTH | DRG: 312 | End: 2025-05-09
Attending: EMERGENCY MEDICINE | Admitting: INTERNAL MEDICINE
Payer: MEDICARE

## 2025-05-05 ENCOUNTER — APPOINTMENT (OUTPATIENT)
Dept: GENERAL RADIOLOGY | Facility: HOSPITAL | Age: 85
DRG: 312 | End: 2025-05-05
Payer: MEDICARE

## 2025-05-05 ENCOUNTER — APPOINTMENT (OUTPATIENT)
Dept: CT IMAGING | Facility: HOSPITAL | Age: 85
DRG: 312 | End: 2025-05-05
Payer: MEDICARE

## 2025-05-05 DIAGNOSIS — R55 SYNCOPE AND COLLAPSE: ICD-10-CM

## 2025-05-05 DIAGNOSIS — S00.83XA CONTUSION OF FACE, INITIAL ENCOUNTER: ICD-10-CM

## 2025-05-05 DIAGNOSIS — S93.401A SPRAIN OF RIGHT ANKLE, UNSPECIFIED LIGAMENT, INITIAL ENCOUNTER: ICD-10-CM

## 2025-05-05 DIAGNOSIS — M54.50 ACUTE ON CHRONIC LOW BACK PAIN: ICD-10-CM

## 2025-05-05 DIAGNOSIS — R55 SYNCOPE, UNSPECIFIED SYNCOPE TYPE: Primary | ICD-10-CM

## 2025-05-05 DIAGNOSIS — G89.29 ACUTE ON CHRONIC LOW BACK PAIN: ICD-10-CM

## 2025-05-05 LAB
ALBUMIN SERPL-MCNC: 3.4 G/DL (ref 3.5–5.2)
ALBUMIN/GLOB SERPL: 1.3 G/DL
ALP SERPL-CCNC: 112 U/L (ref 39–117)
ALT SERPL W P-5'-P-CCNC: 27 U/L (ref 1–41)
ANION GAP SERPL CALCULATED.3IONS-SCNC: 15 MMOL/L (ref 5–15)
ANISOCYTOSIS BLD QL: ABNORMAL
AST SERPL-CCNC: 42 U/L (ref 1–40)
BASOPHILS # BLD MANUAL: 0 10*3/MM3 (ref 0–0.2)
BASOPHILS NFR BLD MANUAL: 0 % (ref 0–1.5)
BILIRUB SERPL-MCNC: 1.1 MG/DL (ref 0–1.2)
BUN SERPL-MCNC: 17 MG/DL (ref 8–23)
BUN/CREAT SERPL: 17.3 (ref 7–25)
CALCIUM SPEC-SCNC: 9.1 MG/DL (ref 8.6–10.5)
CHLORIDE SERPL-SCNC: 101 MMOL/L (ref 98–107)
CK SERPL-CCNC: 245 U/L (ref 20–200)
CO2 SERPL-SCNC: 24 MMOL/L (ref 22–29)
CREAT SERPL-MCNC: 0.98 MG/DL (ref 0.76–1.27)
DEPRECATED RDW RBC AUTO: 52.3 FL (ref 37–54)
EGFRCR SERPLBLD CKD-EPI 2021: 75.1 ML/MIN/1.73
EOSINOPHIL # BLD MANUAL: 0.08 10*3/MM3 (ref 0–0.4)
EOSINOPHIL NFR BLD MANUAL: 1 % (ref 0.3–6.2)
ERYTHROCYTE [DISTWIDTH] IN BLOOD BY AUTOMATED COUNT: 13 % (ref 12.3–15.4)
GEN 5 1HR TROPONIN T REFLEX: 27 NG/L
GLOBULIN UR ELPH-MCNC: 2.7 GM/DL
GLUCOSE SERPL-MCNC: 91 MG/DL (ref 65–99)
HCT VFR BLD AUTO: 44.8 % (ref 37.5–51)
HGB BLD-MCNC: 15.3 G/DL (ref 13–17.7)
LYMPHOCYTES # BLD MANUAL: 0.98 10*3/MM3 (ref 0.7–3.1)
LYMPHOCYTES NFR BLD MANUAL: 3.1 % (ref 5–12)
MACROCYTES BLD QL SMEAR: ABNORMAL
MCH RBC QN AUTO: 37.4 PG (ref 26.6–33)
MCHC RBC AUTO-ENTMCNC: 34.2 G/DL (ref 31.5–35.7)
MCV RBC AUTO: 109.5 FL (ref 79–97)
MONOCYTES # BLD: 0.25 10*3/MM3 (ref 0.1–0.9)
NEUTROPHILS # BLD AUTO: 6.73 10*3/MM3 (ref 1.7–7)
NEUTROPHILS NFR BLD MANUAL: 83.7 % (ref 42.7–76)
NT-PROBNP SERPL-MCNC: 2060 PG/ML (ref 0–1800)
PLAT MORPH BLD: NORMAL
PLATELET # BLD AUTO: 196 10*3/MM3 (ref 140–450)
PMV BLD AUTO: 10.4 FL (ref 6–12)
POTASSIUM SERPL-SCNC: 4.4 MMOL/L (ref 3.5–5.2)
PROT SERPL-MCNC: 6.1 G/DL (ref 6–8.5)
QT INTERVAL: 476 MS
QTC INTERVAL: 523 MS
RBC # BLD AUTO: 4.09 10*6/MM3 (ref 4.14–5.8)
SODIUM SERPL-SCNC: 140 MMOL/L (ref 136–145)
TROPONIN T % DELTA: -10
TROPONIN T NUMERIC DELTA: -3 NG/L
TROPONIN T SERPL HS-MCNC: 30 NG/L
VARIANT LYMPHS NFR BLD MANUAL: 12.2 % (ref 19.6–45.3)
WBC MORPH BLD: NORMAL
WBC NRBC COR # BLD AUTO: 8.04 10*3/MM3 (ref 3.4–10.8)

## 2025-05-05 PROCEDURE — 72131 CT LUMBAR SPINE W/O DYE: CPT

## 2025-05-05 PROCEDURE — 73610 X-RAY EXAM OF ANKLE: CPT

## 2025-05-05 PROCEDURE — 85007 BL SMEAR W/DIFF WBC COUNT: CPT

## 2025-05-05 PROCEDURE — 85025 COMPLETE CBC W/AUTO DIFF WBC: CPT

## 2025-05-05 PROCEDURE — 36415 COLL VENOUS BLD VENIPUNCTURE: CPT

## 2025-05-05 PROCEDURE — G0378 HOSPITAL OBSERVATION PER HR: HCPCS

## 2025-05-05 PROCEDURE — 70450 CT HEAD/BRAIN W/O DYE: CPT

## 2025-05-05 PROCEDURE — 83880 ASSAY OF NATRIURETIC PEPTIDE: CPT

## 2025-05-05 PROCEDURE — 84484 ASSAY OF TROPONIN QUANT: CPT

## 2025-05-05 PROCEDURE — 99285 EMERGENCY DEPT VISIT HI MDM: CPT

## 2025-05-05 PROCEDURE — 72125 CT NECK SPINE W/O DYE: CPT

## 2025-05-05 PROCEDURE — 82550 ASSAY OF CK (CPK): CPT

## 2025-05-05 PROCEDURE — 93010 ELECTROCARDIOGRAM REPORT: CPT | Performed by: INTERNAL MEDICINE

## 2025-05-05 PROCEDURE — 71045 X-RAY EXAM CHEST 1 VIEW: CPT

## 2025-05-05 PROCEDURE — 93005 ELECTROCARDIOGRAM TRACING: CPT

## 2025-05-05 PROCEDURE — 70486 CT MAXILLOFACIAL W/O DYE: CPT

## 2025-05-05 PROCEDURE — 80053 COMPREHEN METABOLIC PANEL: CPT

## 2025-05-05 RX ORDER — AMOXICILLIN 250 MG
2 CAPSULE ORAL 2 TIMES DAILY PRN
Status: DISCONTINUED | OUTPATIENT
Start: 2025-05-05 | End: 2025-05-09 | Stop reason: HOSPADM

## 2025-05-05 RX ORDER — ACETAMINOPHEN 160 MG/5ML
650 SOLUTION ORAL EVERY 4 HOURS PRN
Status: DISCONTINUED | OUTPATIENT
Start: 2025-05-05 | End: 2025-05-06

## 2025-05-05 RX ORDER — ACETAMINOPHEN 325 MG/1
650 TABLET ORAL EVERY 4 HOURS PRN
Status: DISCONTINUED | OUTPATIENT
Start: 2025-05-05 | End: 2025-05-06

## 2025-05-05 RX ORDER — HYDROCODONE BITARTRATE AND ACETAMINOPHEN 7.5; 325 MG/1; MG/1
1 TABLET ORAL ONCE
Refills: 0 | Status: COMPLETED | OUTPATIENT
Start: 2025-05-05 | End: 2025-05-05

## 2025-05-05 RX ORDER — FOLIC ACID 1 MG/1
1 TABLET ORAL DAILY
Status: DISCONTINUED | OUTPATIENT
Start: 2025-05-06 | End: 2025-05-09 | Stop reason: HOSPADM

## 2025-05-05 RX ORDER — ONDANSETRON 2 MG/ML
4 INJECTION INTRAMUSCULAR; INTRAVENOUS EVERY 6 HOURS PRN
Status: DISCONTINUED | OUTPATIENT
Start: 2025-05-05 | End: 2025-05-09 | Stop reason: HOSPADM

## 2025-05-05 RX ORDER — BISACODYL 10 MG
10 SUPPOSITORY, RECTAL RECTAL DAILY PRN
Status: DISCONTINUED | OUTPATIENT
Start: 2025-05-05 | End: 2025-05-09 | Stop reason: HOSPADM

## 2025-05-05 RX ORDER — ACETAMINOPHEN 650 MG/1
650 SUPPOSITORY RECTAL EVERY 4 HOURS PRN
Status: DISCONTINUED | OUTPATIENT
Start: 2025-05-05 | End: 2025-05-06

## 2025-05-05 RX ORDER — POLYETHYLENE GLYCOL 3350 17 G/17G
17 POWDER, FOR SOLUTION ORAL DAILY PRN
Status: DISCONTINUED | OUTPATIENT
Start: 2025-05-05 | End: 2025-05-09 | Stop reason: HOSPADM

## 2025-05-05 RX ORDER — ONDANSETRON 4 MG/1
4 TABLET, ORALLY DISINTEGRATING ORAL EVERY 6 HOURS PRN
Status: DISCONTINUED | OUTPATIENT
Start: 2025-05-05 | End: 2025-05-09 | Stop reason: HOSPADM

## 2025-05-05 RX ORDER — LEVOTHYROXINE SODIUM 125 UG/1
125 TABLET ORAL DAILY
Status: DISCONTINUED | OUTPATIENT
Start: 2025-05-06 | End: 2025-05-09 | Stop reason: HOSPADM

## 2025-05-05 RX ORDER — ALBUTEROL SULFATE 0.83 MG/ML
2.5 SOLUTION RESPIRATORY (INHALATION) EVERY 6 HOURS PRN
Status: DISCONTINUED | OUTPATIENT
Start: 2025-05-05 | End: 2025-05-09 | Stop reason: HOSPADM

## 2025-05-05 RX ORDER — BISACODYL 5 MG/1
5 TABLET, DELAYED RELEASE ORAL DAILY PRN
Status: DISCONTINUED | OUTPATIENT
Start: 2025-05-05 | End: 2025-05-09 | Stop reason: HOSPADM

## 2025-05-05 RX ORDER — AMMONIUM LACTATE 12 G/100G
CREAM TOPICAL 2 TIMES DAILY PRN
Status: DISCONTINUED | OUTPATIENT
Start: 2025-05-05 | End: 2025-05-09 | Stop reason: HOSPADM

## 2025-05-05 RX ADMIN — ACETAMINOPHEN 650 MG: 325 TABLET, FILM COATED ORAL at 17:55

## 2025-05-05 RX ADMIN — HYDROCODONE BITARTRATE AND ACETAMINOPHEN 1 TABLET: 7.5; 325 TABLET ORAL at 11:58

## 2025-05-05 NOTE — ED NOTES
Nursing report ED to floor  Darwin Sparks  84 y.o.  male    HPI :  HPI  Stated Reason for Visit: From home with c/o back pain s/p fall last night.  hx chronic back pain.  pt doesn't remember falling and was found this morning by caregiver  History Obtained From: patient, EMS    Chief Complaint  Chief Complaint   Patient presents with    Fall    Back Pain       Admitting doctor:   Dulce Singh MD    Admitting diagnosis:   The primary encounter diagnosis was Syncope, unspecified syncope type. Diagnoses of Acute on chronic low back pain, Contusion of face, initial encounter, and Sprain of right ankle, unspecified ligament, initial encounter were also pertinent to this visit.    Code status:   Current Code Status       Date Active Code Status Order ID Comments User Context       Prior            Allergies:   Milk-related compounds, Pregabalin, and Neomycin-bacitracin zn-polymyx    Isolation:   No active isolations    Intake and Output  No intake or output data in the 24 hours ending 05/05/25 1500    Weight:   There were no vitals filed for this visit.    Most recent vitals:   Vitals:    05/05/25 1025 05/05/25 1044 05/05/25 1231 05/05/25 1331   BP: (!) 200/91 (!) 189/107 (!) 180/107 126/84   BP Location:  Right arm     Patient Position:  Lying     Pulse: 56  75 69   Resp: 16      Temp: 98 °F (36.7 °C)      TempSrc: Oral      SpO2: 98% 93% 94% 93%       Active LDAs/IV Access:   Lines, Drains & Airways       Active LDAs       Name Placement date Placement time Site Days    Peripheral IV 05/05/25 1057 20 G Right Antecubital 05/05/25  1057  Antecubital  less than 1                    Labs (abnormal labs have a star):   Labs Reviewed   COMPREHENSIVE METABOLIC PANEL - Abnormal; Notable for the following components:       Result Value    Albumin 3.4 (*)     AST (SGOT) 42 (*)     All other components within normal limits    Narrative:     GFR Categories in Chronic Kidney Disease (CKD)              GFR Category           GFR (mL/min/1.73)    Interpretation  G1                    90 or greater        Normal or high (1)  G2                    60-89                Mild decrease (1)  G3a                   45-59                Mild to moderate decrease  G3b                   30-44                Moderate to severe decrease  G4                    15-29                Severe decrease  G5                    14 or less           Kidney failure    (1)In the absence of evidence of kidney disease, neither GFR category G1 or G2 fulfill the criteria for CKD.    eGFR calculation 2021 CKD-EPI creatinine equation, which does not include race as a factor   TROPONIN - Abnormal; Notable for the following components:    HS Troponin T 30 (*)     All other components within normal limits    Narrative:     High Sensitive Troponin T Reference Range:  <14.0 ng/L- Negative Female for AMI  <22.0 ng/L- Negative Male for AMI  >=14 - Abnormal Female indicating possible myocardial injury.  >=22 - Abnormal Male indicating possible myocardial injury.   Clinicians would have to utilize clinical acumen, EKG, Troponin, and serial changes to determine if it is an Acute Myocardial Infarction or myocardial injury due to an underlying chronic condition.        BNP (IN-HOUSE) - Abnormal; Notable for the following components:    proBNP 2,060.0 (*)     All other components within normal limits    Narrative:     This assay is used as an aid in the diagnosis of individuals suspected of having heart failure. It can be used as an aid in the diagnosis of acute decompensated heart failure (ADHF) in patients presenting with signs and symptoms of ADHF to the emergency department (ED). In addition, NT-proBNP of <300 pg/mL indicates ADHF is not likely.    Age Range Result Interpretation  NT-proBNP Concentration (pg/mL:      <50             Positive            >450                   Gray                 300-450                    Negative             <300    50-75           Positive             >900                  Gray                300-900                  Negative            <300      >75             Positive            >1800                  Gray                300-1800                  Negative            <300   CBC WITH AUTO DIFFERENTIAL - Abnormal; Notable for the following components:    RBC 4.09 (*)     .5 (*)     MCH 37.4 (*)     All other components within normal limits   CK - Abnormal; Notable for the following components:    Creatine Kinase 245 (*)     All other components within normal limits   MANUAL DIFFERENTIAL - Abnormal; Notable for the following components:    Neutrophil % 83.7 (*)     Lymphocyte % 12.2 (*)     Monocyte % 3.1 (*)     All other components within normal limits   HIGH SENSITIVITIY TROPONIN T 1HR - Abnormal; Notable for the following components:    HS Troponin T 27 (*)     All other components within normal limits    Narrative:     High Sensitive Troponin T Reference Range:  <14.0 ng/L- Negative Female for AMI  <22.0 ng/L- Negative Male for AMI  >=14 - Abnormal Female indicating possible myocardial injury.  >=22 - Abnormal Male indicating possible myocardial injury.   Clinicians would have to utilize clinical acumen, EKG, Troponin, and serial changes to determine if it is an Acute Myocardial Infarction or myocardial injury due to an underlying chronic condition.        CBC AND DIFFERENTIAL    Narrative:     The following orders were created for panel order CBC & Differential.  Procedure                               Abnormality         Status                     ---------                               -----------         ------                     CBC Auto Differential[[id number]]        Abnormal            Final result                 Please view results for these tests on the individual orders.       EKG:   ECG 12 Lead Syncope   Final Result   HEART RATE=79  bpm   RR Sjqlmhpa=110  ms   GA Rvatvpea=823  ms   P Horizontal Axis=6  deg   P Front Axis=10  deg    QRSD Interval=94  ms   QT Mxbkaogg=752  ms   NBxO=916  ms   QRS Axis=4  deg   T Wave Axis=-21  deg   - ABNORMAL ECG -   Sinus rhythm   Atrial premature complexes   Borderline  repolarization abnormality   Prolonged QT interval   No change from prior tracing   Electronically Signed By: Quiana Mcdonald ([facility]) 2025-05-05 14:30:01   Date and Time of Study:2025-05-05 11:09:00          Meds given in ED:   Medications   HYDROcodone-acetaminophen (NORCO) 7.5-325 MG per tablet 1 tablet (1 tablet Oral Given 5/5/25 1158)       Imaging results:  CT Head Without Contrast  Result Date: 5/5/2025   1. No acute intracranial abnormality is identified with no change when compared to the patient's prior head CT and MRI of the brain on 03/09/2025.  2. There is mild small vessel disease in the cerebral white matter. There is mild diffuse cerebral atrophy and the lateral and third ventricles are prominent in size, felt to be on the basis of central volume loss or atrophy. There are lens implants in the globes from previous bilateral cataract surgery. The remainder of the head CT is within normal limits with no acute skull fracture or intracranial hemorrhage identified.   FACIAL CT TECHNIQUE: Spiral CT images were obtained through the facial bones in the axial imaging plane. The images were reformatted and are submitted in 2 mm thick axial, sagittal and coronal CT sections with soft tissue algorithm and 1 mm thick axial, sagittal and coronal CT sections with high-resolution bone algorithm.  This is correlated to a prior facial CT on 03/09/2025.  FINDINGS: The paranasal sinuses are clear. There are lens implants in the globes from previous bilateral cataract surgery. Otherwise, the orbits are normal in appearance. There are mild osteoarthritic changes in the temporomandibular joints bilaterally with minimal marginal spurring off the mandibular heads. The patient is edentulous. No discernible acute fracture is seen in the facial bones.   IMPRESSION:  No acute facial fracture is identified. The patient is edentulous and also has bilateral intraocular lens implants from previous bilateral cataract surgery. Otherwise, the facial CT is unremarkable.   CERVICAL SPINE CT TECHNIQUE: Spiral CT images were obtained from the skull base down to the T2 thoracic level. The images were reformatted and are submitted in 2 mm thick axial and sagittal CT sections with soft tissue algorithm and 1 mm thick axial, sagittal and coronal CT sections with high-resolution bone algorithm.  This is correlated to a prior cervical spine CT from [facility] on 03/09/2025.  FINDINGS: The atlantooccipital articulation is within normal limits.  At C1-2, there are arthritic changes at the atlantodental interval with narrowing of the interval and marginal spurring off the superior aspect of the anterior ring of C1 and the odontoid and there is thickening and calcification of the transverse ligament along the back of the odontoid. Otherwise, the C1-2 level is normal in appearance.  At C2-3, the posterior disc margin, facets and uncovertebral joints are within normal limits with no canal or foraminal narrowing.  At C3-4, there is mild-to-moderate bilateral facet overgrowth. There is severe disc space narrowing and there are degenerative disc and endplate changes and a 2 mm retrolisthesis of C3 on C4, and a mild diffuse posterior disc osteophyte complex. There is minimal, if any, canal narrowing and there is some minimal spurring into the foramina with minimal foraminal narrowing.  At C4-5, there is mild-to-moderate left and there is moderate right facet overgrowth.  There is a 3 mm degenerative anterolisthesis of C4 on C5. There is mild canal narrowing.  There is some mild right uncovertebral joint hypertrophy and there is mild-to-moderate right, but no left foraminal narrowing.  At C5-6, there is moderate right facet overgrowth and minimal left facet overgrowth. There is  severe disc space narrowing and there are degenerative disc and endplate changes.  There is a diffuse posterior disc osteophyte complex that results in only mild canal narrowing, and some mild uncovertebral joint hypertrophy right greater than left. There is minimal left and there is moderate-to-severe right bony foraminal narrowing.  At C6-7, the facets are normal. There is moderate disc space narrowing. There are degenerative endplate changes, a mild diffuse posterior disc osteophyte complex and mild canal narrowing. There is some uncovertebral joint spurring into the foramina, and there is mild-to-moderate bilateral bony foraminal narrowing.  At C7-T1, there is moderate bilateral facet overgrowth. There is a 3-4 mm degenerative anterolisthesis of C7 on T1.  There is no canal narrowing.  There is, at least, mild-to-moderate bilateral bony foraminal narrowing.  No acute fracture is seen in the cervical spine.  IMPRESSION:  No acute fracture is seen in the cervical spine with no significant change when compared to a recent cervical spine CT on 03/09/2025. There is cervical spondylosis as described in great detail above.   LUMBAR SPINE CT TECHNIQUE: Spiral CT images were obtained from the inferior body of the T10 thoracic level down to the S2-3 sacral level and images were reformatted and are submitted in 3 mm thick axial CT sections with soft tissue algorithm, and 1 mm thick axial CT sections with high-resolution bone algorithm, and 2 mm thick sagittal and coronal reconstructions were performed and submitted in both bone and soft tissue algorithm.  There are no prior lumbar spine CTs or MRIs for comparison. This is correlated to a remote prior CT scan of the abdomen and pelvis on 03/17/2021.  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 There is severe disc space narrowing.  There are degenerative disc and endplate changes and vacuum disc  phenomenon at all levels from L1 to S1.  At T10-11, there is minimal posterior spurring and minimal left facet overgrowth. There is minimal, if any, canal narrowing.  There is mild left and essentially no right foraminal narrowing.  At T11-12, the posterior disc margin and facets are normal with no canal or foraminal narrowing.  At T12-L1, there is some vacuum disc phenomenon. The posterior disc margin and facets are normal, and there is no canal or foraminal narrowing.  At L1-2, there is severe disc space narrowing.  There is vacuum disc phenomenon and degenerative endplate changes, and there is a 4 mm retrolisthesis of L1 with respect to L2, and a diffuse posterior disc osteophyte complex.  The facets are normal.  There is mild-to-moderate canal narrowing.  There is some spurring and air contained within bulging or protruding disc material into the foramen.  There is mild left and there is moderate right foraminal narrowing that could affect the exiting right L2 nerve root.  At L2-3, there is prominent vacuum disc phenomenon, a 2--3 mm retrolisthesis of L2 with respect to L3, and a diffuse posterior disc osteophyte complex.  The facets are normal.  There is prominent posterior epidural fat and there is at least mild-to-moderate up to moderate generalized narrowing of the thecal sac.  There is mild left foraminal narrowing, loss of vertical height of the right foramen, eccentric spurring and bulging disc material into the inferior right foramen, and there is moderate-to-severe right foraminal narrowing.  At L3-4, there is disc space narrowing, vacuum disc phenomenon, degenerative endplate changes, 2-3 mm retrolisthesis of L3 with respect to L4, diffuse posterior disc osteophyte complex, mild-to-moderate right, minimal left facet overgrowth and ligamentum flavum thickening, and there is prominent posterior epidural fat, and the combination of all of the above findings contributes to moderate-to-severe narrowing of  the thecal sac.  There is mild left, and there is moderate-to-severe right foraminal narrowing.  At L4-5, there is moderate bilateral facet overgrowth.  There is a 2 mm anterolisthesis of L4 with respect to L5, a diffuse posterior disc bulge, and there is moderate narrowing of the thecal sac and some narrowing of the lateral recesses.  There is synovial thickening off the facets extending into the posterior foramina, spurring and uncovered bulging disc material into the inferior aspect of the foramina, and there is moderate-to-severe bilateral foraminal narrowing that could affect the exiting L4 nerve roots.  At L5-S1, there is moderate bilateral facet overgrowth, disc space narrowing, degenerative endplate changes, and posterior endplate spurring.  There is no canal or lateral recess narrowing.  There is mild-to-moderate right and there is moderate left foraminal narrowing.  No discernible acute fracture is seen in the lumbar spine.  IMPRESSION:  No acute fracture is seen in the lumbar spine. 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  Radiation dose reduction techniques were utilized, including automated exposure control and exposure modulation based on body size.       CT Cervical Spine Without Contrast  Result Date: 5/5/2025   1. No acute intracranial abnormality is identified with no change when compared to the patient's prior head CT and MRI of the brain on 03/09/2025.  2. There is mild small vessel disease in the cerebral white matter. There is mild diffuse cerebral atrophy and the lateral and third ventricles are prominent in size, felt to be on the basis of central volume loss or atrophy. There are lens implants in the globes from  previous bilateral cataract surgery. The remainder of the head CT is within normal limits with no acute skull fracture or intracranial hemorrhage identified.   FACIAL CT TECHNIQUE: Spiral CT images were obtained through the facial bones in the axial imaging plane. The images were reformatted and are submitted in 2 mm thick axial, sagittal and coronal CT sections with soft tissue algorithm and 1 mm thick axial, sagittal and coronal CT sections with high-resolution bone algorithm.  This is correlated to a prior facial CT on 03/09/2025.  FINDINGS: The paranasal sinuses are clear. There are lens implants in the globes from previous bilateral cataract surgery. Otherwise, the orbits are normal in appearance. There are mild osteoarthritic changes in the temporomandibular joints bilaterally with minimal marginal spurring off the mandibular heads. The patient is edentulous. No discernible acute fracture is seen in the facial bones.  IMPRESSION:  No acute facial fracture is identified. The patient is edentulous and also has bilateral intraocular lens implants from previous bilateral cataract surgery. Otherwise, the facial CT is unremarkable.   CERVICAL SPINE CT TECHNIQUE: Spiral CT images were obtained from the skull base down to the T2 thoracic level. The images were reformatted and are submitted in 2 mm thick axial and sagittal CT sections with soft tissue algorithm and 1 mm thick axial, sagittal and coronal CT sections with high-resolution bone algorithm.  This is correlated to a prior cervical spine CT from [facility] on 03/09/2025.  FINDINGS: The atlantooccipital articulation is within normal limits.  At C1-2, there are arthritic changes at the atlantodental interval with narrowing of the interval and marginal spurring off the superior aspect of the anterior ring of C1 and the odontoid and there is thickening and calcification of the transverse ligament along the back of the odontoid. Otherwise, the C1-2  level is normal in appearance.  At C2-3, the posterior disc margin, facets and uncovertebral joints are within normal limits with no canal or foraminal narrowing.  At C3-4, there is mild-to-moderate bilateral facet overgrowth. There is severe disc space narrowing and there are degenerative disc and endplate changes and a 2 mm retrolisthesis of C3 on C4, and a mild diffuse posterior disc osteophyte complex. There is minimal, if any, canal narrowing and there is some minimal spurring into the foramina with minimal foraminal narrowing.  At C4-5, there is mild-to-moderate left and there is moderate right facet overgrowth.  There is a 3 mm degenerative anterolisthesis of C4 on C5. There is mild canal narrowing.  There is some mild right uncovertebral joint hypertrophy and there is mild-to-moderate right, but no left foraminal narrowing.  At C5-6, there is moderate right facet overgrowth and minimal left facet overgrowth. There is severe disc space narrowing and there are degenerative disc and endplate changes.  There is a diffuse posterior disc osteophyte complex that results in only mild canal narrowing, and some mild uncovertebral joint hypertrophy right greater than left. There is minimal left and there is moderate-to-severe right bony foraminal narrowing.  At C6-7, the facets are normal. There is moderate disc space narrowing. There are degenerative endplate changes, a mild diffuse posterior disc osteophyte complex and mild canal narrowing. There is some uncovertebral joint spurring into the foramina, and there is mild-to-moderate bilateral bony foraminal narrowing.  At C7-T1, there is moderate bilateral facet overgrowth. There is a 3-4 mm degenerative anterolisthesis of C7 on T1.  There is no canal narrowing.  There is, at least, mild-to-moderate bilateral bony foraminal narrowing.  No acute fracture is seen in the cervical spine.  IMPRESSION:  No acute fracture is seen in the cervical spine with no significant  change when compared to a recent cervical spine CT on 03/09/2025. There is cervical spondylosis as described in great detail above.   LUMBAR SPINE CT TECHNIQUE: Spiral CT images were obtained from the inferior body of the T10 thoracic level down to the S2-3 sacral level and images were reformatted and are submitted in 3 mm thick axial CT sections with soft tissue algorithm, and 1 mm thick axial CT sections with high-resolution bone algorithm, and 2 mm thick sagittal and coronal reconstructions were performed and submitted in both bone and soft tissue algorithm.  There are no prior lumbar spine CTs or MRIs for comparison. This is correlated to a remote prior CT scan of the abdomen and pelvis on 03/17/2021.  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 There is severe disc space narrowing.  There are degenerative disc and endplate changes and vacuum disc phenomenon at all levels from L1 to S1.  At T10-11, there is minimal posterior spurring and minimal left facet overgrowth. There is minimal, if any, canal narrowing.  There is mild left and essentially no right foraminal narrowing.  At T11-12, the posterior disc margin and facets are normal with no canal or foraminal narrowing.  At T12-L1, there is some vacuum disc phenomenon. The posterior disc margin and facets are normal, and there is no canal or foraminal narrowing.  At L1-2, there is severe disc space narrowing.  There is vacuum disc phenomenon and degenerative endplate changes, and there is a 4 mm retrolisthesis of L1 with respect to L2, and a diffuse posterior disc osteophyte complex.  The facets are normal.  There is mild-to-moderate canal narrowing.  There is some spurring and air contained within bulging or protruding disc material into the foramen.  There is mild left and there is moderate right foraminal narrowing that could affect the exiting right L2 nerve root.  At L2-3, there is  prominent vacuum disc phenomenon, a 2--3 mm retrolisthesis of L2 with respect to L3, and a diffuse posterior disc osteophyte complex.  The facets are normal.  There is prominent posterior epidural fat and there is at least mild-to-moderate up to moderate generalized narrowing of the thecal sac.  There is mild left foraminal narrowing, loss of vertical height of the right foramen, eccentric spurring and bulging disc material into the inferior right foramen, and there is moderate-to-severe right foraminal narrowing.  At L3-4, there is disc space narrowing, vacuum disc phenomenon, degenerative endplate changes, 2-3 mm retrolisthesis of L3 with respect to L4, diffuse posterior disc osteophyte complex, mild-to-moderate right, minimal left facet overgrowth and ligamentum flavum thickening, and there is prominent posterior epidural fat, and the combination of all of the above findings contributes to moderate-to-severe narrowing of the thecal sac.  There is mild left, and there is moderate-to-severe right foraminal narrowing.  At L4-5, there is moderate bilateral facet overgrowth.  There is a 2 mm anterolisthesis of L4 with respect to L5, a diffuse posterior disc bulge, and there is moderate narrowing of the thecal sac and some narrowing of the lateral recesses.  There is synovial thickening off the facets extending into the posterior foramina, spurring and uncovered bulging disc material into the inferior aspect of the foramina, and there is moderate-to-severe bilateral foraminal narrowing that could affect the exiting L4 nerve roots.  At L5-S1, there is moderate bilateral facet overgrowth, disc space narrowing, degenerative endplate changes, and posterior endplate spurring.  There is no canal or lateral recess narrowing.  There is mild-to-moderate right and there is moderate left foraminal narrowing.  No discernible acute fracture is seen in the lumbar spine.  IMPRESSION:  No acute fracture is seen in the lumbar spine.  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  Radiation dose reduction techniques were utilized, including automated exposure control and exposure modulation based on body size.       CT Facial Bones Without Contrast  Result Date: 5/5/2025   1. No acute intracranial abnormality is identified with no change when compared to the patient's prior head CT and MRI of the brain on 03/09/2025.  2. There is mild small vessel disease in the cerebral white matter. There is mild diffuse cerebral atrophy and the lateral and third ventricles are prominent in size, felt to be on the basis of central volume loss or atrophy. There are lens implants in the globes from previous bilateral cataract surgery. The remainder of the head CT is within normal limits with no acute skull fracture or intracranial hemorrhage identified.   FACIAL CT TECHNIQUE: Spiral CT images were obtained through the facial bones in the axial imaging plane. The images were reformatted and are submitted in 2 mm thick axial, sagittal and coronal CT sections with soft tissue algorithm and 1 mm thick axial, sagittal and coronal CT sections with high-resolution bone algorithm.  This is correlated to a prior facial CT on 03/09/2025.  FINDINGS: The paranasal sinuses are clear. There are lens implants in the globes from previous bilateral cataract surgery. Otherwise, the orbits are normal in appearance. There are mild osteoarthritic changes in the temporomandibular joints bilaterally with minimal marginal spurring off the mandibular heads. The patient is edentulous. No discernible acute fracture is seen in the facial bones.  IMPRESSION:  No acute facial fracture is identified. The patient is edentulous  and also has bilateral intraocular lens implants from previous bilateral cataract surgery. Otherwise, the facial CT is unremarkable.   CERVICAL SPINE CT TECHNIQUE: Spiral CT images were obtained from the skull base down to the T2 thoracic level. The images were reformatted and are submitted in 2 mm thick axial and sagittal CT sections with soft tissue algorithm and 1 mm thick axial, sagittal and coronal CT sections with high-resolution bone algorithm.  This is correlated to a prior cervical spine CT from [facility] on 03/09/2025.  FINDINGS: The atlantooccipital articulation is within normal limits.  At C1-2, there are arthritic changes at the atlantodental interval with narrowing of the interval and marginal spurring off the superior aspect of the anterior ring of C1 and the odontoid and there is thickening and calcification of the transverse ligament along the back of the odontoid. Otherwise, the C1-2 level is normal in appearance.  At C2-3, the posterior disc margin, facets and uncovertebral joints are within normal limits with no canal or foraminal narrowing.  At C3-4, there is mild-to-moderate bilateral facet overgrowth. There is severe disc space narrowing and there are degenerative disc and endplate changes and a 2 mm retrolisthesis of C3 on C4, and a mild diffuse posterior disc osteophyte complex. There is minimal, if any, canal narrowing and there is some minimal spurring into the foramina with minimal foraminal narrowing.  At C4-5, there is mild-to-moderate left and there is moderate right facet overgrowth.  There is a 3 mm degenerative anterolisthesis of C4 on C5. There is mild canal narrowing.  There is some mild right uncovertebral joint hypertrophy and there is mild-to-moderate right, but no left foraminal narrowing.  At C5-6, there is moderate right facet overgrowth and minimal left facet overgrowth. There is severe disc space narrowing and there are degenerative disc and endplate  changes.  There is a diffuse posterior disc osteophyte complex that results in only mild canal narrowing, and some mild uncovertebral joint hypertrophy right greater than left. There is minimal left and there is moderate-to-severe right bony foraminal narrowing.  At C6-7, the facets are normal. There is moderate disc space narrowing. There are degenerative endplate changes, a mild diffuse posterior disc osteophyte complex and mild canal narrowing. There is some uncovertebral joint spurring into the foramina, and there is mild-to-moderate bilateral bony foraminal narrowing.  At C7-T1, there is moderate bilateral facet overgrowth. There is a 3-4 mm degenerative anterolisthesis of C7 on T1.  There is no canal narrowing.  There is, at least, mild-to-moderate bilateral bony foraminal narrowing.  No acute fracture is seen in the cervical spine.  IMPRESSION:  No acute fracture is seen in the cervical spine with no significant change when compared to a recent cervical spine CT on 03/09/2025. There is cervical spondylosis as described in great detail above.   LUMBAR SPINE CT TECHNIQUE: Spiral CT images were obtained from the inferior body of the T10 thoracic level down to the S2-3 sacral level and images were reformatted and are submitted in 3 mm thick axial CT sections with soft tissue algorithm, and 1 mm thick axial CT sections with high-resolution bone algorithm, and 2 mm thick sagittal and coronal reconstructions were performed and submitted in both bone and soft tissue algorithm.  There are no prior lumbar spine CTs or MRIs for comparison. This is correlated to a remote prior CT scan of the abdomen and pelvis on 03/17/2021.  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 There is severe disc space narrowing.  There are degenerative disc and endplate changes and vacuum disc phenomenon at all levels from L1 to S1.  At T10-11, there is minimal  posterior spurring and minimal left facet overgrowth. There is minimal, if any, canal narrowing.  There is mild left and essentially no right foraminal narrowing.  At T11-12, the posterior disc margin and facets are normal with no canal or foraminal narrowing.  At T12-L1, there is some vacuum disc phenomenon. The posterior disc margin and facets are normal, and there is no canal or foraminal narrowing.  At L1-2, there is severe disc space narrowing.  There is vacuum disc phenomenon and degenerative endplate changes, and there is a 4 mm retrolisthesis of L1 with respect to L2, and a diffuse posterior disc osteophyte complex.  The facets are normal.  There is mild-to-moderate canal narrowing.  There is some spurring and air contained within bulging or protruding disc material into the foramen.  There is mild left and there is moderate right foraminal narrowing that could affect the exiting right L2 nerve root.  At L2-3, there is prominent vacuum disc phenomenon, a 2--3 mm retrolisthesis of L2 with respect to L3, and a diffuse posterior disc osteophyte complex.  The facets are normal.  There is prominent posterior epidural fat and there is at least mild-to-moderate up to moderate generalized narrowing of the thecal sac.  There is mild left foraminal narrowing, loss of vertical height of the right foramen, eccentric spurring and bulging disc material into the inferior right foramen, and there is moderate-to-severe right foraminal narrowing.  At L3-4, there is disc space narrowing, vacuum disc phenomenon, degenerative endplate changes, 2-3 mm retrolisthesis of L3 with respect to L4, diffuse posterior disc osteophyte complex, mild-to-moderate right, minimal left facet overgrowth and ligamentum flavum thickening, and there is prominent posterior epidural fat, and the combination of all of the above findings contributes to moderate-to-severe narrowing of the thecal sac.  There is mild left, and there is moderate-to-severe  right foraminal narrowing.  At L4-5, there is moderate bilateral facet overgrowth.  There is a 2 mm anterolisthesis of L4 with respect to L5, a diffuse posterior disc bulge, and there is moderate narrowing of the thecal sac and some narrowing of the lateral recesses.  There is synovial thickening off the facets extending into the posterior foramina, spurring and uncovered bulging disc material into the inferior aspect of the foramina, and there is moderate-to-severe bilateral foraminal narrowing that could affect the exiting L4 nerve roots.  At L5-S1, there is moderate bilateral facet overgrowth, disc space narrowing, degenerative endplate changes, and posterior endplate spurring.  There is no canal or lateral recess narrowing.  There is mild-to-moderate right and there is moderate left foraminal narrowing.  No discernible acute fracture is seen in the lumbar spine.  IMPRESSION:  No acute fracture is seen in the lumbar spine. 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  Radiation dose reduction techniques were utilized, including automated exposure control and exposure modulation based on body size.       CT Lumbar Spine Without Contrast  Result Date: 5/5/2025   1. No acute intracranial abnormality is identified with no change when compared to the patient's prior head CT and MRI of the brain on 03/09/2025.  2. There is mild small vessel disease in the cerebral white matter. There is mild diffuse cerebral atrophy and the lateral and third ventricles are prominent in size, felt to be on the basis of central volume loss or atrophy. There are lens implants in the globes from previous bilateral cataract surgery. The remainder of the head CT is within  normal limits with no acute skull fracture or intracranial hemorrhage identified.   FACIAL CT TECHNIQUE: Spiral CT images were obtained through the facial bones in the axial imaging plane. The images were reformatted and are submitted in 2 mm thick axial, sagittal and coronal CT sections with soft tissue algorithm and 1 mm thick axial, sagittal and coronal CT sections with high-resolution bone algorithm.  This is correlated to a prior facial CT on 03/09/2025.  FINDINGS: The paranasal sinuses are clear. There are lens implants in the globes from previous bilateral cataract surgery. Otherwise, the orbits are normal in appearance. There are mild osteoarthritic changes in the temporomandibular joints bilaterally with minimal marginal spurring off the mandibular heads. The patient is edentulous. No discernible acute fracture is seen in the facial bones.  IMPRESSION:  No acute facial fracture is identified. The patient is edentulous and also has bilateral intraocular lens implants from previous bilateral cataract surgery. Otherwise, the facial CT is unremarkable.   CERVICAL SPINE CT TECHNIQUE: Spiral CT images were obtained from the skull base down to the T2 thoracic level. The images were reformatted and are submitted in 2 mm thick axial and sagittal CT sections with soft tissue algorithm and 1 mm thick axial, sagittal and coronal CT sections with high-resolution bone algorithm.  This is correlated to a prior cervical spine CT from [facility] on 03/09/2025.  FINDINGS: The atlantooccipital articulation is within normal limits.  At C1-2, there are arthritic changes at the atlantodental interval with narrowing of the interval and marginal spurring off the superior aspect of the anterior ring of C1 and the odontoid and there is thickening and calcification of the transverse ligament along the back of the odontoid. Otherwise, the C1-2 level is normal in appearance.  At C2-3, the posterior disc margin, facets  and uncovertebral joints are within normal limits with no canal or foraminal narrowing.  At C3-4, there is mild-to-moderate bilateral facet overgrowth. There is severe disc space narrowing and there are degenerative disc and endplate changes and a 2 mm retrolisthesis of C3 on C4, and a mild diffuse posterior disc osteophyte complex. There is minimal, if any, canal narrowing and there is some minimal spurring into the foramina with minimal foraminal narrowing.  At C4-5, there is mild-to-moderate left and there is moderate right facet overgrowth.  There is a 3 mm degenerative anterolisthesis of C4 on C5. There is mild canal narrowing.  There is some mild right uncovertebral joint hypertrophy and there is mild-to-moderate right, but no left foraminal narrowing.  At C5-6, there is moderate right facet overgrowth and minimal left facet overgrowth. There is severe disc space narrowing and there are degenerative disc and endplate changes.  There is a diffuse posterior disc osteophyte complex that results in only mild canal narrowing, and some mild uncovertebral joint hypertrophy right greater than left. There is minimal left and there is moderate-to-severe right bony foraminal narrowing.  At C6-7, the facets are normal. There is moderate disc space narrowing. There are degenerative endplate changes, a mild diffuse posterior disc osteophyte complex and mild canal narrowing. There is some uncovertebral joint spurring into the foramina, and there is mild-to-moderate bilateral bony foraminal narrowing.  At C7-T1, there is moderate bilateral facet overgrowth. There is a 3-4 mm degenerative anterolisthesis of C7 on T1.  There is no canal narrowing.  There is, at least, mild-to-moderate bilateral bony foraminal narrowing.  No acute fracture is seen in the cervical spine.  IMPRESSION:  No acute fracture is seen in the cervical spine with no significant change when compared to a recent cervical spine CT on 03/09/2025. There is  cervical spondylosis as described in great detail above.   LUMBAR SPINE CT TECHNIQUE: Spiral CT images were obtained from the inferior body of the T10 thoracic level down to the S2-3 sacral level and images were reformatted and are submitted in 3 mm thick axial CT sections with soft tissue algorithm, and 1 mm thick axial CT sections with high-resolution bone algorithm, and 2 mm thick sagittal and coronal reconstructions were performed and submitted in both bone and soft tissue algorithm.  There are no prior lumbar spine CTs or MRIs for comparison. This is correlated to a remote prior CT scan of the abdomen and pelvis on 03/17/2021.  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 There is severe disc space narrowing.  There are degenerative disc and endplate changes and vacuum disc phenomenon at all levels from L1 to S1.  At T10-11, there is minimal posterior spurring and minimal left facet overgrowth. There is minimal, if any, canal narrowing.  There is mild left and essentially no right foraminal narrowing.  At T11-12, the posterior disc margin and facets are normal with no canal or foraminal narrowing.  At T12-L1, there is some vacuum disc phenomenon. The posterior disc margin and facets are normal, and there is no canal or foraminal narrowing.  At L1-2, there is severe disc space narrowing.  There is vacuum disc phenomenon and degenerative endplate changes, and there is a 4 mm retrolisthesis of L1 with respect to L2, and a diffuse posterior disc osteophyte complex.  The facets are normal.  There is mild-to-moderate canal narrowing.  There is some spurring and air contained within bulging or protruding disc material into the foramen.  There is mild left and there is moderate right foraminal narrowing that could affect the exiting right L2 nerve root.  At L2-3, there is prominent vacuum disc phenomenon, a 2--3 mm retrolisthesis of L2 with  respect to L3, and a diffuse posterior disc osteophyte complex.  The facets are normal.  There is prominent posterior epidural fat and there is at least mild-to-moderate up to moderate generalized narrowing of the thecal sac.  There is mild left foraminal narrowing, loss of vertical height of the right foramen, eccentric spurring and bulging disc material into the inferior right foramen, and there is moderate-to-severe right foraminal narrowing.  At L3-4, there is disc space narrowing, vacuum disc phenomenon, degenerative endplate changes, 2-3 mm retrolisthesis of L3 with respect to L4, diffuse posterior disc osteophyte complex, mild-to-moderate right, minimal left facet overgrowth and ligamentum flavum thickening, and there is prominent posterior epidural fat, and the combination of all of the above findings contributes to moderate-to-severe narrowing of the thecal sac.  There is mild left, and there is moderate-to-severe right foraminal narrowing.  At L4-5, there is moderate bilateral facet overgrowth.  There is a 2 mm anterolisthesis of L4 with respect to L5, a diffuse posterior disc bulge, and there is moderate narrowing of the thecal sac and some narrowing of the lateral recesses.  There is synovial thickening off the facets extending into the posterior foramina, spurring and uncovered bulging disc material into the inferior aspect of the foramina, and there is moderate-to-severe bilateral foraminal narrowing that could affect the exiting L4 nerve roots.  At L5-S1, there is moderate bilateral facet overgrowth, disc space narrowing, degenerative endplate changes, and posterior endplate spurring.  There is no canal or lateral recess narrowing.  There is mild-to-moderate right and there is moderate left foraminal narrowing.  No discernible acute fracture is seen in the lumbar spine.  IMPRESSION:  No acute fracture is seen in the lumbar spine. This patient has a moderate rotary levoscoliotic curvature of the lumbar  spine with its apex at the L3 lumbar level and there is pronounced disc space narrowing, degenerative disc and endplate changes, and vacuum disc phenomenon at all levels from L1 to S1, and multilevel canal and foraminal narrowing as described.  Radiation dose reduction techniques were utilized, including automated exposure control and exposure modulation based on body size.       XR Ankle 3+ View Right  Result Date: 5/5/2025   As described.    This report was finalized on 5/5/2025 11:40 AM by Dr. Nirmal Harris M.D on Workstation: [employer]      XR Chest 1 View  Result Date: 5/5/2025  No focal pulmonary consolidation. Follow-up as clinical indications persist.  This report was finalized on 5/5/2025 11:37 AM by Dr. Nirmal Harris M.D on Workstation: [employer]        Ambulatory status:   - assist x 1    Social issues:   Social History     Socioeconomic History    Marital status:    Tobacco Use    Smoking status: Former    Smokeless tobacco: Never   Vaping Use    Vaping status: Never Used   Substance and Sexual Activity    Alcohol use: Yes    Drug use: Not Currently     Types: Marijuana     Comment: used in the 1970's    Sexual activity: Defer       Peripheral Neurovascular  Peripheral Neurovascular (Adult)  Peripheral Neurovascular WDL: WDL    Neuro Cognitive  Neuro Cognitive (Adult)  Cognitive/Neuro/Behavioral WDL: WDL  [place] Coma Scale  Best Eye Response: 4-->(E4) spontaneous  Best Motor Response: 6-->(M6) obeys commands  Best Verbal Response: 5-->(V5) oriented  [place] Coma Scale Score: 15    Learning  Learning Assessment  Learning Readiness and Ability: no barriers identified    Respiratory  Respiratory WDL  Respiratory WDL: WDL    Abdominal Pain       Pain Assessments  Pain (Adult)  (0-10) Pain Rating: Rest: 5  (0-10) Pain Rating: Activity: 5  Patient requested Medication Prescribed for Lower Pain Scale: Yes  Pain Location: back  Pain Side/Orientation: bilateral  Response to Pain Interventions:  interventions effective per patient    NIH Stroke Scale       Navdeep Musa RN  05/05/25 15:00 EDT

## 2025-05-05 NOTE — ED PROVIDER NOTES
MD ATTESTATION NOTE      Brief HPI: Patient complains of an acute fall and possible syncopal episode.  Patient fell at home last night.  He does not remember falling but remembers waking up on the floor.  He laid on the floor all night until his caregiver found him this morning.  He is unsure if he hit his head.  He currently complains of pain in his gums/last face and right ankle.  He has a history of chronic low back pain which is worse today.  He is not on anticoagulants.  Denies headache, neck pain, shortness of breath, chest pain, palpitations, dizziness, nausea, vomiting, or new numbness/tingling/weakness in his extremities.    PHYSICAL EXAM    GENERAL: Awake, alert, and oriented x 3.  Resting comfortably in no acute distress  HENT: nares patent, NCAT, there is mild tenderness over the left maxilla, no signs of trauma to the face  EYES: PERRL, EOMI  CV: regular rhythm, normal rate  RESPIRATORY: normal effort, CTAB  ABDOMEN: soft, nontender  MUSCULOSKELETAL: no deformity, there is tenderness over the right lateral ankle, there is diffuse tenderness of the lumbar spine, C/T spine and chest are nontender  NEURO: moves all extremities, follows commands, speech is clear and fluent, no facial droop, GCS 15  PSYCH:  calm, cooperative  SKIN: warm, dry    Vital signs and nursing notes reviewed.        Plan: Patient fell after having a syncopal episode at home last night.  Will obtain labs, EKG, and imaging studies for further evaluation.    EKG personally interpreted by me at 11:14 AM.  My personal interpretation is:          EKG time: 11:09 AM  Rhythm/Rate: Sinus rhythm with PACs, rate 79  P waves and MO: Normal  QRS, axis: Normal axis  ST and T waves: Nonspecific ST/T wave changes in multiple leads  QT interval: Prolonged    Interpreted Contemporaneously by me, independently viewed  EKG is changed compared to prior EKG done on 3/9/2025.  Nonspecific changes were not present then    ED Course as of 05/05/25 1901   Mon  May 05, 2025   1153 Creatine Kinase(!): 245 [WH]   1154 Glucose: 91 [WH]   1154 BUN: 17 [WH]   1154 Creatinine: 0.98 [WH]   1154 CO2: 24.0 [WH]   1154 proBNP(!): 2,060.0 [WH]   1154 HS Troponin T(!): 30 [WH]   1154 Right ankle x-rays personally interpreted by me.  My personal interpretation is: No fracture.  No dislocation.  There is swelling over the lateral ankle [WH]   1154 Chest x-ray is negative acute [WH]   1304 HS Troponin T(!): 27 [WH]   1304 Troponin T % Delta: -10 [WH]   1310 Updated by radiology that there is no evidence of an acute fracture in the patient's spine or head [CV]   1337 Per the radiologist, head CT and CT of the face are negative acute.  C-spine and L-spine are negative for acute fracture.  There are multilevel degenerative changes.  See dictated report for details. [WH]   1414 Patient is resting comfortably.  Test results and diagnoses were discussed with him.  Blood pressure is now 126/84.  Shared decision making was discussed and admission was recommended.  He is agreeable with this. [WH]   1435 Case discussed with Dr. Singh, hospitalist, she agrees to admit the patient to a monitored bed.  Pertinent history, exam findings, test results, ED course, and diagnoses were discussed with her. [WH]      ED Course User Index  [CV] Anant Minor, PA-C  [WH] Kin Salazar MD      Diagnosis Plan   1. Syncope, unspecified syncope type        2. Acute on chronic low back pain        3. Contusion of face, initial encounter        4. Sprain of right ankle, unspecified ligament, initial encounter          ADMISSION    Discussed treatment plan and reason for admission with pt/family and admitting physician.  Pt/family voiced understanding of the plan for admission for further testing/treatment as needed.        Kin Salazar MD  05/05/25 [id number]

## 2025-05-05 NOTE — ED PROVIDER NOTES
EMERGENCY DEPARTMENT ENCOUNTER    Room Number:  S508/1  Date of encounter:  5/5/2025  PCP: Epley, James, APRN  Historian: Patient and family at bedside  Full history not obtainable due to: None    HPI:  Chief Complaint: Fall     Context: Darwin Sparks is a 84 y.o. male with a PMH significant for hypertension, hyperlipidemia, insomnia, anxiety, chronic low back pain, lumbar facet arthropathy, scoliosis, multiple falls, vertigo who presents to the ED c/o lower back pain following a fall.  Patient states that he is unsure how he fell, but believes he hit a coffee table.  Patient is unsure if he fell at 8 AM today or 8 PM last night.  Patient is primarily complaining of lower back pain, also endorses pain in his right ankle and pain in his upper gumline on the left side.  Patient states that he has had several falls similar to this episode, is unsure what caused it.  Patient also has a history of chronic low back pain, he says that this is much worse following the fall.  Patient denies any blood thinner use, is unsure how long he was on the ground for.  Patient denies overt chest pain or shortness of breath.      MEDICAL RECORD REVIEW:    Upon review of the medical record it appears the patient was evaluated 5/30/2023.  The patient had a normal TN4 and BNP.    PAST MEDICAL HISTORY    Active Ambulatory Problems     Diagnosis Date Noted    Primary hypertension 04/21/2016    Mixed hyperlipidemia 04/21/2016    Adult hypothyroidism 04/21/2016    Insomnia 04/21/2016    Anxiety 04/28/2016    Dermatitis, eczematoid 04/28/2016    Bronchitis 04/28/2016    Seasonal allergies 04/28/2016    Health care maintenance 10/18/2016    Chronic midline low back pain without sciatica 10/18/2016    Penis pain 01/21/2019    Tinea cruris 01/21/2019    Primary osteoarthritis involving multiple joints 08/30/2019    Benzodiazepine dependence 08/30/2019    Constipation 04/21/2021    Abnormal finding on CT scan 04/21/2021    Scoliosis of lumbar  spine 05/12/2021    Lumbar facet arthropathy 07/19/2021    Vertigo 03/09/2025    Multiple falls 03/10/2025    Severe major depression 04/14/2025    Decreased strength, endurance, and mobility 04/14/2025    Gait instability 04/14/2025     Resolved Ambulatory Problems     Diagnosis Date Noted    No Resolved Ambulatory Problems     Past Medical History:   Diagnosis Date    Cataract     Colon polyp     Deep vein thrombosis     Headache 28 years old    HL (hearing loss) 8 years ago    Hyperlipidemia     Hypertension     Hypothyroidism     Infectious viral hepatitis     Inflammatory bowel disease Child to 77 years old    Irritable bowel syndrome     Kidney stone 1958    Low back pain 1973    Osteoarthritis     Pneumonia Child    Prostate cancer 2013    Scoliosis 1973    Urinary tract infection 1973    Visual impairment 1960         PAST SURGICAL HISTORY  Past Surgical History:   Procedure Laterality Date    ADENOIDECTOMY  10 years old    CATARACT EXTRACTION      CIRCUMCISION      COLONOSCOPY      COLONOSCOPY N/A 05/27/2021    Procedure: COLONOSCOPY TO CECUM/TI;  Surgeon: Jamel Michaels MD;  Location: [facility] ENDOSCOPY;  Service: Gastroenterology;  Laterality: N/A;  Pre op: Abnormal cat scan, constipation, RLQ pain  Post op: Diverticulosis, Hemorrhoids, otherwise normal to and including TI.    MEDIAL BRANCH BLOCK Bilateral 07/30/2021    Procedure: LUMBAR MEDIAL BRANCH BLOCK;  Surgeon: Kb Rodriguez MD;  Location: [facility] MAIN OR;  Service: Pain Management;  Laterality: Bilateral;    MEDIAL BRANCH BLOCK Bilateral 08/20/2021    Procedure: MEDIAL BRANCH BLOCK--bilateral lumbar3-lumbar5;  Surgeon: Kb Rodriguez MD;  Location: [facility] MAIN OR;  Service: Pain Management;  Laterality: Bilateral;    PROSTATE SURGERY  72 years old    Low radiation, started vegan diet    RADIOFREQUENCY ABLATION Bilateral 10/08/2021    Procedure: RADIOFREQUENCY ABLATION LUMBAR--bilateral lumbar3-5 WITH MAC;  Surgeon: Kb Rodriguez,  MD;  [place] [facility] [place];  Service: Pain Management;  Laterality: Bilateral;    TONSILLECTOMY      TOOTH EXTRACTION           FAMILY HISTORY  Family History   Problem Relation Age of Onset    Diabetes Mother     Hypertension Mother     Stroke Mother     Diabetes Father     Hypertension Father     Bipolar disorder Father     Stroke Father     Bipolar disorder Paternal Grandmother     Heart disease Other     Sudden death Other     Anuerysm Other     Malig Hyperthermia Neg Hx          SOCIAL HISTORY  Social History     Socioeconomic History    Marital status:    Tobacco Use    Smoking status: Former    Smokeless tobacco: Never   Vaping Use    Vaping status: Never Used   Substance and Sexual Activity    Alcohol use: Yes    Drug use: Not Currently     Types: Marijuana     Comment: used in the 1970's    Sexual activity: Defer         ALLERGIES  Milk-related compounds, Pregabalin, and Neomycin-bacitracin zn-polymyx        REVIEW OF SYSTEMS    All systems reviewed and marked as negative except as listed in HPI     PHYSICAL EXAM    I have reviewed the triage vital signs and nursing notes.    ED Triage Vitals   Temp Heart Rate Resp BP SpO2   05/05/25 1025 05/05/25 1025 05/05/25 1025 05/05/25 1025 05/05/25 1025   98 °F (36.7 °C) 56 16 (!) 200/91 98 %      Temp src Heart Rate Source Patient Position BP Location FiO2 (%)   05/05/25 1025 -- 05/05/25 1044 05/05/25 1044 --   Oral  Lying Right arm        Physical Exam  Constitutional:       General: He is not in acute distress.     Appearance: Normal appearance. He is not ill-appearing.   HENT:      Head: Normocephalic and atraumatic.      Nose: Nose normal.   Eyes:      Extraocular Movements: Extraocular movements intact.      Pupils: Pupils are equal, round, and reactive to light.   Cardiovascular:      Rate and Rhythm: Normal rate and regular rhythm.      Pulses: Normal pulses.      Heart sounds: Normal heart sounds.   Pulmonary:      Effort: Pulmonary effort is  normal. No respiratory distress.      Breath sounds: Normal breath sounds.   Abdominal:      General: Abdomen is flat.      Palpations: Abdomen is soft.   Musculoskeletal:      Right ankle: Tenderness present over the lateral malleolus.      Left ankle: Normal.   Skin:     General: Skin is warm and dry.      Coloration: Skin is not jaundiced.   Neurological:      Mental Status: He is alert and oriented to person, place, and time.   Psychiatric:         Mood and Affect: Mood normal.         Behavior: Behavior normal.         Thought Content: Thought content normal.         Vital signs and nursing notes reviewed.      LAB RESULTS  Recent Results (from the past 24 hours)   Comprehensive Metabolic Panel    Collection Time: 05/05/25 10:57 AM    Specimen: Blood   Result Value Ref Range    Glucose 91 65 - 99 mg/dL    BUN 17 8 - 23 mg/dL    Creatinine 0.98 0.76 - 1.27 mg/dL    Sodium 140 136 - 145 mmol/L    Potassium 4.4 3.5 - 5.2 mmol/L    Chloride 101 98 - 107 mmol/L    CO2 24.0 22.0 - 29.0 mmol/L    Calcium 9.1 8.6 - 10.5 mg/dL    Total Protein 6.1 6.0 - 8.5 g/dL    Albumin 3.4 (L) 3.5 - 5.2 g/dL    ALT (SGPT) 27 1 - 41 U/L    AST (SGOT) 42 (H) 1 - 40 U/L    Alkaline Phosphatase 112 39 - 117 U/L    Total Bilirubin 1.1 0.0 - 1.2 mg/dL    Globulin 2.7 gm/dL    A/G Ratio 1.3 g/dL    BUN/Creatinine Ratio 17.3 7.0 - 25.0    Anion Gap 15.0 5.0 - 15.0 mmol/L    eGFR 75.1 >60.0 mL/min/1.73   High Sensitivity Troponin T    Collection Time: 05/05/25 10:57 AM    Specimen: Blood   Result Value Ref Range    HS Troponin T 30 (H) <22 ng/L   BNP    Collection Time: 05/05/25 10:57 AM    Specimen: Blood   Result Value Ref Range    proBNP 2,060.0 (H) 0.0 - 1,800.0 pg/mL   CBC Auto Differential    Collection Time: 05/05/25 10:57 AM    Specimen: Blood   Result Value Ref Range    WBC 8.04 3.40 - 10.80 10*3/mm3    RBC 4.09 (L) 4.14 - 5.80 10*6/mm3    Hemoglobin 15.3 13.0 - 17.7 g/dL    Hematocrit 44.8 37.5 - 51.0 %    .5 (H) 79.0 - 97.0  fL    MCH 37.4 (H) 26.6 - 33.0 pg    MCHC 34.2 31.5 - 35.7 g/dL    RDW 13.0 12.3 - 15.4 %    RDW-SD 52.3 37.0 - 54.0 fl    MPV 10.4 6.0 - 12.0 fL    Platelets 196 140 - 450 10*3/mm3   CK    Collection Time: 05/05/25 10:57 AM    Specimen: Blood   Result Value Ref Range    Creatine Kinase 245 (H) 20 - 200 U/L   Manual Differential    Collection Time: 05/05/25 10:57 AM    Specimen: Blood   Result Value Ref Range    Neutrophil % 83.7 (H) 42.7 - 76.0 %    Lymphocyte % 12.2 (L) 19.6 - 45.3 %    Monocyte % 3.1 (L) 5.0 - 12.0 %    Eosinophil % 1.0 0.3 - 6.2 %    Basophil % 0.0 0.0 - 1.5 %    Neutrophils Absolute 6.73 1.70 - 7.00 10*3/mm3    Lymphocytes Absolute 0.98 0.70 - 3.10 10*3/mm3    Monocytes Absolute 0.25 0.10 - 0.90 10*3/mm3    Eosinophils Absolute 0.08 0.00 - 0.40 10*3/mm3    Basophils Absolute 0.00 0.00 - 0.20 10*3/mm3    Anisocytosis Large/3+ None Seen    Macrocytes Large/3+ None Seen    WBC Morphology Normal Normal    Platelet Morphology Normal Normal   ECG 12 Lead Syncope    Collection Time: 05/05/25 11:09 AM   Result Value Ref Range    QT Interval 476 ms    QTC Interval 523 ms   High Sensitivity Troponin T 1Hr    Collection Time: 05/05/25 12:02 PM    Specimen: Arm, Right; Blood   Result Value Ref Range    HS Troponin T 27 (H) <22 ng/L    Troponin T Numeric Delta -3 ng/L    Troponin T % Delta -10 Abnormal if >/= 20%       Ordered the above labs and independently reviewed the results.      RADIOLOGY  CT Head Without Contrast  CT Head Without Contrast, CT Cervical Spine Without Contrast  CT Head Without Contrast, CT Cervical Spine Without Contrast, CT Facial Bones Without Contrast  CT Head Without Contrast, CT Cervical Spine Without Contrast, CT Facial Bones Without Contrast, CT Lumbar Spine Without Contrast  Result Date: 5/5/2025  EMERGENCY CT SCAN OF THE HEAD, FACE, CERVICAL SPINE AND LUMBAR SPINE WITHOUT CONTRAST 05/05/2025  CLINICAL HISTORY: This is an 84-year-old male patient who fell and had head trauma  and facial trauma, and also complains of neck and low back pain.  HEAD CT TECHNIQUE: Spiral CT images were obtained from the base of the skull to the vertex without intravenous contrast. The images were reformatted and are submitted in 3 mm thick axial, sagittal and coronal CT sections with brain algorithm and 2 mm thick axial CT sections with high-resolution bone algorithm.  This is correlated to an MRI of the brain on 03/09/2025 and a head CT on 03/09/2025.  FINDINGS: There is some mild patchy low-density in the periventricular white matter consistent with mild small vessel disease. The remainder of the brain parenchyma is normal in attenuation. There is diffuse cerebral atrophy. The lateral and third ventricles are mildly prominent in size, which is felt to be on the basis of central volume loss or atrophy. I see no focal mass effect. There is no midline shift. No extra-axial fluid collections are identified. There is no evidence of acute intracranial hemorrhage. No acute skull fracture is identified. The calvarium and the skull base are normal in appearance. The paranasal sinuses are clear. There are lens implants in the globes from previous bilateral cataract surgery. Otherwise, the orbits are unremarkable. There are some mild osteoarthritic changes in the temporomandibular joints bilaterally.       1. No acute intracranial abnormality is identified with no change when compared to the patient's prior head CT and MRI of the brain on 03/09/2025.  2. There is mild small vessel disease in the cerebral white matter. There is mild diffuse cerebral atrophy and the lateral and third ventricles are prominent in size, felt to be on the basis of central volume loss or atrophy. There are lens implants in the globes from previous bilateral cataract surgery. The remainder of the head CT is within normal limits with no acute skull fracture or intracranial hemorrhage identified.   FACIAL CT TECHNIQUE: Spiral CT images were  obtained through the facial bones in the axial imaging plane. The images were reformatted and are submitted in 2 mm thick axial, sagittal and coronal CT sections with soft tissue algorithm and 1 mm thick axial, sagittal and coronal CT sections with high-resolution bone algorithm.  This is correlated to a prior facial CT on 03/09/2025.  FINDINGS: The paranasal sinuses are clear. There are lens implants in the globes from previous bilateral cataract surgery. Otherwise, the orbits are normal in appearance. There are mild osteoarthritic changes in the temporomandibular joints bilaterally with minimal marginal spurring off the mandibular heads. The patient is edentulous. No discernible acute fracture is seen in the facial bones.  IMPRESSION:  No acute facial fracture is identified. The patient is edentulous and also has bilateral intraocular lens implants from previous bilateral cataract surgery. Otherwise, the facial CT is unremarkable.   CERVICAL SPINE CT TECHNIQUE: Spiral CT images were obtained from the skull base down to the T2 thoracic level. The images were reformatted and are submitted in 2 mm thick axial and sagittal CT sections with soft tissue algorithm and 1 mm thick axial, sagittal and coronal CT sections with high-resolution bone algorithm.  This is correlated to a prior cervical spine CT from [facility] on 03/09/2025.  FINDINGS: The atlantooccipital articulation is within normal limits.  At C1-2, there are arthritic changes at the atlantodental interval with narrowing of the interval and marginal spurring off the superior aspect of the anterior ring of C1 and the odontoid and there is thickening and calcification of the transverse ligament along the back of the odontoid. Otherwise, the C1-2 level is normal in appearance.  At C2-3, the posterior disc margin, facets and uncovertebral joints are within normal limits with no canal or foraminal narrowing.  At C3-4, there is mild-to-moderate  bilateral facet overgrowth. There is severe disc space narrowing and there are degenerative disc and endplate changes and a 2 mm retrolisthesis of C3 on C4, and a mild diffuse posterior disc osteophyte complex. There is minimal, if any, canal narrowing and there is some minimal spurring into the foramina with minimal foraminal narrowing.  At C4-5, there is mild-to-moderate left and there is moderate right facet overgrowth.  There is a 3 mm degenerative anterolisthesis of C4 on C5. There is mild canal narrowing.  There is some mild right uncovertebral joint hypertrophy and there is mild-to-moderate right, but no left foraminal narrowing.  At C5-6, there is moderate right facet overgrowth and minimal left facet overgrowth. There is severe disc space narrowing and there are degenerative disc and endplate changes.  There is a diffuse posterior disc osteophyte complex that results in only mild canal narrowing, and some mild uncovertebral joint hypertrophy right greater than left. There is minimal left and there is moderate-to-severe right bony foraminal narrowing.  At C6-7, the facets are normal. There is moderate disc space narrowing. There are degenerative endplate changes, a mild diffuse posterior disc osteophyte complex and mild canal narrowing. There is some uncovertebral joint spurring into the foramina, and there is mild-to-moderate bilateral bony foraminal narrowing.  At C7-T1, there is moderate bilateral facet overgrowth. There is a 3-4 mm degenerative anterolisthesis of C7 on T1.  There is no canal narrowing.  There is, at least, mild-to-moderate bilateral bony foraminal narrowing.  No acute fracture is seen in the cervical spine.  IMPRESSION:  No acute fracture is seen in the cervical spine with no significant change when compared to a recent cervical spine CT on 03/09/2025. There is cervical spondylosis as described in great detail above.   LUMBAR SPINE CT TECHNIQUE: Spiral CT images were obtained from the  inferior body of the T10 thoracic level down to the S2-3 sacral level and images were reformatted and are submitted in 3 mm thick axial CT sections with soft tissue algorithm, and 1 mm thick axial CT sections with high-resolution bone algorithm, and 2 mm thick sagittal and coronal reconstructions were performed and submitted in both bone and soft tissue algorithm.  There are no prior lumbar spine CTs or MRIs for comparison. This is correlated to a remote prior CT scan of the abdomen and pelvis on 03/17/2021.  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 There is severe disc space narrowing.  There are degenerative disc and endplate changes and vacuum disc phenomenon at all levels from L1 to S1.  At T10-11, there is minimal posterior spurring and minimal left facet overgrowth. There is minimal, if any, canal narrowing.  There is mild left and essentially no right foraminal narrowing.  At T11-12, the posterior disc margin and facets are normal with no canal or foraminal narrowing.  At T12-L1, there is some vacuum disc phenomenon. The posterior disc margin and facets are normal, and there is no canal or foraminal narrowing.  At L1-2, there is severe disc space narrowing.  There is vacuum disc phenomenon and degenerative endplate changes, and there is a 4 mm retrolisthesis of L1 with respect to L2, and a diffuse posterior disc osteophyte complex.  The facets are normal.  There is mild-to-moderate canal narrowing.  There is some spurring and air contained within bulging or protruding disc material into the foramen.  There is mild left and there is moderate right foraminal narrowing that could affect the exiting right L2 nerve root.  At L2-3, there is prominent vacuum disc phenomenon, a 2--3 mm retrolisthesis of L2 with respect to L3, and a diffuse posterior disc osteophyte complex.  The facets are normal.  There is prominent posterior epidural fat  and there is at least mild-to-moderate up to moderate generalized narrowing of the thecal sac.  There is mild left foraminal narrowing, loss of vertical height of the right foramen, eccentric spurring and bulging disc material into the inferior right foramen, and there is moderate-to-severe right foraminal narrowing.  At L3-4, there is disc space narrowing, vacuum disc phenomenon, degenerative endplate changes, 2-3 mm retrolisthesis of L3 with respect to L4, diffuse posterior disc osteophyte complex, mild-to-moderate right, minimal left facet overgrowth and ligamentum flavum thickening, and there is prominent posterior epidural fat, and the combination of all of the above findings contributes to moderate-to-severe narrowing of the thecal sac.  There is mild left, and there is moderate-to-severe right foraminal narrowing.  At L4-5, there is moderate bilateral facet overgrowth.  There is a 2 mm anterolisthesis of L4 with respect to L5, a diffuse posterior disc bulge, and there is moderate narrowing of the thecal sac and some narrowing of the lateral recesses.  There is synovial thickening off the facets extending into the posterior foramina, spurring and uncovered bulging disc material into the inferior aspect of the foramina, and there is moderate-to-severe bilateral foraminal narrowing that could affect the exiting L4 nerve roots.  At L5-S1, there is moderate bilateral facet overgrowth, disc space narrowing, degenerative endplate changes, and posterior endplate spurring.  There is no canal or lateral recess narrowing.  There is mild-to-moderate right and there is moderate left foraminal narrowing.  No discernible acute fracture is seen in the lumbar spine.  IMPRESSION:  No acute fracture is seen in the lumbar spine. 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  Radiation dose reduction techniques were utilized, including automated exposure control and exposure modulation based on body size.       XR Ankle 3+ View Right  Result Date: 5/5/2025  XR ANKLE 3+ VW RIGHT-  INDICATIONS: Trauma.  TECHNIQUE: 3 VIEWS OF THE RIGHT ANKLE  COMPARISON: None available  FINDINGS:  No acute fracture, erosion, or dislocation is identified. Soft tissue swelling is seen laterally at the ankle. Ankle mortise appears preserved. Arterial calcifications are conspicuous. Follow-up/further evaluation can be obtained as indications persist.       As described.    This report was finalized on 5/5/2025 11:40 AM by Dr. Nirmal Harris M.D on Workstation: [id number]      XR Chest 1 View  Result Date: 5/5/2025  XR CHEST 1 VW-  HISTORY: Male who is 84 years-old, syncope  TECHNIQUE: Frontal view of the chest  COMPARISON: 12/20/2024  FINDINGS: Heart size is normal. Aorta is tortuous. Pulmonary vasculature is unremarkable. No focal pulmonary consolidation, pleural effusion, or pneumothorax. No acute osseous process.      No focal pulmonary consolidation. Follow-up as clinical indications persist.  This report was finalized on 5/5/2025 11:37 AM by Dr. Nirmal Harris M.D on Workstation: [id number]        I ordered the above noted radiological studies. Independently reviewed by me and discussed with radiologist.  See dictation above for official radiology interpretation.        MEDICATIONS GIVEN IN ER    Medications   acetaminophen (TYLENOL) tablet 650 mg (650 mg Oral Given 5/5/25 1755)     Or   acetaminophen (TYLENOL) 160 MG/5ML oral solution 650 mg ( Oral Not Given:  See Alt 5/5/25 1755)     Or   acetaminophen (TYLENOL) suppository 650 mg ( Rectal Not Given:  See Alt 5/5/25 1755)   ondansetron ODT (ZOFRAN-ODT) disintegrating tablet 4 mg (has no administration in time range)     Or   ondansetron  (ZOFRAN) injection 4 mg (has no administration in time range)   melatonin tablet 2.5 mg (has no administration in time range)   sennosides-docusate (PERICOLACE) 8.6-50 MG per tablet 2 tablet (has no administration in time range)     And   polyethylene glycol (MIRALAX) packet 17 g (has no administration in time range)     And   bisacodyl (DULCOLAX) EC tablet 5 mg (has no administration in time range)     And   bisacodyl (DULCOLAX) suppository 10 mg (has no administration in time range)   albuterol (PROVENTIL) nebulizer solution 0.083% 2.5 mg/3mL (has no administration in time range)   ammonium lactate (AMLACTIN) 12 % cream (has no administration in time range)   folic acid (FOLVITE) tablet 1 mg (has no administration in time range)   levothyroxine (SYNTHROID, LEVOTHROID) tablet 125 mcg (has no administration in time range)   sertraline (ZOLOFT) tablet 50 mg (has no administration in time range)   HYDROcodone-acetaminophen (NORCO) 7.5-325 MG per tablet 1 tablet (1 tablet Oral Given 5/5/25 1158)         PROGRESS, DATA ANALYSIS, CONSULTS, AND MEDICAL DECISION MAKING    All labs have been independently interpreted by me.  All radiology studies have been interpreted by me.  Discussion below represents my analysis of pertinent findings related to patient's condition, differential diagnosis, treatment plan and final disposition.    - Chronic or social conditions impacting care: None      DIFFERENTIAL DIAGNOSIS INCLUDE BUT NOT LIMITED TO: Differential diagnosis for head injury includes but is not limited to:  - subarachnoid hemorrhage  - subdural hematoma  - epidural hematoma  - concussion  - scalp contusion  - Lumbar strain  - Lumbar radiculopathy  - Lumbar disc bulge/herniation  - Lumbar spinal stenosis  - Vertebral fracture  - Cauda equina syndrome  - Vertebral osteomyelitis  - Kidney stone  - Shingles        Orders placed during this visit:  Orders Placed This Encounter   Procedures    XR Chest 1 View    CT Head Without  Contrast    CT Cervical Spine Without Contrast    XR Ankle 3+ View Right    CT Facial Bones Without Contrast    CT Lumbar Spine Without Contrast    Comprehensive Metabolic Panel    High Sensitivity Troponin T    BNP    CBC Auto Differential    CK    Manual Differential    High Sensitivity Troponin T 1Hr    Basic Metabolic Panel    CBC (No Diff)    Diet: Vegetarian; Vegan (No animal products); Fluid Consistency: Thin (IDDSI 0)    Vital Signs    Up with assistance    Daily Weights    Strict Intake & Output    Oral Care    Place Sequential Compression Device    Maintain Sequential Compression Device    Code Status and Medical Interventions: CPR (Attempt to Resuscitate); Full    [facility] (on-call MD unless specified) Details    Inpatient Cardiology Consult    OT Consult: Eval & Treat ADL Performance Below Baseline    PT Consult: Eval & Treat Functional Mobility Below Baseline    ECG 12 Lead Syncope    Telemetry Scan    Adult Transthoracic Echo Complete W/ Cont if Necessary Per Protocol    Initiate Observation Status    CBC & Differential         ED Course as of 05/05/25 2038   Mon May 05, 2025   1153 Creatine Kinase(!): 245 [WH]   1154 Glucose: 91 [WH]   1154 BUN: 17 [WH]   1154 Creatinine: 0.98 [WH]   1154 CO2: 24.0 [WH]   1154 proBNP(!): 2,060.0 [WH]   1154 HS Troponin T(!): 30 [WH]   1154 Right ankle x-rays personally interpreted by me.  My personal interpretation is: No fracture.  No dislocation.  There is swelling over the lateral ankle [WH]   1154 Chest x-ray is negative acute [WH]   1304 HS Troponin T(!): 27 [WH]   1304 Troponin T % Delta: -10 [WH]   1310 Updated by radiology that there is no evidence of an acute fracture in the patient's spine or head [CV]   1337 Per the radiologist, head CT and CT of the face are negative acute.  C-spine and L-spine are negative for acute fracture.  There are multilevel degenerative changes.  See dictated report for details. [WH]   1414 Patient is resting comfortably.  Test results  and diagnoses were discussed with him.  Blood pressure is now 126/84.  Shared decision making was discussed and admission was recommended.  He is agreeable with this. []   1435 Case discussed with Dr. Singh, hospitalist, she agrees to admit the patient to a monitored bed.  Pertinent history, exam findings, test results, ED course, and diagnoses were discussed with her. []      ED Course User Index  [CV] Anant Minor PA-C  [] Kin Salazar MD       AS OF 20:38 EDT VITALS:    BP - 117/56  HR - 83  TEMP - 97.7 °F (36.5 °C) (Oral)  02 SATS - 93%      No follow-up provider specified.       Medication List      No changes were made to your prescriptions during this visit.         I rechecked the patient.  I discussed the patient's labs, radiology findings (including all incidental findings), diagnosis, and plan for admission. The patient understands and agrees with the plan.      DIAGNOSIS  Final diagnoses:   Syncope, unspecified syncope type   Acute on chronic low back pain   Contusion of face, initial encounter   Sprain of right ankle, unspecified ligament, initial encounter         DISPOSITION  Admit    Pt masked in first look. I wore a surgical mask throughout my encounters with the pt. I performed hand hygiene on entry into the pt room and upon exit.     Dictated utilizing Dragon dictation     Note Disclaimer: At [facility], we believe that sharing information builds trust and better relationships. You are receiving this note because you recently visited [facility]. It is possible you will see health information before a provider has talked with you about it. This kind of information can be easy to misunderstand. To help you fully understand what it means for your health, we urge you to discuss this note with your provider.      Anant Minor PA-C  05/05/25 2039

## 2025-05-06 ENCOUNTER — APPOINTMENT (OUTPATIENT)
Dept: CARDIOLOGY | Facility: HOSPITAL | Age: 85
DRG: 312 | End: 2025-05-06
Payer: MEDICARE

## 2025-05-06 LAB
ANION GAP SERPL CALCULATED.3IONS-SCNC: 10.8 MMOL/L (ref 5–15)
AORTIC ARCH: 2.5 CM
AORTIC DIMENSIONLESS INDEX: 0.78 (DI)
ASCENDING AORTA: 3.7 CM
AV MEAN PRESS GRAD SYS DOP V1V2: 3.1 MMHG
AV VMAX SYS DOP: 121 CM/SEC
BH CV ECHO MEAS - ACS: 1.65 CM
BH CV ECHO MEAS - AO MAX PG: 5.9 MMHG
BH CV ECHO MEAS - AO ROOT AREA (BSA CORRECTED): 2 CM2
BH CV ECHO MEAS - AO ROOT DIAM: 3.8 CM
BH CV ECHO MEAS - AO V2 VTI: 24.4 CM
BH CV ECHO MEAS - AVA(I,D): 2.8 CM2
BH CV ECHO MEAS - EDV(CUBED): 159.7 ML
BH CV ECHO MEAS - EDV(MOD-SP2): 103 ML
BH CV ECHO MEAS - EDV(MOD-SP4): 105 ML
BH CV ECHO MEAS - EF(MOD-SP2): 55.3 %
BH CV ECHO MEAS - EF(MOD-SP4): 49.5 %
BH CV ECHO MEAS - ESV(CUBED): 59.2 ML
BH CV ECHO MEAS - ESV(MOD-SP2): 46 ML
BH CV ECHO MEAS - ESV(MOD-SP4): 53 ML
BH CV ECHO MEAS - FS: 28.1 %
BH CV ECHO MEAS - IVS/LVPW: 1.03 CM
BH CV ECHO MEAS - IVSD: 0.68 CM
BH CV ECHO MEAS - LAT PEAK E' VEL: 4.4 CM/SEC
BH CV ECHO MEAS - LV DIASTOLIC VOL/BSA (35-75): 55.2 CM2
BH CV ECHO MEAS - LV MASS(C)D: 126.7 GRAMS
BH CV ECHO MEAS - LV MAX PG: 3.3 MMHG
BH CV ECHO MEAS - LV MEAN PG: 1.5 MMHG
BH CV ECHO MEAS - LV SYSTOLIC VOL/BSA (12-30): 27.9 CM2
BH CV ECHO MEAS - LV V1 MAX: 90.7 CM/SEC
BH CV ECHO MEAS - LV V1 VTI: 18.9 CM
BH CV ECHO MEAS - LVIDD: 5.4 CM
BH CV ECHO MEAS - LVIDS: 3.9 CM
BH CV ECHO MEAS - LVOT AREA: 3.6 CM2
BH CV ECHO MEAS - LVOT DIAM: 2.14 CM
BH CV ECHO MEAS - LVPWD: 0.67 CM
BH CV ECHO MEAS - MED PEAK E' VEL: 4.7 CM/SEC
BH CV ECHO MEAS - MV A DUR: 0.15 SEC
BH CV ECHO MEAS - MV A MAX VEL: 127.3 CM/SEC
BH CV ECHO MEAS - MV DEC SLOPE: 169 CM/SEC2
BH CV ECHO MEAS - MV DEC TIME: 0.27 SEC
BH CV ECHO MEAS - MV E MAX VEL: 62.9 CM/SEC
BH CV ECHO MEAS - MV E/A: 0.49
BH CV ECHO MEAS - MV MAX PG: 6.5 MMHG
BH CV ECHO MEAS - MV MEAN PG: 2.35 MMHG
BH CV ECHO MEAS - MV P1/2T: 96.9 MSEC
BH CV ECHO MEAS - MV V2 VTI: 26 CM
BH CV ECHO MEAS - MVA(P1/2T): 2.27 CM2
BH CV ECHO MEAS - MVA(VTI): 2.6 CM2
BH CV ECHO MEAS - PA ACC TIME: 0.13 SEC
BH CV ECHO MEAS - PA V2 MAX: 67 CM/SEC
BH CV ECHO MEAS - RAP SYSTOLE: 3 MMHG
BH CV ECHO MEAS - RV MAX PG: 0.82 MMHG
BH CV ECHO MEAS - RV V1 MAX: 45.2 CM/SEC
BH CV ECHO MEAS - RV V1 VTI: 6.3 CM
BH CV ECHO MEAS - RVSP: 22 MMHG
BH CV ECHO MEAS - SV(LVOT): 68.1 ML
BH CV ECHO MEAS - SV(MOD-SP2): 57 ML
BH CV ECHO MEAS - SV(MOD-SP4): 52 ML
BH CV ECHO MEAS - SVI(LVOT): 35.8 ML/M2
BH CV ECHO MEAS - SVI(MOD-SP2): 30 ML/M2
BH CV ECHO MEAS - SVI(MOD-SP4): 27.3 ML/M2
BH CV ECHO MEAS - TAPSE (>1.6): 2.6 CM
BH CV ECHO MEAS - TR MAX PG: 19 MMHG
BH CV ECHO MEAS - TR MAX VEL: 218.2 CM/SEC
BH CV ECHO MEASUREMENTS AVERAGE E/E' RATIO: 13.82
BH CV XLRA - RV BASE: 3 CM
BH CV XLRA - RV MID: 2.7 CM
BH CV XLRA - TDI S': 11.5 CM/SEC
BUN SERPL-MCNC: 19 MG/DL (ref 8–23)
BUN/CREAT SERPL: 23.5 (ref 7–25)
CALCIUM SPEC-SCNC: 8.4 MG/DL (ref 8.6–10.5)
CHLORIDE SERPL-SCNC: 104 MMOL/L (ref 98–107)
CO2 SERPL-SCNC: 26.2 MMOL/L (ref 22–29)
CREAT SERPL-MCNC: 0.81 MG/DL (ref 0.76–1.27)
DEPRECATED RDW RBC AUTO: 51.7 FL (ref 37–54)
EGFRCR SERPLBLD CKD-EPI 2021: 85.9 ML/MIN/1.73
ERYTHROCYTE [DISTWIDTH] IN BLOOD BY AUTOMATED COUNT: 13 % (ref 12.3–15.4)
GLUCOSE SERPL-MCNC: 78 MG/DL (ref 65–99)
HCT VFR BLD AUTO: 38.4 % (ref 37.5–51)
HGB BLD-MCNC: 13.7 G/DL (ref 13–17.7)
LEFT ATRIUM VOLUME INDEX: 31.1 ML/M2
LV EF BIPLANE MOD: 53 %
MAGNESIUM SERPL-MCNC: 2 MG/DL (ref 1.6–2.4)
MCH RBC QN AUTO: 39.1 PG (ref 26.6–33)
MCHC RBC AUTO-ENTMCNC: 35.7 G/DL (ref 31.5–35.7)
MCV RBC AUTO: 109.7 FL (ref 79–97)
PHOSPHATE SERPL-MCNC: 3.2 MG/DL (ref 2.5–4.5)
PLATELET # BLD AUTO: 169 10*3/MM3 (ref 140–450)
PMV BLD AUTO: 10.5 FL (ref 6–12)
POTASSIUM SERPL-SCNC: 4.2 MMOL/L (ref 3.5–5.2)
RBC # BLD AUTO: 3.5 10*6/MM3 (ref 4.14–5.8)
SINUS: 3.8 CM
SODIUM SERPL-SCNC: 141 MMOL/L (ref 136–145)
STJ: 3.4 CM
T3FREE SERPL-MCNC: 2.17 PG/ML (ref 2–4.4)
T4 FREE SERPL-MCNC: 1.02 NG/DL (ref 0.92–1.68)
TSH SERPL DL<=0.05 MIU/L-ACNC: 8.01 UIU/ML (ref 0.27–4.2)
WBC NRBC COR # BLD AUTO: 5.7 10*3/MM3 (ref 3.4–10.8)

## 2025-05-06 PROCEDURE — 84439 ASSAY OF FREE THYROXINE: CPT | Performed by: STUDENT IN AN ORGANIZED HEALTH CARE EDUCATION/TRAINING PROGRAM

## 2025-05-06 PROCEDURE — 97530 THERAPEUTIC ACTIVITIES: CPT

## 2025-05-06 PROCEDURE — G0378 HOSPITAL OBSERVATION PER HR: HCPCS

## 2025-05-06 PROCEDURE — 84100 ASSAY OF PHOSPHORUS: CPT | Performed by: STUDENT IN AN ORGANIZED HEALTH CARE EDUCATION/TRAINING PROGRAM

## 2025-05-06 PROCEDURE — 97166 OT EVAL MOD COMPLEX 45 MIN: CPT

## 2025-05-06 PROCEDURE — 85027 COMPLETE CBC AUTOMATED: CPT | Performed by: INTERNAL MEDICINE

## 2025-05-06 PROCEDURE — 25510000001 PERFLUTREN 6.52 MG/ML SUSPENSION 2 ML VIAL: Performed by: INTERNAL MEDICINE

## 2025-05-06 PROCEDURE — 93306 TTE W/DOPPLER COMPLETE: CPT

## 2025-05-06 PROCEDURE — 93306 TTE W/DOPPLER COMPLETE: CPT | Performed by: INTERNAL MEDICINE

## 2025-05-06 PROCEDURE — 83735 ASSAY OF MAGNESIUM: CPT | Performed by: STUDENT IN AN ORGANIZED HEALTH CARE EDUCATION/TRAINING PROGRAM

## 2025-05-06 PROCEDURE — 99214 OFFICE O/P EST MOD 30 MIN: CPT | Performed by: NURSE PRACTITIONER

## 2025-05-06 PROCEDURE — 97162 PT EVAL MOD COMPLEX 30 MIN: CPT

## 2025-05-06 PROCEDURE — 80048 BASIC METABOLIC PNL TOTAL CA: CPT | Performed by: INTERNAL MEDICINE

## 2025-05-06 PROCEDURE — 25010000002 THIAMINE HCL 200 MG/2ML SOLUTION: Performed by: STUDENT IN AN ORGANIZED HEALTH CARE EDUCATION/TRAINING PROGRAM

## 2025-05-06 PROCEDURE — 84443 ASSAY THYROID STIM HORMONE: CPT | Performed by: STUDENT IN AN ORGANIZED HEALTH CARE EDUCATION/TRAINING PROGRAM

## 2025-05-06 PROCEDURE — 84481 FREE ASSAY (FT-3): CPT | Performed by: STUDENT IN AN ORGANIZED HEALTH CARE EDUCATION/TRAINING PROGRAM

## 2025-05-06 RX ORDER — MIDAZOLAM HYDROCHLORIDE 1 MG/ML
4 INJECTION, SOLUTION INTRAMUSCULAR; INTRAVENOUS
Status: DISCONTINUED | OUTPATIENT
Start: 2025-05-06 | End: 2025-05-09 | Stop reason: HOSPADM

## 2025-05-06 RX ORDER — LORAZEPAM 1 MG/1
2 TABLET ORAL
Status: DISCONTINUED | OUTPATIENT
Start: 2025-05-06 | End: 2025-05-09 | Stop reason: HOSPADM

## 2025-05-06 RX ORDER — FOLIC ACID 1 MG/1
1 TABLET ORAL DAILY
Status: DISCONTINUED | OUTPATIENT
Start: 2025-05-06 | End: 2025-05-06

## 2025-05-06 RX ORDER — THIAMINE HYDROCHLORIDE 100 MG/ML
200 INJECTION, SOLUTION INTRAMUSCULAR; INTRAVENOUS EVERY 8 HOURS SCHEDULED
Status: DISCONTINUED | OUTPATIENT
Start: 2025-05-06 | End: 2025-05-09

## 2025-05-06 RX ORDER — LORAZEPAM 1 MG/1
1 TABLET ORAL
Status: DISCONTINUED | OUTPATIENT
Start: 2025-05-06 | End: 2025-05-09 | Stop reason: HOSPADM

## 2025-05-06 RX ORDER — ACETAMINOPHEN 500 MG
1000 TABLET ORAL EVERY 8 HOURS
Status: DISCONTINUED | OUTPATIENT
Start: 2025-05-06 | End: 2025-05-08

## 2025-05-06 RX ORDER — MIDAZOLAM HYDROCHLORIDE 1 MG/ML
2 INJECTION, SOLUTION INTRAMUSCULAR; INTRAVENOUS
Status: DISCONTINUED | OUTPATIENT
Start: 2025-05-06 | End: 2025-05-09 | Stop reason: HOSPADM

## 2025-05-06 RX ADMIN — ACETAMINOPHEN 650 MG: 325 TABLET, FILM COATED ORAL at 07:55

## 2025-05-06 RX ADMIN — PERFLUTREN 2 ML: 6.52 INJECTION, SUSPENSION INTRAVENOUS at 10:29

## 2025-05-06 RX ADMIN — LEVOTHYROXINE SODIUM 125 MCG: 125 TABLET ORAL at 07:57

## 2025-05-06 RX ADMIN — ACETAMINOPHEN 1000 MG: 500 TABLET, FILM COATED ORAL at 11:40

## 2025-05-06 RX ADMIN — THIAMINE HYDROCHLORIDE 200 MG: 100 INJECTION, SOLUTION INTRAMUSCULAR; INTRAVENOUS at 21:40

## 2025-05-06 RX ADMIN — FOLIC ACID 1 MG: 1 TABLET ORAL at 07:57

## 2025-05-06 RX ADMIN — SERTRALINE HYDROCHLORIDE 50 MG: 50 TABLET, FILM COATED ORAL at 08:00

## 2025-05-06 RX ADMIN — ACETAMINOPHEN 1000 MG: 500 TABLET, FILM COATED ORAL at 20:33

## 2025-05-06 NOTE — PROGRESS NOTES
Discharge Planning Assessment  [facility]     Patient Name: Darwin Sparks  MRN: [id number]  Today's Date: 5/6/2025    Admit Date: 5/5/2025    Plan: Return home with spouse and caregivers   Discharge Needs Assessment    No documentation.                  Discharge Plan       Row Name 05/06/25 1441       Plan    Plan Return home with spouse and caregivers    Plan Comments Patient lives at home with spouse and has a caregiver there 4 days a week and a  that comes on the days caregiver is not there who helps the patient as well. He stated he is going to hire a nurse practitioner to help him and his  at home. Discussed home health with him and he stated him and his  are at baseline and do not think home health will be needed.. Patient wants to return home and continue with his caregivers. ( Patient has a stair lift in home and a walker at home. Patient has three steps in garage but her states he has rails and can do the steps. Patient states his caregiver will provide transportation home.                       Demographic Summary    No documentation.                  Functional Status    No documentation.                  Psychosocial    No documentation.                  Abuse/Neglect    No documentation.                  Legal    No documentation.                  Substance Abuse    No documentation.                  Patient Forms    No documentation.                     Elizabeth Wright RN

## 2025-05-06 NOTE — PLAN OF CARE
Goal Outcome Evaluation:  Plan of Care Reviewed With: patient           Outcome Evaluation: Pt admitted to [facility] for back pain and RLE pain after syncope/collapse. Pt reports living w/ his spouse and being (I)<>Mod (I) at baseline, uses rollator for mobility. Pt is AxOx3, however appears confused w/ conversation at times and mild difficulty following directions. Performed initial STS w/ Min<>Mod A + RW. Once standing, pt was able to perform short distance mobility w/ CGA + RW. Unsteady at times but no overt LOB. Pt declined further activity 2/2 lunch arriving and returned to bed at end of session. Pt presents w/ mild strength, balance, and activity tolerance deficits to perform near baseline. Anticipate d/c to SNF vs HH, pending progress while IP. OT will f/u to address deficits.    Anticipated Discharge Disposition (OT): skilled nursing facility, home with home health (pending progress)

## 2025-05-06 NOTE — THERAPY EVALUATION
Patient Name: Darwin Sparks  : 1940    MRN: [id number]                              Today's Date: 2025       Admit Date: 2025    Visit Dx:     ICD-10-CM ICD-9-CM   1. Syncope, unspecified syncope type  R55 780.2   2. Acute on chronic low back pain  M54.50 724.2    G89.29 338.19     338.29   3. Contusion of face, initial encounter  S00.83XA 920   4. Sprain of right ankle, unspecified ligament, initial encounter  S93.401A 845.00     Patient Active Problem List   Diagnosis    Primary hypertension    Mixed hyperlipidemia    Adult hypothyroidism    Insomnia    Anxiety    Dermatitis, eczematoid    Bronchitis    Seasonal allergies    Health care maintenance    Chronic midline low back pain without sciatica    Penis pain    Tinea cruris    Primary osteoarthritis involving multiple joints    Benzodiazepine dependence    Constipation    Abnormal finding on CT scan    Scoliosis of lumbar spine    Lumbar facet arthropathy    Vertigo    Multiple falls    Severe major depression    Decreased strength, endurance, and mobility    Gait instability    Syncope     Past Medical History:   Diagnosis Date    Cataract     Colon polyp     10 years ago    Deep vein thrombosis     Childhood    Headache 28 years old    HL (hearing loss) 8 years ago    Not too bad, hearing aids for crouded places    Hyperlipidemia     Hypertension     Hypothyroidism     Infectious viral hepatitis     A & B ?    Inflammatory bowel disease Child to 77 years old    Constopation, alergy animal protine,Vegan diet    Irritable bowel syndrome     Vegan diet big improvement    Kidney stone     4 surgeries    Low back pain 1973    Osteoarthritis     Pneumonia Child    2 times    Prostate cancer 2013    Scoliosis 1973    Urinary tract infection 1973    After kidney stones    Visual impairment 1960    First glasses     Past Surgical History:   Procedure Laterality Date    ADENOIDECTOMY  10 years old    CATARACT EXTRACTION      CIRCUMCISION       COLONOSCOPY      COLONOSCOPY N/A 05/27/2021    Procedure: COLONOSCOPY TO CECUM/TI;  Surgeon: Jamel Michaels MD;  Location: [facility] ENDOSCOPY;  Service: Gastroenterology;  Laterality: N/A;  Pre op: Abnormal cat scan, constipation, RLQ pain  Post op: Diverticulosis, Hemorrhoids, otherwise normal to and including TI.    MEDIAL BRANCH BLOCK Bilateral 07/30/2021    Procedure: LUMBAR MEDIAL BRANCH BLOCK;  Surgeon: Kb Rodriguez MD;  Location: [facility] [place] MAIN OR;  Service: Pain Management;  Laterality: Bilateral;    MEDIAL BRANCH BLOCK Bilateral 08/20/2021    Procedure: MEDIAL BRANCH BLOCK--bilateral lumbar3-lumbar5;  Surgeon: Kb Rodriguez MD;  Location: [facility] [place] MAIN OR;  Service: Pain Management;  Laterality: Bilateral;    PROSTATE SURGERY  72 years old    Low radiation, started vegan diet    RADIOFREQUENCY ABLATION Bilateral 10/08/2021    Procedure: RADIOFREQUENCY ABLATION LUMBAR--bilateral lumbar3-5 WITH MAC;  Surgeon: Kb Rodriguez MD;  Location: [facility] [place] MAIN OR;  Service: Pain Management;  Laterality: Bilateral;    TONSILLECTOMY      TOOTH EXTRACTION        General Information       Row Name 05/06/25 1254          OT Time and Intention    Subjective Information complains of;weakness;fatigue  -KG     Document Type evaluation  -KG     Mode of Treatment individual therapy;occupational therapy  -KG     Patient Effort good  -KG     Symptoms Noted During/After Treatment fatigue  -KG       Row Name 05/06/25 1254          General Information    Patient Profile Reviewed yes  -KG     Prior Level of Function independent:;ADL's;all household mobility;community mobility;home management  uses rollator for functional mobility.  -KG     Existing Precautions/Restrictions fall  -KG       Row Name 05/06/25 1254          Living Environment    Current Living Arrangements home  -KG     People in Home spouse  -KG       Row Name 05/06/25 1254          Home Main Entrance    Number of Stairs, Main Entrance two  -KG       Row Name  05/06/25 1254          Stairs Within Home, Primary    Number of Stairs, Within Home, Primary none  -KG       Row Name 05/06/25 1254          Cognition    Orientation Status (Cognition) oriented x 4  -KG       Row Name 05/06/25 1254          Safety Issues/Impairments Affecting Functional Mobility    Impairments Affecting Function (Mobility) balance;endurance/activity tolerance;strength;pain  -KG     Comment, Safety Issues/Impairments (Mobility) mild confusion noted at times  -KG               User Key  (r) = Recorded By, (t) = Taken By, (c) = Cosigned By      Initials Name Provider Type    Luca Hong OT Occupational Therapist                     Mobility/ADL's       Row Name 05/06/25 1259          Bed Mobility    Bed Mobility supine-sit;sit-supine  -KG     Supine-Sit [place] (Bed Mobility) standby assist  -KG     Sit-Supine [place] (Bed Mobility) standby assist  -KG       Row Name 05/06/25 1259          Transfers    Transfers sit-stand transfer;stand-sit transfer  -KG       Row Name 05/06/25 1259          Sit-Stand Transfer    Sit-Stand [place] (Transfers) minimum assist (75% patient effort);moderate assist (50% patient effort)  -KG     Assistive Device (Sit-Stand Transfers) walker, front-wheeled  -KG       Row Name 05/06/25 1259          Stand-Sit Transfer    Stand-Sit [place] (Transfers) contact guard  -KG     Assistive Device (Stand-Sit Transfers) walker, front-wheeled  -KG       Row Name 05/06/25 1259          Functional Mobility    Functional Mobility- Ind. Level contact guard assist  -KG     Functional Mobility- Device walker, front-wheeled  -KG     Functional Mobility- Comment from EOB to doorway, and back to EOB (simulating bathroom distance)  -KG               User Key  (r) = Recorded By, (t) = Taken By, (c) = Cosigned By      Initials Name Provider Type    Luca Hong OT Occupational Therapist                   Obj/Interventions       Row Name 05/06/25 1300           Sensory Assessment (Somatosensory)    Sensory Assessment (Somatosensory) sensation intact  -KG       Row Name 05/06/25 1300          Vision Assessment/Intervention    Visual Impairment/Limitations WNL  -KG       Row Name 05/06/25 1300          Range of Motion Comprehensive    General Range of Motion no range of motion deficits identified  -KG       Row Name 05/06/25 1300          Strength Comprehensive (MMT)    Comment, General Manual Muscle Testing (MMT) Assessment BUE strength 4-/5 grossly  -KG       Row Name 05/06/25 1300          Motor Skills    Motor Skills functional endurance  -KG     Functional Endurance fair  -KG       Row Name 05/06/25 1300          Balance    Balance Assessment sitting static balance;sitting dynamic balance;sit to stand dynamic balance;standing static balance;standing dynamic balance  -KG     Static Sitting Balance standby assist  -KG     Dynamic Sitting Balance standby assist  -KG     Position, Sitting Balance sitting edge of bed  -KG     Sit to Stand Dynamic Balance minimal assist;moderate assist  -KG     Static Standing Balance contact guard  -KG     Dynamic Standing Balance contact guard  -KG     Position/Device Used, Standing Balance walker, front-wheeled  -KG     Balance Interventions sitting;standing;sit to stand;supported;dynamic;static;occupation based/functional task  -KG               User Key  (r) = Recorded By, (t) = Taken By, (c) = Cosigned By      Initials Name Provider Type    KG Luca Marr, OT Occupational Therapist                   Goals/Plan       Row Name 05/06/25 1304          Bed Mobility Goal 1 (OT)    Activity/Assistive Device (Bed Mobility Goal 1, OT) sit to supine;supine to sit  -KG     [place] Level/Cues Needed (Bed Mobility Goal 1, OT) modified independence  -KG     Time Frame (Bed Mobility Goal 1, OT) short term goal (STG);2 weeks  -KG     Progress/Outcomes (Bed Mobility Goal 1, OT) new goal  -KG       Row Name 05/06/25 1304          Transfer Goal 1  (OT)    Activity/Assistive Device (Transfer Goal 1, OT) sit-to-stand/stand-to-sit;bed-to-chair/chair-to-bed;toilet  -KG     [place] Level/Cues Needed (Transfer Goal 1, OT) contact guard required  -KG     Time Frame (Transfer Goal 1, OT) short term goal (STG);2 weeks  -KG     Progress/Outcome (Transfer Goal 1, OT) new goal  -KG       Row Name 05/06/25 1304          Bathing Goal 1 (OT)    Activity/Device (Bathing Goal 1, OT) upper body bathing;lower body bathing  -KG     [place] Level/Cues Needed (Bathing Goal 1, OT) contact guard required  -KG     Time Frame (Bathing Goal 1, OT) short term goal (STG);2 weeks  -KG     Progress/Outcomes (Bathing Goal 1, OT) new goal  -KG       Row Name 05/06/25 1304          Dressing Goal 1 (OT)    Activity/Device (Dressing Goal 1, OT) upper body dressing;lower body dressing  -KG     [place]/Cues Needed (Dressing Goal 1, OT) contact guard required  -KG     Time Frame (Dressing Goal 1, OT) short term goal (STG);2 weeks  -KG     Progress/Outcome (Dressing Goal 1, OT) new goal  -KG       Row Name 05/06/25 1304          Toileting Goal 1 (OT)    Activity/Device (Toileting Goal 1, OT) adjust/manage clothing;perform perineal hygiene  -KG     [place] Level/Cues Needed (Toileting Goal 1, OT) contact guard required  -KG     Time Frame (Toileting Goal 1, OT) short term goal (STG);2 weeks  -KG     Progress/Outcome (Toileting Goal 1, OT) new goal  -KG       Row Name 05/06/25 1304          Grooming Goal 1 (OT)    Activity/Device (Grooming Goal 1, OT) oral care;wash face, hands  -KG     [place] (Grooming Goal 1, OT) contact guard required  -KG     Time Frame (Grooming Goal 1, OT) short term goal (STG);2 weeks  -KG     Progress/Outcome (Grooming Goal 1, OT) new goal  -KG       Row Name 05/06/25 1304          Therapy Assessment/Plan (OT)    Planned Therapy Interventions (OT) activity tolerance training;adaptive equipment training;BADL retraining;functional balance  retraining;occupation/activity based interventions;ROM/therapeutic exercise;patient/caregiver education/training;strengthening exercise;transfer/mobility retraining  -KG               User Key  (r) = Recorded By, (t) = Taken By, (c) = Cosigned By      Initials Name Provider Type    KG Luca Marr, ROXANE Occupational Therapist                   Clinical Impression       Row Name 05/06/25 1301          Pain Assessment    Pretreatment Pain Rating 0/10 - no pain  -KG     Posttreatment Pain Rating 0/10 - no pain  -KG     Pre/Posttreatment Pain Comment c/o chronic LE pain, did not rate  -KG       Row Name 05/06/25 1301          Plan of Care Review    Plan of Care Reviewed With patient  -KG     Outcome Evaluation Pt admitted to [facility] for back pain and RLE pain after syncope/collapse. Pt reports living w/ his spouse and being (I)<>Mod (I) at baseline, uses rollator for mobility. Pt is AxOx3, however appears confused w/ conversation at times and mild difficulty following directions. Performed initial STS w/ Min<>Mod A + RW. Once standing, pt was able to perform short distance mobility w/ CGA + RW. Unsteady at times but no overt LOB. Pt declined further activity 2/2 lunch arriving and returned to bed at end of session. Pt presents w/ mild strength, balance, and activity tolerance deficits to perform near baseline. Anticipate d/c to SNF vs HH, pending progress while IP. OT will f/u to address deficits.  -KG       Row Name 05/06/25 1301          Therapy Assessment/Plan (OT)    Rehab Potential (OT) fair  -KG     Criteria for Skilled Therapeutic Interventions Met (OT) skilled treatment is necessary  -KG     Therapy Frequency (OT) 5 times/wk  -KG       Row Name 05/06/25 1301          Therapy Plan Review/Discharge Plan (OT)    Anticipated Discharge Disposition (OT) skilled nursing facility;home with home health  pending progress  -KG       Row Name 05/06/25 1301          Vital Signs    Pre Patient Position Supine  -KG     Intra  Patient Position Standing  -KG     Post Patient Position Supine  -KG       Row Name 05/06/25 1301          Positioning and Restraints    Pre-Treatment Position in bed  -KG     Post Treatment Position bed  -KG     In Bed notified nsg;supine;call light within reach;encouraged to call for assist;exit alarm on  -KG               User Key  (r) = Recorded By, (t) = Taken By, (c) = Cosigned By      Initials Name Provider Type    Luca Hong OT Occupational Therapist                   Outcome Measures       Row Name 05/06/25 1307          How much help from another is currently needed...    Putting on and taking off regular lower body clothing? 3  -KG     Bathing (including washing, rinsing, and drying) 3  -KG     Toileting (which includes using toilet bed pan or urinal) 3  -KG     Putting on and taking off regular upper body clothing 4  -KG     Taking care of personal grooming (such as brushing teeth) 3  -KG     Eating meals 4  -KG     AM-PAC 6 Clicks Score (OT) 20  -KG       Row Name 05/06/25 1307          Modified [place] Scale    Modified [place] Scale 4 - Moderately severe disability.  Unable to walk without assistance, and unable to attend to own bodily needs without assistance.  -KG       Row Name 05/06/25 1307          Functional Assessment    Outcome Measure Options AM-PAC 6 Clicks Daily Activity (OT);Modified [place]  -KG               User Key  (r) = Recorded By, (t) = Taken By, (c) = Cosigned By      Initials Name Provider Type    Luca Hong OT Occupational Therapist                      OT Recommendation and Plan  Planned Therapy Interventions (OT): activity tolerance training, adaptive equipment training, BADL retraining, functional balance retraining, occupation/activity based interventions, ROM/therapeutic exercise, patient/caregiver education/training, strengthening exercise, transfer/mobility retraining  Therapy Frequency (OT): 5 times/wk  Plan of Care Review  Plan of Care Reviewed With:  patient  Outcome Evaluation: Pt admitted to [facility] for back pain and RLE pain after syncope/collapse. Pt reports living w/ his spouse and being (I)<>Mod (I) at baseline, uses rollator for mobility. Pt is AxOx3, however appears confused w/ conversation at times and mild difficulty following directions. Performed initial STS w/ Min<>Mod A + RW. Once standing, pt was able to perform short distance mobility w/ CGA + RW. Unsteady at times but no overt LOB. Pt declined further activity 2/2 lunch arriving and returned to bed at end of session. Pt presents w/ mild strength, balance, and activity tolerance deficits to perform near baseline. Anticipate d/c to SNF vs HH, pending progress while IP. OT will f/u to address deficits.     Time Calculation:   Evaluation Complexity (OT)  Review Occupational Profile/Medical/Therapy History Complexity: expanded/moderate complexity  Assessment, Occupational Performance/Identification of Deficit Complexity: 3-5 performance deficits  Clinical Decision Making Complexity (OT): detailed assessment/moderate complexity  Overall Complexity of Evaluation (OT): moderate complexity     Time Calculation- OT       Row Name 05/06/25 1308             Time Calculation- OT    OT Start Time 1101  -KG      OT Stop Time 1115  -KG      OT Time Calculation (min) 14 min  -KG      OT Non-Billable Time (min) 14 min  -KG      OT Received On 05/06/25  -KG      OT - Next Appointment 05/07/25  -KG      OT Goal Re-Cert Due Date 05/20/25  -KG         Untimed Charges    OT Eval/Re-eval Minutes 14  -KG         Total Minutes    Untimed Charges Total Minutes 14  -KG       Total Minutes 14  -KG                User Key  (r) = Recorded By, (t) = Taken By, (c) = Cosigned By      Initials Name Provider Type    Luca Hong OT Occupational Therapist                  Therapy Charges for Today       Code Description Service Date Service Provider Modifiers Qty    [id number] HC OT EVAL MOD COMPLEXITY 2 5/6/2025 Luca Marr  OT GO 1                 Luca Marr, OT  5/6/2025

## 2025-05-06 NOTE — PLAN OF CARE
Goal Outcome Evaluation:  Plan of Care Reviewed With: patient, caregiver           Outcome Evaluation: Pt seen for PT eval this afternoon. He was admitted to [facility] for back pain and RLE pain after syncopal episode/collapse. At baseline, pt lives w his spouse and uses Rwx for ambulation. He is independent w mobility and ADLs. He does have caregivers M-F in the mornings, but the caregiver primarily assists w pt's . Pt has hx of HTN, HLD, anxiety, back pain, and falls. Today, pt presents w generalized weakness and decreased activity tolerance, although he states he is close to his baseline. He is able to transition to EOB w min A. He stood w min A using Rwx w cues for proper hand placement. Pt able to ambulate approx 60 ft w CGA. He demos slow pace, forward posture w occasional cues to step in closer to walker. Pt returned to bed at end of session. He tolerated activity well. Pt plans home at [place]. He will continue to benefit from skilled PT to maximize safety, strength, and independence w mobility.    Anticipated Discharge Disposition (PT): home with assist, home with home health

## 2025-05-06 NOTE — PLAN OF CARE
Problem: Adult Inpatient Plan of Care  Goal: Absence of Hospital-Acquired Illness or Injury  Intervention: Identify and Manage Fall Risk  Description: Perform standard risk assessment on admission using a validated tool or comprehensive approach appropriate to the patient; reassess fall risk frequently, with change in status or transfer to another level of care.Communicate risk to interprofessional healthcare team; ensure fall risk visible cue.Determine need for increased observation, equipment and environmental modification, as well as use of supportive, nonskid footwear.Adjust safety measures to individual needs and identified risk factors.Reinforce the importance of active participation with fall risk prevention, safety, and physical activity with the patient and family.Perform regular intentional rounding to assess need for position change, pain assessment and personal needs, including assistance with toileting.  Recent Flowsheet Documentation  Taken 5/6/2025 1847 by Christian Wright, RN  Safety Promotion/Fall Prevention:   activity supervised   assistive device/personal items within reach   clutter free environment maintained   room organization consistent   safety round/check completed  Taken 5/6/2025 1445 by Christian Wright RN  Safety Promotion/Fall Prevention:   activity supervised   assistive device/personal items within reach   clutter free environment maintained   safety round/check completed   room organization consistent  Taken 5/6/2025 1247 by Christian Wright, RN  Safety Promotion/Fall Prevention:   activity supervised   assistive device/personal items within reach   clutter free environment maintained   safety round/check completed   room organization consistent  Taken 5/6/2025 1022 by Christian Wright, RN  Safety Promotion/Fall Prevention:   activity supervised   assistive device/personal items within reach   clutter free environment maintained   safety round/check completed   room organization  consistent  Taken 5/6/2025 0824 by Christian Wright, RN  Safety Promotion/Fall Prevention:   activity supervised   assistive device/personal items within reach   clutter free environment maintained   Goal Outcome Evaluation:      Patient ax4 but forgetful. Patient CIWA 0, orthostatics charted in the chart, call light in reach, tylenol given for pain.

## 2025-05-06 NOTE — H&P
HISTORY AND PHYSICAL   [facility]        Date of Admission: 2025  Patient Identification:  Name: Darwin Sparks  Age: 84 y.o.  Sex: male  :  1940  MRN: [id number]                     Primary Care Physician: Epley, James, APRN    Chief Complaint:  84 year old gentleman presented to the ED after a syncopal episode at home; it happened overnight although he does not remember the details; negin was on the floor  until this morning; he was complaining of back pain; he thinks he hit a piece of furniture; no visitors are present with him; he denies nausea or vomiting; no chest pain or shortness of breath; he was in the observation unit two months ago due to recurrent falls    History of Present Illness:   As above    Past Medical History:  Past Medical History:   Diagnosis Date    Cataract     Colon polyp     10 years ago    Deep vein thrombosis     Childhood    Headache 28 years old    HL (hearing loss) 8 years ago    Not too bad, hearing aids for crouded places    Hyperlipidemia     Hypertension     Hypothyroidism     Infectious viral hepatitis     A & B ?    Inflammatory bowel disease Child to 77 years old    Constopation, alergy animal protine,Vegan diet    Irritable bowel syndrome     Vegan diet big improvement    Kidney stone     4 surgeries    Low back pain 1973    Osteoarthritis     Pneumonia Child    2 times    Prostate cancer 2013    Scoliosis 1973    Urinary tract infection 1973    After kidney stones    Visual impairment 1960    First glasses     Past Surgical History:  Past Surgical History:   Procedure Laterality Date    ADENOIDECTOMY  10 years old    CATARACT EXTRACTION      CIRCUMCISION      COLONOSCOPY      COLONOSCOPY N/A 2021    Procedure: COLONOSCOPY TO CECUM/TI;  Surgeon: Jamel Michaels MD;  Location: [facility] ENDOSCOPY;  Service: Gastroenterology;  Laterality: N/A;  Pre op: Abnormal cat scan, constipation, RLQ pain  Post op: Diverticulosis, Hemorrhoids, otherwise  normal to and including TI.    MEDIAL BRANCH BLOCK Bilateral 07/30/2021    Procedure: LUMBAR MEDIAL BRANCH BLOCK;  Surgeon: Kb Rodriguez MD;  Location: [facility] MAIN OR;  Service: Pain Management;  Laterality: Bilateral;    MEDIAL BRANCH BLOCK Bilateral 08/20/2021    Procedure: MEDIAL BRANCH BLOCK--bilateral lumbar3-lumbar5;  Surgeon: Kb Rodriguez MD;  Location: [facility];  Service: Pain Management;  Laterality: Bilateral;    PROSTATE SURGERY  72 years old    Low radiation, started vegan diet    RADIOFREQUENCY ABLATION Bilateral 10/08/2021    Procedure: RADIOFREQUENCY ABLATION LUMBAR--bilateral lumbar3-5 WITH MAC;  Surgeon: Kb Rodriguez MD;  Location: [facility];  Service: Pain Management;  Laterality: Bilateral;    TONSILLECTOMY      TOOTH EXTRACTION        Home Meds:  Medications Prior to Admission   Medication Sig Dispense Refill Last Dose/Taking    albuterol sulfate  (90 Base) MCG/ACT inhaler Inhale 2 puffs Every 4 (Four) Hours As Needed for Wheezing or Shortness of Air. 6.7 g 2 Past Month    ammonium lactate (LAC-HYDRIN) 12 % lotion Apply  topically to the appropriate area as directed As Needed for Dry Skin. Best for lower extremity dry chronically's skin 400 g 3 Past Week    celecoxib (CeleBREX) 200 MG capsule Take 1 capsule by mouth 2 (Two) Times a Day. With water, take lowest effective dose 180 capsule 1 5/4/2025    folic acid (FOLVITE) 1 MG tablet Take 1 tablet by mouth Daily. 90 tablet 3 5/4/2025    levothyroxine (SYNTHROID, LEVOTHROID) 125 MCG tablet Take 1 tablet by mouth Daily. 90 tablet 3 5/4/2025    sertraline (Zoloft) 50 MG tablet Take 1 tablet by mouth Daily. For improved mood and feelings of wellbeing 30 tablet 1 5/4/2025       Allergies:  Allergies   Allergen Reactions    Milk-Related Compounds Other (See Comments)     Constipation    Pregabalin      Other reaction(s): Visual Disturbance    Neomycin-Bacitracin Zn-Polymyx Rash     Immunizations:  Immunization History    Administered Date(s) Administered    Arexvy (RSV, Adults 60+ yrs) 12/02/2023    COVID-19 (PFIZER) 12YRS+ (COMIRNATY) 12/02/2023    COVID-19 (PFIZER) BIVALENT 12+YRS 10/01/2022, 01/05/2023    COVID-19 (PFIZER) Purple Cap Monovalent 03/06/2021, 03/27/2021    Covid-19 (Pfizer) Gray Cap Monovalent 04/13/2022    Fluad Quad 65+ 11/19/2019    Fluzone High-Dose 65+YRS 10/17/2017, 12/13/2018    Fluzone High-Dose 65+yrs 10/01/2022, 12/02/2023    Fluzone Quad >6mos (Multi-dose) 10/11/2016    Pneumococcal Conjugate 13-Valent (PCV13) 10/18/2016     Social History:   Social History     Social History Narrative    Not on file     Social History     Socioeconomic History    Marital status:    Tobacco Use    Smoking status: Former    Smokeless tobacco: Never   Vaping Use    Vaping status: Never Used   Substance and Sexual Activity    Alcohol use: Yes    Drug use: Not Currently     Types: Marijuana     Comment: used in the 1970's    Sexual activity: Defer       Family History:  Family History   Problem Relation Age of Onset    Diabetes Mother     Hypertension Mother     Stroke Mother     Diabetes Father     Hypertension Father     Bipolar disorder Father     Stroke Father     Bipolar disorder Paternal Grandmother     Heart disease Other     Sudden death Other     Anuerysm Other     Malig Hyperthermia Neg Hx         Review of Systems  See history of present illness and past medical history.  Patient denies headache, dizziness,  change in vision, change in hearing, change in taste, changes in weight, changes in appetite, focal weakness, numbness, or paresthesia.  Patient denies chest pain, palpitations, dyspnea, orthopnea, PND, cough, sinus pressure, rhinorrhea, epistaxis, hemoptysis, nausea, vomiting,hematemesis, diarrhea, constipation or hematochezia.  Denies cold or heat intolerance, polydipsia, polyuria, polyphagia. Denies hematuria, pyuria, dysuria, hesitancy, frequency or urgency. Denies consumption of raw and under  cooked meats foods or change in water source.  Denies fever, chills, sweats, night sweats.  D     Objective:  T Max 24 hrs: Temp (24hrs), Av.8 °F (36.6 °C), Min:97.7 °F (36.5 °C), Max:98 °F (36.7 °C)    Vitals Ranges:   Temp:  [97.7 °F (36.5 °C)-98 °F (36.7 °C)] 97.7 °F (36.5 °C)  Heart Rate:  [56-83] 83  Resp:  [16-18] 18  BP: (117-200)/() 117/56      Exam:  /56 (BP Location: Left arm, Patient Position: Lying)   Pulse 83   Temp 97.7 °F (36.5 °C) (Oral)   Resp 18   Wt 77.2 kg (170 lb 3.1 oz)   SpO2 93%   BMI 25.88 kg/m²     General Appearance:    Alert, cooperative, no distress, appears stated age   Head:    Normocephalic, without obvious abnormality, atraumatic   Eyes:    PERRL, conjunctivae/corneas clear, EOM's intact, both eyes   Ears:    Normal external ear canals, both ears   Nose:   Nares normal, septum midline, mucosa normal, no drainage    or sinus tenderness   Throat:   Lips, mucosa, and tongue normal   Neck:   Supple, symmetrical, trachea midline, no adenopathy;     thyroid:  no enlargement/tenderness/nodules; no carotid    bruit or JVD   Back:     Symmetric, no curvature, ROM normal, no CVA tenderness   Lungs:     Clear to auscultation bilaterally, respirations unlabored   Chest Wall:    No tenderness or deformity    Heart:    Regular rate and rhythm, S1 and S2 normal, no murmur, rub   or gallop   Abdomen:     Soft, nontender, bowel sounds active all four quadrants,     no masses, no hepatomegaly, no splenomegaly   Extremities:   Extremities normal, atraumatic, no cyanosis or edema                       .    Data Review:  Labs in chart were reviewed.  WBC   Date Value Ref Range Status   2025 8.04 3.40 - 10.80 10*3/mm3 Final     Hemoglobin   Date Value Ref Range Status   2025 15.3 13.0 - 17.7 g/dL Final     Hematocrit   Date Value Ref Range Status   2025 44.8 37.5 - 51.0 % Final     Platelets   Date Value Ref Range Status   2025 196 140 - 450 10*3/mm3 Final      Sodium   Date Value Ref Range Status   05/05/2025 140 136 - 145 mmol/L Final     Potassium   Date Value Ref Range Status   05/05/2025 4.4 3.5 - 5.2 mmol/L Final     Chloride   Date Value Ref Range Status   05/05/2025 101 98 - 107 mmol/L Final     CO2   Date Value Ref Range Status   05/05/2025 24.0 22.0 - 29.0 mmol/L Final     BUN   Date Value Ref Range Status   05/05/2025 17 8 - 23 mg/dL Final     Creatinine   Date Value Ref Range Status   05/05/2025 0.98 0.76 - 1.27 mg/dL Final     Glucose   Date Value Ref Range Status   05/05/2025 91 65 - 99 mg/dL Final     Calcium   Date Value Ref Range Status   05/05/2025 9.1 8.6 - 10.5 mg/dL Final     AST (SGOT)   Date Value Ref Range Status   05/05/2025 42 (H) 1 - 40 U/L Final     ALT (SGPT)   Date Value Ref Range Status   05/05/2025 27 1 - 41 U/L Final     Alkaline Phosphatase   Date Value Ref Range Status   05/05/2025 112 39 - 117 U/L Final                Imaging Results (All)       Procedure Component Value Units Date/Time    CT Head Without Contrast [[id number]] Collected: 05/05/25 1326     Updated: 05/05/25 1326    Narrative:      EMERGENCY CT SCAN OF THE HEAD, FACE, CERVICAL SPINE AND LUMBAR SPINE  WITHOUT CONTRAST 05/05/2025     CLINICAL HISTORY: This is an 84-year-old male patient who fell and had  head trauma and facial trauma, and also complains of neck and low back  pain.     HEAD CT TECHNIQUE: Spiral CT images were obtained from the base of the  skull to the vertex without intravenous contrast. The images were  reformatted and are submitted in 3 mm thick axial, sagittal and coronal  CT sections with brain algorithm and 2 mm thick axial CT sections with  high-resolution bone algorithm.     This is correlated to an MRI of the brain on 03/09/2025 and a head CT on  03/09/2025.     FINDINGS: There is some mild patchy low-density in the periventricular  white matter consistent with mild small vessel disease. The remainder of  the brain parenchyma is normal in  attenuation. There is diffuse cerebral  atrophy. The lateral and third ventricles are mildly prominent in size,  which is felt to be on the basis of central volume loss or atrophy. I  see no focal mass effect. There is no midline shift. No extra-axial  fluid collections are identified. There is no evidence of acute  intracranial hemorrhage. No acute skull fracture is identified. The  calvarium and the skull base are normal in appearance. The paranasal  sinuses are clear. There are lens implants in the globes from previous  bilateral cataract surgery. Otherwise, the orbits are unremarkable.  There are some mild osteoarthritic changes in the temporomandibular  joints bilaterally.       Impression:         1. No acute intracranial abnormality is identified with no change when  compared to the patient's prior head CT and MRI of the brain on  03/09/2025.     2. There is mild small vessel disease in the cerebral white matter.  There is mild diffuse cerebral atrophy and the lateral and third  ventricles are prominent in size, felt to be on the basis of central  volume loss or atrophy. There are lens implants in the globes from  previous bilateral cataract surgery. The remainder of the head CT is  within normal limits with no acute skull fracture or intracranial  hemorrhage identified.        FACIAL CT TECHNIQUE: Spiral CT images were obtained through the facial  bones in the axial imaging plane. The images were reformatted and are  submitted in 2 mm thick axial, sagittal and coronal CT sections with  soft tissue algorithm and 1 mm thick axial, sagittal and coronal CT  sections with high-resolution bone algorithm.     This is correlated to a prior facial CT on 03/09/2025.     FINDINGS: The paranasal sinuses are clear. There are lens implants in  the globes from previous bilateral cataract surgery. Otherwise, the  orbits are normal in appearance. There are mild osteoarthritic changes  in the temporomandibular joints  bilaterally with minimal marginal  spurring off the mandibular heads. The patient is edentulous. No  discernible acute fracture is seen in the facial bones.     IMPRESSION:  No acute facial fracture is identified. The patient is  edentulous and also has bilateral intraocular lens implants from  previous bilateral cataract surgery. Otherwise, the facial CT is  unremarkable.        CERVICAL SPINE CT TECHNIQUE: Spiral CT images were obtained from the  skull base down to the T2 thoracic level. The images were reformatted  and are submitted in 2 mm thick axial and sagittal CT sections with soft  tissue algorithm and 1 mm thick axial, sagittal and coronal CT sections  with high-resolution bone algorithm.     This is correlated to a prior cervical spine CT from [facility] on 03/09/2025.      FINDINGS: The atlantooccipital articulation is within normal limits.     At C1-2, there are arthritic changes at the atlantodental interval with  narrowing of the interval and marginal spurring off the superior aspect  of the anterior ring of C1 and the odontoid and there is thickening and  calcification of the transverse ligament along the back of the odontoid.  Otherwise, the C1-2 level is normal in appearance.     At C2-3, the posterior disc margin, facets and uncovertebral joints are  within normal limits with no canal or foraminal narrowing.     At C3-4, there is mild-to-moderate bilateral facet overgrowth. There is  severe disc space narrowing and there are degenerative disc and endplate  changes and a 2 mm retrolisthesis of C3 on C4, and a mild diffuse  posterior disc osteophyte complex. There is minimal, if any, canal  narrowing and there is some minimal spurring into the foramina with  minimal foraminal narrowing.     At C4-5, there is mild-to-moderate left and there is moderate right  facet overgrowth.  There is a 3 mm degenerative anterolisthesis of C4 on  C5. There is mild canal narrowing.  There is some  mild right  uncovertebral joint hypertrophy and there is mild-to-moderate right, but  no left foraminal narrowing.     At C5-6, there is moderate right facet overgrowth and minimal left facet  overgrowth. There is severe disc space narrowing and there are  degenerative disc and endplate changes.  There is a diffuse posterior  disc osteophyte complex that results in only mild canal narrowing, and  some mild uncovertebral joint hypertrophy right greater than left.   There is minimal left and there is moderate-to-severe right bony  foraminal narrowing.     At C6-7, the facets are normal. There is moderate disc space narrowing.   There are degenerative endplate changes, a mild diffuse posterior disc  osteophyte complex and mild canal narrowing. There is some uncovertebral  joint spurring into the foramina, and there is mild-to-moderate  bilateral bony foraminal narrowing.     At C7-T1, there is moderate bilateral facet overgrowth. There is a 3-4  mm degenerative anterolisthesis of C7 on T1.  There is no canal  narrowing.  There is, at least, mild-to-moderate bilateral bony  foraminal narrowing.     No acute fracture is seen in the cervical spine.     IMPRESSION:  No acute fracture is seen in the cervical spine with no  significant change when compared to a recent cervical spine CT on  03/09/2025. There is cervical spondylosis as described in great detail  above.        LUMBAR SPINE CT TECHNIQUE: Spiral CT images were obtained from the  inferior body of the T10 thoracic level down to the S2-3 sacral level  and images were reformatted and are submitted in 3 mm thick axial CT  sections with soft tissue algorithm, and 1 mm thick axial CT sections  with high-resolution bone algorithm, and 2 mm thick sagittal and coronal  reconstructions were performed and submitted in both bone and soft  tissue algorithm.     There are no prior lumbar spine CTs or MRIs for comparison. This is  correlated to a remote prior CT scan of the  abdomen and pelvis on  03/17/2021.     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 There is severe disc  space narrowing.  There are degenerative disc and endplate changes and  vacuum disc phenomenon at all levels from L1 to S1.     At T10-11, there is minimal posterior spurring and minimal left facet  overgrowth. There is minimal, if any, canal narrowing.  There is mild  left and essentially no right foraminal narrowing.     At T11-12, the posterior disc margin and facets are normal with no canal  or foraminal narrowing.     At T12-L1, there is some vacuum disc phenomenon. The posterior disc  margin and facets are normal, and there is no canal or foraminal  narrowing.     At L1-2, there is severe disc space narrowing.  There is vacuum disc  phenomenon and degenerative endplate changes, and there is a 4 mm  retrolisthesis of L1 with respect to L2, and a diffuse posterior disc  osteophyte complex.  The facets are normal.  There is mild-to-moderate  canal narrowing.  There is some spurring and air contained within  bulging or protruding disc material into the foramen.  There is mild  left and there is moderate right foraminal narrowing that could affect  the exiting right L2 nerve root.      At L2-3, there is prominent vacuum disc phenomenon, a 2--3 mm  retrolisthesis of L2 with respect to L3, and a diffuse posterior disc  osteophyte complex.  The facets are normal.  There is prominent  posterior epidural fat and there is at least mild-to-moderate up to  moderate generalized narrowing of the thecal sac.  There is mild left  foraminal narrowing, loss of vertical height of the right foramen,  eccentric spurring and bulging disc material into the inferior right  foramen, and there is moderate-to-severe right foraminal narrowing.     At L3-4, there is disc space narrowing, vacuum disc phenomenon,  degenerative endplate changes, 2-3 mm  retrolisthesis of L3 with respect  to L4, diffuse posterior disc osteophyte complex, mild-to-moderate  right, minimal left facet overgrowth and ligamentum flavum thickening,  and there is prominent posterior epidural fat, and the combination of  all of the above findings contributes to moderate-to-severe narrowing of  the thecal sac.  There is mild left, and there is moderate-to-severe  right foraminal narrowing.     At L4-5, there is moderate bilateral facet overgrowth.  There is a 2 mm  anterolisthesis of L4 with respect to L5, a diffuse posterior disc  bulge, and there is moderate narrowing of the thecal sac and some  narrowing of the lateral recesses.  There is synovial thickening off the  facets extending into the posterior foramina, spurring and uncovered  bulging disc material into the inferior aspect of the foramina, and  there is moderate-to-severe bilateral foraminal narrowing that could  affect the exiting L4 nerve roots.     At L5-S1, there is moderate bilateral facet overgrowth, disc space  narrowing, degenerative endplate changes, and posterior endplate  spurring.  There is no canal or lateral recess narrowing.  There is  mild-to-moderate right and there is moderate left foraminal narrowing.      No discernible acute fracture is seen in the lumbar spine.     IMPRESSION:  No acute fracture is seen in the lumbar spine. 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      Radiation dose reduction techniques were utilized, including automated  exposure control and exposure modulation based on body size.          CT Cervical Spine Without Contrast [[id number]] Collected: 05/05/25 1326     Updated: 05/05/25 1326    Narrative:      EMERGENCY  CT SCAN OF THE HEAD, FACE, CERVICAL SPINE AND LUMBAR SPINE  WITHOUT CONTRAST 05/05/2025     CLINICAL HISTORY: This is an 84-year-old male patient who fell and had  head trauma and facial trauma, and also complains of neck and low back  pain.     HEAD CT TECHNIQUE: Spiral CT images were obtained from the base of the  skull to the vertex without intravenous contrast. The images were  reformatted and are submitted in 3 mm thick axial, sagittal and coronal  CT sections with brain algorithm and 2 mm thick axial CT sections with  high-resolution bone algorithm.     This is correlated to an MRI of the brain on 03/09/2025 and a head CT on  03/09/2025.     FINDINGS: There is some mild patchy low-density in the periventricular  white matter consistent with mild small vessel disease. The remainder of  the brain parenchyma is normal in attenuation. There is diffuse cerebral  atrophy. The lateral and third ventricles are mildly prominent in size,  which is felt to be on the basis of central volume loss or atrophy. I  see no focal mass effect. There is no midline shift. No extra-axial  fluid collections are identified. There is no evidence of acute  intracranial hemorrhage. No acute skull fracture is identified. The  calvarium and the skull base are normal in appearance. The paranasal  sinuses are clear. There are lens implants in the globes from previous  bilateral cataract surgery. Otherwise, the orbits are unremarkable.  There are some mild osteoarthritic changes in the temporomandibular  joints bilaterally.       Impression:         1. No acute intracranial abnormality is identified with no change when  compared to the patient's prior head CT and MRI of the brain on  03/09/2025.     2. There is mild small vessel disease in the cerebral white matter.  There is mild diffuse cerebral atrophy and the lateral and third  ventricles are prominent in size, felt to be on the basis of central  volume loss or atrophy. There are lens  implants in the globes from  previous bilateral cataract surgery. The remainder of the head CT is  within normal limits with no acute skull fracture or intracranial  hemorrhage identified.        FACIAL CT TECHNIQUE: Spiral CT images were obtained through the facial  bones in the axial imaging plane. The images were reformatted and are  submitted in 2 mm thick axial, sagittal and coronal CT sections with  soft tissue algorithm and 1 mm thick axial, sagittal and coronal CT  sections with high-resolution bone algorithm.     This is correlated to a prior facial CT on 03/09/2025.     FINDINGS: The paranasal sinuses are clear. There are lens implants in  the globes from previous bilateral cataract surgery. Otherwise, the  orbits are normal in appearance. There are mild osteoarthritic changes  in the temporomandibular joints bilaterally with minimal marginal  spurring off the mandibular heads. The patient is edentulous. No  discernible acute fracture is seen in the facial bones.     IMPRESSION:  No acute facial fracture is identified. The patient is  edentulous and also has bilateral intraocular lens implants from  previous bilateral cataract surgery. Otherwise, the facial CT is  unremarkable.        CERVICAL SPINE CT TECHNIQUE: Spiral CT images were obtained from the  skull base down to the T2 thoracic level. The images were reformatted  and are submitted in 2 mm thick axial and sagittal CT sections with soft  tissue algorithm and 1 mm thick axial, sagittal and coronal CT sections  with high-resolution bone algorithm.     This is correlated to a prior cervical spine CT from [facility] on 03/09/2025.      FINDINGS: The atlantooccipital articulation is within normal limits.     At C1-2, there are arthritic changes at the atlantodental interval with  narrowing of the interval and marginal spurring off the superior aspect  of the anterior ring of C1 and the odontoid and there is thickening and  calcification  of the transverse ligament along the back of the odontoid.  Otherwise, the C1-2 level is normal in appearance.     At C2-3, the posterior disc margin, facets and uncovertebral joints are  within normal limits with no canal or foraminal narrowing.     At C3-4, there is mild-to-moderate bilateral facet overgrowth. There is  severe disc space narrowing and there are degenerative disc and endplate  changes and a 2 mm retrolisthesis of C3 on C4, and a mild diffuse  posterior disc osteophyte complex. There is minimal, if any, canal  narrowing and there is some minimal spurring into the foramina with  minimal foraminal narrowing.     At C4-5, there is mild-to-moderate left and there is moderate right  facet overgrowth.  There is a 3 mm degenerative anterolisthesis of C4 on  C5. There is mild canal narrowing.  There is some mild right  uncovertebral joint hypertrophy and there is mild-to-moderate right, but  no left foraminal narrowing.     At C5-6, there is moderate right facet overgrowth and minimal left facet  overgrowth. There is severe disc space narrowing and there are  degenerative disc and endplate changes.  There is a diffuse posterior  disc osteophyte complex that results in only mild canal narrowing, and  some mild uncovertebral joint hypertrophy right greater than left.   There is minimal left and there is moderate-to-severe right bony  foraminal narrowing.     At C6-7, the facets are normal. There is moderate disc space narrowing.   There are degenerative endplate changes, a mild diffuse posterior disc  osteophyte complex and mild canal narrowing. There is some uncovertebral  joint spurring into the foramina, and there is mild-to-moderate  bilateral bony foraminal narrowing.     At C7-T1, there is moderate bilateral facet overgrowth. There is a 3-4  mm degenerative anterolisthesis of C7 on T1.  There is no canal  narrowing.  There is, at least, mild-to-moderate bilateral bony  foraminal narrowing.     No acute  fracture is seen in the cervical spine.     IMPRESSION:  No acute fracture is seen in the cervical spine with no  significant change when compared to a recent cervical spine CT on  03/09/2025. There is cervical spondylosis as described in great detail  above.        LUMBAR SPINE CT TECHNIQUE: Spiral CT images were obtained from the  inferior body of the T10 thoracic level down to the S2-3 sacral level  and images were reformatted and are submitted in 3 mm thick axial CT  sections with soft tissue algorithm, and 1 mm thick axial CT sections  with high-resolution bone algorithm, and 2 mm thick sagittal and coronal  reconstructions were performed and submitted in both bone and soft  tissue algorithm.     There are no prior lumbar spine CTs or MRIs for comparison. This is  correlated to a remote prior CT scan of the abdomen and pelvis on  03/17/2021.     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 There is severe disc  space narrowing.  There are degenerative disc and endplate changes and  vacuum disc phenomenon at all levels from L1 to S1.     At T10-11, there is minimal posterior spurring and minimal left facet  overgrowth. There is minimal, if any, canal narrowing.  There is mild  left and essentially no right foraminal narrowing.     At T11-12, the posterior disc margin and facets are normal with no canal  or foraminal narrowing.     At T12-L1, there is some vacuum disc phenomenon. The posterior disc  margin and facets are normal, and there is no canal or foraminal  narrowing.     At L1-2, there is severe disc space narrowing.  There is vacuum disc  phenomenon and degenerative endplate changes, and there is a 4 mm  retrolisthesis of L1 with respect to L2, and a diffuse posterior disc  osteophyte complex.  The facets are normal.  There is mild-to-moderate  canal narrowing.  There is some spurring and air contained within  bulging or  protruding disc material into the foramen.  There is mild  left and there is moderate right foraminal narrowing that could affect  the exiting right L2 nerve root.      At L2-3, there is prominent vacuum disc phenomenon, a 2--3 mm  retrolisthesis of L2 with respect to L3, and a diffuse posterior disc  osteophyte complex.  The facets are normal.  There is prominent  posterior epidural fat and there is at least mild-to-moderate up to  moderate generalized narrowing of the thecal sac.  There is mild left  foraminal narrowing, loss of vertical height of the right foramen,  eccentric spurring and bulging disc material into the inferior right  foramen, and there is moderate-to-severe right foraminal narrowing.     At L3-4, there is disc space narrowing, vacuum disc phenomenon,  degenerative endplate changes, 2-3 mm retrolisthesis of L3 with respect  to L4, diffuse posterior disc osteophyte complex, mild-to-moderate  right, minimal left facet overgrowth and ligamentum flavum thickening,  and there is prominent posterior epidural fat, and the combination of  all of the above findings contributes to moderate-to-severe narrowing of  the thecal sac.  There is mild left, and there is moderate-to-severe  right foraminal narrowing.     At L4-5, there is moderate bilateral facet overgrowth.  There is a 2 mm  anterolisthesis of L4 with respect to L5, a diffuse posterior disc  bulge, and there is moderate narrowing of the thecal sac and some  narrowing of the lateral recesses.  There is synovial thickening off the  facets extending into the posterior foramina, spurring and uncovered  bulging disc material into the inferior aspect of the foramina, and  there is moderate-to-severe bilateral foraminal narrowing that could  affect the exiting L4 nerve roots.     At L5-S1, there is moderate bilateral facet overgrowth, disc space  narrowing, degenerative endplate changes, and posterior endplate  spurring.  There is no canal or lateral  recess narrowing.  There is  mild-to-moderate right and there is moderate left foraminal narrowing.      No discernible acute fracture is seen in the lumbar spine.     IMPRESSION:  No acute fracture is seen in the lumbar spine. 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      Radiation dose reduction techniques were utilized, including automated  exposure control and exposure modulation based on body size.          CT Facial Bones Without Contrast [[id number]] Collected: 05/05/25 1326     Updated: 05/05/25 1326    Narrative:      EMERGENCY CT SCAN OF THE HEAD, FACE, CERVICAL SPINE AND LUMBAR SPINE  WITHOUT CONTRAST 05/05/2025     CLINICAL HISTORY: This is an 84-year-old male patient who fell and had  head trauma and facial trauma, and also complains of neck and low back  pain.     HEAD CT TECHNIQUE: Spiral CT images were obtained from the base of the  skull to the vertex without intravenous contrast. The images were  reformatted and are submitted in 3 mm thick axial, sagittal and coronal  CT sections with brain algorithm and 2 mm thick axial CT sections with  high-resolution bone algorithm.     This is correlated to an MRI of the brain on 03/09/2025 and a head CT on  03/09/2025.     FINDINGS: There is some mild patchy low-density in the periventricular  white matter consistent with mild small vessel disease. The remainder of  the brain parenchyma is normal in attenuation. There is diffuse cerebral  atrophy. The lateral and third ventricles are mildly prominent in size,  which is felt to be on the basis of central volume loss or atrophy. I  see no focal mass effect. There is no midline shift. No extra-axial  fluid collections are identified.  There is no evidence of acute  intracranial hemorrhage. No acute skull fracture is identified. The  calvarium and the skull base are normal in appearance. The paranasal  sinuses are clear. There are lens implants in the globes from previous  bilateral cataract surgery. Otherwise, the orbits are unremarkable.  There are some mild osteoarthritic changes in the temporomandibular  joints bilaterally.       Impression:         1. No acute intracranial abnormality is identified with no change when  compared to the patient's prior head CT and MRI of the brain on  03/09/2025.     2. There is mild small vessel disease in the cerebral white matter.  There is mild diffuse cerebral atrophy and the lateral and third  ventricles are prominent in size, felt to be on the basis of central  volume loss or atrophy. There are lens implants in the globes from  previous bilateral cataract surgery. The remainder of the head CT is  within normal limits with no acute skull fracture or intracranial  hemorrhage identified.        FACIAL CT TECHNIQUE: Spiral CT images were obtained through the facial  bones in the axial imaging plane. The images were reformatted and are  submitted in 2 mm thick axial, sagittal and coronal CT sections with  soft tissue algorithm and 1 mm thick axial, sagittal and coronal CT  sections with high-resolution bone algorithm.     This is correlated to a prior facial CT on 03/09/2025.     FINDINGS: The paranasal sinuses are clear. There are lens implants in  the globes from previous bilateral cataract surgery. Otherwise, the  orbits are normal in appearance. There are mild osteoarthritic changes  in the temporomandibular joints bilaterally with minimal marginal  spurring off the mandibular heads. The patient is edentulous. No  discernible acute fracture is seen in the facial bones.     IMPRESSION:  No acute facial fracture is identified. The patient is  edentulous and also has bilateral intraocular lens implants  from  previous bilateral cataract surgery. Otherwise, the facial CT is  unremarkable.        CERVICAL SPINE CT TECHNIQUE: Spiral CT images were obtained from the  skull base down to the T2 thoracic level. The images were reformatted  and are submitted in 2 mm thick axial and sagittal CT sections with soft  tissue algorithm and 1 mm thick axial, sagittal and coronal CT sections  with high-resolution bone algorithm.     This is correlated to a prior cervical spine CT from [facility] on 03/09/2025.      FINDINGS: The atlantooccipital articulation is within normal limits.     At C1-2, there are arthritic changes at the atlantodental interval with  narrowing of the interval and marginal spurring off the superior aspect  of the anterior ring of C1 and the odontoid and there is thickening and  calcification of the transverse ligament along the back of the odontoid.  Otherwise, the C1-2 level is normal in appearance.     At C2-3, the posterior disc margin, facets and uncovertebral joints are  within normal limits with no canal or foraminal narrowing.     At C3-4, there is mild-to-moderate bilateral facet overgrowth. There is  severe disc space narrowing and there are degenerative disc and endplate  changes and a 2 mm retrolisthesis of C3 on C4, and a mild diffuse  posterior disc osteophyte complex. There is minimal, if any, canal  narrowing and there is some minimal spurring into the foramina with  minimal foraminal narrowing.     At C4-5, there is mild-to-moderate left and there is moderate right  facet overgrowth.  There is a 3 mm degenerative anterolisthesis of C4 on  C5. There is mild canal narrowing.  There is some mild right  uncovertebral joint hypertrophy and there is mild-to-moderate right, but  no left foraminal narrowing.     At C5-6, there is moderate right facet overgrowth and minimal left facet  overgrowth. There is severe disc space narrowing and there are  degenerative disc and endplate  changes.  There is a diffuse posterior  disc osteophyte complex that results in only mild canal narrowing, and  some mild uncovertebral joint hypertrophy right greater than left.   There is minimal left and there is moderate-to-severe right bony  foraminal narrowing.     At C6-7, the facets are normal. There is moderate disc space narrowing.   There are degenerative endplate changes, a mild diffuse posterior disc  osteophyte complex and mild canal narrowing. There is some uncovertebral  joint spurring into the foramina, and there is mild-to-moderate  bilateral bony foraminal narrowing.     At C7-T1, there is moderate bilateral facet overgrowth. There is a 3-4  mm degenerative anterolisthesis of C7 on T1.  There is no canal  narrowing.  There is, at least, mild-to-moderate bilateral bony  foraminal narrowing.     No acute fracture is seen in the cervical spine.     IMPRESSION:  No acute fracture is seen in the cervical spine with no  significant change when compared to a recent cervical spine CT on  03/09/2025. There is cervical spondylosis as described in great detail  above.        LUMBAR SPINE CT TECHNIQUE: Spiral CT images were obtained from the  inferior body of the T10 thoracic level down to the S2-3 sacral level  and images were reformatted and are submitted in 3 mm thick axial CT  sections with soft tissue algorithm, and 1 mm thick axial CT sections  with high-resolution bone algorithm, and 2 mm thick sagittal and coronal  reconstructions were performed and submitted in both bone and soft  tissue algorithm.     There are no prior lumbar spine CTs or MRIs for comparison. This is  correlated to a remote prior CT scan of the abdomen and pelvis on  03/17/2021.     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 There is severe disc  space narrowing.  There are degenerative disc and endplate changes and  vacuum disc phenomenon at all levels  from L1 to S1.     At T10-11, there is minimal posterior spurring and minimal left facet  overgrowth. There is minimal, if any, canal narrowing.  There is mild  left and essentially no right foraminal narrowing.     At T11-12, the posterior disc margin and facets are normal with no canal  or foraminal narrowing.     At T12-L1, there is some vacuum disc phenomenon. The posterior disc  margin and facets are normal, and there is no canal or foraminal  narrowing.     At L1-2, there is severe disc space narrowing.  There is vacuum disc  phenomenon and degenerative endplate changes, and there is a 4 mm  retrolisthesis of L1 with respect to L2, and a diffuse posterior disc  osteophyte complex.  The facets are normal.  There is mild-to-moderate  canal narrowing.  There is some spurring and air contained within  bulging or protruding disc material into the foramen.  There is mild  left and there is moderate right foraminal narrowing that could affect  the exiting right L2 nerve root.      At L2-3, there is prominent vacuum disc phenomenon, a 2--3 mm  retrolisthesis of L2 with respect to L3, and a diffuse posterior disc  osteophyte complex.  The facets are normal.  There is prominent  posterior epidural fat and there is at least mild-to-moderate up to  moderate generalized narrowing of the thecal sac.  There is mild left  foraminal narrowing, loss of vertical height of the right foramen,  eccentric spurring and bulging disc material into the inferior right  foramen, and there is moderate-to-severe right foraminal narrowing.     At L3-4, there is disc space narrowing, vacuum disc phenomenon,  degenerative endplate changes, 2-3 mm retrolisthesis of L3 with respect  to L4, diffuse posterior disc osteophyte complex, mild-to-moderate  right, minimal left facet overgrowth and ligamentum flavum thickening,  and there is prominent posterior epidural fat, and the combination of  all of the above findings contributes to  moderate-to-severe narrowing of  the thecal sac.  There is mild left, and there is moderate-to-severe  right foraminal narrowing.     At L4-5, there is moderate bilateral facet overgrowth.  There is a 2 mm  anterolisthesis of L4 with respect to L5, a diffuse posterior disc  bulge, and there is moderate narrowing of the thecal sac and some  narrowing of the lateral recesses.  There is synovial thickening off the  facets extending into the posterior foramina, spurring and uncovered  bulging disc material into the inferior aspect of the foramina, and  there is moderate-to-severe bilateral foraminal narrowing that could  affect the exiting L4 nerve roots.     At L5-S1, there is moderate bilateral facet overgrowth, disc space  narrowing, degenerative endplate changes, and posterior endplate  spurring.  There is no canal or lateral recess narrowing.  There is  mild-to-moderate right and there is moderate left foraminal narrowing.      No discernible acute fracture is seen in the lumbar spine.     IMPRESSION:  No acute fracture is seen in the lumbar spine. 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      Radiation dose reduction techniques were utilized, including automated  exposure control and exposure modulation based on body size.          CT Lumbar Spine Without Contrast [[id number]] Collected: 05/05/25 1326     Updated: 05/05/25 1326    Narrative:      EMERGENCY CT SCAN OF THE HEAD, FACE, CERVICAL SPINE AND LUMBAR SPINE  WITHOUT CONTRAST 05/05/2025     CLINICAL HISTORY: This is an 84-year-old male patient who fell and had  head trauma and facial trauma, and also complains of neck and low back  pain.     HEAD CT TECHNIQUE: Spiral CT images were  obtained from the base of the  skull to the vertex without intravenous contrast. The images were  reformatted and are submitted in 3 mm thick axial, sagittal and coronal  CT sections with brain algorithm and 2 mm thick axial CT sections with  high-resolution bone algorithm.     This is correlated to an MRI of the brain on 03/09/2025 and a head CT on  03/09/2025.     FINDINGS: There is some mild patchy low-density in the periventricular  white matter consistent with mild small vessel disease. The remainder of  the brain parenchyma is normal in attenuation. There is diffuse cerebral  atrophy. The lateral and third ventricles are mildly prominent in size,  which is felt to be on the basis of central volume loss or atrophy. I  see no focal mass effect. There is no midline shift. No extra-axial  fluid collections are identified. There is no evidence of acute  intracranial hemorrhage. No acute skull fracture is identified. The  calvarium and the skull base are normal in appearance. The paranasal  sinuses are clear. There are lens implants in the globes from previous  bilateral cataract surgery. Otherwise, the orbits are unremarkable.  There are some mild osteoarthritic changes in the temporomandibular  joints bilaterally.       Impression:         1. No acute intracranial abnormality is identified with no change when  compared to the patient's prior head CT and MRI of the brain on  03/09/2025.     2. There is mild small vessel disease in the cerebral white matter.  There is mild diffuse cerebral atrophy and the lateral and third  ventricles are prominent in size, felt to be on the basis of central  volume loss or atrophy. There are lens implants in the globes from  previous bilateral cataract surgery. The remainder of the head CT is  within normal limits with no acute skull fracture or intracranial  hemorrhage identified.        FACIAL CT TECHNIQUE: Spiral CT images were obtained through the facial  bones in the axial  imaging plane. The images were reformatted and are  submitted in 2 mm thick axial, sagittal and coronal CT sections with  soft tissue algorithm and 1 mm thick axial, sagittal and coronal CT  sections with high-resolution bone algorithm.     This is correlated to a prior facial CT on 03/09/2025.     FINDINGS: The paranasal sinuses are clear. There are lens implants in  the globes from previous bilateral cataract surgery. Otherwise, the  orbits are normal in appearance. There are mild osteoarthritic changes  in the temporomandibular joints bilaterally with minimal marginal  spurring off the mandibular heads. The patient is edentulous. No  discernible acute fracture is seen in the facial bones.     IMPRESSION:  No acute facial fracture is identified. The patient is  edentulous and also has bilateral intraocular lens implants from  previous bilateral cataract surgery. Otherwise, the facial CT is  unremarkable.        CERVICAL SPINE CT TECHNIQUE: Spiral CT images were obtained from the  skull base down to the T2 thoracic level. The images were reformatted  and are submitted in 2 mm thick axial and sagittal CT sections with soft  tissue algorithm and 1 mm thick axial, sagittal and coronal CT sections  with high-resolution bone algorithm.     This is correlated to a prior cervical spine CT from [facility] on 03/09/2025.      FINDINGS: The atlantooccipital articulation is within normal limits.     At C1-2, there are arthritic changes at the atlantodental interval with  narrowing of the interval and marginal spurring off the superior aspect  of the anterior ring of C1 and the odontoid and there is thickening and  calcification of the transverse ligament along the back of the odontoid.  Otherwise, the C1-2 level is normal in appearance.     At C2-3, the posterior disc margin, facets and uncovertebral joints are  within normal limits with no canal or foraminal narrowing.     At C3-4, there is mild-to-moderate  bilateral facet overgrowth. There is  severe disc space narrowing and there are degenerative disc and endplate  changes and a 2 mm retrolisthesis of C3 on C4, and a mild diffuse  posterior disc osteophyte complex. There is minimal, if any, canal  narrowing and there is some minimal spurring into the foramina with  minimal foraminal narrowing.     At C4-5, there is mild-to-moderate left and there is moderate right  facet overgrowth.  There is a 3 mm degenerative anterolisthesis of C4 on  C5. There is mild canal narrowing.  There is some mild right  uncovertebral joint hypertrophy and there is mild-to-moderate right, but  no left foraminal narrowing.     At C5-6, there is moderate right facet overgrowth and minimal left facet  overgrowth. There is severe disc space narrowing and there are  degenerative disc and endplate changes.  There is a diffuse posterior  disc osteophyte complex that results in only mild canal narrowing, and  some mild uncovertebral joint hypertrophy right greater than left.   There is minimal left and there is moderate-to-severe right bony  foraminal narrowing.     At C6-7, the facets are normal. There is moderate disc space narrowing.   There are degenerative endplate changes, a mild diffuse posterior disc  osteophyte complex and mild canal narrowing. There is some uncovertebral  joint spurring into the foramina, and there is mild-to-moderate  bilateral bony foraminal narrowing.     At C7-T1, there is moderate bilateral facet overgrowth. There is a 3-4  mm degenerative anterolisthesis of C7 on T1.  There is no canal  narrowing.  There is, at least, mild-to-moderate bilateral bony  foraminal narrowing.     No acute fracture is seen in the cervical spine.     IMPRESSION:  No acute fracture is seen in the cervical spine with no  significant change when compared to a recent cervical spine CT on  03/09/2025. There is cervical spondylosis as described in great detail  above.        LUMBAR SPINE CT  TECHNIQUE: Spiral CT images were obtained from the  inferior body of the T10 thoracic level down to the S2-3 sacral level  and images were reformatted and are submitted in 3 mm thick axial CT  sections with soft tissue algorithm, and 1 mm thick axial CT sections  with high-resolution bone algorithm, and 2 mm thick sagittal and coronal  reconstructions were performed and submitted in both bone and soft  tissue algorithm.     There are no prior lumbar spine CTs or MRIs for comparison. This is  correlated to a remote prior CT scan of the abdomen and pelvis on  03/17/2021.     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 There is severe disc  space narrowing.  There are degenerative disc and endplate changes and  vacuum disc phenomenon at all levels from L1 to S1.     At T10-11, there is minimal posterior spurring and minimal left facet  overgrowth. There is minimal, if any, canal narrowing.  There is mild  left and essentially no right foraminal narrowing.     At T11-12, the posterior disc margin and facets are normal with no canal  or foraminal narrowing.     At T12-L1, there is some vacuum disc phenomenon. The posterior disc  margin and facets are normal, and there is no canal or foraminal  narrowing.     At L1-2, there is severe disc space narrowing.  There is vacuum disc  phenomenon and degenerative endplate changes, and there is a 4 mm  retrolisthesis of L1 with respect to L2, and a diffuse posterior disc  osteophyte complex.  The facets are normal.  There is mild-to-moderate  canal narrowing.  There is some spurring and air contained within  bulging or protruding disc material into the foramen.  There is mild  left and there is moderate right foraminal narrowing that could affect  the exiting right L2 nerve root.      At L2-3, there is prominent vacuum disc phenomenon, a 2--3 mm  retrolisthesis of L2 with respect to L3, and a diffuse  posterior disc  osteophyte complex.  The facets are normal.  There is prominent  posterior epidural fat and there is at least mild-to-moderate up to  moderate generalized narrowing of the thecal sac.  There is mild left  foraminal narrowing, loss of vertical height of the right foramen,  eccentric spurring and bulging disc material into the inferior right  foramen, and there is moderate-to-severe right foraminal narrowing.     At L3-4, there is disc space narrowing, vacuum disc phenomenon,  degenerative endplate changes, 2-3 mm retrolisthesis of L3 with respect  to L4, diffuse posterior disc osteophyte complex, mild-to-moderate  right, minimal left facet overgrowth and ligamentum flavum thickening,  and there is prominent posterior epidural fat, and the combination of  all of the above findings contributes to moderate-to-severe narrowing of  the thecal sac.  There is mild left, and there is moderate-to-severe  right foraminal narrowing.     At L4-5, there is moderate bilateral facet overgrowth.  There is a 2 mm  anterolisthesis of L4 with respect to L5, a diffuse posterior disc  bulge, and there is moderate narrowing of the thecal sac and some  narrowing of the lateral recesses.  There is synovial thickening off the  facets extending into the posterior foramina, spurring and uncovered  bulging disc material into the inferior aspect of the foramina, and  there is moderate-to-severe bilateral foraminal narrowing that could  affect the exiting L4 nerve roots.     At L5-S1, there is moderate bilateral facet overgrowth, disc space  narrowing, degenerative endplate changes, and posterior endplate  spurring.  There is no canal or lateral recess narrowing.  There is  mild-to-moderate right and there is moderate left foraminal narrowing.      No discernible acute fracture is seen in the lumbar spine.     IMPRESSION:  No acute fracture is seen in the lumbar spine. 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      Radiation dose reduction techniques were utilized, including automated  exposure control and exposure modulation based on body size.          XR Ankle 3+ View Right [[id number]] Collected: 05/05/25 1137     Updated: 05/05/25 1144    Narrative:      XR ANKLE 3+ VW RIGHT-     INDICATIONS: Trauma.     TECHNIQUE: 3 VIEWS OF THE RIGHT ANKLE     COMPARISON: None available     FINDINGS:     No acute fracture, erosion, or dislocation is identified. Soft tissue  swelling is seen laterally at the ankle. Ankle mortise appears  preserved. Arterial calcifications are conspicuous. Follow-up/further  evaluation can be obtained as indications persist.       Impression:         As described.           This report was finalized on 5/5/2025 11:40 AM by Dr. Nirmal Harris M.D on Workstation: [employer]       XR Chest 1 View [[id number]] Collected: 05/05/25 1134     Updated: 05/05/25 1140    Narrative:      XR CHEST 1 VW-     HISTORY: Male who is 84 years-old, syncope     TECHNIQUE: Frontal view of the chest     COMPARISON: 12/20/2024     FINDINGS: Heart size is normal. Aorta is tortuous. Pulmonary vasculature  is unremarkable. No focal pulmonary consolidation, pleural effusion, or  pneumothorax. No acute osseous process.       Impression:      No focal pulmonary consolidation. Follow-up as clinical  indications persist.     This report was finalized on 5/5/2025 11:37 AM by Dr. Nirmal Harris M.D on Workstation: [employer]                 Assessment:  Active Hospital Problems    Diagnosis  POA    **Syncope [R55]  Yes      Resolved Hospital Problems   No resolved problems to display.   Falls  Hypertension  Hyperlipidemia  Hypothyroidism  Back pan  Facial contusion  Prostate  cancer    Plan:  Will get echo  Ask cardiology to see him  Pt/ot to see  Rehab was recommended in march but the patient declined  Notes reviewed  Dw patient and ed provider      Dulce Singh MD  5/5/2025  20:27 EDT

## 2025-05-06 NOTE — PROGRESS NOTES
Name: Darwin Sparks ADMIT: 2025   : 1940  PCP: EpleyDarwin MICHAEL    MRN: [id number] LOS: 0 days   AGE/SEX: 84 y.o. male  ROOM: [facility]     Subjective   Subjective   Patient seen this morning.  No acute events overnight.  Complains of headache.  Denies current dizziness.  No fevers no chills.  No chest pain or palpitations.    Review of Systems   As above  Objective   Objective   Vital Signs  Temp:  [97.5 °F (36.4 °C)-98.1 °F (36.7 °C)] 98.1 °F (36.7 °C)  Heart Rate:  [] 141  Resp:  [18] 18  BP: (101-170)/(72-93) 136/84  SpO2:  [89 %-98 %] 96 %  on   ;   Device (Oxygen Therapy): room air  Body mass index is 25.7 kg/m².  Physical Exam    General: Alert, lying in bed, not in distress,  HEENT: Normocephalic, atraumatic  CV: Regular rate and rhythm, no murmurs rubs or gallops  Lungs: Clear to auscultation bilaterally, no crackles or wheezes  Abdomen: Soft, nontender, nondistended  Extremities: No significant peripheral edema , no cyanosis     Results Review     I reviewed the patient's new clinical results.  Results from last 7 days   Lab Units 25  0525 25  1057   WBC 10*3/mm3 5.70 8.04   HEMOGLOBIN g/dL 13.7 15.3   PLATELETS 10*3/mm3 169 196     Results from last 7 days   Lab Units 25  0525 25  1057   SODIUM mmol/L 141 140   POTASSIUM mmol/L 4.2 4.4   CHLORIDE mmol/L 104 101   CO2 mmol/L 26.2 24.0   BUN mg/dL 19 17   CREATININE mg/dL 0.81 0.98   GLUCOSE mg/dL 78 91   Estimated Creatinine Clearance: 71 mL/min (by C-G formula based on SCr of 0.81 mg/dL).  Results from last 7 days   Lab Units 25  1057   ALBUMIN g/dL 3.4*   BILIRUBIN mg/dL 1.1   ALK PHOS U/L 112   AST (SGOT) U/L 42*   ALT (SGPT) U/L 27     Results from last 7 days   Lab Units 25  0525 25  1057   CALCIUM mg/dL 8.4* 9.1   ALBUMIN g/dL  --  3.4*   MAGNESIUM mg/dL 2.0  --    PHOSPHORUS mg/dL 3.2  --        COVID19   Date Value Ref Range Status   10/06/2021 Not Detected Not Detected - Ref. Range  "Final   08/18/2021 Not Detected Not Detected - Ref. Range Final     No results found for: \"HGBA1C\", \"POCGLU\"        Adult Transthoracic Echo Complete W/ Cont if Necessary Per Protocol    Left ventricular systolic function is normal. Calculated left   ventricular EF = 53%    Left ventricular diastolic function is consistent with (grade I)   impaired relaxation.    Estimated right ventricular systolic pressure from tricuspid   regurgitation is normal (<35 mmHg).  CT Head Without Contrast, CT Cervical Spine Without Contrast, CT Facial Bones Without Contrast, CT Lumbar Spine Without Contrast  Narrative: EMERGENCY CT SCAN OF THE HEAD, FACE, CERVICAL SPINE AND LUMBAR SPINE  WITHOUT CONTRAST 05/05/2025     CLINICAL HISTORY: This is an 84-year-old male patient who fell and had  head trauma and facial trauma, and also complains of neck and low back  pain.     HEAD CT TECHNIQUE: Spiral CT images were obtained from the base of the  skull to the vertex without intravenous contrast. The images were  reformatted and are submitted in 3 mm thick axial, sagittal and coronal  CT sections with brain algorithm and 2 mm thick axial CT sections with  high-resolution bone algorithm.     This is correlated to an MRI of the brain on 03/09/2025 and a head CT on  03/09/2025.     FINDINGS: There is some mild patchy low-density in the periventricular  white matter consistent with mild small vessel disease. The remainder of  the brain parenchyma is normal in attenuation. There is diffuse cerebral  atrophy. The lateral and third ventricles are mildly prominent in size,  which is felt to be on the basis of central volume loss or atrophy. I  see no focal mass effect. There is no midline shift. No extra-axial  fluid collections are identified. There is no evidence of acute  intracranial hemorrhage. No acute skull fracture is identified. The  calvarium and the skull base are normal in appearance. The paranasal  sinuses are clear. There are lens " implants in the globes from previous  bilateral cataract surgery. Otherwise, the orbits are unremarkable.  There are some mild osteoarthritic changes in the temporomandibular  joints bilaterally.     Impression:    1. No acute intracranial abnormality is identified with no change when  compared to the patient's prior head CT and MRI of the brain on  03/09/2025.     2. There is mild small vessel disease in the cerebral white matter.  There is mild diffuse cerebral atrophy and the lateral and third  ventricles are prominent in size, felt to be on the basis of central  volume loss or atrophy. There are lens implants in the globes from  previous bilateral cataract surgery. The remainder of the head CT is  within normal limits with no acute skull fracture or intracranial  hemorrhage identified.        FACIAL CT TECHNIQUE: Spiral CT images were obtained through the facial  bones in the axial imaging plane. The images were reformatted and are  submitted in 2 mm thick axial, sagittal and coronal CT sections with  soft tissue algorithm and 1 mm thick axial, sagittal and coronal CT  sections with high-resolution bone algorithm.     This is correlated to a prior facial CT on 03/09/2025.     FINDINGS: The paranasal sinuses are clear. There are lens implants in  the globes from previous bilateral cataract surgery. Otherwise, the  orbits are normal in appearance. There are mild osteoarthritic changes  in the temporomandibular joints bilaterally with minimal marginal  spurring off the mandibular heads. The patient is edentulous. No  discernible acute fracture is seen in the facial bones.     IMPRESSION:  No acute facial fracture is identified. The patient is  edentulous and also has bilateral intraocular lens implants from  previous bilateral cataract surgery. Otherwise, the facial CT is  unremarkable.        CERVICAL SPINE CT TECHNIQUE: Spiral CT images were obtained from the  skull base down to the T2 thoracic level. The images  were reformatted  and are submitted in 2 mm thick axial and sagittal CT sections with soft  tissue algorithm and 1 mm thick axial, sagittal and coronal CT sections  with high-resolution bone algorithm.     This is correlated to a prior cervical spine CT from [facility] on 03/09/2025.      FINDINGS: The atlantooccipital articulation is within normal limits.     At C1-2, there are arthritic changes at the atlantodental interval with  narrowing of the interval and marginal spurring off the superior aspect  of the anterior ring of C1 and the odontoid and there is thickening and  calcification of the transverse ligament along the back of the odontoid.  Otherwise, the C1-2 level is normal in appearance.     At C2-3, the posterior disc margin, facets and uncovertebral joints are  within normal limits with no canal or foraminal narrowing.     At C3-4, there is mild-to-moderate bilateral facet overgrowth. There is  severe disc space narrowing and there are degenerative disc and endplate  changes and a 2 mm retrolisthesis of C3 on C4, and a mild diffuse  posterior disc osteophyte complex. There is minimal, if any, canal  narrowing and there is some minimal spurring into the foramina with  minimal foraminal narrowing.     At C4-5, there is mild-to-moderate left and there is moderate right  facet overgrowth.  There is a 3 mm degenerative anterolisthesis of C4 on  C5. There is mild canal narrowing.  There is some mild right  uncovertebral joint hypertrophy and there is mild-to-moderate right, but  no left foraminal narrowing.     At C5-6, there is moderate right facet overgrowth and minimal left facet  overgrowth. There is severe disc space narrowing and there are  degenerative disc and endplate changes.  There is a diffuse posterior  disc osteophyte complex that results in only mild canal narrowing, and  some mild uncovertebral joint hypertrophy right greater than left.   There is minimal left and there is  moderate-to-severe right bony  foraminal narrowing.     At C6-7, the facets are normal. There is moderate disc space narrowing.   There are degenerative endplate changes, a mild diffuse posterior disc  osteophyte complex and mild canal narrowing. There is some uncovertebral  joint spurring into the foramina, and there is mild-to-moderate  bilateral bony foraminal narrowing.     At C7-T1, there is moderate bilateral facet overgrowth. There is a 3-4  mm degenerative anterolisthesis of C7 on T1.  There is no canal  narrowing.  There is, at least, mild-to-moderate bilateral bony  foraminal narrowing.     No acute fracture is seen in the cervical spine.     IMPRESSION:  No acute fracture is seen in the cervical spine with no  significant change when compared to a recent cervical spine CT on  03/09/2025. There is cervical spondylosis as described in great detail  above.        LUMBAR SPINE CT TECHNIQUE: Spiral CT images were obtained from the  inferior body of the T10 thoracic level down to the S2-3 sacral level  and images were reformatted and are submitted in 3 mm thick axial CT  sections with soft tissue algorithm, and 1 mm thick axial CT sections  with high-resolution bone algorithm, and 2 mm thick sagittal and coronal  reconstructions were performed and submitted in both bone and soft  tissue algorithm.     There are no prior lumbar spine CTs or MRIs for comparison. This is  correlated to a remote prior CT scan of the abdomen and pelvis on  03/17/2021.     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 There is severe disc  space narrowing.  There are degenerative disc and endplate changes and  vacuum disc phenomenon at all levels from L1 to S1.     At T10-11, there is minimal posterior spurring and minimal left facet  overgrowth. There is minimal, if any, canal narrowing.  There is mild  left and essentially no right foraminal narrowing.     At  T11-12, the posterior disc margin and facets are normal with no canal  or foraminal narrowing.     At T12-L1, there is some vacuum disc phenomenon. The posterior disc  margin and facets are normal, and there is no canal or foraminal  narrowing.     At L1-2, there is severe disc space narrowing.  There is vacuum disc  phenomenon and degenerative endplate changes, and there is a 4 mm  retrolisthesis of L1 with respect to L2, and a diffuse posterior disc  osteophyte complex.  The facets are normal.  There is mild-to-moderate  canal narrowing.  There is some spurring and air contained within  bulging or protruding disc material into the foramen.  There is mild  left and there is moderate right foraminal narrowing that could affect  the exiting right L2 nerve root.      At L2-3, there is prominent vacuum disc phenomenon, a 2--3 mm  retrolisthesis of L2 with respect to L3, and a diffuse posterior disc  osteophyte complex.  The facets are normal.  There is prominent  posterior epidural fat and there is at least mild-to-moderate up to  moderate generalized narrowing of the thecal sac.  There is mild left  foraminal narrowing, loss of vertical height of the right foramen,  eccentric spurring and bulging disc material into the inferior right  foramen, and there is moderate-to-severe right foraminal narrowing.     At L3-4, there is disc space narrowing, vacuum disc phenomenon,  degenerative endplate changes, 2-3 mm retrolisthesis of L3 with respect  to L4, diffuse posterior disc osteophyte complex, mild-to-moderate  right, minimal left facet overgrowth and ligamentum flavum thickening,  and there is prominent posterior epidural fat, and the combination of  all of the above findings contributes to moderate-to-severe narrowing of  the thecal sac.  There is mild left, and there is moderate-to-severe  right foraminal narrowing.     At L4-5, there is moderate bilateral facet overgrowth.  There is a 2 mm  anterolisthesis of L4 with  respect to L5, a diffuse posterior disc  bulge, and there is moderate narrowing of the thecal sac and some  narrowing of the lateral recesses.  There is synovial thickening off the  facets extending into the posterior foramina, spurring and uncovered  bulging disc material into the inferior aspect of the foramina, and  there is moderate-to-severe bilateral foraminal narrowing that could  affect the exiting L4 nerve roots.     At L5-S1, there is moderate bilateral facet overgrowth, disc space  narrowing, degenerative endplate changes, and posterior endplate  spurring.  There is no canal or lateral recess narrowing.  There is  mild-to-moderate right and there is moderate left foraminal narrowing.      No discernible acute fracture is seen in the lumbar spine.     IMPRESSION:  No acute fracture is seen in the lumbar spine. 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      Radiation dose reduction techniques were utilized, including automated  exposure control and exposure modulation based on body size.        This report was finalized on 5/6/2025 6:47 AM by Dr. Victor M Stiles M.D on  Workstation: [id number]       Scheduled Medications  acetaminophen, 1,000 mg, Oral, Q8H  folic acid, 1 mg, Oral, Daily  levothyroxine, 125 mcg, Oral, Daily  sertraline, 50 mg, Oral, Daily  thiamine (B-1) IV, 200 mg, Intravenous, Q8H   Followed by  [START ON 5/12/2025] thiamine, 100 mg, Oral, Daily    Infusions   Diet  Diet: Vegetarian; Vegan (No animal products); Fluid Consistency: Thin (IDDSI 0)    I have personally reviewed     [x]  Laboratory   [x]  Microbiology   [x]  Radiology   [x]  EKG/Telemetry  []  Cardiology/Vascular   []  Pathology    []  Records        Assessment/Plan     Active Hospital Problems    Diagnosis  POA    **Syncope [R55]  Yes    Anxiety [F41.9]  Yes    Primary hypertension [I10]  Yes    Adult hypothyroidism [E03.9]  Yes      Resolved Hospital Problems   No resolved problems to display.       Patient is a 84 y.o. male with a PMH significant for hypertension, hyperlipidemia, insomnia, anxiety, chronic low back pain, lumbar facet arthropathy, scoliosis, multiple falls, vertigo who presents to the ED c/o lower back pain following a fall.  Patient states that he is unsure how he fell, but believes he hit a coffee table.      Unwitnessed fall, syncope?  -Recurrent falls  - Patient not sure if he passed out, reports he just went down.  Was weak and could not get up.  - CT scan of the head with no acute findings.  - Orthostatic vitals lying /72 with heart rate of 103, standing BP initial 101/77 with heart rate of 147, and repeat was 146/84 with heart rate of 141  - Echocardiogram with no acute findings  - Cardiology following.  Likely will need Zio patch at discharge    Alcohol use disorder  -Drinks almost 3-4 glasses of wine a night   - Contributing to falls as above  - Initiated on CIWA protocol, multivitamins  - Access consult    Hypothyroidism  -TSH elevated at 8.0 but actually improved compared to prior TSH of 22.8 on 03/9/25, free T4 and free T4 normal  - Continue outpatient levothyroxine    Elevated MCV  - Hemoglobin normal.  MCV significantly elevated at 109.7.  Folic acid was low at 3.27 on 03/9/25.  continue folic acid supplement  - Check B12, folate        SCDs for DVT prophylaxis.  Full code.  Discussed with patient.  Expected Discharge Date: 5/7/2025; Expected Discharge Time:        Copied text in this note has been reviewed and is accurate as of 05/06/25.         Dictated utilizing Dragon dictation        Orquidea Haas MD  [facility]  05/06/25  19:42 EDT

## 2025-05-06 NOTE — CONSULTS
Patient Name: Darwin Sparks  :1940  84 y.o.    Date of Admission: 2025  Date of Consultation:  25  Encounter Provider: MICHAEL Odell  Place of Service: [facility] CARDIOLOGY  Referring Provider: Dulce Singh MD  Patient Care Team:  Epley, James, APRN as PCP - General (Family Medicine)      Chief complaint: unwitnessed fall    History of Present Illness:    Mr. Sparks is an 84 year old gentleman who has been seen by Dr. Hyatt in the past for dyspnea. He has hypertension and hyperlipidemia. In 2023 he had a transthoracic echo which showed normal LV systolic function ejection fraction 62%, grade 2 diastolic dysfunction, severe left atrial enlargement, moderate right atrial enlargement and mild aortic root dilation. There was age-related changes to the aortic and mitral valves but overall normal function. PET stress test in 2023 showed no evidence of ischemia. He was seen in May 2023 and was doing well.     He came to the emergency room this morning after an unwitnessed fall. He is not sure if he passed out. If he had light headed or dizziness - it was very brief and he was just down. He was weak and unable to get up. He was on the ground for most of the night. He came to the ER with back pain.          HS troponin 30 proBNP . EKg without conduction disease. No ischemic changes. Renal function stable.     He is still really weak and had trouble standing with staff.     Echo today       Left ventricular systolic function is normal. Calculated left ventricular EF = 53%    Left ventricular diastolic function is consistent with (grade I) impaired relaxation.    Estimated right ventricular systolic pressure from tricuspid regurgitation is normal (<35 mmHg).      Echo 23      Left ventricular systolic function is normal. Calculated left ventricular EF = 62.3% Normal left ventricular cavity size and wall thickness noted. There  are wall motion normalities as noted below Left ventricular diastolic function is consistent with (grade II w/high LAP) pseudonormalization.    The following segments are hypokinetic: basal inferior and basal inferolateral. All other segments are normal.    : Left atrial volume is severely increased. The right atrial cavity is moderately dilated.    There is moderate thickening of the non-coronary cusp(s) of the aortic valve. Trace aortic valve regurgitation is present. No aortic valve stenosis is present.    Moderate mitral annular calcification is present. Mild mitral valve regurgitation is present. No significant mitral valve stenosis is present.    Trace tricuspid valve regurgitation is present. Estimated right ventricular systolic pressure from tricuspid regurgitation is normal (<35 mmHg).    Mild dilation of the aortic arch is present.       Past Medical History:   Diagnosis Date    Cataract     Colon polyp     10 years ago    Deep vein thrombosis     Childhood    Headache 28 years old    HL (hearing loss) 8 years ago    Not too bad, hearing aids for crouded places    Hyperlipidemia     Hypertension     Hypothyroidism     Infectious viral hepatitis     A & B ?    Inflammatory bowel disease Child to 77 years old    Constopation, alergy animal protine,Vegan diet    Irritable bowel syndrome     Vegan diet big improvement    Kidney stone 1958    4 surgeries    Low back pain 1973    Osteoarthritis     Pneumonia Child    2 times    Prostate cancer 2013    Scoliosis 1973    Urinary tract infection 1973    After kidney stones    Visual impairment 1960    First glasses       Past Surgical History:   Procedure Laterality Date    ADENOIDECTOMY  10 years old    CATARACT EXTRACTION      CIRCUMCISION      COLONOSCOPY      COLONOSCOPY N/A 05/27/2021    Procedure: COLONOSCOPY TO CECUM/TI;  Surgeon: Jamel Michaels MD;  Location: [facility] ENDOSCOPY;  Service: Gastroenterology;  Laterality: N/A;  Pre op: Abnormal cat scan,  constipation, RLQ pain  Post op: Diverticulosis, Hemorrhoids, otherwise normal to and including TI.    MEDIAL BRANCH BLOCK Bilateral 07/30/2021    Procedure: LUMBAR MEDIAL BRANCH BLOCK;  Surgeon: Kb Rodriguez MD;  [facility];  Service: Pain Management;  Laterality: Bilateral;    MEDIAL BRANCH BLOCK Bilateral 08/20/2021    Procedure: MEDIAL BRANCH BLOCK--bilateral lumbar3-lumbar5;  Surgeon: Kb Rodriguez MD;  Location: [facility] MA[facility];  Service: Pain Management;  Laterality: Bilateral;    PROSTATE SURGERY  72 years old    Low radiation, started vegan diet    RADIOFREQUENCY ABLATION Bilateral 10/08/2021    Procedure: RADIOFREQUENCY ABLATION LUMBAR--bilateral lumbar3-5 WITH MAC;  Surgeon: Kb Rodriguez MD;  [facility];  Service: Pain Management;  Laterality: Bilateral;    TONSILLECTOMY      TOOTH EXTRACTION           Prior to Admission medications    Medication Sig Start Date End Date Taking? Authorizing Provider   albuterol sulfate  (90 Base) MCG/ACT inhaler Inhale 2 puffs Every 4 (Four) Hours As Needed for Wheezing or Shortness of Air. 2/20/24  Yes Epley, James, APRN   ammonium lactate (LAC-HYDRIN) 12 % lotion Apply  topically to the appropriate area as directed As Needed for Dry Skin. Best for lower extremity dry chronically's skin 3/21/25  Yes Epley, James, APRN   celecoxib (CeleBREX) 200 MG capsule Take 1 capsule by mouth 2 (Two) Times a Day. With water, take lowest effective dose 3/21/25  Yes Epley, James, APRN   folic acid (FOLVITE) 1 MG tablet Take 1 tablet by mouth Daily. 3/21/25  Yes Epley, James, APRN   levothyroxine (SYNTHROID, LEVOTHROID) 125 MCG tablet Take 1 tablet by mouth Daily. 3/21/25  Yes Epley, James, APRN   sertraline (Zoloft) 50 MG tablet Take 1 tablet by mouth Daily. For improved mood and feelings of wellbeing 4/14/25  Yes Epley, James, APRN       Allergies   Allergen Reactions    Milk-Related Compounds Other (See Comments)     Constipation     "Pregabalin      Other reaction(s): Visual Disturbance    Neomycin-Bacitracin Zn-Polymyx Rash       Social History     Socioeconomic History    Marital status:    Tobacco Use    Smoking status: Former    Smokeless tobacco: Never   Vaping Use    Vaping status: Never Used   Substance and Sexual Activity    Alcohol use: Yes    Drug use: Not Currently     Types: Marijuana     Comment: used in the 1970's    Sexual activity: Defer       Family History   Problem Relation Age of Onset    Diabetes Mother     Hypertension Mother     Stroke Mother     Diabetes Father     Hypertension Father     Bipolar disorder Father     Stroke Father     Bipolar disorder Paternal Grandmother     Heart disease Other     Sudden death Other     Anuerysm Other     Malig Hyperthermia Neg Hx        REVIEW OF SYSTEMS:   All systems reviewed.  Pertinent positives identified in HPI.  All other systems are negative.      Objective:     Vitals:    05/06/25 0624 05/06/25 0754 05/06/25 1029 05/06/25 1045   BP:  126/93 126/93 170/84   BP Location:  Left arm  Left arm   Patient Position:  Lying  Lying   Pulse:    88   Resp:  18  18   Temp:  97.5 °F (36.4 °C)  98.1 °F (36.7 °C)   TempSrc:  Oral  Oral   SpO2:    97%   Weight: 76.7 kg (169 lb 1.5 oz)  76.7 kg (169 lb)    Height:   172.7 cm (68\")      Body mass index is 25.7 kg/m².    Physical Exam:  Constitutional: She is oriented to person, place, and time. She appears well-developed. She does not appear ill.   HENT:   Head: Normocephalic and atraumatic. Head is without contusion.   Right Ear: Hearing normal. No drainage.   Left Ear: Hearing normal. No drainage.   Nose: No nasal deformity. No epistaxis.   Eyes: Lids are normal. Right eye exhibits no exudate. Left eye exhibits no exudate.  Neck: No JVD present. Carotid bruit is not present. No tracheal deviation present. No thyroid mass and no thyromegaly present.   Cardiovascular: Normal rate, regular rhythm and normal heart sounds.    Pulses:       " Posterior tibial pulses are 2+ on the right side, and 2+ on the left side.   Pulmonary/Chest: Effort normal and breath sounds normal.   Abdominal: Soft. Normal appearance and bowel sounds are normal. There is no tenderness.   Musculoskeletal: Normal range of motion.        Right shoulder: She exhibits no deformity.        Left shoulder: She exhibits no deformity.   Neurological: She is alert and oriented to person, place, and time. She has normal strength.   Skin: Skin is warm, dry and intact. No rash noted.   Psychiatric: She has a normal mood and affect. Her behavior is normal. Thought content normal.   Vitals reviewed      Lab Review:     Results from last 7 days   Lab Units 05/06/25  0525 05/05/25  1057   SODIUM mmol/L 141 140   POTASSIUM mmol/L 4.2 4.4   CHLORIDE mmol/L 104 101   CO2 mmol/L 26.2 24.0   BUN mg/dL 19 17   CREATININE mg/dL 0.81 0.98   CALCIUM mg/dL 8.4* 9.1   BILIRUBIN mg/dL  --  1.1   ALK PHOS U/L  --  112   ALT (SGPT) U/L  --  27   AST (SGOT) U/L  --  42*   GLUCOSE mg/dL 78 91     Results from last 7 days   Lab Units 05/05/25  1202 05/05/25  1057   CK TOTAL U/L  --  245*   HSTROP T ng/L 27* 30*     Results from last 7 days   Lab Units 05/06/25  0525   WBC 10*3/mm3 5.70   HEMOGLOBIN g/dL 13.7   HEMATOCRIT % 38.4   PLATELETS 10*3/mm3 169         Results from last 7 days   Lab Units 05/06/25  0525   MAGNESIUM mg/dL 2.0                 Current Facility-Administered Medications:     acetaminophen (TYLENOL) tablet 1,000 mg, 1,000 mg, Oral, Q8H, Orquidea Haas MD, 1,000 mg at 05/06/25 1140    albuterol (PROVENTIL) nebulizer solution 0.083% 2.5 mg/3mL, 2.5 mg, Nebulization, Q6H PRN, Dulce Singh MD    ammonium lactate (AMLACTIN) 12 % cream, , Topical, BID PRN, Dulce Singh MD    sennosides-docusate (PERICOLACE) 8.6-50 MG per tablet 2 tablet, 2 tablet, Oral, BID PRN **AND** polyethylene glycol (MIRALAX) packet 17 g, 17 g, Oral, Daily PRN **AND** bisacodyl (DULCOLAX) EC tablet 5  mg, 5 mg, Oral, Daily PRN **AND** bisacodyl (DULCOLAX) suppository 10 mg, 10 mg, Rectal, Daily PRN, Dulce Singh MD    folic acid (FOLVITE) tablet 1 mg, 1 mg, Oral, Daily, Dulce Singh MD, 1 mg at 05/06/25 0757    levothyroxine (SYNTHROID, LEVOTHROID) tablet 125 mcg, 125 mcg, Oral, Daily, Dulce Singh MD, 125 mcg at 05/06/25 0757    melatonin tablet 2.5 mg, 2.5 mg, Oral, Nightly PRN, Dulce Singh MD    ondansetron ODT (ZOFRAN-ODT) disintegrating tablet 4 mg, 4 mg, Oral, Q6H PRN **OR** ondansetron (ZOFRAN) injection 4 mg, 4 mg, Intravenous, Q6H PRN, Dulce Singh MD    sertraline (ZOLOFT) tablet 50 mg, 50 mg, Oral, Daily, Dulce Singh MD, 50 mg at 05/06/25 0800    Assessment and Plan:       Active Hospital Problems    Diagnosis  POA    **Syncope [R55]  Yes    Primary hypertension [I10]  Yes      Resolved Hospital Problems   No resolved problems to display.     Unwitnessed fall, questionable syncope .   Hypertension  Hypothyroidism   Back pain   Recurrent falls     Echocardiogram unremarkable.   Check orthostatics.   No obvious cardiac etiology for symptoms / falls however may be reasonable to discharge with a monitor.     Dinora Marina, APRN  05/06/25  13:23 EDT

## 2025-05-06 NOTE — THERAPY EVALUATION
Patient Name: Darwin Sparks  : 1940    MRN: [id number]                              Today's Date: 2025       Admit Date: 2025    Visit Dx:     ICD-10-CM ICD-9-CM   1. Syncope, unspecified syncope type  R55 780.2   2. Acute on chronic low back pain  M54.50 724.2    G89.29 338.19     338.29   3. Contusion of face, initial encounter  S00.83XA 920   4. Sprain of right ankle, unspecified ligament, initial encounter  S93.401A 845.00     Patient Active Problem List   Diagnosis    Primary hypertension    Mixed hyperlipidemia    Adult hypothyroidism    Insomnia    Anxiety    Dermatitis, eczematoid    Bronchitis    Seasonal allergies    Health care maintenance    Chronic midline low back pain without sciatica    Penis pain    Tinea cruris    Primary osteoarthritis involving multiple joints    Benzodiazepine dependence    Constipation    Abnormal finding on CT scan    Scoliosis of lumbar spine    Lumbar facet arthropathy    Vertigo    Multiple falls    Severe major depression    Decreased strength, endurance, and mobility    Gait instability    Syncope     Past Medical History:   Diagnosis Date    Cataract     Colon polyp     10 years ago    Deep vein thrombosis     Childhood    Headache 28 years old    HL (hearing loss) 8 years ago    Not too bad, hearing aids for crouded places    Hyperlipidemia     Hypertension     Hypothyroidism     Infectious viral hepatitis     A & B ?    Inflammatory bowel disease Child to 77 years old    Constopation, alergy animal protine,Vegan diet    Irritable bowel syndrome     Vegan diet big improvement    Kidney stone     4 surgeries    Low back pain 1973    Osteoarthritis     Pneumonia Child    2 times    Prostate cancer 2013    Scoliosis 1973    Urinary tract infection 1973    After kidney stones    Visual impairment 1960    First glasses     Past Surgical History:   Procedure Laterality Date    ADENOIDECTOMY  10 years old    CATARACT EXTRACTION      CIRCUMCISION       COLONOSCOPY      COLONOSCOPY N/A 05/27/2021    Procedure: COLONOSCOPY TO CECUM/TI;  Surgeon: Jamel Michaels MD;  Location: [facility] ENDOSCOPY;  Service: Gastroenterology;  Laterality: N/A;  Pre op: Abnormal cat scan, constipation, RLQ pain  Post op: Diverticulosis, Hemorrhoids, otherwise normal to and including TI.    MEDIAL BRANCH BLOCK Bilateral 07/30/2021    Procedure: LUMBAR MEDIAL BRANCH BLOCK;  Surgeon: Kb Rodriguez MD;  Location: [facility] MA[facility];  Service: Pain Management;  Laterality: Bilateral;    MEDIAL BRANCH BLOCK Bilateral 08/20/2021    Procedure: MEDIAL BRANCH BLOCK--bilateral lumbar3-lumbar5;  Surgeon: Kb Rodriguez MD;  Location: [facility];  Service: Pain Management;  Laterality: Bilateral;    PROSTATE SURGERY  72 years old    Low radiation, started vegan diet    RADIOFREQUENCY ABLATION Bilateral 10/08/2021    Procedure: RADIOFREQUENCY ABLATION LUMBAR--bilateral lumbar3-5 WITH MAC;  Surgeon: Kb Rodriguez MD;  Location: [facility] MAIN OR;  Service: Pain Management;  Laterality: Bilateral;    TONSILLECTOMY      TOOTH EXTRACTION        General Information       Row Name 05/06/25 1510          Physical Therapy Time and Intention    Document Type evaluation  -EJ     Mode of Treatment physical therapy  -EJ       Row Name 05/06/25 1510          General Information    Patient Profile Reviewed yes  -EJ     Prior Level of Function independent:;all household mobility;community mobility;w/c or scooter;ADL's  uses Rwx and sometimes WC for longer community distances. has caregivers during the week in the mornings, although caregiver primarily assists pt's   -EJ     Existing Precautions/Restrictions fall  -EJ     Barriers to Rehab none identified  -EJ       Row Name 05/06/25 1510          Living Environment    Current Living Arrangements home  -EJ     People in Home spouse  -EJ       Row Name 05/06/25 1510          Stairs Within Home, Primary    Stairs, Within Home, Primary has stair lift   -EJ     Number of Stairs, Within Home, Primary two  -EJ       Row Name 05/06/25 1510          Cognition    Orientation Status (Cognition) oriented x 4  -EJ       Row Name 05/06/25 1510          Safety Issues/Impairments Affecting Functional Mobility    Impairments Affecting Function (Mobility) balance;endurance/activity tolerance;strength;pain  -EJ               User Key  (r) = Recorded By, (t) = Taken By, (c) = Cosigned By      Initials Name Provider Type    EJ Jovana Alba, PT Physical Therapist                   Mobility       Row Name 05/06/25 1511          Bed Mobility    Supine-Sit [place] (Bed Mobility) verbal cues;minimum assist (75% patient effort);contact guard  -EJ     Sit-Supine [place] (Bed Mobility) verbal cues;contact guard;minimum assist (75% patient effort)  -EJ     Assistive Device (Bed Mobility) head of bed elevated;bed rails  -EJ     Comment, (Bed Mobility) cues for sequencing  -EJ       Row Name 05/06/25 1511          Sit-Stand Transfer    Sit-Stand [place] (Transfers) nonverbal cues (demo/gesture);verbal cues;minimum assist (75% patient effort)  -EJ     Assistive Device (Sit-Stand Transfers) walker, front-wheeled  -EJ     Comment, (Sit-Stand Transfer) cues for proper hand placement  -EJ       Row Name 05/06/25 1511          Gait/Stairs (Locomotion)    [place] Level (Gait) verbal cues;contact guard  -EJ     Assistive Device (Gait) walker, front-wheeled  -EJ     Distance in Feet (Gait) 60  -EJ     Deviations/Abnormal Patterns (Gait) carolyn decreased;stride length decreased;gait speed decreased  -EJ     Bilateral Gait Deviations forward flexed posture  -EJ     Comment, (Gait/Stairs) slow pace, cues to ambulate closer to walker at times, no overt unsteadiness  -EJ               User Key  (r) = Recorded By, (t) = Taken By, (c) = Cosigned By      Initials Name Provider Type    EJ Jovana Alba, PT Physical Therapist                   Obj/Interventions       Row Name  05/06/25 1522          Range of Motion Comprehensive    General Range of Motion no range of motion deficits identified  -[facility] Name 05/06/25 1522          Strength Comprehensive (MMT)    Comment, General Manual Muscle Testing (MMT) Assessment generalized weakness  -[facility] Name 05/06/25 1522          Balance    Static Sitting Balance standby assist  -EJ     Dynamic Sitting Balance standby assist  -EJ     Position, Sitting Balance sitting edge of bed  -EJ     Static Standing Balance contact guard  -EJ     Dynamic Standing Balance contact guard  -EJ     Position/Device Used, Standing Balance walker, front-wheeled  -EJ               User Key  (r) = Recorded By, (t) = Taken By, (c) = Cosigned By      Initials Name Provider Type    EJ Jovana Alba, PT Physical Therapist                   Goals/Plan       Row Name 05/06/25 1527          Bed Mobility Goal 1 (PT)    Activity/Assistive Device (Bed Mobility Goal 1, PT) bed mobility activities, all  -EJ     [place] Level/Cues Needed (Bed Mobility Goal 1, PT) standby assist  -EJ     Time Frame (Bed Mobility Goal 1, PT) 1 week  -EJ       Row Name 05/06/25 1527          Transfer Goal 1 (PT)    Activity/Assistive Device (Transfer Goal 1, PT) transfers, all;walker, rolling  -EJ     [place] Level/Cues Needed (Transfer Goal 1, PT) standby assist  -EJ     Time Frame (Transfer Goal 1, PT) 1 week  -EJ       Row Name 05/06/25 1527          Gait Training Goal 1 (PT)    Activity/Assistive Device (Gait Training Goal 1, PT) gait (walking locomotion);walker, rolling  -EJ     [place] Level (Gait Training Goal 1, PT) standby assist  -EJ     Distance (Gait Training Goal 1, PT) 100  -EJ     Time Frame (Gait Training Goal 1, PT) 1 week  -EJ       Row Name 05/06/25 1527          Therapy Assessment/Plan (PT)    Planned Therapy Interventions (PT) balance training;bed mobility training;gait training;home exercise program;stretching;strengthening;stair training;ROM  (range of motion);patient/family education;transfer training  -EJ               User Key  (r) = Recorded By, (t) = Taken By, (c) = Cosigned By      Initials Name Provider Type    Jovana León, PT Physical Therapist                   Clinical Impression       Row Name 05/06/25 1523          Pain    Pretreatment Pain Rating 0/10 - no pain  -EJ     Posttreatment Pain Rating 0/10 - no pain  -EJ     Pre/Posttreatment Pain Comment reports headache  -       Row Name 05/06/25 1523          Plan of Care Review    Plan of Care Reviewed With patient;caregiver  -     Outcome Evaluation Pt seen for PT eval this afternoon. He was admitted to [facility] for back pain and RLE pain after syncopal episode/collapse. At baseline, pt lives w his spouse and uses Rwx for ambulation. He is independent w mobility and ADLs. He does have caregivers M-F in the mornings, but the caregiver primarily assists w pt's . Pt has hx of HTN, HLD, anxiety, back pain, and falls. Today, pt presents w generalized weakness and decreased activity tolerance, although he states he is close to his baseline. He is able to transition to EOB w min A. He stood w min A using Rwx w cues for proper hand placement. Pt able to ambulate approx 60 ft w CGA. He demos slow pace, forward posture w occasional cues to step in closer to walker. Pt returned to bed at end of session. He tolerated activity well. Pt plans home at [place]. He will continue to benefit from skilled PT to maximize safety, strength, and independence w mobility.  -       Row Name 05/06/25 1523          Therapy Assessment/Plan (PT)    Rehab Potential (PT) good  -EJ     Criteria for Skilled Interventions Met (PT) yes  -EJ     Therapy Frequency (PT) 5 times/wk  -EJ       Row Name 05/06/25 1523          Positioning and Restraints    Pre-Treatment Position in bed  -EJ     Post Treatment Position bed  -EJ     In Bed notified nsg;supine;call light within reach;encouraged to call for assist;exit alarm  on;fowlers;with family/caregiver  -EJ               User Key  (r) = Recorded By, (t) = Taken By, (c) = Cosigned By      Initials Name Provider Type    Jovana León, PT Physical Therapist                   Outcome Measures       Row Name 05/06/25 1529          How much help from another person do you currently need...    Turning from your back to your side while in flat bed without using bedrails? 4  -EJ     Moving from lying on back to sitting on the side of a flat bed without bedrails? 3  -EJ     Moving to and from a bed to a chair (including a wheelchair)? 3  -EJ     Standing up from a chair using your arms (e.g., wheelchair, bedside chair)? 3  -EJ     Climbing 3-5 steps with a railing? 3  -EJ     To walk in hospital room? 3  -EJ     AM-PAC 6 Clicks Score (PT) 19  -EJ       Row Name 05/06/25 1307          Modified Roma Scale    Modified Bradford Scale 4 - Moderately severe disability.  Unable to walk without assistance, and unable to attend to own bodily needs without assistance.  -KG       Row Name 05/06/25 1307          Functional Assessment    Outcome Measure Options AM-PAC 6 Clicks Daily Activity (OT);Modified Bradford  -KG               User Key  (r) = Recorded By, (t) = Taken By, (c) = Cosigned By      Initials Name Provider Type    Jovana León, PT Physical Therapist    Luca Hong, OT Occupational Therapist                                   PT Recommendation and Plan  Planned Therapy Interventions (PT): balance training, bed mobility training, gait training, home exercise program, stretching, strengthening, stair training, ROM (range of motion), patient/family education, transfer training  Outcome Evaluation: Pt seen for PT tristan this afternoon. He was admitted to [facility] for back pain and RLE pain after syncopal episode/collapse. At baseline, pt lives w his spouse and uses Rwx for ambulation. He is independent w mobility and ADLs. He does have caregivers M-F in the mornings, but the  caregiver primarily assists w pt's . Pt has hx of HTN, HLD, anxiety, back pain, and falls. Today, pt presents w generalized weakness and decreased activity tolerance, although he states he is close to his baseline. He is able to transition to EOB w min A. He stood w min A using Rwx w cues for proper hand placement. Pt able to ambulate approx 60 ft w CGA. He demos slow pace, forward posture w occasional cues to step in closer to walker. Pt returned to bed at end of session. He tolerated activity well. Pt plans home at [place]. He will continue to benefit from skilled PT to maximize safety, strength, and independence w mobility.     Time Calculation:         PT Charges       Row Name 05/06/25 1529             Time Calculation    Start Time 1440  -EJ      Stop Time 1506  -EJ      Time Calculation (min) 26 min  -EJ      PT Received On 05/06/25  -EJ      PT - Next Appointment 05/07/25  -EJ      PT Goal Re-Cert Due Date 05/13/25  -EJ         Time Calculation- PT    Total Timed Code Minutes- PT 20 minute(s)  -EJ                User Key  (r) = Recorded By, (t) = Taken By, (c) = Cosigned By      Initials Name Provider Type    Jovana León, PT Physical Therapist                  Therapy Charges for Today       Code Description Service Date Service Provider Modifiers Qty    33864291443  PT EVAL MOD COMPLEXITY 3 5/6/2025 Jovana Alba, PT GP 1    [id number]  PT THERAPEUTIC ACT EA 15 MIN 5/6/2025 Jovana Alba, PT GP 1            PT G-Codes  Outcome Measure Options: AM-PAC 6 Clicks Daily Activity (OT), Modified Roma  AM-PAC 6 Clicks Score (PT): 19  AM-PAC 6 Clicks Score (OT): 20  Modified Roma Scale: 4 - Moderately severe disability.  Unable to walk without assistance, and unable to attend to own bodily needs without assistance.  PT Discharge Summary  Anticipated Discharge Disposition (PT): home with assist, home with home health    Jovana Alba PT  5/6/2025

## 2025-05-07 LAB
ALBUMIN SERPL-MCNC: 2.7 G/DL (ref 3.5–5.2)
ALBUMIN/GLOB SERPL: 1.3 G/DL
ALP SERPL-CCNC: 78 U/L (ref 39–117)
ALT SERPL W P-5'-P-CCNC: 19 U/L (ref 1–41)
ANION GAP SERPL CALCULATED.3IONS-SCNC: 7.4 MMOL/L (ref 5–15)
AST SERPL-CCNC: 33 U/L (ref 1–40)
BILIRUB SERPL-MCNC: 0.5 MG/DL (ref 0–1.2)
BUN SERPL-MCNC: 19 MG/DL (ref 8–23)
BUN/CREAT SERPL: 27.1 (ref 7–25)
CALCIUM SPEC-SCNC: 8 MG/DL (ref 8.6–10.5)
CHLORIDE SERPL-SCNC: 103 MMOL/L (ref 98–107)
CO2 SERPL-SCNC: 27.6 MMOL/L (ref 22–29)
CREAT SERPL-MCNC: 0.7 MG/DL (ref 0.76–1.27)
DEPRECATED RDW RBC AUTO: 52.1 FL (ref 37–54)
EGFRCR SERPLBLD CKD-EPI 2021: 89.7 ML/MIN/1.73
ERYTHROCYTE [DISTWIDTH] IN BLOOD BY AUTOMATED COUNT: 12.8 % (ref 12.3–15.4)
FOLATE SERPL-MCNC: 8.79 NG/ML (ref 4.78–24.2)
GLOBULIN UR ELPH-MCNC: 2.1 GM/DL
GLUCOSE SERPL-MCNC: 86 MG/DL (ref 65–99)
HCT VFR BLD AUTO: 37.4 % (ref 37.5–51)
HGB BLD-MCNC: 12.7 G/DL (ref 13–17.7)
MAGNESIUM SERPL-MCNC: 1.9 MG/DL (ref 1.6–2.4)
MCH RBC QN AUTO: 37.8 PG (ref 26.6–33)
MCHC RBC AUTO-ENTMCNC: 34 G/DL (ref 31.5–35.7)
MCV RBC AUTO: 111.3 FL (ref 79–97)
PHOSPHATE SERPL-MCNC: 2.8 MG/DL (ref 2.5–4.5)
PLATELET # BLD AUTO: 150 10*3/MM3 (ref 140–450)
PMV BLD AUTO: 10.7 FL (ref 6–12)
POTASSIUM SERPL-SCNC: 4.2 MMOL/L (ref 3.5–5.2)
PROT SERPL-MCNC: 4.8 G/DL (ref 6–8.5)
QT INTERVAL: 452 MS
QTC INTERVAL: 479 MS
RBC # BLD AUTO: 3.36 10*6/MM3 (ref 4.14–5.8)
SODIUM SERPL-SCNC: 138 MMOL/L (ref 136–145)
VIT B12 BLD-MCNC: 248 PG/ML (ref 211–946)
WBC NRBC COR # BLD AUTO: 4.45 10*3/MM3 (ref 3.4–10.8)

## 2025-05-07 PROCEDURE — 85027 COMPLETE CBC AUTOMATED: CPT | Performed by: STUDENT IN AN ORGANIZED HEALTH CARE EDUCATION/TRAINING PROGRAM

## 2025-05-07 PROCEDURE — 25010000002 CYANOCOBALAMIN PER 1000 MCG: Performed by: STUDENT IN AN ORGANIZED HEALTH CARE EDUCATION/TRAINING PROGRAM

## 2025-05-07 PROCEDURE — 80053 COMPREHEN METABOLIC PANEL: CPT | Performed by: STUDENT IN AN ORGANIZED HEALTH CARE EDUCATION/TRAINING PROGRAM

## 2025-05-07 PROCEDURE — 93005 ELECTROCARDIOGRAM TRACING: CPT | Performed by: STUDENT IN AN ORGANIZED HEALTH CARE EDUCATION/TRAINING PROGRAM

## 2025-05-07 PROCEDURE — 82607 VITAMIN B-12: CPT | Performed by: STUDENT IN AN ORGANIZED HEALTH CARE EDUCATION/TRAINING PROGRAM

## 2025-05-07 PROCEDURE — 83735 ASSAY OF MAGNESIUM: CPT | Performed by: STUDENT IN AN ORGANIZED HEALTH CARE EDUCATION/TRAINING PROGRAM

## 2025-05-07 PROCEDURE — 93010 ELECTROCARDIOGRAM REPORT: CPT | Performed by: INTERNAL MEDICINE

## 2025-05-07 PROCEDURE — G0378 HOSPITAL OBSERVATION PER HR: HCPCS

## 2025-05-07 PROCEDURE — 84100 ASSAY OF PHOSPHORUS: CPT | Performed by: STUDENT IN AN ORGANIZED HEALTH CARE EDUCATION/TRAINING PROGRAM

## 2025-05-07 PROCEDURE — 25010000002 THIAMINE PER 100 MG: Performed by: STUDENT IN AN ORGANIZED HEALTH CARE EDUCATION/TRAINING PROGRAM

## 2025-05-07 PROCEDURE — 82746 ASSAY OF FOLIC ACID SERUM: CPT | Performed by: STUDENT IN AN ORGANIZED HEALTH CARE EDUCATION/TRAINING PROGRAM

## 2025-05-07 RX ORDER — METOPROLOL TARTRATE 25 MG/1
12.5 TABLET, FILM COATED ORAL ONCE
Status: DISCONTINUED | OUTPATIENT
Start: 2025-05-07 | End: 2025-05-07

## 2025-05-07 RX ORDER — CYANOCOBALAMIN 1000 UG/ML
1000 INJECTION, SOLUTION INTRAMUSCULAR; SUBCUTANEOUS DAILY
Status: DISCONTINUED | OUTPATIENT
Start: 2025-05-07 | End: 2025-05-09 | Stop reason: HOSPADM

## 2025-05-07 RX ORDER — LISINOPRIL 10 MG/1
10 TABLET ORAL
Status: DISCONTINUED | OUTPATIENT
Start: 2025-05-07 | End: 2025-05-08

## 2025-05-07 RX ORDER — METOPROLOL TARTRATE 25 MG/1
12.5 TABLET, FILM COATED ORAL EVERY 12 HOURS SCHEDULED
Status: DISCONTINUED | OUTPATIENT
Start: 2025-05-07 | End: 2025-05-08

## 2025-05-07 RX ORDER — CLONIDINE HYDROCHLORIDE 0.1 MG/1
0.1 TABLET ORAL EVERY 6 HOURS PRN
Status: DISCONTINUED | OUTPATIENT
Start: 2025-05-07 | End: 2025-05-09 | Stop reason: HOSPADM

## 2025-05-07 RX ADMIN — LEVOTHYROXINE SODIUM 125 MCG: 125 TABLET ORAL at 09:03

## 2025-05-07 RX ADMIN — FOLIC ACID 1 MG: 1 TABLET ORAL at 09:03

## 2025-05-07 RX ADMIN — ACETAMINOPHEN 1000 MG: 500 TABLET, FILM COATED ORAL at 05:56

## 2025-05-07 RX ADMIN — THIAMINE HYDROCHLORIDE 200 MG: 100 INJECTION, SOLUTION INTRAMUSCULAR; INTRAVENOUS at 21:48

## 2025-05-07 RX ADMIN — ACETAMINOPHEN 1000 MG: 500 TABLET, FILM COATED ORAL at 11:54

## 2025-05-07 RX ADMIN — CYANOCOBALAMIN 1000 MCG: 1000 INJECTION, SOLUTION INTRAMUSCULAR; SUBCUTANEOUS at 13:36

## 2025-05-07 RX ADMIN — SERTRALINE HYDROCHLORIDE 50 MG: 50 TABLET, FILM COATED ORAL at 09:03

## 2025-05-07 RX ADMIN — LISINOPRIL 10 MG: 10 TABLET ORAL at 14:00

## 2025-05-07 RX ADMIN — CLONIDINE HYDROCHLORIDE 0.1 MG: 0.1 TABLET ORAL at 02:10

## 2025-05-07 RX ADMIN — ACETAMINOPHEN 1000 MG: 500 TABLET, FILM COATED ORAL at 20:22

## 2025-05-07 RX ADMIN — THIAMINE HYDROCHLORIDE 200 MG: 100 INJECTION, SOLUTION INTRAMUSCULAR; INTRAVENOUS at 05:56

## 2025-05-07 RX ADMIN — METOPROLOL TARTRATE 12.5 MG: 25 TABLET, FILM COATED ORAL at 20:22

## 2025-05-07 RX ADMIN — THIAMINE HYDROCHLORIDE 200 MG: 100 INJECTION, SOLUTION INTRAMUSCULAR; INTRAVENOUS at 14:00

## 2025-05-07 NOTE — NURSING NOTE
Received call back from Shyanne Green was told she will review the EKG/ VTACH run and place orders if she believes its a real run.

## 2025-05-07 NOTE — NURSING NOTE
Received call from [facility] stated patient had 22 beat vtach. EKG done and charted and /87.Cardiology paged.

## 2025-05-07 NOTE — PROGRESS NOTES
Name: Darwin Sparks ADMIT: 2025   : 1940  PCP: Epley Darwin APRDEMARCUS    MRN: [id number] LOS: 0 days   AGE/SEX: 84 y.o. male  ROOM: [facility]     Subjective   Subjective   Patient seen this morning.  No acute events overnight.   Reports feeling better, headache improved.  Blood pressure elevated, patient reports previously was on atenolol and lisinopril but will discontinue due to low blood pressure readings.    Review of Systems   As above  Objective   Objective   Vital Signs  Temp:  [98.1 °F (36.7 °C)-98.6 °F (37 °C)] 98.4 °F (36.9 °C)  Heart Rate:  [68-80] 71  Resp:  [16-18] 16  BP: (121-183)/() 130/82  SpO2:  [93 %-98 %] 97 %  on   ;   Device (Oxygen Therapy): room air  Body mass index is 27.45 kg/m².  Physical Exam    General: Alert, lying in bed, not in distress,  HEENT: Normocephalic, atraumatic  CV: Regular rate and rhythm, no murmurs rubs or gallops  Lungs: Clear to auscultation bilaterally, no crackles or wheezes  Abdomen: Soft, nontender, nondistended  Extremities: No significant peripheral edema , no cyanosis     Results Review     I reviewed the patient's new clinical results.  Results from last 7 days   Lab Units 25  0608 25  0525  1057   WBC 10*3/mm3 4.45 5.70 8.04   HEMOGLOBIN g/dL 12.7* 13.7 15.3   PLATELETS 10*3/mm3 150 169 196     Results from last 7 days   Lab Units 25  0608 25  0525 25  1057   SODIUM mmol/L 138 141 140   POTASSIUM mmol/L 4.2 4.2 4.4   CHLORIDE mmol/L 103 104 101   CO2 mmol/L 27.6 26.2 24.0   BUN mg/dL 19 19 17   CREATININE mg/dL 0.70* 0.81 0.98   GLUCOSE mg/dL 86 78 91   Estimated Creatinine Clearance: 87.8 mL/min (A) (by C-G formula based on SCr of 0.7 mg/dL (L)).  Results from last 7 days   Lab Units 25  0608 25  1057   ALBUMIN g/dL 2.7* 3.4*   BILIRUBIN mg/dL 0.5 1.1   ALK PHOS U/L 78 112   AST (SGOT) U/L 33 42*   ALT (SGPT) U/L 19 27     Results from last 7 days   Lab Units 25  0608 25  0525  "05/05/25  1057   CALCIUM mg/dL 8.0* 8.4* 9.1   ALBUMIN g/dL 2.7*  --  3.4*   MAGNESIUM mg/dL 1.9 2.0  --    PHOSPHORUS mg/dL 2.8 3.2  --        COVID19   Date Value Ref Range Status   10/06/2021 Not Detected Not Detected - Ref. Range Final   08/18/2021 Not Detected Not Detected - Ref. Range Final     No results found for: \"HGBA1C\", \"POCGLU\"        Adult Transthoracic Echo Complete W/ Cont if Necessary Per Protocol    Left ventricular systolic function is normal. Calculated left   ventricular EF = 53%    Left ventricular diastolic function is consistent with (grade I)   impaired relaxation.    Estimated right ventricular systolic pressure from tricuspid   regurgitation is normal (<35 mmHg).  CT Head Without Contrast, CT Cervical Spine Without Contrast, CT Facial Bones Without Contrast, CT Lumbar Spine Without Contrast  Narrative: EMERGENCY CT SCAN OF THE HEAD, FACE, CERVICAL SPINE AND LUMBAR SPINE  WITHOUT CONTRAST 05/05/2025     CLINICAL HISTORY: This is an 84-year-old male patient who fell and had  head trauma and facial trauma, and also complains of neck and low back  pain.     HEAD CT TECHNIQUE: Spiral CT images were obtained from the base of the  skull to the vertex without intravenous contrast. The images were  reformatted and are submitted in 3 mm thick axial, sagittal and coronal  CT sections with brain algorithm and 2 mm thick axial CT sections with  high-resolution bone algorithm.     This is correlated to an MRI of the brain on 03/09/2025 and a head CT on  03/09/2025.     FINDINGS: There is some mild patchy low-density in the periventricular  white matter consistent with mild small vessel disease. The remainder of  the brain parenchyma is normal in attenuation. There is diffuse cerebral  atrophy. The lateral and third ventricles are mildly prominent in size,  which is felt to be on the basis of central volume loss or atrophy. I  see no focal mass effect. There is no midline shift. No extra-axial  fluid " collections are identified. There is no evidence of acute  intracranial hemorrhage. No acute skull fracture is identified. The  calvarium and the skull base are normal in appearance. The paranasal  sinuses are clear. There are lens implants in the globes from previous  bilateral cataract surgery. Otherwise, the orbits are unremarkable.  There are some mild osteoarthritic changes in the temporomandibular  joints bilaterally.     Impression:    1. No acute intracranial abnormality is identified with no change when  compared to the patient's prior head CT and MRI of the brain on  03/09/2025.     2. There is mild small vessel disease in the cerebral white matter.  There is mild diffuse cerebral atrophy and the lateral and third  ventricles are prominent in size, felt to be on the basis of central  volume loss or atrophy. There are lens implants in the globes from  previous bilateral cataract surgery. The remainder of the head CT is  within normal limits with no acute skull fracture or intracranial  hemorrhage identified.        FACIAL CT TECHNIQUE: Spiral CT images were obtained through the facial  bones in the axial imaging plane. The images were reformatted and are  submitted in 2 mm thick axial, sagittal and coronal CT sections with  soft tissue algorithm and 1 mm thick axial, sagittal and coronal CT  sections with high-resolution bone algorithm.     This is correlated to a prior facial CT on 03/09/2025.     FINDINGS: The paranasal sinuses are clear. There are lens implants in  the globes from previous bilateral cataract surgery. Otherwise, the  orbits are normal in appearance. There are mild osteoarthritic changes  in the temporomandibular joints bilaterally with minimal marginal  spurring off the mandibular heads. The patient is edentulous. No  discernible acute fracture is seen in the facial bones.     IMPRESSION:  No acute facial fracture is identified. The patient is  edentulous and also has bilateral  intraocular lens implants from  previous bilateral cataract surgery. Otherwise, the facial CT is  unremarkable.        CERVICAL SPINE CT TECHNIQUE: Spiral CT images were obtained from the  skull base down to the T2 thoracic level. The images were reformatted  and are submitted in 2 mm thick axial and sagittal CT sections with soft  tissue algorithm and 1 mm thick axial, sagittal and coronal CT sections  with high-resolution bone algorithm.     This is correlated to a prior cervical spine CT from [facility] on 03/09/2025.      FINDINGS: The atlantooccipital articulation is within normal limits.     At C1-2, there are arthritic changes at the atlantodental interval with  narrowing of the interval and marginal spurring off the superior aspect  of the anterior ring of C1 and the odontoid and there is thickening and  calcification of the transverse ligament along the back of the odontoid.  Otherwise, the C1-2 level is normal in appearance.     At C2-3, the posterior disc margin, facets and uncovertebral joints are  within normal limits with no canal or foraminal narrowing.     At C3-4, there is mild-to-moderate bilateral facet overgrowth. There is  severe disc space narrowing and there are degenerative disc and endplate  changes and a 2 mm retrolisthesis of C3 on C4, and a mild diffuse  posterior disc osteophyte complex. There is minimal, if any, canal  narrowing and there is some minimal spurring into the foramina with  minimal foraminal narrowing.     At C4-5, there is mild-to-moderate left and there is moderate right  facet overgrowth.  There is a 3 mm degenerative anterolisthesis of C4 on  C5. There is mild canal narrowing.  There is some mild right  uncovertebral joint hypertrophy and there is mild-to-moderate right, but  no left foraminal narrowing.     At C5-6, there is moderate right facet overgrowth and minimal left facet  overgrowth. There is severe disc space narrowing and there  are  degenerative disc and endplate changes.  There is a diffuse posterior  disc osteophyte complex that results in only mild canal narrowing, and  some mild uncovertebral joint hypertrophy right greater than left.   There is minimal left and there is moderate-to-severe right bony  foraminal narrowing.     At C6-7, the facets are normal. There is moderate disc space narrowing.   There are degenerative endplate changes, a mild diffuse posterior disc  osteophyte complex and mild canal narrowing. There is some uncovertebral  joint spurring into the foramina, and there is mild-to-moderate  bilateral bony foraminal narrowing.     At C7-T1, there is moderate bilateral facet overgrowth. There is a 3-4  mm degenerative anterolisthesis of C7 on T1.  There is no canal  narrowing.  There is, at least, mild-to-moderate bilateral bony  foraminal narrowing.     No acute fracture is seen in the cervical spine.     IMPRESSION:  No acute fracture is seen in the cervical spine with no  significant change when compared to a recent cervical spine CT on  03/09/2025. There is cervical spondylosis as described in great detail  above.        LUMBAR SPINE CT TECHNIQUE: Spiral CT images were obtained from the  inferior body of the T10 thoracic level down to the S2-3 sacral level  and images were reformatted and are submitted in 3 mm thick axial CT  sections with soft tissue algorithm, and 1 mm thick axial CT sections  with high-resolution bone algorithm, and 2 mm thick sagittal and coronal  reconstructions were performed and submitted in both bone and soft  tissue algorithm.     There are no prior lumbar spine CTs or MRIs for comparison. This is  correlated to a remote prior CT scan of the abdomen and pelvis on  03/17/2021.     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 There is severe disc  space narrowing.  There are degenerative disc and endplate changes  and  vacuum disc phenomenon at all levels from L1 to S1.     At T10-11, there is minimal posterior spurring and minimal left facet  overgrowth. There is minimal, if any, canal narrowing.  There is mild  left and essentially no right foraminal narrowing.     At T11-12, the posterior disc margin and facets are normal with no canal  or foraminal narrowing.     At T12-L1, there is some vacuum disc phenomenon. The posterior disc  margin and facets are normal, and there is no canal or foraminal  narrowing.     At L1-2, there is severe disc space narrowing.  There is vacuum disc  phenomenon and degenerative endplate changes, and there is a 4 mm  retrolisthesis of L1 with respect to L2, and a diffuse posterior disc  osteophyte complex.  The facets are normal.  There is mild-to-moderate  canal narrowing.  There is some spurring and air contained within  bulging or protruding disc material into the foramen.  There is mild  left and there is moderate right foraminal narrowing that could affect  the exiting right L2 nerve root.      At L2-3, there is prominent vacuum disc phenomenon, a 2--3 mm  retrolisthesis of L2 with respect to L3, and a diffuse posterior disc  osteophyte complex.  The facets are normal.  There is prominent  posterior epidural fat and there is at least mild-to-moderate up to  moderate generalized narrowing of the thecal sac.  There is mild left  foraminal narrowing, loss of vertical height of the right foramen,  eccentric spurring and bulging disc material into the inferior right  foramen, and there is moderate-to-severe right foraminal narrowing.     At L3-4, there is disc space narrowing, vacuum disc phenomenon,  degenerative endplate changes, 2-3 mm retrolisthesis of L3 with respect  to L4, diffuse posterior disc osteophyte complex, mild-to-moderate  right, minimal left facet overgrowth and ligamentum flavum thickening,  and there is prominent posterior epidural fat, and the combination of  all of the  above findings contributes to moderate-to-severe narrowing of  the thecal sac.  There is mild left, and there is moderate-to-severe  right foraminal narrowing.     At L4-5, there is moderate bilateral facet overgrowth.  There is a 2 mm  anterolisthesis of L4 with respect to L5, a diffuse posterior disc  bulge, and there is moderate narrowing of the thecal sac and some  narrowing of the lateral recesses.  There is synovial thickening off the  facets extending into the posterior foramina, spurring and uncovered  bulging disc material into the inferior aspect of the foramina, and  there is moderate-to-severe bilateral foraminal narrowing that could  affect the exiting L4 nerve roots.     At L5-S1, there is moderate bilateral facet overgrowth, disc space  narrowing, degenerative endplate changes, and posterior endplate  spurring.  There is no canal or lateral recess narrowing.  There is  mild-to-moderate right and there is moderate left foraminal narrowing.      No discernible acute fracture is seen in the lumbar spine.     IMPRESSION:  No acute fracture is seen in the lumbar spine. 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      Radiation dose reduction techniques were utilized, including automated  exposure control and exposure modulation based on body size.        This report was finalized on 5/6/2025 6:47 AM by Dr. Victor M Stiles M.D on  Workstation: [id number]       Scheduled Medications  acetaminophen, 1,000 mg, Oral, Q8H  cyanocobalamin, 1,000 mcg, Intramuscular, Daily  folic acid, 1 mg, Oral, Daily  levothyroxine, 125 mcg, Oral, Daily  lisinopril, 10 mg, Oral, Q24H  sertraline, 50 mg, Oral, Daily  thiamine (B-1) IV, 200 mg, Intravenous,  Q8H   Followed by  [START ON 5/12/2025] thiamine, 100 mg, Oral, Daily    Infusions   Diet  Diet: Vegetarian; Vegan (No animal products); Fluid Consistency: Thin (IDDSI 0)    I have personally reviewed     [x]  Laboratory   [x]  Microbiology   [x]  Radiology   [x]  EKG/Telemetry  []  Cardiology/Vascular   []  Pathology    []  Records       Assessment/Plan     Active Hospital Problems    Diagnosis  POA    **Syncope [R55]  Yes    Anxiety [F41.9]  Yes    Primary hypertension [I10]  Yes    Adult hypothyroidism [E03.9]  Yes      Resolved Hospital Problems   No resolved problems to display.       Patient is a 84 y.o. male with a PMH significant for hypertension, hyperlipidemia, insomnia, anxiety, chronic low back pain, lumbar facet arthropathy, scoliosis, multiple falls, vertigo who presents to the ED c/o lower back pain following a fall.  Patient states that he is unsure how he fell, but believes he hit a coffee table.      Unwitnessed fall, syncope?  -Recurrent falls  - Patient not sure if he passed out, reports he just went down.  Was weak and could not get up.  - CT scan of the head with no acute findings.  - Orthostatic vitals lying /72 with heart rate of 103, standing BP initial 101/77 with heart rate of 147, and repeat was 146/84 with heart rate of 141  - Echocardiogram with no acute findings  - Cardiology following.  Likely will need Zio patch at discharge      Hypertension  V. tach?  -Per chart review patient with V. tach cris, however upon review it was not felt to be V. tach per RN.  EKG is sinus rhythm APCs,, heart rate 60s  -Labs electrolyte stable this morning  - Patient was previously on lisinopril and atenolol, reports was discontinued by provider due to low BP readings   - Initiated lisinopril 10 mg  daily, metoprolol 12.5 twice daily  - Cardiology following      Alcohol use disorder  -Drinks 3-4 glasses of wine a night   - Contributing to falls as above  - Initiated on CIWA protocol,  multivitamins  - Access consult  - Discussed with patient that alcohol use contributing to falls and encouraged alcohol cessation, patient reports he was previously told the same by  providers,    Hypothyroidism  -TSH elevated at 8.0 but actually improved compared to prior TSH of 22.8 on 03/9/25, free T4 and free T4 normal  - Continue outpatient levothyroxine    Elevated MCV  - Hemoglobin normal.  MCV significantly elevated at 109.7.  Folic acid was low at 3.27 on 03/9/25.  Repeat cholic acid normal.  Continue folic acid supplement  - B12 low end of normal, initiated on B12 injections        SCDs for DVT prophylaxis.  Full code.  Discussed with patient.  Expected Discharge Date: 5/7/2025; Expected Discharge Time:        Copied text in this note has been reviewed and is accurate as of 05/07/25.         Dictated utilizing Dragon dictation        Orquidea Haas MD  [facility]  05/07/25  17:54 EDT

## 2025-05-08 LAB
ALBUMIN SERPL-MCNC: 2.7 G/DL (ref 3.5–5.2)
ALBUMIN/GLOB SERPL: 1.1 G/DL
ALP SERPL-CCNC: 85 U/L (ref 39–117)
ALT SERPL W P-5'-P-CCNC: 20 U/L (ref 1–41)
ANION GAP SERPL CALCULATED.3IONS-SCNC: 10.6 MMOL/L (ref 5–15)
AST SERPL-CCNC: 31 U/L (ref 1–40)
BILIRUB SERPL-MCNC: 0.4 MG/DL (ref 0–1.2)
BUN SERPL-MCNC: 14 MG/DL (ref 8–23)
BUN/CREAT SERPL: 16.7 (ref 7–25)
CALCIUM SPEC-SCNC: 8.2 MG/DL (ref 8.6–10.5)
CHLORIDE SERPL-SCNC: 101 MMOL/L (ref 98–107)
CO2 SERPL-SCNC: 25.4 MMOL/L (ref 22–29)
CREAT SERPL-MCNC: 0.84 MG/DL (ref 0.76–1.27)
DEPRECATED RDW RBC AUTO: 50.2 FL (ref 37–54)
EGFRCR SERPLBLD CKD-EPI 2021: 84.9 ML/MIN/1.73
ERYTHROCYTE [DISTWIDTH] IN BLOOD BY AUTOMATED COUNT: 12.7 % (ref 12.3–15.4)
GLOBULIN UR ELPH-MCNC: 2.4 GM/DL
GLUCOSE SERPL-MCNC: 141 MG/DL (ref 65–99)
HCT VFR BLD AUTO: 38.1 % (ref 37.5–51)
HGB BLD-MCNC: 13.1 G/DL (ref 13–17.7)
MAGNESIUM SERPL-MCNC: 2 MG/DL (ref 1.6–2.4)
MCH RBC QN AUTO: 37.5 PG (ref 26.6–33)
MCHC RBC AUTO-ENTMCNC: 34.4 G/DL (ref 31.5–35.7)
MCV RBC AUTO: 109.2 FL (ref 79–97)
PHOSPHATE SERPL-MCNC: 2.3 MG/DL (ref 2.5–4.5)
PLATELET # BLD AUTO: 177 10*3/MM3 (ref 140–450)
PMV BLD AUTO: 10.3 FL (ref 6–12)
POTASSIUM SERPL-SCNC: 3.4 MMOL/L (ref 3.5–5.2)
PROT SERPL-MCNC: 5.1 G/DL (ref 6–8.5)
RBC # BLD AUTO: 3.49 10*6/MM3 (ref 4.14–5.8)
SODIUM SERPL-SCNC: 137 MMOL/L (ref 136–145)
WBC NRBC COR # BLD AUTO: 4.43 10*3/MM3 (ref 3.4–10.8)

## 2025-05-08 PROCEDURE — 99232 SBSQ HOSP IP/OBS MODERATE 35: CPT | Performed by: NURSE PRACTITIONER

## 2025-05-08 PROCEDURE — 25010000002 DIPHENHYDRAMINE PER 50 MG: Performed by: STUDENT IN AN ORGANIZED HEALTH CARE EDUCATION/TRAINING PROGRAM

## 2025-05-08 PROCEDURE — 80053 COMPREHEN METABOLIC PANEL: CPT | Performed by: STUDENT IN AN ORGANIZED HEALTH CARE EDUCATION/TRAINING PROGRAM

## 2025-05-08 PROCEDURE — 25010000002 PROCHLORPERAZINE 10 MG/2ML SOLUTION: Performed by: STUDENT IN AN ORGANIZED HEALTH CARE EDUCATION/TRAINING PROGRAM

## 2025-05-08 PROCEDURE — 25010000002 THIAMINE HCL 200 MG/2ML SOLUTION: Performed by: STUDENT IN AN ORGANIZED HEALTH CARE EDUCATION/TRAINING PROGRAM

## 2025-05-08 PROCEDURE — 25810000003 SODIUM CHLORIDE 0.9 % SOLUTION: Performed by: STUDENT IN AN ORGANIZED HEALTH CARE EDUCATION/TRAINING PROGRAM

## 2025-05-08 PROCEDURE — 25010000002 CYANOCOBALAMIN PER 1000 MCG: Performed by: STUDENT IN AN ORGANIZED HEALTH CARE EDUCATION/TRAINING PROGRAM

## 2025-05-08 PROCEDURE — 83735 ASSAY OF MAGNESIUM: CPT | Performed by: STUDENT IN AN ORGANIZED HEALTH CARE EDUCATION/TRAINING PROGRAM

## 2025-05-08 PROCEDURE — 85027 COMPLETE CBC AUTOMATED: CPT | Performed by: STUDENT IN AN ORGANIZED HEALTH CARE EDUCATION/TRAINING PROGRAM

## 2025-05-08 PROCEDURE — 84100 ASSAY OF PHOSPHORUS: CPT | Performed by: STUDENT IN AN ORGANIZED HEALTH CARE EDUCATION/TRAINING PROGRAM

## 2025-05-08 RX ORDER — LISINOPRIL 10 MG/1
10 TABLET ORAL
Status: DISCONTINUED | OUTPATIENT
Start: 2025-05-09 | End: 2025-05-09 | Stop reason: HOSPADM

## 2025-05-08 RX ORDER — ACETAMINOPHEN 500 MG
1000 TABLET ORAL EVERY 8 HOURS PRN
Status: DISCONTINUED | OUTPATIENT
Start: 2025-05-08 | End: 2025-05-09

## 2025-05-08 RX ORDER — POTASSIUM CHLORIDE 1500 MG/1
40 TABLET, EXTENDED RELEASE ORAL EVERY 4 HOURS
Status: COMPLETED | OUTPATIENT
Start: 2025-05-08 | End: 2025-05-08

## 2025-05-08 RX ORDER — SODIUM CHLORIDE 9 MG/ML
100 INJECTION, SOLUTION INTRAVENOUS CONTINUOUS
Status: DISCONTINUED | OUTPATIENT
Start: 2025-05-08 | End: 2025-05-09

## 2025-05-08 RX ORDER — LISINOPRIL 20 MG/1
20 TABLET ORAL
Status: DISCONTINUED | OUTPATIENT
Start: 2025-05-08 | End: 2025-05-08

## 2025-05-08 RX ORDER — PROCHLORPERAZINE EDISYLATE 5 MG/ML
5 INJECTION INTRAMUSCULAR; INTRAVENOUS ONCE
Status: COMPLETED | OUTPATIENT
Start: 2025-05-08 | End: 2025-05-08

## 2025-05-08 RX ORDER — DIPHENHYDRAMINE HYDROCHLORIDE 50 MG/ML
12.5 INJECTION, SOLUTION INTRAMUSCULAR; INTRAVENOUS ONCE
Status: COMPLETED | OUTPATIENT
Start: 2025-05-08 | End: 2025-05-08

## 2025-05-08 RX ADMIN — CLONIDINE HYDROCHLORIDE 0.1 MG: 0.1 TABLET ORAL at 03:56

## 2025-05-08 RX ADMIN — DIPHENHYDRAMINE HYDROCHLORIDE 12.5 MG: 50 INJECTION, SOLUTION INTRAMUSCULAR; INTRAVENOUS at 08:24

## 2025-05-08 RX ADMIN — POTASSIUM CHLORIDE 40 MEQ: 1500 TABLET, EXTENDED RELEASE ORAL at 11:59

## 2025-05-08 RX ADMIN — THIAMINE HYDROCHLORIDE 200 MG: 100 INJECTION, SOLUTION INTRAMUSCULAR; INTRAVENOUS at 14:55

## 2025-05-08 RX ADMIN — SERTRALINE HYDROCHLORIDE 50 MG: 50 TABLET, FILM COATED ORAL at 08:27

## 2025-05-08 RX ADMIN — FOLIC ACID 1 MG: 1 TABLET ORAL at 08:19

## 2025-05-08 RX ADMIN — LEVOTHYROXINE SODIUM 125 MCG: 125 TABLET ORAL at 08:19

## 2025-05-08 RX ADMIN — PROCHLORPERAZINE EDISYLATE 5 MG: 5 INJECTION, SOLUTION INTRAMUSCULAR; INTRAVENOUS at 08:23

## 2025-05-08 RX ADMIN — DIBASIC SODIUM PHOSPHATE, MONOBASIC POTASSIUM PHOSPHATE AND MONOBASIC SODIUM PHOSPHATE 2 TABLET: 852; 155; 130 TABLET ORAL at 12:00

## 2025-05-08 RX ADMIN — METOPROLOL TARTRATE 12.5 MG: 25 TABLET, FILM COATED ORAL at 08:19

## 2025-05-08 RX ADMIN — THIAMINE HYDROCHLORIDE 200 MG: 100 INJECTION, SOLUTION INTRAMUSCULAR; INTRAVENOUS at 06:32

## 2025-05-08 RX ADMIN — CYANOCOBALAMIN 1000 MCG: 1000 INJECTION, SOLUTION INTRAMUSCULAR; SUBCUTANEOUS at 08:25

## 2025-05-08 RX ADMIN — THIAMINE HYDROCHLORIDE 200 MG: 100 INJECTION, SOLUTION INTRAMUSCULAR; INTRAVENOUS at 22:22

## 2025-05-08 RX ADMIN — LISINOPRIL 20 MG: 20 TABLET ORAL at 08:18

## 2025-05-08 RX ADMIN — POTASSIUM CHLORIDE 40 MEQ: 1500 TABLET, EXTENDED RELEASE ORAL at 14:55

## 2025-05-08 RX ADMIN — SODIUM CHLORIDE 100 ML/HR: 9 INJECTION, SOLUTION INTRAVENOUS at 14:58

## 2025-05-08 RX ADMIN — ACETAMINOPHEN 1000 MG: 500 TABLET, FILM COATED ORAL at 03:56

## 2025-05-08 NOTE — PROGRESS NOTES
Name: Darwin Sparks ADMIT: 2025   : 1940  PCP: Epley MICHAEL Granados    MRN: [id number] LOS: 0 days   AGE/SEX: 84 y.o. male  ROOM: [facility]     Subjective   Subjective   Patient seen this morning.  No acute events overnight.  Continues to complain of headache.,  No nausea no vomiting, no focal weakness or vision changes.  BP labile.      Review of Systems   As above  Objective   Objective   Vital Signs  Temp:  [97.5 °F (36.4 °C)-98.4 °F (36.9 °C)] 97.9 °F (36.6 °C)  Heart Rate:  [51-71] 51  Resp:  [16-18] 16  BP: ()/() 96/61  SpO2:  [93 %-98 %] 94 %  on   ;   Device (Oxygen Therapy): room air  Body mass index is 25.95 kg/m².  Physical Exam    General: Alert, lying in bed, not in distress,  HEENT: Normocephalic, atraumatic  CV: Bradycardic, regular from  Lungs: Clear to auscultation bilaterally, no crackles or wheezes  Abdomen: Soft, nontender, nondistended  Extremities: No significant peripheral edema , no cyanosis     Results Review     I reviewed the patient's new clinical results.  Results from last 7 days   Lab Units 25  0811 25  0625  1057   WBC 10*3/mm3 4.43 4.45 5.70 8.04   HEMOGLOBIN g/dL 13.1 12.7* 13.7 15.3   PLATELETS 10*3/mm3 177 150 169 196     Results from last 7 days   Lab Units 25  0811 25  0608 25  0525  1057   SODIUM mmol/L 137 138 141 140   POTASSIUM mmol/L 3.4* 4.2 4.2 4.4   CHLORIDE mmol/L 101 103 104 101   CO2 mmol/L 25.4 27.6 26.2 24.0   BUN mg/dL 14 19 19 17   CREATININE mg/dL 0.84 0.70* 0.81 0.98   GLUCOSE mg/dL 141* 86 78 91   Estimated Creatinine Clearance: 69.1 mL/min (by C-G formula based on SCr of 0.84 mg/dL).  Results from last 7 days   Lab Units 25  0811 25  0608 25  1057   ALBUMIN g/dL 2.7* 2.7* 3.4*   BILIRUBIN mg/dL 0.4 0.5 1.1   ALK PHOS U/L 85 78 112   AST (SGOT) U/L 31 33 42*   ALT (SGPT) U/L 20 19 27     Results from last 7 days   Lab Units 25  0811 25  0608  "05/06/25  0525 05/05/25  1057   CALCIUM mg/dL 8.2* 8.0* 8.4* 9.1   ALBUMIN g/dL 2.7* 2.7*  --  3.4*   MAGNESIUM mg/dL 2.0 1.9 2.0  --    PHOSPHORUS mg/dL 2.3* 2.8 3.2  --        COVID19   Date Value Ref Range Status   10/06/2021 Not Detected Not Detected - Ref. Range Final   08/18/2021 Not Detected Not Detected - Ref. Range Final     No results found for: \"HGBA1C\", \"POCGLU\"        Adult Transthoracic Echo Complete W/ Cont if Necessary Per Protocol    Left ventricular systolic function is normal. Calculated left   ventricular EF = 53%    Left ventricular diastolic function is consistent with (grade I)   impaired relaxation.    Estimated right ventricular systolic pressure from tricuspid   regurgitation is normal (<35 mmHg).  CT Head Without Contrast, CT Cervical Spine Without Contrast, CT Facial Bones Without Contrast, CT Lumbar Spine Without Contrast  Narrative: EMERGENCY CT SCAN OF THE HEAD, FACE, CERVICAL SPINE AND LUMBAR SPINE  WITHOUT CONTRAST 05/05/2025     CLINICAL HISTORY: This is an 84-year-old male patient who fell and had  head trauma and facial trauma, and also complains of neck and low back  pain.     HEAD CT TECHNIQUE: Spiral CT images were obtained from the base of the  skull to the vertex without intravenous contrast. The images were  reformatted and are submitted in 3 mm thick axial, sagittal and coronal  CT sections with brain algorithm and 2 mm thick axial CT sections with  high-resolution bone algorithm.     This is correlated to an MRI of the brain on 03/09/2025 and a head CT on  03/09/2025.     FINDINGS: There is some mild patchy low-density in the periventricular  white matter consistent with mild small vessel disease. The remainder of  the brain parenchyma is normal in attenuation. There is diffuse cerebral  atrophy. The lateral and third ventricles are mildly prominent in size,  which is felt to be on the basis of central volume loss or atrophy. I  see no focal mass effect. There is no " midline shift. No extra-axial  fluid collections are identified. There is no evidence of acute  intracranial hemorrhage. No acute skull fracture is identified. The  calvarium and the skull base are normal in appearance. The paranasal  sinuses are clear. There are lens implants in the globes from previous  bilateral cataract surgery. Otherwise, the orbits are unremarkable.  There are some mild osteoarthritic changes in the temporomandibular  joints bilaterally.     Impression:    1. No acute intracranial abnormality is identified with no change when  compared to the patient's prior head CT and MRI of the brain on  03/09/2025.     2. There is mild small vessel disease in the cerebral white matter.  There is mild diffuse cerebral atrophy and the lateral and third  ventricles are prominent in size, felt to be on the basis of central  volume loss or atrophy. There are lens implants in the globes from  previous bilateral cataract surgery. The remainder of the head CT is  within normal limits with no acute skull fracture or intracranial  hemorrhage identified.        FACIAL CT TECHNIQUE: Spiral CT images were obtained through the facial  bones in the axial imaging plane. The images were reformatted and are  submitted in 2 mm thick axial, sagittal and coronal CT sections with  soft tissue algorithm and 1 mm thick axial, sagittal and coronal CT  sections with high-resolution bone algorithm.     This is correlated to a prior facial CT on 03/09/2025.     FINDINGS: The paranasal sinuses are clear. There are lens implants in  the globes from previous bilateral cataract surgery. Otherwise, the  orbits are normal in appearance. There are mild osteoarthritic changes  in the temporomandibular joints bilaterally with minimal marginal  spurring off the mandibular heads. The patient is edentulous. No  discernible acute fracture is seen in the facial bones.     IMPRESSION:  No acute facial fracture is identified. The patient  is  edentulous and also has bilateral intraocular lens implants from  previous bilateral cataract surgery. Otherwise, the facial CT is  unremarkable.        CERVICAL SPINE CT TECHNIQUE: Spiral CT images were obtained from the  skull base down to the T2 thoracic level. The images were reformatted  and are submitted in 2 mm thick axial and sagittal CT sections with soft  tissue algorithm and 1 mm thick axial, sagittal and coronal CT sections  with high-resolution bone algorithm.     This is correlated to a prior cervical spine CT from [facility] on 03/09/2025.      FINDINGS: The atlantooccipital articulation is within normal limits.     At C1-2, there are arthritic changes at the atlantodental interval with  narrowing of the interval and marginal spurring off the superior aspect  of the anterior ring of C1 and the odontoid and there is thickening and  calcification of the transverse ligament along the back of the odontoid.  Otherwise, the C1-2 level is normal in appearance.     At C2-3, the posterior disc margin, facets and uncovertebral joints are  within normal limits with no canal or foraminal narrowing.     At C3-4, there is mild-to-moderate bilateral facet overgrowth. There is  severe disc space narrowing and there are degenerative disc and endplate  changes and a 2 mm retrolisthesis of C3 on C4, and a mild diffuse  posterior disc osteophyte complex. There is minimal, if any, canal  narrowing and there is some minimal spurring into the foramina with  minimal foraminal narrowing.     At C4-5, there is mild-to-moderate left and there is moderate right  facet overgrowth.  There is a 3 mm degenerative anterolisthesis of C4 on  C5. There is mild canal narrowing.  There is some mild right  uncovertebral joint hypertrophy and there is mild-to-moderate right, but  no left foraminal narrowing.     At C5-6, there is moderate right facet overgrowth and minimal left facet  overgrowth. There is severe disc  space narrowing and there are  degenerative disc and endplate changes.  There is a diffuse posterior  disc osteophyte complex that results in only mild canal narrowing, and  some mild uncovertebral joint hypertrophy right greater than left.   There is minimal left and there is moderate-to-severe right bony  foraminal narrowing.     At C6-7, the facets are normal. There is moderate disc space narrowing.   There are degenerative endplate changes, a mild diffuse posterior disc  osteophyte complex and mild canal narrowing. There is some uncovertebral  joint spurring into the foramina, and there is mild-to-moderate  bilateral bony foraminal narrowing.     At C7-T1, there is moderate bilateral facet overgrowth. There is a 3-4  mm degenerative anterolisthesis of C7 on T1.  There is no canal  narrowing.  There is, at least, mild-to-moderate bilateral bony  foraminal narrowing.     No acute fracture is seen in the cervical spine.     IMPRESSION:  No acute fracture is seen in the cervical spine with no  significant change when compared to a recent cervical spine CT on  03/09/2025. There is cervical spondylosis as described in great detail  above.        LUMBAR SPINE CT TECHNIQUE: Spiral CT images were obtained from the  inferior body of the T10 thoracic level down to the S2-3 sacral level  and images were reformatted and are submitted in 3 mm thick axial CT  sections with soft tissue algorithm, and 1 mm thick axial CT sections  with high-resolution bone algorithm, and 2 mm thick sagittal and coronal  reconstructions were performed and submitted in both bone and soft  tissue algorithm.     There are no prior lumbar spine CTs or MRIs for comparison. This is  correlated to a remote prior CT scan of the abdomen and pelvis on  03/17/2021.     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 There is severe disc  space narrowing.  There are degenerative disc and  endplate changes and  vacuum disc phenomenon at all levels from L1 to S1.     At T10-11, there is minimal posterior spurring and minimal left facet  overgrowth. There is minimal, if any, canal narrowing.  There is mild  left and essentially no right foraminal narrowing.     At T11-12, the posterior disc margin and facets are normal with no canal  or foraminal narrowing.     At T12-L1, there is some vacuum disc phenomenon. The posterior disc  margin and facets are normal, and there is no canal or foraminal  narrowing.     At L1-2, there is severe disc space narrowing.  There is vacuum disc  phenomenon and degenerative endplate changes, and there is a 4 mm  retrolisthesis of L1 with respect to L2, and a diffuse posterior disc  osteophyte complex.  The facets are normal.  There is mild-to-moderate  canal narrowing.  There is some spurring and air contained within  bulging or protruding disc material into the foramen.  There is mild  left and there is moderate right foraminal narrowing that could affect  the exiting right L2 nerve root.      At L2-3, there is prominent vacuum disc phenomenon, a 2--3 mm  retrolisthesis of L2 with respect to L3, and a diffuse posterior disc  osteophyte complex.  The facets are normal.  There is prominent  posterior epidural fat and there is at least mild-to-moderate up to  moderate generalized narrowing of the thecal sac.  There is mild left  foraminal narrowing, loss of vertical height of the right foramen,  eccentric spurring and bulging disc material into the inferior right  foramen, and there is moderate-to-severe right foraminal narrowing.     At L3-4, there is disc space narrowing, vacuum disc phenomenon,  degenerative endplate changes, 2-3 mm retrolisthesis of L3 with respect  to L4, diffuse posterior disc osteophyte complex, mild-to-moderate  right, minimal left facet overgrowth and ligamentum flavum thickening,  and there is prominent posterior epidural fat, and the combination  of  all of the above findings contributes to moderate-to-severe narrowing of  the thecal sac.  There is mild left, and there is moderate-to-severe  right foraminal narrowing.     At L4-5, there is moderate bilateral facet overgrowth.  There is a 2 mm  anterolisthesis of L4 with respect to L5, a diffuse posterior disc  bulge, and there is moderate narrowing of the thecal sac and some  narrowing of the lateral recesses.  There is synovial thickening off the  facets extending into the posterior foramina, spurring and uncovered  bulging disc material into the inferior aspect of the foramina, and  there is moderate-to-severe bilateral foraminal narrowing that could  affect the exiting L4 nerve roots.     At L5-S1, there is moderate bilateral facet overgrowth, disc space  narrowing, degenerative endplate changes, and posterior endplate  spurring.  There is no canal or lateral recess narrowing.  There is  mild-to-moderate right and there is moderate left foraminal narrowing.      No discernible acute fracture is seen in the lumbar spine.     IMPRESSION:  No acute fracture is seen in the lumbar spine. 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      Radiation dose reduction techniques were utilized, including automated  exposure control and exposure modulation based on body size.        This report was finalized on 5/6/2025 6:47 AM by Dr. Victor M Stiles M.D on  Workstation: [id number]       Scheduled Medications  cyanocobalamin, 1,000 mcg, Intramuscular, Daily  folic acid, 1 mg, Oral, Daily  levothyroxine, 125 mcg, Oral, Daily  [Held by provider] lisinopril, 20 mg, Oral, Q24H  potassium chloride, 40 mEq, Oral, Q4H  sertraline, 50 mg, Oral,  Daily  thiamine (B-1) IV, 200 mg, Intravenous, Q8H   Followed by  [START ON 5/12/2025] thiamine, 100 mg, Oral, Daily    Infusions  sodium chloride, 100 mL/hr    Diet  Diet: Vegetarian; Vegan (No animal products); Fluid Consistency: Thin (IDDSI 0)    I have personally reviewed     [x]  Laboratory   []  Microbiology   []  Radiology   [x]  EKG/Telemetry  []  Cardiology/Vascular   []  Pathology    []  Records       Assessment/Plan     Active Hospital Problems    Diagnosis  POA    **Syncope [R55]  Yes    Anxiety [F41.9]  Yes    Primary hypertension [I10]  Yes    Adult hypothyroidism [E03.9]  Yes      Resolved Hospital Problems   No resolved problems to display.       Patient is a 84 y.o. male with a PMH significant for hypertension, hyperlipidemia, insomnia, anxiety, chronic low back pain, lumbar facet arthropathy, scoliosis, multiple falls, vertigo who presents to the ED c/o lower back pain following a fall.  Patient states that he is unsure how he fell, but believes he hit a coffee table.      Unwitnessed fall, syncope?  -Recurrent falls  - Patient not sure if he passed out, reports he just went down.  Was weak and could not get up.  - CT scan of the head with no acute findings.  - Orthostatic vitals lying /72 with heart rate of 103, standing BP initial 101/77 with heart rate of 147, and repeat was 146/84 with heart rate of 141  - Echocardiogram with no acute findings  - Cardiology following.  Likely will need Zio patch at discharge      Hypertension  V. tach?  -Per chart review patient with V. tach cris, however upon review it was not felt to be V. tach per RN.  EKG is sinus rhythm APCs,, heart rate 60s  -Labs electrolyte stable this morning  - Patient was previously on lisinopril and atenolol, reports was discontinued by provider due to low BP readings   - BP very labile, blood pressure intermittently increased up ot 180s, lisinopril was increased to 20 mg daily and blood pressure dropped to systolic 90  evening..  Will hold lisinopril, likely will resume at lower dose 10 mg daily tomorrow depending on the BP reading.  Hold metoprolol due to bradycardia.    Alcohol use disorder  -Drinks 3-4 glasses of wine a night   - Contributing to falls as above  - Initiated on CIWA protocol, multivitamins  - Access consult  - Discussed with patient that alcohol use contributing to falls and encouraged alcohol cessation, patient reports he was previously told the same by  providers,    Hypothyroidism  -TSH elevated at 8.0 but actually improved compared to prior TSH of 22.8 on 03/9/25, free T4 and free T4 normal  - Continue outpatient levothyroxine    Elevated MCV  - Hemoglobin normal.  MCV significantly elevated at 109.7.  Folic acid was low at 3.27 on 03/9/25.  Repeat cholic acid normal.  Continue folic acid supplement  - B12 low end of normal, initiated on B12 injections, will discharge on B12 supplement        SCDs for DVT prophylaxis.  Full code.  Discussed with patient.  Disposition: Home with home health, hopefully tomorrow if BP improves  Expected Discharge Date: 5/8/2025; Expected Discharge Time:        Copied text in this note has been reviewed and is accurate as of 05/08/25.         Dictated utilizing Dragon dictation        Orquidea Haas MD  [facility]  05/08/25  13:25 EDT

## 2025-05-08 NOTE — CASE MANAGEMENT/SOCIAL WORK
Continued Stay Note  [facility]     Patient Name: Darwin Sparks  MRN: [id number]  Today's Date: 5/8/2025    Admit Date: 5/5/2025    Plan: Home with spouse and caregivers   Discharge Plan       Row Name 05/08/25 1210       Plan    Plan Home with spouse and caregivers    Plan Comments S/W pt at bedside who confirms plan remains to return home upon DC with his spouse Roger and caregivers 4 days/ week.  He denies any DC needs at this time.  CCP will continue to follow. ............Ramy PEREZ/ CCP                   Discharge Codes    No documentation.                 Expected Discharge Date and Time       Expected Discharge Date Expected Discharge Time    May 8, 2025               Pratibha Felix RN

## 2025-05-08 NOTE — PROGRESS NOTES
"    Patient Name: Darwin Sparks  :1940  84 y.o.      Patient Care Team:  Epley, James, APRN as PCP - General (Family Medicine)    Chief Complaint: follow up possible syncope    Interval History: BP is high. He is drowsy and feels really tired. Did not sleep well last night per his report. A little bradycardic overnight. SR. No heart block.     Orthostatics positive , particularly by HR           Objective   Vital Signs  Temp:  [97.5 °F (36.4 °C)-98.4 °F (36.9 °C)] 97.9 °F (36.6 °C)  Heart Rate:  [51-71] 51  Resp:  [16-18] 16  BP: ()/() /  No intake or output data in the 24 hours ending 25 1212  Flowsheet Rows      Flowsheet Row First Filed Value   Admission Height 172.7 cm (68\") Documented at 2025 1029   Admission Weight 77.2 kg (170 lb 3.1 oz) Documented at 2025 1639            Physical Exam:   General Appearance:    Alert, cooperative, in no acute distress   Lungs:     Clear to auscultation.  Normal respiratory effort and rate.      Heart:    Regular rhythm and normal rate, normal S1 and S2, no murmurs, gallops or rubs.     Chest Wall:    No abnormalities observed   Abdomen:     Soft, nontender, positive bowel sounds.     Extremities:   no cyanosis, clubbing or edema.  No marked joint deformities.  Adequate musculoskeletal strength.       Results Review:    Results from last 7 days   Lab Units 25  0811   SODIUM mmol/L 137   POTASSIUM mmol/L 3.4*   CHLORIDE mmol/L 101   CO2 mmol/L 25.4   BUN mg/dL 14   CREATININE mg/dL 0.84   GLUCOSE mg/dL 141*   CALCIUM mg/dL 8.2*     Results from last 7 days   Lab Units 25  1202 25  1057   CK TOTAL U/L  --  245*   HSTROP T ng/L 27* 30*     Results from last 7 days   Lab Units 25  0811   WBC 10*3/mm3 4.43   HEMOGLOBIN g/dL 13.1   HEMATOCRIT % 38.1   PLATELETS 10*3/mm3 177         Results from last 7 days   Lab Units 25  0811   MAGNESIUM mg/dL 2.0                   Medication Review:   cyanocobalamin, 1,000 " mcg, Intramuscular, Daily  folic acid, 1 mg, Oral, Daily  levothyroxine, 125 mcg, Oral, Daily  lisinopril, 20 mg, Oral, Q24H  metoprolol tartrate, 12.5 mg, Oral, Q12H  potassium chloride, 40 mEq, Oral, Q4H  sertraline, 50 mg, Oral, Daily  thiamine (B-1) IV, 200 mg, Intravenous, Q8H   Followed by  [START ON 5/12/2025] thiamine, 100 mg, Oral, Daily              Assessment & Plan   Unwitnessed fall, questionable syncope - may have been orthostatic. Avoid aggressive BP control.   Hypertension  Hypothyroidism   Back pain  Recurrent falls    Echo unremarkable.   Monitor at [place].   Will see as needed.     MICHAEL Odell  [facility]  05/08/25  12:12 EDT

## 2025-05-09 ENCOUNTER — READMISSION MANAGEMENT (OUTPATIENT)
Dept: CALL CENTER | Facility: HOSPITAL | Age: 85
End: 2025-05-09
Payer: MEDICARE

## 2025-05-09 ENCOUNTER — APPOINTMENT (OUTPATIENT)
Dept: CARDIOLOGY | Facility: HOSPITAL | Age: 85
DRG: 312 | End: 2025-05-09
Payer: MEDICARE

## 2025-05-09 VITALS
BODY MASS INDEX: 25.86 KG/M2 | TEMPERATURE: 97.5 F | HEIGHT: 68 IN | HEART RATE: 62 BPM | WEIGHT: 170.64 LBS | OXYGEN SATURATION: 97 % | DIASTOLIC BLOOD PRESSURE: 79 MMHG | RESPIRATION RATE: 16 BRPM | SYSTOLIC BLOOD PRESSURE: 138 MMHG

## 2025-05-09 LAB
ANION GAP SERPL CALCULATED.3IONS-SCNC: 10 MMOL/L (ref 5–15)
BUN SERPL-MCNC: 11 MG/DL (ref 8–23)
BUN/CREAT SERPL: 13.6 (ref 7–25)
CALCIUM SPEC-SCNC: 8.3 MG/DL (ref 8.6–10.5)
CHLORIDE SERPL-SCNC: 103 MMOL/L (ref 98–107)
CO2 SERPL-SCNC: 24 MMOL/L (ref 22–29)
CREAT SERPL-MCNC: 0.81 MG/DL (ref 0.76–1.27)
DEPRECATED RDW RBC AUTO: 54 FL (ref 37–54)
EGFRCR SERPLBLD CKD-EPI 2021: 85.9 ML/MIN/1.73
ERYTHROCYTE [DISTWIDTH] IN BLOOD BY AUTOMATED COUNT: 13 % (ref 12.3–15.4)
GLUCOSE SERPL-MCNC: 76 MG/DL (ref 65–99)
HCT VFR BLD AUTO: 39.3 % (ref 37.5–51)
HGB BLD-MCNC: 13.5 G/DL (ref 13–17.7)
MAGNESIUM SERPL-MCNC: 1.9 MG/DL (ref 1.6–2.4)
MCH RBC QN AUTO: 38.5 PG (ref 26.6–33)
MCHC RBC AUTO-ENTMCNC: 34.4 G/DL (ref 31.5–35.7)
MCV RBC AUTO: 112 FL (ref 79–97)
PHOSPHATE SERPL-MCNC: 2.8 MG/DL (ref 2.5–4.5)
PLATELET # BLD AUTO: 173 10*3/MM3 (ref 140–450)
PMV BLD AUTO: 10.4 FL (ref 6–12)
POTASSIUM SERPL-SCNC: 4 MMOL/L (ref 3.5–5.2)
RBC # BLD AUTO: 3.51 10*6/MM3 (ref 4.14–5.8)
SODIUM SERPL-SCNC: 137 MMOL/L (ref 136–145)
WBC NRBC COR # BLD AUTO: 4.89 10*3/MM3 (ref 3.4–10.8)

## 2025-05-09 PROCEDURE — 25010000002 CYANOCOBALAMIN PER 1000 MCG: Performed by: STUDENT IN AN ORGANIZED HEALTH CARE EDUCATION/TRAINING PROGRAM

## 2025-05-09 PROCEDURE — 85027 COMPLETE CBC AUTOMATED: CPT | Performed by: STUDENT IN AN ORGANIZED HEALTH CARE EDUCATION/TRAINING PROGRAM

## 2025-05-09 PROCEDURE — 25810000003 SODIUM CHLORIDE 0.9 % SOLUTION: Performed by: STUDENT IN AN ORGANIZED HEALTH CARE EDUCATION/TRAINING PROGRAM

## 2025-05-09 PROCEDURE — 80048 BASIC METABOLIC PNL TOTAL CA: CPT | Performed by: STUDENT IN AN ORGANIZED HEALTH CARE EDUCATION/TRAINING PROGRAM

## 2025-05-09 PROCEDURE — 97110 THERAPEUTIC EXERCISES: CPT

## 2025-05-09 PROCEDURE — 84100 ASSAY OF PHOSPHORUS: CPT | Performed by: STUDENT IN AN ORGANIZED HEALTH CARE EDUCATION/TRAINING PROGRAM

## 2025-05-09 PROCEDURE — 83735 ASSAY OF MAGNESIUM: CPT | Performed by: STUDENT IN AN ORGANIZED HEALTH CARE EDUCATION/TRAINING PROGRAM

## 2025-05-09 PROCEDURE — 93246 EXT ECG>7D<15D RECORDING: CPT

## 2025-05-09 RX ORDER — IBUPROFEN 400 MG/1
400 TABLET, FILM COATED ORAL ONCE
Status: COMPLETED | OUTPATIENT
Start: 2025-05-09 | End: 2025-05-09

## 2025-05-09 RX ORDER — ACETAMINOPHEN 500 MG
1000 TABLET ORAL EVERY 8 HOURS PRN
Qty: 90 TABLET | Refills: 0 | Status: SHIPPED | OUTPATIENT
Start: 2025-05-09 | End: 2025-05-24

## 2025-05-09 RX ORDER — POTASSIUM CHLORIDE 1500 MG/1
40 TABLET, EXTENDED RELEASE ORAL EVERY 4 HOURS
Status: DISCONTINUED | OUTPATIENT
Start: 2025-05-09 | End: 2025-05-09

## 2025-05-09 RX ORDER — PANTOPRAZOLE SODIUM 40 MG/1
40 TABLET, DELAYED RELEASE ORAL
Status: DISCONTINUED | OUTPATIENT
Start: 2025-05-09 | End: 2025-05-09 | Stop reason: HOSPADM

## 2025-05-09 RX ORDER — LISINOPRIL 10 MG/1
10 TABLET ORAL
Qty: 30 TABLET | Refills: 0 | Status: SHIPPED | OUTPATIENT
Start: 2025-05-10 | End: 2025-06-09

## 2025-05-09 RX ORDER — LANOLIN ALCOHOL/MO/W.PET/CERES
1000 CREAM (GRAM) TOPICAL DAILY
Qty: 30 TABLET | Refills: 0 | Status: SHIPPED | OUTPATIENT
Start: 2025-05-09 | End: 2025-05-13 | Stop reason: SDUPTHER

## 2025-05-09 RX ORDER — BUTALBITAL, ACETAMINOPHEN AND CAFFEINE 50; 325; 40 MG/1; MG/1; MG/1
2 TABLET ORAL ONCE
Status: COMPLETED | OUTPATIENT
Start: 2025-05-09 | End: 2025-05-09

## 2025-05-09 RX ADMIN — BUTALBITAL, ACETAMINOPHEN AND CAFFEINE 2 TABLET: 325; 50; 40 TABLET ORAL at 13:08

## 2025-05-09 RX ADMIN — Medication 100 MG: at 08:48

## 2025-05-09 RX ADMIN — ACETAMINOPHEN 1000 MG: 500 TABLET, FILM COATED ORAL at 01:01

## 2025-05-09 RX ADMIN — ACETAMINOPHEN 1000 MG: 500 TABLET, FILM COATED ORAL at 08:36

## 2025-05-09 RX ADMIN — LISINOPRIL 10 MG: 10 TABLET ORAL at 08:36

## 2025-05-09 RX ADMIN — LEVOTHYROXINE SODIUM 125 MCG: 125 TABLET ORAL at 08:36

## 2025-05-09 RX ADMIN — IBUPROFEN 400 MG: 400 TABLET, FILM COATED ORAL at 10:54

## 2025-05-09 RX ADMIN — SODIUM CHLORIDE 100 ML/HR: 9 INJECTION, SOLUTION INTRAVENOUS at 03:03

## 2025-05-09 RX ADMIN — CYANOCOBALAMIN 1000 MCG: 1000 INJECTION, SOLUTION INTRAMUSCULAR; SUBCUTANEOUS at 08:37

## 2025-05-09 RX ADMIN — PANTOPRAZOLE SODIUM 40 MG: 40 TABLET, DELAYED RELEASE ORAL at 10:54

## 2025-05-09 RX ADMIN — SERTRALINE HYDROCHLORIDE 50 MG: 50 TABLET, FILM COATED ORAL at 08:36

## 2025-05-09 RX ADMIN — FOLIC ACID 1 MG: 1 TABLET ORAL at 08:36

## 2025-05-09 NOTE — DISCHARGE INSTRUCTIONS
Notify your primary care provider if you experience chest pain, difficulty breathing, fevers/chills, nausea or vomiting, bleeding in stool, excessive diarrhea, numbness or weakness or tingling in any part of your body.      Monitor blood pressure daily and record.  Follow-up with blood pressure log with PCP.

## 2025-05-09 NOTE — DISCHARGE SUMMARY
Patient Name: Darwin Sparks  : 1940  MRN: [id number]    Date of Admission: 2025  Date of Discharge:  2025  Primary Care Physician: Epley, James, APRN      Chief Complaint:   Fall and Back Pain      Discharge Diagnoses     Active Hospital Problems    Diagnosis  POA    **Syncope [R55]  Yes    Anxiety [F41.9]  Yes    Primary hypertension [I10]  Yes    Adult hypothyroidism [E03.9]  Yes      Resolved Hospital Problems   No resolved problems to display.        Hospital Course   Patient is a 84 y.o. male with a PMH significant for hypertension, hyperlipidemia, insomnia, anxiety, chronic low back pain, lumbar facet arthropathy, scoliosis, multiple falls, vertigo who presents to the ED c/o lower back pain following a fall.  Patient states that he is unsure how he fell, but believes he hit a coffee table.       Unwitnessed fall, syncope?  -Recurrent falls  - Patient not sure if he passed out, reports he just went down.  Was weak and could not get up.  - CT scan of the head with no acute findings.  - Orthostatic vitals lying /72 with heart rate of 103, standing BP initial 101/77 with heart rate of 147, and repeat was 146/84 with heart rate of 141  - Echocardiogram with no acute findings  - Cardiology evaluated.  Was discharged with Zio patch per cardiology commendations        Hypertension  - Patient was previously on lisinopril and atenolol, reports was discontinued by provider due to low BP readings   - BP very labile, blood pressure intermittently increased up ot 180s,  - Was initiated on metoprolol however discontinued due to bradycardia.  Was initiated on lisinopril and increased up to 20 mg however patient blood pressure dropped to 90s, and lisinopril was decreased to 10 mg daily.  Patient reports he is very sensitive to blood pressure medications.  -Will need to monitor blood pressure daily and record, follow-up with blood pressure log with PCP.     Alcohol use disorder  -Drinks 3-4 glasses  of wine a night   - Contributing to falls as above  - Was initiated on CIWA protocol, multivitamins  - Access consult  - Discussed with patient that alcohol use contributing to falls and encouraged alcohol cessation, patient reports he was previously told the same by  providers,     Hypothyroidism  -TSH elevated at 8.0 05/6/25 but actually improved compared to prior TSH of 22.8 on 03/9/25, free T4 and free T4 normal  - Continue outpatient levothyroxine.  Will need repeat labs in 1 week.     Elevated MCV  - Hemoglobin normal.  MCV significantly elevated at 109.7.  Folic acid was low at 3.27 on 03/9/25.  Repeat cholic acid normal.  Continue outpatient folic acid supplement  - B12 low end of normal, was initiated on B12 injections, discharged on B12 supplement.           Day of Discharge     Subjective:  Patient seen this morning.  No acute events overnight.  Reports overall feeling better, denies dizziness.  Complains of headache.  No nausea no vomiting.  No focal numbness.  No vision changes    Physical Exam:  Temp:  [97.5 °F (36.4 °C)-98.1 °F (36.7 °C)] 97.5 °F (36.4 °C)  Heart Rate:  [56-74] 62  Resp:  [16-18] 16  BP: (138-167)/(71-90) 138/79  Body mass index is 25.95 kg/m².  Physical Exam      General: Alert, sitting up in the bed, not in distress,  HEENT: Normocephalic, atraumatic  CV: Regular rate and rhythm, no murmurs rubs or gallops  Lungs: Clear to auscultation bilaterally, no crackles or wheezes  Abdomen: Soft, nontender, nondistended  Extremities: No significant peripheral edema , no cyanosis       Consultants     Consult Orders (all) (From admission, onward)       Start     Ordered    05/05/25 2036  Inpatient Cardiology Consult  Once        Specialty:  Cardiology  Provider:  Yessica Hyatt MD    05/05/25 2035 05/05/25 1416  [facility] (on-call MD unless specified) Details  Once        Specialty:  Hospitalist  Provider:  (Not yet assigned)    05/05/25 1415                  Procedures     * Surgery not found  *      Imaging Results (All)       Procedure Component Value Units Date/Time    CT Head Without Contrast [[id number]] Collected: 05/05/25 1326     Updated: 05/06/25 0650    Narrative:      EMERGENCY CT SCAN OF THE HEAD, FACE, CERVICAL SPINE AND LUMBAR SPINE  WITHOUT CONTRAST 05/05/2025     CLINICAL HISTORY: This is an 84-year-old male patient who fell and had  head trauma and facial trauma, and also complains of neck and low back  pain.     HEAD CT TECHNIQUE: Spiral CT images were obtained from the base of the  skull to the vertex without intravenous contrast. The images were  reformatted and are submitted in 3 mm thick axial, sagittal and coronal  CT sections with brain algorithm and 2 mm thick axial CT sections with  high-resolution bone algorithm.     This is correlated to an MRI of the brain on 03/09/2025 and a head CT on  03/09/2025.     FINDINGS: There is some mild patchy low-density in the periventricular  white matter consistent with mild small vessel disease. The remainder of  the brain parenchyma is normal in attenuation. There is diffuse cerebral  atrophy. The lateral and third ventricles are mildly prominent in size,  which is felt to be on the basis of central volume loss or atrophy. I  see no focal mass effect. There is no midline shift. No extra-axial  fluid collections are identified. There is no evidence of acute  intracranial hemorrhage. No acute skull fracture is identified. The  calvarium and the skull base are normal in appearance. The paranasal  sinuses are clear. There are lens implants in the globes from previous  bilateral cataract surgery. Otherwise, the orbits are unremarkable.  There are some mild osteoarthritic changes in the temporomandibular  joints bilaterally.       Impression:         1. No acute intracranial abnormality is identified with no change when  compared to the patient's prior head CT and MRI of the brain on  03/09/2025.     2. There is mild small vessel disease in the  cerebral white matter.  There is mild diffuse cerebral atrophy and the lateral and third  ventricles are prominent in size, felt to be on the basis of central  volume loss or atrophy. There are lens implants in the globes from  previous bilateral cataract surgery. The remainder of the head CT is  within normal limits with no acute skull fracture or intracranial  hemorrhage identified.        FACIAL CT TECHNIQUE: Spiral CT images were obtained through the facial  bones in the axial imaging plane. The images were reformatted and are  submitted in 2 mm thick axial, sagittal and coronal CT sections with  soft tissue algorithm and 1 mm thick axial, sagittal and coronal CT  sections with high-resolution bone algorithm.     This is correlated to a prior facial CT on 03/09/2025.     FINDINGS: The paranasal sinuses are clear. There are lens implants in  the globes from previous bilateral cataract surgery. Otherwise, the  orbits are normal in appearance. There are mild osteoarthritic changes  in the temporomandibular joints bilaterally with minimal marginal  spurring off the mandibular heads. The patient is edentulous. No  discernible acute fracture is seen in the facial bones.     IMPRESSION:  No acute facial fracture is identified. The patient is  edentulous and also has bilateral intraocular lens implants from  previous bilateral cataract surgery. Otherwise, the facial CT is  unremarkable.        CERVICAL SPINE CT TECHNIQUE: Spiral CT images were obtained from the  skull base down to the T2 thoracic level. The images were reformatted  and are submitted in 2 mm thick axial and sagittal CT sections with soft  tissue algorithm and 1 mm thick axial, sagittal and coronal CT sections  with high-resolution bone algorithm.     This is correlated to a prior cervical spine CT from [facility] on 03/09/2025.      FINDINGS: The atlantooccipital articulation is within normal limits.     At C1-2, there are arthritic  changes at the atlantodental interval with  narrowing of the interval and marginal spurring off the superior aspect  of the anterior ring of C1 and the odontoid and there is thickening and  calcification of the transverse ligament along the back of the odontoid.  Otherwise, the C1-2 level is normal in appearance.     At C2-3, the posterior disc margin, facets and uncovertebral joints are  within normal limits with no canal or foraminal narrowing.     At C3-4, there is mild-to-moderate bilateral facet overgrowth. There is  severe disc space narrowing and there are degenerative disc and endplate  changes and a 2 mm retrolisthesis of C3 on C4, and a mild diffuse  posterior disc osteophyte complex. There is minimal, if any, canal  narrowing and there is some minimal spurring into the foramina with  minimal foraminal narrowing.     At C4-5, there is mild-to-moderate left and there is moderate right  facet overgrowth.  There is a 3 mm degenerative anterolisthesis of C4 on  C5. There is mild canal narrowing.  There is some mild right  uncovertebral joint hypertrophy and there is mild-to-moderate right, but  no left foraminal narrowing.     At C5-6, there is moderate right facet overgrowth and minimal left facet  overgrowth. There is severe disc space narrowing and there are  degenerative disc and endplate changes.  There is a diffuse posterior  disc osteophyte complex that results in only mild canal narrowing, and  some mild uncovertebral joint hypertrophy right greater than left.   There is minimal left and there is moderate-to-severe right bony  foraminal narrowing.     At C6-7, the facets are normal. There is moderate disc space narrowing.   There are degenerative endplate changes, a mild diffuse posterior disc  osteophyte complex and mild canal narrowing. There is some uncovertebral  joint spurring into the foramina, and there is mild-to-moderate  bilateral bony foraminal narrowing.     At C7-T1, there is moderate  bilateral facet overgrowth. There is a 3-4  mm degenerative anterolisthesis of C7 on T1.  There is no canal  narrowing.  There is, at least, mild-to-moderate bilateral bony  foraminal narrowing.     No acute fracture is seen in the cervical spine.     IMPRESSION:  No acute fracture is seen in the cervical spine with no  significant change when compared to a recent cervical spine CT on  03/09/2025. There is cervical spondylosis as described in great detail  above.        LUMBAR SPINE CT TECHNIQUE: Spiral CT images were obtained from the  inferior body of the T10 thoracic level down to the S2-3 sacral level  and images were reformatted and are submitted in 3 mm thick axial CT  sections with soft tissue algorithm, and 1 mm thick axial CT sections  with high-resolution bone algorithm, and 2 mm thick sagittal and coronal  reconstructions were performed and submitted in both bone and soft  tissue algorithm.     There are no prior lumbar spine CTs or MRIs for comparison. This is  correlated to a remote prior CT scan of the abdomen and pelvis on  03/17/2021.     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 There is severe disc  space narrowing.  There are degenerative disc and endplate changes and  vacuum disc phenomenon at all levels from L1 to S1.     At T10-11, there is minimal posterior spurring and minimal left facet  overgrowth. There is minimal, if any, canal narrowing.  There is mild  left and essentially no right foraminal narrowing.     At T11-12, the posterior disc margin and facets are normal with no canal  or foraminal narrowing.     At T12-L1, there is some vacuum disc phenomenon. The posterior disc  margin and facets are normal, and there is no canal or foraminal  narrowing.     At L1-2, there is severe disc space narrowing.  There is vacuum disc  phenomenon and degenerative endplate changes, and there is a 4 mm  retrolisthesis of L1  with respect to L2, and a diffuse posterior disc  osteophyte complex.  The facets are normal.  There is mild-to-moderate  canal narrowing.  There is some spurring and air contained within  bulging or protruding disc material into the foramen.  There is mild  left and there is moderate right foraminal narrowing that could affect  the exiting right L2 nerve root.      At L2-3, there is prominent vacuum disc phenomenon, a 2--3 mm  retrolisthesis of L2 with respect to L3, and a diffuse posterior disc  osteophyte complex.  The facets are normal.  There is prominent  posterior epidural fat and there is at least mild-to-moderate up to  moderate generalized narrowing of the thecal sac.  There is mild left  foraminal narrowing, loss of vertical height of the right foramen,  eccentric spurring and bulging disc material into the inferior right  foramen, and there is moderate-to-severe right foraminal narrowing.     At L3-4, there is disc space narrowing, vacuum disc phenomenon,  degenerative endplate changes, 2-3 mm retrolisthesis of L3 with respect  to L4, diffuse posterior disc osteophyte complex, mild-to-moderate  right, minimal left facet overgrowth and ligamentum flavum thickening,  and there is prominent posterior epidural fat, and the combination of  all of the above findings contributes to moderate-to-severe narrowing of  the thecal sac.  There is mild left, and there is moderate-to-severe  right foraminal narrowing.     At L4-5, there is moderate bilateral facet overgrowth.  There is a 2 mm  anterolisthesis of L4 with respect to L5, a diffuse posterior disc  bulge, and there is moderate narrowing of the thecal sac and some  narrowing of the lateral recesses.  There is synovial thickening off the  facets extending into the posterior foramina, spurring and uncovered  bulging disc material into the inferior aspect of the foramina, and  there is moderate-to-severe bilateral foraminal narrowing that could  affect the  exiting L4 nerve roots.     At L5-S1, there is moderate bilateral facet overgrowth, disc space  narrowing, degenerative endplate changes, and posterior endplate  spurring.  There is no canal or lateral recess narrowing.  There is  mild-to-moderate right and there is moderate left foraminal narrowing.      No discernible acute fracture is seen in the lumbar spine.     IMPRESSION:  No acute fracture is seen in the lumbar spine. 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      Radiation dose reduction techniques were utilized, including automated  exposure control and exposure modulation based on body size.        This report was finalized on 5/6/2025 6:47 AM by Dr. Victor M Stiles M.D on  Workstation: [id number]       CT Cervical Spine Without Contrast [[id number]] Collected: 05/05/25 1326     Updated: 05/06/25 0650    Narrative:      EMERGENCY CT SCAN OF THE HEAD, FACE, CERVICAL SPINE AND LUMBAR SPINE  WITHOUT CONTRAST 05/05/2025     CLINICAL HISTORY: This is an 84-year-old male patient who fell and had  head trauma and facial trauma, and also complains of neck and low back  pain.     HEAD CT TECHNIQUE: Spiral CT images were obtained from the base of the  skull to the vertex without intravenous contrast. The images were  reformatted and are submitted in 3 mm thick axial, sagittal and coronal  CT sections with brain algorithm and 2 mm thick axial CT sections with  high-resolution bone algorithm.     This is correlated to an MRI of the brain on 03/09/2025 and a head CT on  03/09/2025.     FINDINGS: There is some mild patchy low-density in the periventricular  white matter consistent with mild small vessel disease. The remainder of  the brain  parenchyma is normal in attenuation. There is diffuse cerebral  atrophy. The lateral and third ventricles are mildly prominent in size,  which is felt to be on the basis of central volume loss or atrophy. I  see no focal mass effect. There is no midline shift. No extra-axial  fluid collections are identified. There is no evidence of acute  intracranial hemorrhage. No acute skull fracture is identified. The  calvarium and the skull base are normal in appearance. The paranasal  sinuses are clear. There are lens implants in the globes from previous  bilateral cataract surgery. Otherwise, the orbits are unremarkable.  There are some mild osteoarthritic changes in the temporomandibular  joints bilaterally.       Impression:         1. No acute intracranial abnormality is identified with no change when  compared to the patient's prior head CT and MRI of the brain on  03/09/2025.     2. There is mild small vessel disease in the cerebral white matter.  There is mild diffuse cerebral atrophy and the lateral and third  ventricles are prominent in size, felt to be on the basis of central  volume loss or atrophy. There are lens implants in the globes from  previous bilateral cataract surgery. The remainder of the head CT is  within normal limits with no acute skull fracture or intracranial  hemorrhage identified.        FACIAL CT TECHNIQUE: Spiral CT images were obtained through the facial  bones in the axial imaging plane. The images were reformatted and are  submitted in 2 mm thick axial, sagittal and coronal CT sections with  soft tissue algorithm and 1 mm thick axial, sagittal and coronal CT  sections with high-resolution bone algorithm.     This is correlated to a prior facial CT on 03/09/2025.     FINDINGS: The paranasal sinuses are clear. There are lens implants in  the globes from previous bilateral cataract surgery. Otherwise, the  orbits are normal in appearance. There are mild osteoarthritic changes  in the  temporomandibular joints bilaterally with minimal marginal  spurring off the mandibular heads. The patient is edentulous. No  discernible acute fracture is seen in the facial bones.     IMPRESSION:  No acute facial fracture is identified. The patient is  edentulous and also has bilateral intraocular lens implants from  previous bilateral cataract surgery. Otherwise, the facial CT is  unremarkable.        CERVICAL SPINE CT TECHNIQUE: Spiral CT images were obtained from the  skull base down to the T2 thoracic level. The images were reformatted  and are submitted in 2 mm thick axial and sagittal CT sections with soft  tissue algorithm and 1 mm thick axial, sagittal and coronal CT sections  with high-resolution bone algorithm.     This is correlated to a prior cervical spine CT from [facility] on 03/09/2025.      FINDINGS: The atlantooccipital articulation is within normal limits.     At C1-2, there are arthritic changes at the atlantodental interval with  narrowing of the interval and marginal spurring off the superior aspect  of the anterior ring of C1 and the odontoid and there is thickening and  calcification of the transverse ligament along the back of the odontoid.  Otherwise, the C1-2 level is normal in appearance.     At C2-3, the posterior disc margin, facets and uncovertebral joints are  within normal limits with no canal or foraminal narrowing.     At C3-4, there is mild-to-moderate bilateral facet overgrowth. There is  severe disc space narrowing and there are degenerative disc and endplate  changes and a 2 mm retrolisthesis of C3 on C4, and a mild diffuse  posterior disc osteophyte complex. There is minimal, if any, canal  narrowing and there is some minimal spurring into the foramina with  minimal foraminal narrowing.     At C4-5, there is mild-to-moderate left and there is moderate right  facet overgrowth.  There is a 3 mm degenerative anterolisthesis of C4 on  C5. There is mild canal  narrowing.  There is some mild right  uncovertebral joint hypertrophy and there is mild-to-moderate right, but  no left foraminal narrowing.     At C5-6, there is moderate right facet overgrowth and minimal left facet  overgrowth. There is severe disc space narrowing and there are  degenerative disc and endplate changes.  There is a diffuse posterior  disc osteophyte complex that results in only mild canal narrowing, and  some mild uncovertebral joint hypertrophy right greater than left.   There is minimal left and there is moderate-to-severe right bony  foraminal narrowing.     At C6-7, the facets are normal. There is moderate disc space narrowing.   There are degenerative endplate changes, a mild diffuse posterior disc  osteophyte complex and mild canal narrowing. There is some uncovertebral  joint spurring into the foramina, and there is mild-to-moderate  bilateral bony foraminal narrowing.     At C7-T1, there is moderate bilateral facet overgrowth. There is a 3-4  mm degenerative anterolisthesis of C7 on T1.  There is no canal  narrowing.  There is, at least, mild-to-moderate bilateral bony  foraminal narrowing.     No acute fracture is seen in the cervical spine.     IMPRESSION:  No acute fracture is seen in the cervical spine with no  significant change when compared to a recent cervical spine CT on  03/09/2025. There is cervical spondylosis as described in great detail  above.        LUMBAR SPINE CT TECHNIQUE: Spiral CT images were obtained from the  inferior body of the T10 thoracic level down to the S2-3 sacral level  and images were reformatted and are submitted in 3 mm thick axial CT  sections with soft tissue algorithm, and 1 mm thick axial CT sections  with high-resolution bone algorithm, and 2 mm thick sagittal and coronal  reconstructions were performed and submitted in both bone and soft  tissue algorithm.     There are no prior lumbar spine CTs or MRIs for comparison. This is  correlated to a remote  prior CT scan of the abdomen and pelvis on  03/17/2021.     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 There is severe disc  space narrowing.  There are degenerative disc and endplate changes and  vacuum disc phenomenon at all levels from L1 to S1.     At T10-11, there is minimal posterior spurring and minimal left facet  overgrowth. There is minimal, if any, canal narrowing.  There is mild  left and essentially no right foraminal narrowing.     At T11-12, the posterior disc margin and facets are normal with no canal  or foraminal narrowing.     At T12-L1, there is some vacuum disc phenomenon. The posterior disc  margin and facets are normal, and there is no canal or foraminal  narrowing.     At L1-2, there is severe disc space narrowing.  There is vacuum disc  phenomenon and degenerative endplate changes, and there is a 4 mm  retrolisthesis of L1 with respect to L2, and a diffuse posterior disc  osteophyte complex.  The facets are normal.  There is mild-to-moderate  canal narrowing.  There is some spurring and air contained within  bulging or protruding disc material into the foramen.  There is mild  left and there is moderate right foraminal narrowing that could affect  the exiting right L2 nerve root.      At L2-3, there is prominent vacuum disc phenomenon, a 2--3 mm  retrolisthesis of L2 with respect to L3, and a diffuse posterior disc  osteophyte complex.  The facets are normal.  There is prominent  posterior epidural fat and there is at least mild-to-moderate up to  moderate generalized narrowing of the thecal sac.  There is mild left  foraminal narrowing, loss of vertical height of the right foramen,  eccentric spurring and bulging disc material into the inferior right  foramen, and there is moderate-to-severe right foraminal narrowing.     At L3-4, there is disc space narrowing, vacuum disc phenomenon,  degenerative endplate  changes, 2-3 mm retrolisthesis of L3 with respect  to L4, diffuse posterior disc osteophyte complex, mild-to-moderate  right, minimal left facet overgrowth and ligamentum flavum thickening,  and there is prominent posterior epidural fat, and the combination of  all of the above findings contributes to moderate-to-severe narrowing of  the thecal sac.  There is mild left, and there is moderate-to-severe  right foraminal narrowing.     At L4-5, there is moderate bilateral facet overgrowth.  There is a 2 mm  anterolisthesis of L4 with respect to L5, a diffuse posterior disc  bulge, and there is moderate narrowing of the thecal sac and some  narrowing of the lateral recesses.  There is synovial thickening off the  facets extending into the posterior foramina, spurring and uncovered  bulging disc material into the inferior aspect of the foramina, and  there is moderate-to-severe bilateral foraminal narrowing that could  affect the exiting L4 nerve roots.     At L5-S1, there is moderate bilateral facet overgrowth, disc space  narrowing, degenerative endplate changes, and posterior endplate  spurring.  There is no canal or lateral recess narrowing.  There is  mild-to-moderate right and there is moderate left foraminal narrowing.      No discernible acute fracture is seen in the lumbar spine.     IMPRESSION:  No acute fracture is seen in the lumbar spine. 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      Radiation dose reduction techniques were utilized, including automated  exposure control and exposure modulation based on body size.        This report was finalized on 5/6/2025 6:47 AM by Dr. Victor M Stiles M.D on  Workstation: [id number]       CT  Facial Bones Without Contrast [[id number]] Collected: 05/05/25 1326     Updated: 05/06/25 0650    Narrative:      EMERGENCY CT SCAN OF THE HEAD, FACE, CERVICAL SPINE AND LUMBAR SPINE  WITHOUT CONTRAST 05/05/2025     CLINICAL HISTORY: This is an 84-year-old male patient who fell and had  head trauma and facial trauma, and also complains of neck and low back  pain.     HEAD CT TECHNIQUE: Spiral CT images were obtained from the base of the  skull to the vertex without intravenous contrast. The images were  reformatted and are submitted in 3 mm thick axial, sagittal and coronal  CT sections with brain algorithm and 2 mm thick axial CT sections with  high-resolution bone algorithm.     This is correlated to an MRI of the brain on 03/09/2025 and a head CT on  03/09/2025.     FINDINGS: There is some mild patchy low-density in the periventricular  white matter consistent with mild small vessel disease. The remainder of  the brain parenchyma is normal in attenuation. There is diffuse cerebral  atrophy. The lateral and third ventricles are mildly prominent in size,  which is felt to be on the basis of central volume loss or atrophy. I  see no focal mass effect. There is no midline shift. No extra-axial  fluid collections are identified. There is no evidence of acute  intracranial hemorrhage. No acute skull fracture is identified. The  calvarium and the skull base are normal in appearance. The paranasal  sinuses are clear. There are lens implants in the globes from previous  bilateral cataract surgery. Otherwise, the orbits are unremarkable.  There are some mild osteoarthritic changes in the temporomandibular  joints bilaterally.       Impression:         1. No acute intracranial abnormality is identified with no change when  compared to the patient's prior head CT and MRI of the brain on  03/09/2025.     2. There is mild small vessel disease in the cerebral white matter.  There is mild diffuse cerebral atrophy and the lateral  and third  ventricles are prominent in size, felt to be on the basis of central  volume loss or atrophy. There are lens implants in the globes from  previous bilateral cataract surgery. The remainder of the head CT is  within normal limits with no acute skull fracture or intracranial  hemorrhage identified.        FACIAL CT TECHNIQUE: Spiral CT images were obtained through the facial  bones in the axial imaging plane. The images were reformatted and are  submitted in 2 mm thick axial, sagittal and coronal CT sections with  soft tissue algorithm and 1 mm thick axial, sagittal and coronal CT  sections with high-resolution bone algorithm.     This is correlated to a prior facial CT on 03/09/2025.     FINDINGS: The paranasal sinuses are clear. There are lens implants in  the globes from previous bilateral cataract surgery. Otherwise, the  orbits are normal in appearance. There are mild osteoarthritic changes  in the temporomandibular joints bilaterally with minimal marginal  spurring off the mandibular heads. The patient is edentulous. No  discernible acute fracture is seen in the facial bones.     IMPRESSION:  No acute facial fracture is identified. The patient is  edentulous and also has bilateral intraocular lens implants from  previous bilateral cataract surgery. Otherwise, the facial CT is  unremarkable.        CERVICAL SPINE CT TECHNIQUE: Spiral CT images were obtained from the  skull base down to the T2 thoracic level. The images were reformatted  and are submitted in 2 mm thick axial and sagittal CT sections with soft  tissue algorithm and 1 mm thick axial, sagittal and coronal CT sections  with high-resolution bone algorithm.     This is correlated to a prior cervical spine CT from [facility] on 03/09/2025.      FINDINGS: The atlantooccipital articulation is within normal limits.     At C1-2, there are arthritic changes at the atlantodental interval with  narrowing of the interval and marginal  spurring off the superior aspect  of the anterior ring of C1 and the odontoid and there is thickening and  calcification of the transverse ligament along the back of the odontoid.  Otherwise, the C1-2 level is normal in appearance.     At C2-3, the posterior disc margin, facets and uncovertebral joints are  within normal limits with no canal or foraminal narrowing.     At C3-4, there is mild-to-moderate bilateral facet overgrowth. There is  severe disc space narrowing and there are degenerative disc and endplate  changes and a 2 mm retrolisthesis of C3 on C4, and a mild diffuse  posterior disc osteophyte complex. There is minimal, if any, canal  narrowing and there is some minimal spurring into the foramina with  minimal foraminal narrowing.     At C4-5, there is mild-to-moderate left and there is moderate right  facet overgrowth.  There is a 3 mm degenerative anterolisthesis of C4 on  C5. There is mild canal narrowing.  There is some mild right  uncovertebral joint hypertrophy and there is mild-to-moderate right, but  no left foraminal narrowing.     At C5-6, there is moderate right facet overgrowth and minimal left facet  overgrowth. There is severe disc space narrowing and there are  degenerative disc and endplate changes.  There is a diffuse posterior  disc osteophyte complex that results in only mild canal narrowing, and  some mild uncovertebral joint hypertrophy right greater than left.   There is minimal left and there is moderate-to-severe right bony  foraminal narrowing.     At C6-7, the facets are normal. There is moderate disc space narrowing.   There are degenerative endplate changes, a mild diffuse posterior disc  osteophyte complex and mild canal narrowing. There is some uncovertebral  joint spurring into the foramina, and there is mild-to-moderate  bilateral bony foraminal narrowing.     At C7-T1, there is moderate bilateral facet overgrowth. There is a 3-4  mm degenerative anterolisthesis of C7 on  T1.  There is no canal  narrowing.  There is, at least, mild-to-moderate bilateral bony  foraminal narrowing.     No acute fracture is seen in the cervical spine.     IMPRESSION:  No acute fracture is seen in the cervical spine with no  significant change when compared to a recent cervical spine CT on  03/09/2025. There is cervical spondylosis as described in great detail  above.        LUMBAR SPINE CT TECHNIQUE: Spiral CT images were obtained from the  inferior body of the T10 thoracic level down to the S2-3 sacral level  and images were reformatted and are submitted in 3 mm thick axial CT  sections with soft tissue algorithm, and 1 mm thick axial CT sections  with high-resolution bone algorithm, and 2 mm thick sagittal and coronal  reconstructions were performed and submitted in both bone and soft  tissue algorithm.     There are no prior lumbar spine CTs or MRIs for comparison. This is  correlated to a remote prior CT scan of the abdomen and pelvis on  03/17/2021.     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 There is severe disc  space narrowing.  There are degenerative disc and endplate changes and  vacuum disc phenomenon at all levels from L1 to S1.     At T10-11, there is minimal posterior spurring and minimal left facet  overgrowth. There is minimal, if any, canal narrowing.  There is mild  left and essentially no right foraminal narrowing.     At T11-12, the posterior disc margin and facets are normal with no canal  or foraminal narrowing.     At T12-L1, there is some vacuum disc phenomenon. The posterior disc  margin and facets are normal, and there is no canal or foraminal  narrowing.     At L1-2, there is severe disc space narrowing.  There is vacuum disc  phenomenon and degenerative endplate changes, and there is a 4 mm  retrolisthesis of L1 with respect to L2, and a diffuse posterior disc  osteophyte complex.  The facets are  normal.  There is mild-to-moderate  canal narrowing.  There is some spurring and air contained within  bulging or protruding disc material into the foramen.  There is mild  left and there is moderate right foraminal narrowing that could affect  the exiting right L2 nerve root.      At L2-3, there is prominent vacuum disc phenomenon, a 2--3 mm  retrolisthesis of L2 with respect to L3, and a diffuse posterior disc  osteophyte complex.  The facets are normal.  There is prominent  posterior epidural fat and there is at least mild-to-moderate up to  moderate generalized narrowing of the thecal sac.  There is mild left  foraminal narrowing, loss of vertical height of the right foramen,  eccentric spurring and bulging disc material into the inferior right  foramen, and there is moderate-to-severe right foraminal narrowing.     At L3-4, there is disc space narrowing, vacuum disc phenomenon,  degenerative endplate changes, 2-3 mm retrolisthesis of L3 with respect  to L4, diffuse posterior disc osteophyte complex, mild-to-moderate  right, minimal left facet overgrowth and ligamentum flavum thickening,  and there is prominent posterior epidural fat, and the combination of  all of the above findings contributes to moderate-to-severe narrowing of  the thecal sac.  There is mild left, and there is moderate-to-severe  right foraminal narrowing.     At L4-5, there is moderate bilateral facet overgrowth.  There is a 2 mm  anterolisthesis of L4 with respect to L5, a diffuse posterior disc  bulge, and there is moderate narrowing of the thecal sac and some  narrowing of the lateral recesses.  There is synovial thickening off the  facets extending into the posterior foramina, spurring and uncovered  bulging disc material into the inferior aspect of the foramina, and  there is moderate-to-severe bilateral foraminal narrowing that could  affect the exiting L4 nerve roots.     At L5-S1, there is moderate bilateral facet overgrowth, disc  space  narrowing, degenerative endplate changes, and posterior endplate  spurring.  There is no canal or lateral recess narrowing.  There is  mild-to-moderate right and there is moderate left foraminal narrowing.      No discernible acute fracture is seen in the lumbar spine.     IMPRESSION:  No acute fracture is seen in the lumbar spine. 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      Radiation dose reduction techniques were utilized, including automated  exposure control and exposure modulation based on body size.        This report was finalized on 5/6/2025 6:47 AM by Dr. Victor M Stiles M.D on  Workstation: [id number]       CT Lumbar Spine Without Contrast [[id number]] Collected: 05/05/25 1326     Updated: 05/06/25 0650    Narrative:      EMERGENCY CT SCAN OF THE HEAD, FACE, CERVICAL SPINE AND LUMBAR SPINE  WITHOUT CONTRAST 05/05/2025     CLINICAL HISTORY: This is an 84-year-old male patient who fell and had  head trauma and facial trauma, and also complains of neck and low back  pain.     HEAD CT TECHNIQUE: Spiral CT images were obtained from the base of the  skull to the vertex without intravenous contrast. The images were  reformatted and are submitted in 3 mm thick axial, sagittal and coronal  CT sections with brain algorithm and 2 mm thick axial CT sections with  high-resolution bone algorithm.     This is correlated to an MRI of the brain on 03/09/2025 and a head CT on  03/09/2025.     FINDINGS: There is some mild patchy low-density in the periventricular  white matter consistent with mild small vessel disease. The remainder of  the brain parenchyma is normal in attenuation. There is diffuse cerebral  atrophy. The lateral and third  ventricles are mildly prominent in size,  which is felt to be on the basis of central volume loss or atrophy. I  see no focal mass effect. There is no midline shift. No extra-axial  fluid collections are identified. There is no evidence of acute  intracranial hemorrhage. No acute skull fracture is identified. The  calvarium and the skull base are normal in appearance. The paranasal  sinuses are clear. There are lens implants in the globes from previous  bilateral cataract surgery. Otherwise, the orbits are unremarkable.  There are some mild osteoarthritic changes in the temporomandibular  joints bilaterally.       Impression:         1. No acute intracranial abnormality is identified with no change when  compared to the patient's prior head CT and MRI of the brain on  03/09/2025.     2. There is mild small vessel disease in the cerebral white matter.  There is mild diffuse cerebral atrophy and the lateral and third  ventricles are prominent in size, felt to be on the basis of central  volume loss or atrophy. There are lens implants in the globes from  previous bilateral cataract surgery. The remainder of the head CT is  within normal limits with no acute skull fracture or intracranial  hemorrhage identified.        FACIAL CT TECHNIQUE: Spiral CT images were obtained through the facial  bones in the axial imaging plane. The images were reformatted and are  submitted in 2 mm thick axial, sagittal and coronal CT sections with  soft tissue algorithm and 1 mm thick axial, sagittal and coronal CT  sections with high-resolution bone algorithm.     This is correlated to a prior facial CT on 03/09/2025.     FINDINGS: The paranasal sinuses are clear. There are lens implants in  the globes from previous bilateral cataract surgery. Otherwise, the  orbits are normal in appearance. There are mild osteoarthritic changes  in the temporomandibular joints bilaterally with minimal marginal  spurring off the mandibular heads. The  patient is edentulous. No  discernible acute fracture is seen in the facial bones.     IMPRESSION:  No acute facial fracture is identified. The patient is  edentulous and also has bilateral intraocular lens implants from  previous bilateral cataract surgery. Otherwise, the facial CT is  unremarkable.        CERVICAL SPINE CT TECHNIQUE: Spiral CT images were obtained from the  skull base down to the T2 thoracic level. The images were reformatted  and are submitted in 2 mm thick axial and sagittal CT sections with soft  tissue algorithm and 1 mm thick axial, sagittal and coronal CT sections  with high-resolution bone algorithm.     This is correlated to a prior cervical spine CT from [facility] on 03/09/2025.      FINDINGS: The atlantooccipital articulation is within normal limits.     At C1-2, there are arthritic changes at the atlantodental interval with  narrowing of the interval and marginal spurring off the superior aspect  of the anterior ring of C1 and the odontoid and there is thickening and  calcification of the transverse ligament along the back of the odontoid.  Otherwise, the C1-2 level is normal in appearance.     At C2-3, the posterior disc margin, facets and uncovertebral joints are  within normal limits with no canal or foraminal narrowing.     At C3-4, there is mild-to-moderate bilateral facet overgrowth. There is  severe disc space narrowing and there are degenerative disc and endplate  changes and a 2 mm retrolisthesis of C3 on C4, and a mild diffuse  posterior disc osteophyte complex. There is minimal, if any, canal  narrowing and there is some minimal spurring into the foramina with  minimal foraminal narrowing.     At C4-5, there is mild-to-moderate left and there is moderate right  facet overgrowth.  There is a 3 mm degenerative anterolisthesis of C4 on  C5. There is mild canal narrowing.  There is some mild right  uncovertebral joint hypertrophy and there is mild-to-moderate  right, but  no left foraminal narrowing.     At C5-6, there is moderate right facet overgrowth and minimal left facet  overgrowth. There is severe disc space narrowing and there are  degenerative disc and endplate changes.  There is a diffuse posterior  disc osteophyte complex that results in only mild canal narrowing, and  some mild uncovertebral joint hypertrophy right greater than left.   There is minimal left and there is moderate-to-severe right bony  foraminal narrowing.     At C6-7, the facets are normal. There is moderate disc space narrowing.   There are degenerative endplate changes, a mild diffuse posterior disc  osteophyte complex and mild canal narrowing. There is some uncovertebral  joint spurring into the foramina, and there is mild-to-moderate  bilateral bony foraminal narrowing.     At C7-T1, there is moderate bilateral facet overgrowth. There is a 3-4  mm degenerative anterolisthesis of C7 on T1.  There is no canal  narrowing.  There is, at least, mild-to-moderate bilateral bony  foraminal narrowing.     No acute fracture is seen in the cervical spine.     IMPRESSION:  No acute fracture is seen in the cervical spine with no  significant change when compared to a recent cervical spine CT on  03/09/2025. There is cervical spondylosis as described in great detail  above.        LUMBAR SPINE CT TECHNIQUE: Spiral CT images were obtained from the  inferior body of the T10 thoracic level down to the S2-3 sacral level  and images were reformatted and are submitted in 3 mm thick axial CT  sections with soft tissue algorithm, and 1 mm thick axial CT sections  with high-resolution bone algorithm, and 2 mm thick sagittal and coronal  reconstructions were performed and submitted in both bone and soft  tissue algorithm.     There are no prior lumbar spine CTs or MRIs for comparison. This is  correlated to a remote prior CT scan of the abdomen and pelvis on  03/17/2021.     FINDINGS: There is a moderate rotary  levoscoliotic curvature of the  lumbar spine with its apex at the L3 lumbar level. There is severe disc  space narrowing.  There are degenerative disc and endplate changes and  vacuum disc phenomenon at all levels from L1 to S1.     At T10-11, there is minimal posterior spurring and minimal left facet  overgrowth. There is minimal, if any, canal narrowing.  There is mild  left and essentially no right foraminal narrowing.     At T11-12, the posterior disc margin and facets are normal with no canal  or foraminal narrowing.     At T12-L1, there is some vacuum disc phenomenon. The posterior disc  margin and facets are normal, and there is no canal or foraminal  narrowing.     At L1-2, there is severe disc space narrowing.  There is vacuum disc  phenomenon and degenerative endplate changes, and there is a 4 mm  retrolisthesis of L1 with respect to L2, and a diffuse posterior disc  osteophyte complex.  The facets are normal.  There is mild-to-moderate  canal narrowing.  There is some spurring and air contained within  bulging or protruding disc material into the foramen.  There is mild  left and there is moderate right foraminal narrowing that could affect  the exiting right L2 nerve root.      At L2-3, there is prominent vacuum disc phenomenon, a 2--3 mm  retrolisthesis of L2 with respect to L3, and a diffuse posterior disc  osteophyte complex.  The facets are normal.  There is prominent  posterior epidural fat and there is at least mild-to-moderate up to  moderate generalized narrowing of the thecal sac.  There is mild left  foraminal narrowing, loss of vertical height of the right foramen,  eccentric spurring and bulging disc material into the inferior right  foramen, and there is moderate-to-severe right foraminal narrowing.     At L3-4, there is disc space narrowing, vacuum disc phenomenon,  degenerative endplate changes, 2-3 mm retrolisthesis of L3 with respect  to L4, diffuse posterior disc osteophyte complex,  mild-to-moderate  right, minimal left facet overgrowth and ligamentum flavum thickening,  and there is prominent posterior epidural fat, and the combination of  all of the above findings contributes to moderate-to-severe narrowing of  the thecal sac.  There is mild left, and there is moderate-to-severe  right foraminal narrowing.     At L4-5, there is moderate bilateral facet overgrowth.  There is a 2 mm  anterolisthesis of L4 with respect to L5, a diffuse posterior disc  bulge, and there is moderate narrowing of the thecal sac and some  narrowing of the lateral recesses.  There is synovial thickening off the  facets extending into the posterior foramina, spurring and uncovered  bulging disc material into the inferior aspect of the foramina, and  there is moderate-to-severe bilateral foraminal narrowing that could  affect the exiting L4 nerve roots.     At L5-S1, there is moderate bilateral facet overgrowth, disc space  narrowing, degenerative endplate changes, and posterior endplate  spurring.  There is no canal or lateral recess narrowing.  There is  mild-to-moderate right and there is moderate left foraminal narrowing.      No discernible acute fracture is seen in the lumbar spine.     IMPRESSION:  No acute fracture is seen in the lumbar spine. 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      Radiation dose reduction techniques were utilized, including automated  exposure control and exposure modulation based on body size.        This report was finalized on 5/6/2025 6:47 AM by Dr. Victor M Stiles M.D on  Workstation: [id number]       XR Ankle 3+ View Right [[id number]] Collected: 05/05/25 1137     Updated: 05/05/25 1144    Narrative:       XR ANKLE 3+ VW RIGHT-     INDICATIONS: Trauma.     TECHNIQUE: 3 VIEWS OF THE RIGHT ANKLE     COMPARISON: None available     FINDINGS:     No acute fracture, erosion, or dislocation is identified. Soft tissue  swelling is seen laterally at the ankle. Ankle mortise appears  preserved. Arterial calcifications are conspicuous. Follow-up/further  evaluation can be obtained as indications persist.       Impression:         As described.           This report was finalized on 5/5/2025 11:40 AM by Dr. Nirmal Harris M.D on Workstation: [id number]       XR Chest 1 View [[id number]] Collected: 05/05/25 1134     Updated: 05/05/25 1140    Narrative:      XR CHEST 1 VW-     HISTORY: Male who is 84 years-old, syncope     TECHNIQUE: Frontal view of the chest     COMPARISON: 12/20/2024     FINDINGS: Heart size is normal. Aorta is tortuous. Pulmonary vasculature  is unremarkable. No focal pulmonary consolidation, pleural effusion, or  pneumothorax. No acute osseous process.       Impression:      No focal pulmonary consolidation. Follow-up as clinical  indications persist.     This report was finalized on 5/5/2025 11:37 AM by Dr. Nirmal Harris M.D on Workstation: [id number]               Results for orders placed during the hospital encounter of 05/05/25    Adult Transthoracic Echo Complete W/ Cont if Necessary Per Protocol    Interpretation Summary    Left ventricular systolic function is normal. Calculated left ventricular EF = 53%    Left ventricular diastolic function is consistent with (grade I) impaired relaxation.    Estimated right ventricular systolic pressure from tricuspid regurgitation is normal (<35 mmHg).    Pertinent Labs     Results from last 7 days   Lab Units 05/09/25  0705 05/08/25  0811 05/07/25  0608 05/06/25  0525   WBC 10*3/mm3 4.89 4.43 4.45 5.70   HEMOGLOBIN g/dL 13.5 13.1 12.7* 13.7   PLATELETS 10*3/mm3 173 177 150 169     Results from last 7 days   Lab Units 05/09/25  0705 05/08/25  0811 05/07/25  0608  "05/06/25  0525   SODIUM mmol/L 137 137 138 141   POTASSIUM mmol/L 4.0 3.4* 4.2 4.2   CHLORIDE mmol/L 103 101 103 104   CO2 mmol/L 24.0 25.4 27.6 26.2   BUN mg/dL 11 14 19 19   CREATININE mg/dL 0.81 0.84 0.70* 0.81   GLUCOSE mg/dL 76 141* 86 78   Estimated Creatinine Clearance: 71.7 mL/min (by C-G formula based on SCr of 0.81 mg/dL).  Results from last 7 days   Lab Units 05/08/25  0811 05/07/25  0608 05/05/25  1057   ALBUMIN g/dL 2.7* 2.7* 3.4*   BILIRUBIN mg/dL 0.4 0.5 1.1   ALK PHOS U/L 85 78 112   AST (SGOT) U/L 31 33 42*   ALT (SGPT) U/L 20 19 27     Results from last 7 days   Lab Units 05/09/25  0705 05/08/25  0811 05/07/25  0608 05/06/25  0525 05/05/25  1057   CALCIUM mg/dL 8.3* 8.2* 8.0* 8.4* 9.1   ALBUMIN g/dL  --  2.7* 2.7*  --  3.4*   MAGNESIUM mg/dL 1.9 2.0 1.9 2.0  --    PHOSPHORUS mg/dL 2.8 2.3* 2.8 3.2  --        Results from last 7 days   Lab Units 05/05/25  1202 05/05/25  1057   CK TOTAL U/L  --  245*   HSTROP T ng/L 27* 30*   PROBNP pg/mL  --  2,060.0*           Invalid input(s): \"LDLCALC\"          Test Results Pending at Discharge       Discharge Details        Discharge Medications        New Medications        Instructions Start Date   acetaminophen 500 MG tablet  Commonly known as: TYLENOL   Take 2 tablets by mouth Every 8 (Eight) Hours As Needed for mild to Moderate Pain, or Headache for up to 15 days.      lisinopril 10 MG tablet  Commonly known as: PRINIVIL,ZESTRIL   10 mg, Oral, Every 24 Hours Scheduled   Start Date: May 10, 2025     vitamin B-12 1000 MCG tablet  Commonly known as: CYANOCOBALAMIN   1,000 mcg, Oral, Daily             Continue These Medications        Instructions Start Date   albuterol sulfate  (90 Base) MCG/ACT inhaler  Commonly known as: PROVENTIL HFA;VENTOLIN HFA;PROAIR HFA   2 puffs, Inhalation, Every 4 Hours PRN      ammonium lactate 12 % lotion  Commonly known as: LAC-HYDRIN   Topical, As Needed, Best for lower extremity dry chronically's skin      celecoxib 200 " MG capsule  Commonly known as: CeleBREX   200 mg, Oral, 2 Times Daily, With water, take lowest effective dose      folic acid 1 MG tablet  Commonly known as: FOLVITE   1 mg, Oral, Daily      levothyroxine 125 MCG tablet  Commonly known as: SYNTHROID, LEVOTHROID   125 mcg, Oral, Daily      sertraline 50 MG tablet  Commonly known as: Zoloft   50 mg, Oral, Daily, For improved mood and feelings of wellbeing               Allergies   Allergen Reactions    Milk-Related Compounds Other (See Comments)     Constipation    Pregabalin      Other reaction(s): Visual Disturbance    Neomycin-Bacitracin Zn-Polymyx Rash       Discharge Disposition:  Home-Health Care Svc      Discharge Diet:  Diet Order   Procedures    Diet: Vegetarian; Vegan (No animal products); Fluid Consistency: Thin (IDDSI 0)       Discharge Activity:       CODE STATUS:    Code Status and Medical Interventions: CPR (Attempt to Resuscitate); Full   Ordered at: 05/05/25 1631     Code Status (Patient has no pulse and is not breathing):    CPR (Attempt to Resuscitate)     Medical Interventions (Patient has pulse or is breathing):    Full       Future Appointments   Date Time Provider Department Center   5/13/2025  9:30 AM Epley, James, APRN [facility]   5/19/2025  1:30 PM Epley, James, APRN [facility]     Additional Instructions for the Follow-ups that You Need to Schedule       Ambulatory Referral to Home Health   As directed      Face to Face Visit Date: 5/9/2025   Follow-up provider for Plan of Care?: I treated the patient in an acute care facility and will not continue treatment after discharge.   Follow-up provider: EPLEY, JAMES [[id number]]   Reason/Clinical Findings: Questionable syncope   Describe mobility limitations that make leaving home difficult: Recurrent falls, weakness   Nursing/Therapeutic Services Requested: Physical Therapy   PT orders: Therapeutic exercise Gait Training Transfer training Strengthening   Weight Bearing Status: As Tolerated    Frequency: 1 Week 1               Follow-up Information       Epley, James, MICHAEL. Schedule an appointment as soon as possible for a visit on 5/13/2025.    Specialties: Nurse Practitioner, Family Medicine  Why: Appointment will be Tuesday, May 13, 2025 at 9:30am  Contact information:  [address]  [address]  [facility] [address]  [phone]                             Additional Instructions for the Follow-ups that You Need to Schedule       Ambulatory Referral to Home Health   As directed      Face to Face Visit Date: 5/9/2025   Follow-up provider for Plan of Care?: I treated the patient in an acute care facility and will not continue treatment after discharge.   Follow-up provider: EPLEY, JAMES [[id number]]   Reason/Clinical Findings: Questionable syncope   Describe mobility limitations that make leaving home difficult: Recurrent falls, weakness   Nursing/Therapeutic Services Requested: Physical Therapy   PT orders: Therapeutic exercise Gait Training Transfer training Strengthening   Weight Bearing Status: As Tolerated   Frequency: 1 Week 1            Time Spent on Discharge:  Greater than 30 minutes      Orquidea Haas MD  [facility]  05/09/25  15:12 EDT

## 2025-05-09 NOTE — THERAPY TREATMENT NOTE
Acute Care - Physical Therapy Treatment Note  [facility]     Patient Name: Darwin Sparks  : 1940  MRN: [id number]  Today's Date: 2025      Visit Dx:     ICD-10-CM ICD-9-CM   1. Syncope, unspecified syncope type  R55 780.2   2. Acute on chronic low back pain  M54.50 724.2    G89.29 338.19     338.29   3. Contusion of face, initial encounter  S00.83XA 920   4. Sprain of right ankle, unspecified ligament, initial encounter  S93.401A 845.00   5. Syncope and collapse  R55 780.2     Patient Active Problem List   Diagnosis    Primary hypertension    Mixed hyperlipidemia    Adult hypothyroidism    Insomnia    Anxiety    Dermatitis, eczematoid    Bronchitis    Seasonal allergies    Health care maintenance    Chronic midline low back pain without sciatica    Penis pain    Tinea cruris    Primary osteoarthritis involving multiple joints    Benzodiazepine dependence    Constipation    Abnormal finding on CT scan    Scoliosis of lumbar spine    Lumbar facet arthropathy    Vertigo    Multiple falls    Severe major depression    Decreased strength, endurance, and mobility    Gait instability    Syncope     Past Medical History:   Diagnosis Date    Cataract     Colon polyp     10 years ago    Deep vein thrombosis     Childhood    Headache 28 years old    HL (hearing loss) 8 years ago    Not too bad, hearing aids for crouded places    Hyperlipidemia     Hypertension     Hypothyroidism     Infectious viral hepatitis     A & B ?    Inflammatory bowel disease Child to 77 years old    Constopation, alergy animal protine,Vegan diet    Irritable bowel syndrome     Vegan diet big improvement    Kidney stone 6    4 surgeries    Low back pain 1973    Osteoarthritis     Pneumonia Child    2 times    Prostate cancer 2013    Scoliosis 1973    Urinary tract infection 1973    After kidney stones    Visual impairment 1960    First glasses     Past Surgical History:   Procedure Laterality Date    ADENOIDECTOMY  10 years old     CATARACT EXTRACTION      CIRCUMCISION      COLONOSCOPY      COLONOSCOPY N/A 05/27/2021    Procedure: COLONOSCOPY TO CECUM/TI;  Surgeon: Jamel Michaels MD;  Location: [facility] ENDOSCOPY;  Service: Gastroenterology;  Laterality: N/A;  Pre op: Abnormal cat scan, constipation, RLQ pain  Post op: Diverticulosis, Hemorrhoids, otherwise normal to and including TI.    MEDIAL BRANCH BLOCK Bilateral 07/30/2021    Procedure: LUMBAR MEDIAL BRANCH BLOCK;  Surgeon: Kb Rodriguez MD;  Location: [facility];  Service: Pain Management;  Laterality: Bilateral;    MEDIAL BRANCH BLOCK Bilateral 08/20/2021    Procedure: MEDIAL BRANCH BLOCK--bilateral lumbar3-lumbar5;  Surgeon: Kb Rodriguez MD;  Location: [facility];  Service: Pain Management;  Laterality: Bilateral;    PROSTATE SURGERY  72 years old    Low radiation, started vegan diet    RADIOFREQUENCY ABLATION Bilateral 10/08/2021    Procedure: RADIOFREQUENCY ABLATION LUMBAR--bilateral lumbar3-5 WITH MAC;  Surgeon: Kb Rodriguez MD;  Location: [facility];  Service: Pain Management;  Laterality: Bilateral;    TONSILLECTOMY      TOOTH EXTRACTION       PT Assessment (Last 12 Hours)       PT Evaluation and Treatment       Row Name 05/09/25 1400          Physical Therapy Time and Intention    Subjective Information no complaints  -     Document Type therapy note (daily note)  -     Mode of Treatment individual therapy;physical therapy  -     Patient Effort good  -       Row Name 05/09/25 1400          General Information    Patient Profile Reviewed yes  -     Patient/Family/Caregiver Comments/Observations pt supine in bed no acute distress  -     Existing Precautions/Restrictions fall  -     Barriers to Rehab none identified  -       Row Name 05/09/25 1400          Living Environment    Current Living Arrangements home  -     People in Home spouse  -       Row Name 05/09/25 1400          Pain    Pretreatment Pain Rating 0/10 - no pain  -      Posttreatment Pain Rating 0/10 - no pain  -       Row Name 05/09/25 1400          Cognition    Orientation Status (Cognition) oriented x 4  -       Row Name 05/09/25 1400          Bed Mobility    Supine-Sit [place] (Bed Mobility) standby assist  -     Sit-Supine [place] (Bed Mobility) contact guard  -     Assistive Device (Bed Mobility) head of bed elevated;bed rails  -     Comment, (Bed Mobility) increased time required to complete  -[facility] Name 05/09/25 1400          Sit-Stand Transfer    Sit-Stand [place] (Transfers) contact guard  -     Assistive Device (Sit-Stand Transfers) walker, front-wheeled  -       Row Name 05/09/25 1400          Stand-Sit Transfer    Stand-Sit [place] (Transfers) contact guard  -     Assistive Device (Stand-Sit Transfers) walker, front-wheeled  -       Row Name 05/09/25 1400          Gait/Stairs (Locomotion)    [place] Level (Gait) contact guard  -     Assistive Device (Gait) walker, front-wheeled  -     Distance in Feet (Gait) 40  -     Pattern (Gait) step-through  -     Deviations/Abnormal Patterns (Gait) bilateral deviations;weight shifting decreased;carolyn decreased  -     Bilateral Gait Deviations forward flexed posture;heel strike decreased  bilateral toe inning  -       Row Name 05/09/25 1400          Plan of Care Review    Plan of Care Reviewed With patient  -     Progress improving  -     Outcome Evaluation Pt pleasant and cooperative, PT assisted pt w donning shoes for ambulation, 40ft CGA rwx slowed carolyn and bilateral toe inning. Pt reports he feels he is close to his baseline mobility. Anticipate return home w [facility] and caregiver assist.  -       Row Name 05/09/25 1400          Positioning and Restraints    Pre-Treatment Position in bed  -     Post Treatment Position bed  -     In Bed supine;call light within reach;exit alarm on;encouraged to call for assist;notified [facility]  -               User Key   (r) = Recorded By, (t) = Taken By, (c) = Cosigned By      Initials Name Provider Type     Denita Baker, PT Physical Therapist                    Physical Therapy Education       Title: PT OT SLP Therapies (In Progress)       Topic: Physical Therapy (In Progress)       Point: Mobility training (In Progress)       Learning Progress Summary            Patient Acceptance, E, NR by  at 5/9/2025 1502                      Point: Home exercise program (In Progress)       Learning Progress Summary            Patient Acceptance, E, NR by  at 5/9/2025 1502                      Point: Body mechanics (In Progress)       Learning Progress Summary            Patient Acceptance, E, NR by  at 5/9/2025 1502                      Point: Precautions (In Progress)       Learning Progress Summary            Patient Acceptance, E, NR by  at 5/9/2025 1502                                      User Key       Initials Effective Dates Name Provider Type [facility] 06/16/21 -  Denita Baker, PT Physical Therapist PT                  PT Recommendation and Plan  Anticipated Discharge Disposition (PT): home with assist, home with 24/7 care, home with home health  Plan of Care Reviewed With: patient  Progress: improving  Outcome Evaluation: Pt pleasant and cooperative, PT assisted pt w donning shoes for ambulation, 40ft CGA rwx slowed carolyn and bilateral toe inning. Pt reports he feels he is close to his baseline mobility. Anticipate return home w [facility] and caregiver assist.   Outcome Measures       Row Name 05/09/25 1500             How much help from another person do you currently need...    Turning from your back to your side while in flat bed without using bedrails? 3  -LH      Moving from lying on back to sitting on the side of a flat bed without bedrails? 3  -LH      Moving to and from a bed to a chair (including a wheelchair)? 3  -LH      Standing up from a chair using your arms (e.g., wheelchair, bedside chair)? 3  -LH       Climbing 3-5 steps with a railing? 2  -      To walk in hospital room? 3  -      AM-PAC 6 Clicks Score (PT) 17  -         Functional Assessment    Outcome Measure Options AM-PAC 6 Clicks Basic Mobility (PT)  -                User Key  (r) = Recorded By, (t) = Taken By, (c) = Cosigned By      Initials Name Provider Type     Denita Baker, PT Physical Therapist                     Time Calculation:    PT Charges       Row Name 05/09/25 1502             Time Calculation    Start Time 1425  -      Stop Time 1445  -      Time Calculation (min) 20 min  -      PT Received On 05/09/25  -      PT - Next Appointment 05/12/25  -                User Key  (r) = Recorded By, (t) = Taken By, (c) = Cosigned By      Initials Name Provider Type     Denita Baker, PT Physical Therapist                  Therapy Charges for Today       Code Description Service Date Service Provider Modifiers Qty    05617376318 HC PT THER PROC EA 15 MIN 5/9/2025 Denita Baker, PT GP 1            PT G-Codes  Outcome Measure Options: AM-PAC 6 Clicks Basic Mobility (PT)  AM-PAC 6 Clicks Score (PT): 17  AM-PAC 6 Clicks Score (OT): 20  Modified [place]aft Scale: 4 - Moderately severe disability.  Unable to walk without assistance, and unable to attend to own bodily needs without assistance.    Denita Baker, PT  5/9/2025

## 2025-05-09 NOTE — CASE MANAGEMENT/SOCIAL WORK
Continued Stay Note  [facility]     Patient Name: Darwin Sparks  MRN: [id number]  Today's Date: 5/9/2025    Admit Date: 5/5/2025    Plan: Home with spouse and caregivers   Discharge Plan       Row Name 05/09/25 1617       Plan    Plan Comments Pt states he does not have any way to pay for his new meds from Meds to Bed: $17.56.   CCP will cover the cost. ........Pratibha PEREZ/ OFELIA      Row Name 05/09/25 7001       Plan    Plan Comments DC orders and order for home health noted.  CCP spoke w/ pt at bedside and recommended HH for PT.   Pt states he is not interested in home health at this time.  CCP encuraged him to discuss w/ his PCP if he changes his mind after DC. Pt's friend [employer] will provide transport home...........Pratibha PEREZ/ OFELIA                   Discharge Codes    No documentation.                 Expected Discharge Date and Time       Expected Discharge Date Expected Discharge Time    May 9, 2025               Pratibha Felix RN

## 2025-05-09 NOTE — PLAN OF CARE
Goal Outcome Evaluation:  Plan of Care Reviewed With: patient        Progress: improving  Outcome Evaluation: Pt pleasant and cooperative, PT assisted pt w donning shoes for ambulation, 40ft CGA rwx slowed carolyn and bilateral toe inning. Pt reports he feels he is close to his baseline mobility. Anticipate return home w C and caregiver assist.    Anticipated Discharge Disposition (PT): home with assist, home with 24/7 care, home with home health

## 2025-05-09 NOTE — CASE MANAGEMENT/SOCIAL WORK
Continued Stay Note  [facility]     Patient Name: Darwin Sparks  MRN: [id number]  Today's Date: 5/9/2025    Admit Date: 5/5/2025    Plan: Home with spouse and caregivers   Discharge Plan       Row Name 05/09/25 1559       Plan    Plan Comments DC orders and order for home health noted.  CCP spoke w/ pt at bedside and recommended HH for PT.   Pt states he is not interested in home health at this time.  CCP encuraged him to discuss w/ his PCP if he changes his mind after DC. Pt's friend [employer] will provide transport home...........Pratibha PEREZ/ OFELIA                   Discharge Codes    No documentation.                 Expected Discharge Date and Time       Expected Discharge Date Expected Discharge Time    May 9, 2025               Pratibha Felix RN

## 2025-05-09 NOTE — OUTREACH NOTE
Prep Survey      Flowsheet Row Responses   [facility] patient discharged from? [place]   Is LACE score < 7 ? No   Eligibility [facility]   Date of Admission 05/05/25   Date of Discharge 05/09/25   Discharge diagnosis Syncope   Does the patient have one of the following disease processes/diagnoses(primary or secondary)? Other   Prep survey completed? Yes            Suze PATINO - Registered Nurse

## 2025-05-10 NOTE — CASE MANAGEMENT/SOCIAL WORK
Case Management Discharge Note      Final Note: home; denied HH         Selected Continued Care - Discharged on 5/9/2025 Admission date: 5/5/2025 - Discharge disposition: Home-Health Care Svc      Destination    No services have been selected for the patient.                Durable Medical Equipment    No services have been selected for the patient.                Dialysis/Infusion    No services have been selected for the patient.                Home Medical Care    No services have been selected for the patient.                Therapy    No services have been selected for the patient.                Community Resources    No services have been selected for the patient.                Community & DME    No services have been selected for the patient.                    Transportation Services  Private: Car    Final Discharge Disposition Code: 01 - home or self-care

## 2025-05-12 ENCOUNTER — TRANSITIONAL CARE MANAGEMENT TELEPHONE ENCOUNTER (OUTPATIENT)
Dept: CALL CENTER | Facility: HOSPITAL | Age: 85
End: 2025-05-12
Payer: MEDICARE

## 2025-05-12 NOTE — OUTREACH NOTE
Call Center TCM Note      Flowsheet Row Responses   [facility] patient discharged from? [place]   Does the patient have one of the following disease processes/diagnoses(primary or secondary)? Other   TCM attempt successful? No   Unsuccessful attempts Attempt 2  [Release is from 2017 with Roger]            Ian BUENROSTRO - Registered Nurse    5/12/2025, 10:41 EDT

## 2025-05-13 ENCOUNTER — OFFICE VISIT (OUTPATIENT)
Dept: FAMILY MEDICINE CLINIC | Facility: CLINIC | Age: 85
End: 2025-05-13
Payer: MEDICARE

## 2025-05-13 ENCOUNTER — TRANSITIONAL CARE MANAGEMENT TELEPHONE ENCOUNTER (OUTPATIENT)
Dept: CALL CENTER | Facility: HOSPITAL | Age: 85
End: 2025-05-13
Payer: MEDICARE

## 2025-05-13 VITALS
DIASTOLIC BLOOD PRESSURE: 88 MMHG | OXYGEN SATURATION: 98 % | WEIGHT: 176.7 LBS | HEIGHT: 68 IN | BODY MASS INDEX: 26.78 KG/M2 | TEMPERATURE: 97.6 F | HEART RATE: 51 BPM | SYSTOLIC BLOOD PRESSURE: 164 MMHG

## 2025-05-13 DIAGNOSIS — R29.6 RECURRENT FALLS: ICD-10-CM

## 2025-05-13 DIAGNOSIS — R55 SYNCOPE, UNSPECIFIED SYNCOPE TYPE: ICD-10-CM

## 2025-05-13 DIAGNOSIS — I10 PRIMARY HYPERTENSION: Primary | ICD-10-CM

## 2025-05-13 PROCEDURE — 99213 OFFICE O/P EST LOW 20 MIN: CPT | Performed by: NURSE PRACTITIONER

## 2025-05-13 PROCEDURE — 1125F AMNT PAIN NOTED PAIN PRSNT: CPT | Performed by: NURSE PRACTITIONER

## 2025-05-13 RX ORDER — FOLIC ACID 1 MG/1
1 TABLET ORAL DAILY
Qty: 90 TABLET | Refills: 3 | Status: SHIPPED | OUTPATIENT
Start: 2025-05-13

## 2025-05-13 RX ORDER — LANOLIN ALCOHOL/MO/W.PET/CERES
1000 CREAM (GRAM) TOPICAL DAILY
Qty: 90 TABLET | Refills: 3 | Status: SHIPPED | OUTPATIENT
Start: 2025-05-13

## 2025-05-13 NOTE — OUTREACH NOTE
Call Center TCM Note      Flowsheet Row Responses   [facility] patient discharged from? [place]   Does the patient have one of the following disease processes/diagnoses(primary or secondary)? Other   TCM attempt successful? No   Unsuccessful attempts Attempt 3   Revoked Reason Other  [Pt has arrived at PCP office for HOSP DC FU appt. TCM complete.]            RADHA LAND - Registered Nurse    5/13/2025, 09:01 EDT

## 2025-05-13 NOTE — PATIENT INSTRUCTIONS
Discharge instructions,    Recheck blood pressure tomorrow,  When to keep your blood pressure less than 140 systolic  And generally greater than 120 systolic  To avoid falls    Challenge sit to stand exercises hourly when not able or not needing to get up,  To maintain strength,  Continue present medication make sure you are taking B12 1000 mcg daily as well as folic acid 1 mg daily chronically to ensure strength and to avoid fatigue related to deficiencies    Recheck in 2 weeks or what ever works for your schedule,  Will do a good follow-up make sure your blood pressure is doing good and I will check a few labs at that time as well nonfasting so

## 2025-05-13 NOTE — PROGRESS NOTES
"Chief Complaint  Loss of Consciousness    Subjective        Darwin Sparks presents to [facility]  History of Present Illness  Recurrent falls patient recently fell became weak lost consciousness hit his head on the table, evaluated in the hospital was admitted for weakness, blood pressure was quite elevated resumed lisinopril 10 and then moved to 20 it was too low back down to 10, macrocytic anemia with low B12 and folate given B12 injection resume B12 and folate in the hospital  Check blood pressure at home he will recheck as little high today no chest pain or shortness of breath, he has 2 nurses to come out weekly to help,      Objective   Vital Signs:  /88 (BP Location: Left arm, Patient Position: Sitting, Cuff Size: Adult)   Pulse 51   Temp 97.6 °F (36.4 °C) (Temporal)   Ht 172.7 cm (68\")   Wt 80.2 kg (176 lb 11.2 oz)   SpO2 98%   BMI 26.87 kg/m²   Estimated body mass index is 26.87 kg/m² as calculated from the following:    Height as of this encounter: 172.7 cm (68\").    Weight as of this encounter: 80.2 kg (176 lb 11.2 oz).          Physical Exam  Constitutional:       General: He is not in acute distress.     Appearance: Normal appearance. He is not ill-appearing, toxic-appearing or diaphoretic.   Eyes:      Conjunctiva/sclera: Conjunctivae normal.      Pupils: Pupils are equal, round, and reactive to light.   Cardiovascular:      Rate and Rhythm: Normal rate and regular rhythm.   Pulmonary:      Effort: Pulmonary effort is normal. No respiratory distress.   Musculoskeletal:      Comments: Sitting in wheelchair able to propel self with feet,  No significant lower extremity swelling   Neurological:      Mental Status: He is oriented to person, place, and time.   Psychiatric:         Mood and Affect: Mood normal.         Thought Content: Thought content normal.         Judgment: Judgment normal.      Comments: Good spirits appropriate normal      Result Review :       "          Assessment and Plan   Diagnoses and all orders for this visit:    1. Primary hypertension (Primary)    2. Syncope, unspecified syncope type    3. Recurrent falls    Other orders  -     folic acid (FOLVITE) 1 MG tablet; Take 1 tablet by mouth Daily.  Dispense: 90 tablet; Refill: 3  -     vitamin B-12 (CYANOCOBALAMIN) 1000 MCG tablet; Take 1 tablet by mouth Daily.  Dispense: 90 tablet; Refill: 3             Follow Up   Return in about 2 weeks (around 5/27/2025) for Print discharge instructions/educational handouts.  Patient was given instructions and counseling regarding his condition or for health maintenance advice. Please see specific information pulled into the AVS if appropriate.     Patient Instructions   Discharge instructions,    Recheck blood pressure tomorrow,  When to keep your blood pressure less than 140 systolic  And generally greater than 120 systolic  To avoid falls    Challenge sit to stand exercises hourly when not able or not needing to get up,  To maintain strength,  Continue present medication make sure you are taking B12 1000 mcg daily as well as folic acid 1 mg daily chronically to ensure strength and to avoid fatigue related to deficiencies    Recheck in 2 weeks or what ever works for your schedule,  Will do a good follow-up make sure your blood pressure is doing good and I will check a few labs at that time as well nonfasting so     Continue lisinopril 10 mg recheck blood pressure tomorrow

## 2025-06-09 ENCOUNTER — TELEPHONE (OUTPATIENT)
Dept: FAMILY MEDICINE CLINIC | Facility: CLINIC | Age: 85
End: 2025-06-09
Payer: MEDICARE

## 2025-07-23 ENCOUNTER — OFFICE VISIT (OUTPATIENT)
Dept: FAMILY MEDICINE CLINIC | Facility: CLINIC | Age: 85
End: 2025-07-23
Payer: MEDICARE

## 2025-07-23 VITALS — BODY MASS INDEX: 26.67 KG/M2 | OXYGEN SATURATION: 97 % | WEIGHT: 176 LBS | HEART RATE: 70 BPM | HEIGHT: 68 IN

## 2025-07-23 DIAGNOSIS — I10 PRIMARY HYPERTENSION: ICD-10-CM

## 2025-07-23 DIAGNOSIS — R07.9 CHEST PAIN, UNSPECIFIED TYPE: ICD-10-CM

## 2025-07-23 DIAGNOSIS — E03.9 ADULT HYPOTHYROIDISM: ICD-10-CM

## 2025-07-23 DIAGNOSIS — E88.09 HYPOALBUMINEMIA: ICD-10-CM

## 2025-07-23 DIAGNOSIS — R06.09 DYSPNEA ON EXERTION: ICD-10-CM

## 2025-07-23 DIAGNOSIS — R79.89 ELEVATED BRAIN NATRIURETIC PEPTIDE (BNP) LEVEL: ICD-10-CM

## 2025-07-23 DIAGNOSIS — R63.4 ABNORMAL WEIGHT LOSS: ICD-10-CM

## 2025-07-23 DIAGNOSIS — R60.9 DEPENDENT EDEMA: Primary | ICD-10-CM

## 2025-07-23 DIAGNOSIS — E78.2 MIXED HYPERLIPIDEMIA: ICD-10-CM

## 2025-07-23 DIAGNOSIS — M79.89 LEG SWELLING: ICD-10-CM

## 2025-07-23 PROCEDURE — G2211 COMPLEX E/M VISIT ADD ON: HCPCS | Performed by: NURSE PRACTITIONER

## 2025-07-23 PROCEDURE — 99214 OFFICE O/P EST MOD 30 MIN: CPT | Performed by: NURSE PRACTITIONER

## 2025-07-23 PROCEDURE — 1125F AMNT PAIN NOTED PAIN PRSNT: CPT | Performed by: NURSE PRACTITIONER

## 2025-07-23 RX ORDER — FUROSEMIDE 20 MG/1
20 TABLET ORAL DAILY PRN
Qty: 30 TABLET | Refills: 2 | Status: SHIPPED | OUTPATIENT
Start: 2025-07-23

## 2025-07-23 RX ORDER — POTASSIUM CHLORIDE 750 MG/1
20 CAPSULE, EXTENDED RELEASE ORAL DAILY PRN
Qty: 60 CAPSULE | Refills: 2 | Status: SHIPPED | OUTPATIENT
Start: 2025-07-23

## 2025-07-29 ENCOUNTER — HOSPITAL ENCOUNTER (OUTPATIENT)
Facility: HOSPITAL | Age: 85
Setting detail: OBSERVATION
Discharge: HOME | End: 2025-08-01
Attending: STUDENT IN AN ORGANIZED HEALTH CARE EDUCATION/TRAINING PROGRAM | Admitting: INTERNAL MEDICINE
Payer: MEDICARE

## 2025-07-29 ENCOUNTER — APPOINTMENT (OUTPATIENT)
Dept: CT IMAGING | Facility: HOSPITAL | Age: 85
End: 2025-07-29
Payer: MEDICARE

## 2025-07-29 DIAGNOSIS — S09.90XA CHI (CLOSED HEAD INJURY), INITIAL ENCOUNTER: ICD-10-CM

## 2025-07-29 DIAGNOSIS — I10 UNCONTROLLED HYPERTENSION: ICD-10-CM

## 2025-07-29 DIAGNOSIS — R29.6 REPEATED FALLS: ICD-10-CM

## 2025-07-29 DIAGNOSIS — R41.82 ALTERED MENTAL STATUS, UNSPECIFIED ALTERED MENTAL STATUS TYPE: Primary | ICD-10-CM

## 2025-07-29 LAB
ALBUMIN SERPL-MCNC: 3.4 G/DL (ref 3.5–5.2)
ALBUMIN/GLOB SERPL: 1.4 G/DL
ALP SERPL-CCNC: 84 U/L (ref 39–117)
ALT SERPL W P-5'-P-CCNC: 19 U/L (ref 1–41)
AMPHET+METHAMPHET UR QL: NEGATIVE
AMPHETAMINES UR QL: NEGATIVE
ANION GAP SERPL CALCULATED.3IONS-SCNC: 9.9 MMOL/L (ref 5–15)
ANISOCYTOSIS BLD QL: ABNORMAL
APTT PPP: 25.4 SECONDS (ref 22.7–35.4)
AST SERPL-CCNC: 31 U/L (ref 1–40)
BARBITURATES UR QL SCN: NEGATIVE
BASOPHILS # BLD MANUAL: 0.06 10*3/MM3 (ref 0–0.2)
BASOPHILS NFR BLD MANUAL: 1 % (ref 0–1.5)
BENZODIAZ UR QL SCN: NEGATIVE
BILIRUB SERPL-MCNC: 0.3 MG/DL (ref 0–1.2)
BILIRUB UR QL STRIP: NEGATIVE
BUN SERPL-MCNC: 5 MG/DL (ref 8–23)
BUN/CREAT SERPL: 7.9 (ref 7–25)
BUPRENORPHINE SERPL-MCNC: NEGATIVE NG/ML
CALCIUM SPEC-SCNC: 8.8 MG/DL (ref 8.6–10.5)
CANNABINOIDS SERPL QL: NEGATIVE
CHLORIDE SERPL-SCNC: 107 MMOL/L (ref 98–107)
CK SERPL-CCNC: 122 U/L (ref 20–200)
CK SERPL-CCNC: 136 U/L (ref 20–200)
CLARITY UR: CLEAR
CO2 SERPL-SCNC: 24.1 MMOL/L (ref 22–29)
COCAINE UR QL: NEGATIVE
COLOR UR: YELLOW
CREAT SERPL-MCNC: 0.63 MG/DL (ref 0.76–1.27)
DEPRECATED RDW RBC AUTO: 48.9 FL (ref 37–54)
EGFRCR SERPLBLD CKD-EPI 2021: 92.6 ML/MIN/1.73
EOSINOPHIL # BLD MANUAL: 0.06 10*3/MM3 (ref 0–0.4)
EOSINOPHIL NFR BLD MANUAL: 1 % (ref 0.3–6.2)
ERYTHROCYTE [DISTWIDTH] IN BLOOD BY AUTOMATED COUNT: 12.8 % (ref 12.3–15.4)
FENTANYL UR-MCNC: NEGATIVE NG/ML
GEN 5 1HR TROPONIN T REFLEX: 33 NG/L
GLOBULIN UR ELPH-MCNC: 2.5 GM/DL
GLUCOSE SERPL-MCNC: 101 MG/DL (ref 65–99)
GLUCOSE UR STRIP-MCNC: NEGATIVE MG/DL
HCT VFR BLD AUTO: 43.9 % (ref 37.5–51)
HGB BLD-MCNC: 14.2 G/DL (ref 13–17.7)
HGB UR QL STRIP.AUTO: NEGATIVE
HIV 1+2 AB+HIV1 P24 AG SERPL QL IA: NORMAL
INR PPP: 0.96 (ref 0.9–1.1)
KETONES UR QL STRIP: NEGATIVE
LEUKOCYTE ESTERASE UR QL STRIP.AUTO: NEGATIVE
LYMPHOCYTES # BLD MANUAL: 3.75 10*3/MM3 (ref 0.7–3.1)
LYMPHOCYTES NFR BLD MANUAL: 5.1 % (ref 5–12)
MACROCYTES BLD QL SMEAR: ABNORMAL
MCH RBC QN AUTO: 34.1 PG (ref 26.6–33)
MCHC RBC AUTO-ENTMCNC: 32.3 G/DL (ref 31.5–35.7)
MCV RBC AUTO: 105.3 FL (ref 79–97)
METHADONE UR QL SCN: NEGATIVE
MONOCYTES # BLD: 0.29 10*3/MM3 (ref 0.1–0.9)
NEUTROPHILS # BLD AUTO: 1.58 10*3/MM3 (ref 1.7–7)
NEUTROPHILS NFR BLD MANUAL: 27.6 % (ref 42.7–76)
NITRITE UR QL STRIP: NEGATIVE
NRBC BLD AUTO-RTO: 0 /100 WBC (ref 0–0.2)
NT-PROBNP SERPL-MCNC: 1438 PG/ML (ref 0–1800)
OPIATES UR QL: NEGATIVE
OXYCODONE UR QL SCN: NEGATIVE
PCP UR QL SCN: NEGATIVE
PH UR STRIP.AUTO: 7.5 [PH] (ref 5–8)
PLAT MORPH BLD: NORMAL
PLATELET # BLD AUTO: 257 10*3/MM3 (ref 140–450)
PMV BLD AUTO: 10.4 FL (ref 6–12)
POTASSIUM SERPL-SCNC: 4.8 MMOL/L (ref 3.5–5.2)
PROT SERPL-MCNC: 5.9 G/DL (ref 6–8.5)
PROT UR QL STRIP: NEGATIVE
PROTHROMBIN TIME: 12.7 SECONDS (ref 11.7–14.2)
QT INTERVAL: 490 MS
QTC INTERVAL: 532 MS
RBC # BLD AUTO: 4.17 10*6/MM3 (ref 4.14–5.8)
SODIUM SERPL-SCNC: 141 MMOL/L (ref 136–145)
SP GR UR STRIP: 1.01 (ref 1–1.03)
TRICYCLICS UR QL SCN: NEGATIVE
TROPONIN T % DELTA: 6
TROPONIN T NUMERIC DELTA: 2 NG/L
TROPONIN T SERPL HS-MCNC: 31 NG/L
TSH SERPL DL<=0.05 MIU/L-ACNC: 9.38 UIU/ML (ref 0.27–4.2)
UROBILINOGEN UR QL STRIP: NORMAL
VARIANT LYMPHS NFR BLD MANUAL: 65.3 % (ref 19.6–45.3)
VIT B12 BLD-MCNC: 498 PG/ML (ref 211–946)
WBC MORPH BLD: NORMAL
WBC NRBC COR # BLD AUTO: 5.74 10*3/MM3 (ref 3.4–10.8)

## 2025-07-29 PROCEDURE — 25810000003 SODIUM CHLORIDE 0.9 % SOLUTION: Performed by: STUDENT IN AN ORGANIZED HEALTH CARE EDUCATION/TRAINING PROGRAM

## 2025-07-29 PROCEDURE — G0432 EIA HIV-1/HIV-2 SCREEN: HCPCS | Performed by: INTERNAL MEDICINE

## 2025-07-29 PROCEDURE — 84484 ASSAY OF TROPONIN QUANT: CPT | Performed by: STUDENT IN AN ORGANIZED HEALTH CARE EDUCATION/TRAINING PROGRAM

## 2025-07-29 PROCEDURE — 81003 URINALYSIS AUTO W/O SCOPE: CPT | Performed by: STUDENT IN AN ORGANIZED HEALTH CARE EDUCATION/TRAINING PROGRAM

## 2025-07-29 PROCEDURE — G0378 HOSPITAL OBSERVATION PER HR: HCPCS

## 2025-07-29 PROCEDURE — 83880 ASSAY OF NATRIURETIC PEPTIDE: CPT | Performed by: INTERNAL MEDICINE

## 2025-07-29 PROCEDURE — 84443 ASSAY THYROID STIM HORMONE: CPT | Performed by: INTERNAL MEDICINE

## 2025-07-29 PROCEDURE — 85014 HEMATOCRIT: CPT | Performed by: INTERNAL MEDICINE

## 2025-07-29 PROCEDURE — 80307 DRUG TEST PRSMV CHEM ANLYZR: CPT | Performed by: INTERNAL MEDICINE

## 2025-07-29 PROCEDURE — 80053 COMPREHEN METABOLIC PANEL: CPT | Performed by: STUDENT IN AN ORGANIZED HEALTH CARE EDUCATION/TRAINING PROGRAM

## 2025-07-29 PROCEDURE — 96374 THER/PROPH/DIAG INJ IV PUSH: CPT

## 2025-07-29 PROCEDURE — 93005 ELECTROCARDIOGRAM TRACING: CPT | Performed by: STUDENT IN AN ORGANIZED HEALTH CARE EDUCATION/TRAINING PROGRAM

## 2025-07-29 PROCEDURE — 83880 ASSAY OF NATRIURETIC PEPTIDE: CPT | Performed by: STUDENT IN AN ORGANIZED HEALTH CARE EDUCATION/TRAINING PROGRAM

## 2025-07-29 PROCEDURE — 82747 ASSAY OF FOLIC ACID RBC: CPT | Performed by: INTERNAL MEDICINE

## 2025-07-29 PROCEDURE — 82550 ASSAY OF CK (CPK): CPT | Performed by: INTERNAL MEDICINE

## 2025-07-29 PROCEDURE — 25010000002 METOPROLOL TARTRATE: Performed by: STUDENT IN AN ORGANIZED HEALTH CARE EDUCATION/TRAINING PROGRAM

## 2025-07-29 PROCEDURE — 85730 THROMBOPLASTIN TIME PARTIAL: CPT | Performed by: STUDENT IN AN ORGANIZED HEALTH CARE EDUCATION/TRAINING PROGRAM

## 2025-07-29 PROCEDURE — 85007 BL SMEAR W/DIFF WBC COUNT: CPT | Performed by: STUDENT IN AN ORGANIZED HEALTH CARE EDUCATION/TRAINING PROGRAM

## 2025-07-29 PROCEDURE — 36415 COLL VENOUS BLD VENIPUNCTURE: CPT

## 2025-07-29 PROCEDURE — 85610 PROTHROMBIN TIME: CPT | Performed by: STUDENT IN AN ORGANIZED HEALTH CARE EDUCATION/TRAINING PROGRAM

## 2025-07-29 PROCEDURE — 99285 EMERGENCY DEPT VISIT HI MDM: CPT

## 2025-07-29 PROCEDURE — 72125 CT NECK SPINE W/O DYE: CPT

## 2025-07-29 PROCEDURE — 70450 CT HEAD/BRAIN W/O DYE: CPT

## 2025-07-29 PROCEDURE — 93010 ELECTROCARDIOGRAM REPORT: CPT | Performed by: INTERNAL MEDICINE

## 2025-07-29 PROCEDURE — 85025 COMPLETE CBC W/AUTO DIFF WBC: CPT | Performed by: STUDENT IN AN ORGANIZED HEALTH CARE EDUCATION/TRAINING PROGRAM

## 2025-07-29 PROCEDURE — 82607 VITAMIN B-12: CPT | Performed by: INTERNAL MEDICINE

## 2025-07-29 PROCEDURE — 82550 ASSAY OF CK (CPK): CPT | Performed by: STUDENT IN AN ORGANIZED HEALTH CARE EDUCATION/TRAINING PROGRAM

## 2025-07-29 RX ORDER — BISACODYL 10 MG
10 SUPPOSITORY, RECTAL RECTAL DAILY PRN
Status: DISCONTINUED | OUTPATIENT
Start: 2025-07-29 | End: 2025-07-29 | Stop reason: SDUPTHER

## 2025-07-29 RX ORDER — ENOXAPARIN SODIUM 100 MG/ML
40 INJECTION SUBCUTANEOUS NIGHTLY
Status: DISCONTINUED | OUTPATIENT
Start: 2025-07-29 | End: 2025-07-29

## 2025-07-29 RX ORDER — LEVOTHYROXINE SODIUM 137 UG/1
137 TABLET ORAL
Status: DISCONTINUED | OUTPATIENT
Start: 2025-07-30 | End: 2025-07-31

## 2025-07-29 RX ORDER — BISACODYL 5 MG/1
5 TABLET, DELAYED RELEASE ORAL DAILY PRN
Status: DISCONTINUED | OUTPATIENT
Start: 2025-07-29 | End: 2025-08-01 | Stop reason: HOSPADM

## 2025-07-29 RX ORDER — SODIUM CHLORIDE 0.9 % (FLUSH) 0.9 %
10 SYRINGE (ML) INJECTION EVERY 12 HOURS SCHEDULED
Status: DISCONTINUED | OUTPATIENT
Start: 2025-07-29 | End: 2025-08-01 | Stop reason: HOSPADM

## 2025-07-29 RX ORDER — POLYETHYLENE GLYCOL 3350 17 G/17G
17 POWDER, FOR SOLUTION ORAL DAILY PRN
Status: DISCONTINUED | OUTPATIENT
Start: 2025-07-29 | End: 2025-08-01 | Stop reason: HOSPADM

## 2025-07-29 RX ORDER — ONDANSETRON 4 MG/1
4 TABLET, ORALLY DISINTEGRATING ORAL EVERY 6 HOURS PRN
Status: DISCONTINUED | OUTPATIENT
Start: 2025-07-29 | End: 2025-08-01 | Stop reason: HOSPADM

## 2025-07-29 RX ORDER — NITROGLYCERIN 0.4 MG/1
0.4 TABLET SUBLINGUAL
Status: DISCONTINUED | OUTPATIENT
Start: 2025-07-29 | End: 2025-08-01 | Stop reason: HOSPADM

## 2025-07-29 RX ORDER — ASPIRIN 325 MG
162 TABLET ORAL ONCE
Status: COMPLETED | OUTPATIENT
Start: 2025-07-29 | End: 2025-07-29

## 2025-07-29 RX ORDER — SODIUM CHLORIDE 9 MG/ML
40 INJECTION, SOLUTION INTRAVENOUS AS NEEDED
Status: DISCONTINUED | OUTPATIENT
Start: 2025-07-29 | End: 2025-08-01 | Stop reason: HOSPADM

## 2025-07-29 RX ORDER — POTASSIUM CHLORIDE 750 MG/1
20 CAPSULE, EXTENDED RELEASE ORAL DAILY
Status: DISCONTINUED | OUTPATIENT
Start: 2025-07-29 | End: 2025-07-30

## 2025-07-29 RX ORDER — ACETAMINOPHEN 650 MG/1
650 SUPPOSITORY RECTAL EVERY 4 HOURS PRN
Status: DISCONTINUED | OUTPATIENT
Start: 2025-07-29 | End: 2025-08-01 | Stop reason: HOSPADM

## 2025-07-29 RX ORDER — ACETAMINOPHEN 325 MG/1
650 TABLET ORAL EVERY 4 HOURS PRN
Status: DISCONTINUED | OUTPATIENT
Start: 2025-07-29 | End: 2025-08-01 | Stop reason: HOSPADM

## 2025-07-29 RX ORDER — ONDANSETRON 2 MG/ML
4 INJECTION INTRAMUSCULAR; INTRAVENOUS EVERY 6 HOURS PRN
Status: DISCONTINUED | OUTPATIENT
Start: 2025-07-29 | End: 2025-08-01 | Stop reason: HOSPADM

## 2025-07-29 RX ORDER — CALCIUM CARBONATE 500 MG/1
2 TABLET, CHEWABLE ORAL 2 TIMES DAILY PRN
Status: DISCONTINUED | OUTPATIENT
Start: 2025-07-29 | End: 2025-08-01 | Stop reason: HOSPADM

## 2025-07-29 RX ORDER — FUROSEMIDE 10 MG/ML
40 INJECTION INTRAMUSCULAR; INTRAVENOUS
Status: DISCONTINUED | OUTPATIENT
Start: 2025-07-30 | End: 2025-07-30

## 2025-07-29 RX ORDER — SODIUM CHLORIDE 0.9 % (FLUSH) 0.9 %
10 SYRINGE (ML) INJECTION AS NEEDED
Status: DISCONTINUED | OUTPATIENT
Start: 2025-07-29 | End: 2025-08-01 | Stop reason: HOSPADM

## 2025-07-29 RX ORDER — AMOXICILLIN 250 MG
2 CAPSULE ORAL 2 TIMES DAILY PRN
Status: DISCONTINUED | OUTPATIENT
Start: 2025-07-29 | End: 2025-08-01 | Stop reason: HOSPADM

## 2025-07-29 RX ORDER — BISACODYL 10 MG
10 SUPPOSITORY, RECTAL RECTAL DAILY PRN
Status: DISCONTINUED | OUTPATIENT
Start: 2025-07-29 | End: 2025-08-01 | Stop reason: HOSPADM

## 2025-07-29 RX ORDER — ACETAMINOPHEN 160 MG/5ML
650 SOLUTION ORAL EVERY 4 HOURS PRN
Status: DISCONTINUED | OUTPATIENT
Start: 2025-07-29 | End: 2025-08-01 | Stop reason: HOSPADM

## 2025-07-29 RX ORDER — LISINOPRIL 20 MG/1
10 TABLET ORAL DAILY
Status: DISCONTINUED | OUTPATIENT
Start: 2025-07-30 | End: 2025-08-01 | Stop reason: HOSPADM

## 2025-07-29 RX ADMIN — SODIUM CHLORIDE 1000 ML: 9 INJECTION, SOLUTION INTRAVENOUS at 13:03

## 2025-07-29 RX ADMIN — ACETAMINOPHEN 650 MG: 325 TABLET, FILM COATED ORAL at 20:06

## 2025-07-29 RX ADMIN — METOROPROLOL TARTRATE 2.5 MG: 5 INJECTION, SOLUTION INTRAVENOUS at 14:53

## 2025-07-29 RX ADMIN — ASPIRIN 325 MG ORAL TABLET 162 MG: 325 PILL ORAL at 18:35

## 2025-07-29 RX ADMIN — Medication 10 ML: at 20:06

## 2025-07-29 NOTE — PLAN OF CARE
Goal Outcome Evaluation:  Plan of Care Reviewed With: patient        Progress: no change  Outcome Evaluation: Pt admitted to floor; multiple falls in the past few months; pt is vegan; pt is A&Ox4 on room air, NSR with PVCs; on CIWA protocol; c/o left side pain in head where he fell; CT scan was done in ED; VSS- BP elevated.

## 2025-07-29 NOTE — ED PROVIDER NOTES
EMERGENCY DEPARTMENT ENCOUNTER  Room Number:  44/44  PCP: Epley, James, APRN  Independent Historians: Historian: Patient and Friend      HPI:  Chief Complaint: had concerns including Fall and Altered Mental Status.     A complete HPI/ROS/PMH/PSH/SH/FH are unobtainable due to: EM_Unobtainable History : None    Chronic or social conditions impacting patient care (Social Determinants of Health): None      Context: Darwin Sparks is a 84 y.o. male with a medical history of hypertension, hyperlipidemia, chronic low back pain, gait instability, syncope who presents to the ED c/o acute fall and altered mental status.  She went over to visit patient's  and patient on the ground.  He was confused, did not know he was on the ground, however on the ground, or how long he can he also noticed that bruising to his left forehead.  Patient reports he has been falling from time to time recently.  Complains of some mild neck pain.  No other complaints at this time.      Review of prior external notes (non-ED) -and- Review of prior external test results outside of this encounter: I reviewed family medicine APRN note from 6 days ago.  Dependent edema, BMP ordered, Lasix.  Patient also with hypoalbuminemia.      Prescription drug monitoring program review:         PAST MEDICAL HISTORY  Active Ambulatory Problems     Diagnosis Date Noted    Primary hypertension 04/21/2016    Mixed hyperlipidemia 04/21/2016    Adult hypothyroidism 04/21/2016    Insomnia 04/21/2016    Anxiety 04/28/2016    Dermatitis, eczematoid 04/28/2016    Bronchitis 04/28/2016    Seasonal allergies 04/28/2016    Health care maintenance 10/18/2016    Chronic midline low back pain without sciatica 10/18/2016    Penis pain 01/21/2019    Tinea cruris 01/21/2019    Primary osteoarthritis involving multiple joints 08/30/2019    Benzodiazepine dependence 08/30/2019    Constipation 04/21/2021    Abnormal finding on CT scan 04/21/2021    Scoliosis of lumbar spine  05/12/2021    Lumbar facet arthropathy 07/19/2021    Vertigo 03/09/2025    Multiple falls 03/10/2025    Severe major depression 04/14/2025    Decreased strength, endurance, and mobility 04/14/2025    Gait instability 04/14/2025    Syncope 05/05/2025     Resolved Ambulatory Problems     Diagnosis Date Noted    No Resolved Ambulatory Problems     Past Medical History:   Diagnosis Date    Cataract     Colon polyp     Deep vein thrombosis     Headache 28 years old    HL (hearing loss) 8 years ago    Hyperlipidemia     Hypertension     Hypothyroidism     Infectious viral hepatitis     Inflammatory bowel disease Child to 77 years old    Irritable bowel syndrome     Kidney stone 1958    Low back pain 1973    Osteoarthritis     Pneumonia Child    Prostate cancer 2013    Scoliosis 1973    Urinary tract infection 1973    Visual impairment 1960         PAST SURGICAL HISTORY  Past Surgical History:   Procedure Laterality Date    ADENOIDECTOMY  10 years old    CATARACT EXTRACTION      CIRCUMCISION      COLONOSCOPY      COLONOSCOPY N/A 05/27/2021    Procedure: COLONOSCOPY TO CECUM/TI;  Surgeon: Jamel Michaels MD;  Location: [facility] ENDOSCOPY;  Service: Gastroenterology;  Laterality: N/A;  Pre op: Abnormal cat scan, constipation, RLQ pain  Post op: Diverticulosis, Hemorrhoids, otherwise normal to and including TI.    MEDIAL BRANCH BLOCK Bilateral 07/30/2021    Procedure: LUMBAR MEDIAL BRANCH BLOCK;  Surgeon: Kb Rodriguez MD;  Location: [facility] MAIN OR;  Service: Pain Management;  Laterality: Bilateral;    MEDIAL BRANCH BLOCK Bilateral 08/20/2021    Procedure: MEDIAL BRANCH BLOCK--bilateral lumbar3-lumbar5;  Surgeon: Kb Rodriguez MD;  Location: [facility] MAIN OR;  Service: Pain Management;  Laterality: Bilateral;    PROSTATE SURGERY  72 years old    Low radiation, started vegan diet    RADIOFREQUENCY ABLATION Bilateral 10/08/2021    Procedure: RADIOFREQUENCY ABLATION LUMBAR--bilateral lumbar3-5 WITH MAC;  Surgeon:  Kb Rodriguez MD;  Location: [facility] MAIN OR;  Service: Pain Management;  Laterality: Bilateral;    TONSILLECTOMY      TOOTH EXTRACTION           FAMILY HISTORY  Family History   Problem Relation Age of Onset    Diabetes Mother     Hypertension Mother     Stroke Mother     Diabetes Father     Hypertension Father     Bipolar disorder Father     Stroke Father     Bipolar disorder Paternal Grandmother     Heart disease Other     Sudden death Other     Anuerysm Other     Malig Hyperthermia Neg Hx          SOCIAL HISTORY  Social History     Socioeconomic History    Marital status:    Tobacco Use    Smoking status: Never    Smokeless tobacco: Never   Vaping Use    Vaping status: Never Used   Substance and Sexual Activity    Alcohol use: Yes    Drug use: Not Currently     Types: Marijuana     Comment: used in the 1970's    Sexual activity: Defer       ALLERGIES  Milk-related compounds, Pregabalin, and Neomycin-bacitracin zn-polymyx      PHYSICAL EXAM    I have reviewed the triage vital signs and nursing notes.    ED Triage Vitals   Temp Heart Rate Resp BP SpO2   03/02/25 1448 03/02/25 1448 03/02/25 1448 03/02/25 1450 03/02/25 1448   97.4 °F (36.3 °C) 95 16 141/84 97 %      Temp src Heart Rate Source Patient Position BP Location FiO2 (%)   -- -- -- -- --              Physical Exam  GENERAL: alert, no acute distress  SKIN: Warm, dry  HENT: Normocephalic, contusion to left forehead, mild midline cervical tenderness to palpation  EYES: no scleral icterus  CV: regular rhythm, regular rate  RESPIRATORY: normal effort, lungs clear  ABDOMEN: soft, nondistended, nontender  MUSCULOSKELETAL: no deformity, 2+ pretibial pitting edema bilaterally  NEURO: alert, moves all extremities, follows commands  No drift of extremities, sensation intact and symmetric, no focal neurologic deficits  Patient is oriented, follows commands, but amnestic to the event      LAB RESULTS  Recent Results (from the past 24 hours)   Comprehensive  Metabolic Panel    Collection Time: 07/29/25 12:19 PM    Specimen: Blood   Result Value Ref Range    Glucose 101 (H) 65 - 99 mg/dL    BUN 5.0 (L) 8.0 - 23.0 mg/dL    Creatinine 0.63 (L) 0.76 - 1.27 mg/dL    Sodium 141 136 - 145 mmol/L    Potassium 4.8 3.5 - 5.2 mmol/L    Chloride 107 98 - 107 mmol/L    CO2 24.1 22.0 - 29.0 mmol/L    Calcium 8.8 8.6 - 10.5 mg/dL    Total Protein 5.9 (L) 6.0 - 8.5 g/dL    Albumin 3.4 (L) 3.5 - 5.2 g/dL    ALT (SGPT) 19 1 - 41 U/L    AST (SGOT) 31 1 - 40 U/L    Alkaline Phosphatase 84 39 - 117 U/L    Total Bilirubin 0.3 0.0 - 1.2 mg/dL    Globulin 2.5 gm/dL    A/G Ratio 1.4 g/dL    BUN/Creatinine Ratio 7.9 7.0 - 25.0    Anion Gap 9.9 5.0 - 15.0 mmol/L    eGFR 92.6 >60.0 mL/min/1.73   aPTT    Collection Time: 07/29/25 12:19 PM    Specimen: Blood   Result Value Ref Range    PTT 25.4 22.7 - 35.4 seconds   Protime-INR    Collection Time: 07/29/25 12:19 PM    Specimen: Blood   Result Value Ref Range    Protime 12.7 11.7 - 14.2 Seconds    INR 0.96 0.90 - 1.10   High Sensitivity Troponin T    Collection Time: 07/29/25 12:19 PM    Specimen: Blood   Result Value Ref Range    HS Troponin T 31 (H) <22 ng/L   CK    Collection Time: 07/29/25 12:19 PM    Specimen: Blood   Result Value Ref Range    Creatine Kinase 136 20 - 200 U/L   CBC Auto Differential    Collection Time: 07/29/25 12:19 PM    Specimen: Blood   Result Value Ref Range    WBC 5.74 3.40 - 10.80 10*3/mm3    RBC 4.17 4.14 - 5.80 10*6/mm3    Hemoglobin 14.2 13.0 - 17.7 g/dL    Hematocrit 43.9 37.5 - 51.0 %    .3 (H) 79.0 - 97.0 fL    MCH 34.1 (H) 26.6 - 33.0 pg    MCHC 32.3 31.5 - 35.7 g/dL    RDW 12.8 12.3 - 15.4 %    RDW-SD 48.9 37.0 - 54.0 fl    MPV 10.4 6.0 - 12.0 fL    Platelets 257 140 - 450 10*3/mm3    nRBC 0.0 0.0 - 0.2 /100 WBC   Manual Differential    Collection Time: 07/29/25 12:19 PM    Specimen: Blood   Result Value Ref Range    Neutrophil % 27.6 (L) 42.7 - 76.0 %    Lymphocyte % 65.3 (H) 19.6 - 45.3 %    Monocyte %  5.1 5.0 - 12.0 %    Eosinophil % 1.0 0.3 - 6.2 %    Basophil % 1.0 0.0 - 1.5 %    Neutrophils Absolute 1.58 (L) 1.70 - 7.00 10*3/mm3    Lymphocytes Absolute 3.75 (H) 0.70 - 3.10 10*3/mm3    Monocytes Absolute 0.29 0.10 - 0.90 10*3/mm3    Eosinophils Absolute 0.06 0.00 - 0.40 10*3/mm3    Basophils Absolute 0.06 0.00 - 0.20 10*3/mm3    Anisocytosis Slight/1+ None Seen    Macrocytes Mod/2+ None Seen    WBC Morphology Normal Normal    Platelet Morphology Normal Normal   BNP    Collection Time: 07/29/25 12:19 PM    Specimen: Blood   Result Value Ref Range    proBNP 1,438.0 0.0 - 1,800.0 pg/mL   ECG 12 Lead Altered Mental Status    Collection Time: 07/29/25 12:38 PM   Result Value Ref Range    QT Interval 490 ms    QTC Interval 532 ms   Urinalysis With Culture If Indicated - Urine, Clean Catch    Collection Time: 07/29/25  1:20 PM    Specimen: Urine, Clean Catch   Result Value Ref Range    Color, UA Yellow Yellow, Straw    Appearance, UA Clear Clear    pH, UA 7.5 5.0 - 8.0    Specific Gravity, UA 1.008 1.005 - 1.030    Glucose, UA Negative Negative    Ketones, UA Negative Negative    Bilirubin, UA Negative Negative    Blood, UA Negative Negative    Protein, UA Negative Negative    Leuk Esterase, UA Negative Negative    Nitrite, UA Negative Negative    Urobilinogen, UA 0.2 E.U./dL 0.2 - 1.0 E.U./dL   High Sensitivity Troponin T 1Hr    Collection Time: 07/29/25  2:00 PM    Specimen: Arm, Left; Blood   Result Value Ref Range    HS Troponin T 33 (H) <22 ng/L    Troponin T Numeric Delta 2 ng/L    Troponin T % Delta 6 Abnormal if >/= 20%       RADIOLOGY  CT Cervical Spine Without Contrast  CT Cervical Spine Without Contrast, CT Head Without Contrast  Result Date: 7/29/2025  CT HEAD WITHOUT CONTRAST  HISTORY: Multiple falls. Closed head injury. Confusion.  TECHNIQUE:  Head CT includes axial imaging from the base of skull to the vertex without intravenous contrast. Radiation dose reduction techniques were utilized, including  automated exposure control and exposure modulation based on body size.  COMPARISON: CT head and cervical spine 05/05/2025, 03/09/2025.  FINDINGS: HEAD CT: There is moderate cerebral atrophy and mild chronic small vessel ischemic white matter change. There are no abnormal areas of increased attenuation intra-axially to suggest hemorrhage. No extra-axial fluid collection is observed. There is no evidence for cerebral edema or mass effect or shift of the midline structures. Bone windows demonstrate no calvarial abnormality. Mastoid air cells and visualized paranasal sinuses appear clear. Intracranial atherosclerotic calcifications are present.   CERVICAL SPINE: There is advanced degenerative disc disease with disc space flattening at the C3-4, C5-6, C6-7, and C7-T1 levels. Degenerative malalignment with 2-3 mm retrolisthesis of C3 on C4, 3 mm anterolisthesis of C4 on C5, 3 mm anterolisthesis of C7 on T1. Multilevel uncovertebral overgrowth with osseous encroachment on the neural foramina. C1 ring and odontoid process are intact. No evidence for fracture or acute abnormality.      1. No evidence for acute intracranial abnormality. Moderate cerebral atrophy and chronic small vessel ischemic white matter change. 2. Advanced cervical spondylosis without evidence for acute abnormality in the cervical spine or significant change compared to CT 05/05/2025.          MEDICATIONS GIVEN IN ER  Medications   sodium chloride 0.9 % bolus 1,000 mL (0 mL Intravenous Stopped 7/29/25 1455)   metoprolol tartrate (LOPRESSOR) injection 2.5 mg (2.5 mg Intravenous Given 7/29/25 1453)         ORDERS PLACED DURING THIS VISIT:  Orders Placed This Encounter   Procedures    CT Cervical Spine Without Contrast    CT Head Without Contrast    Comprehensive Metabolic Panel    aPTT    Protime-INR    Urinalysis With Culture If Indicated - Urine, Clean Catch    High Sensitivity Troponin T    CK    CBC Auto Differential    Manual Differential    BNP     High Sensitivity Troponin T 1Hr    LHA (on-call MD unless specified) Details    ECG 12 Lead Altered Mental Status    Initiate Observation Status    CBC & Differential         OUTPATIENT MEDICATION MANAGEMENT:  No current Epic-ordered facility-administered medications on file.     Current Outpatient Medications Ordered in Epic   Medication Sig Dispense Refill    albuterol sulfate  (90 Base) MCG/ACT inhaler Inhale 2 puffs Every 4 (Four) Hours As Needed for Wheezing or Shortness of Air. 6.7 g 2    ammonium lactate (LAC-HYDRIN) 12 % lotion Apply  topically to the appropriate area as directed As Needed for Dry Skin. Best for lower extremity dry chronically's skin (Patient taking differently: Apply 1 Application topically to the appropriate area as directed As Needed for Dry Skin. Best for lower extremity dry chronically's skin) 400 g 3    folic acid (FOLVITE) 1 MG tablet Take 1 tablet by mouth Daily. 90 tablet 3    furosemide (Lasix) 20 MG tablet Take 1 tablet by mouth Daily As Needed (Dependent edema). 30 tablet 2    levothyroxine (SYNTHROID, LEVOTHROID) 125 MCG tablet Take 1 tablet by mouth Daily. 90 tablet 3    lisinopril (PRINIVIL,ZESTRIL) 10 MG tablet Take 1 tablet by mouth Daily for 30 days. (Patient taking differently: Take 1 tablet by mouth Daily.) 30 tablet 0    potassium chloride (MICRO-K) 10 MEQ CR capsule Take 2 capsules by mouth Daily As Needed (Need to take if taking water pill furosemide). (Patient taking differently: Take 2 capsules by mouth Daily.) 60 capsule 2    sertraline (Zoloft) 50 MG tablet Take 1 tablet by mouth Daily. For improved mood and feelings of wellbeing (Patient taking differently: Take 1 tablet by mouth Daily.) 30 tablet 1    vitamin B-12 (CYANOCOBALAMIN) 1000 MCG tablet Take 1 tablet by mouth Daily. 90 tablet 3    celecoxib (CeleBREX) 200 MG capsule Take 1 capsule by mouth 2 (Two) Times a Day. With water, take lowest effective dose (Patient not taking: Reported on 7/29/2025) 180  capsule 1         PROCEDURES  Procedures      EM_CC: Critical care provider statement:    Critical care time (minutes): 33.   Critical care time was exclusive of:  Separately billable procedures and treating other patients   Critical care was necessary to treat or prevent imminent or life-threatening deterioration of the following conditions:  CNS Failure   Critical care was time spent personally by me on the following activities:  Development of treatment plan with patient or surrogate, discussions with consultants, evaluation of patient's response to treatment, examination of patient, obtaining history from patient or surrogate, ordering and performing treatments and interventions, ordering and review of laboratory studies, ordering and review of radiographic studies, pulse oximetry, re-evaluation of patient's condition and review of old charts. Critical Care indicators: Altered Mental Status, Elderly, Delirium       PROGRESS, DATA ANALYSIS, CONSULTS, AND MEDICAL DECISION MAKING  All labs have been independently interpreted by me.  All radiology studies have been reviewed by me. All EKG's have been independently viewed and interpreted by me.  Discussion below represents my analysis of pertinent findings related to patient's condition, differential diagnosis, treatment plan and final disposition.    Differential diagnosis includes but is not limited to mechanical fall, syncope, closed head injury, cervical fracture, ICH, UTI, electrolyte or metabolic derangement, debility, recurrent falls.    Clinical Scores:                                       ED Course as of 07/29/25 1502   Tue Jul 29, 2025   1155 Patient presents to the ED with friend for evaluation of fall and altered mental status.  She went over to visit patient's  and patient on the ground.  He was confused, did not know he was on the ground, however on the ground, or how long he can he also noticed that bruising to his left forehead.  Patient  reports he has been falling from time to time recently.  Complains of some mild neck pain.  No other complaints at this time.    On exam patient has mild left frontal contusion.  Mild midline cervical tenderness to palpation.  Lungs are clear, full range of motion of extremities without drift, face is symmetric, no focal neurologic deficits    Will obtain basic labs, CK, UA, troponin and EKG.  CT head and C-spine.  Anticipate admission, patient and friend are agreeable [DN]   1238 WBC: 5.74 [DN]   1247 ECG 12 Lead Altered Mental Status  Sinus rhythm, rate 72, normal axis and intervals, low voltage T waves in inferior and lateral leads, QTc prolongation, no significant ST changes, no STEMI    I compared EKG to prior on 5/7/2025, no significant change, EKGs interpreted by self [DN]   1255 HS Troponin T(!): 31 [DN]   1255 Creatine Kinase: 136 [DN]   1259 Creatinine(!): 0.63 [DN]   1406 CT Head Without Contrast  I reviewed patient's CT image(s), no obvious ICH, interpreted by self  I reviewed the radiologist's interpretation of above image(s)   [DN]   1433 Troponin T Numeric Delta: 2 [DN]   1433 BP(!): 190/105 [DN]   1435 I reviewed discharge summary from May 2025.  Patient with very labile blood pressures.  Will give small dose metop and continue to closely monitor [DN]   1437 I discussed results with patient and friend, they are agreeable with plan for admission due to weakness and recurrent falls    He is currently back to baseline mental status [DN]   1459 Patient states he believes he remembers why he fell last night.  He now remembers getting into an argument with his , going back to his room and drinking a bottle of wine, and then waking up on the floor [DN]   1500 I discussed patient with Dr. Banuelos, [facility], agreeable with plan to admit [DN]      ED Course User Index  [DN] Dk Seo MD             AS OF 15:02 EDT VITALS:    BP - 175/97  HR - 66  TEMP - 97.5 °F (36.4 °C) (Oral)  O2 SATS -  100%    COMPLEXITY OF CARE  The patient requires admission.      DIAGNOSIS  Final diagnoses:   Altered mental status, unspecified altered mental status type   CHI (closed head injury), initial encounter   Repeated falls   Uncontrolled hypertension         DISPOSITION  ED Disposition       ED Disposition   Decision to Admit    Condition   --    Comment   Level of Care: Telemetry [5]   Diagnosis: Repeated falls [[id number]]   Admitting Physician: ALEXIS ANTONY [[id number]]   Attending Physician: ALEXIS ANTONY [[id number]]   Is patient appropriate for Inpatient Observation Unit?: No [0]                 New Medications Ordered This Visit   Medications    sodium chloride 0.9 % bolus 1,000 mL    metoprolol tartrate (LOPRESSOR) injection 2.5 mg       Please note that portions of this document were completed with a voice recognition program.    Note Disclaimer: At [facility], we believe that sharing information builds trust and better relationships. You are receiving this note because you recently visited [facility]. It is possible you will see health information before a provider has talked with you about it. This kind of information can be easy to misunderstand. To help you fully understand what it means for your health, we urge you to discuss this note with your provider.         Dk Seo MD  07/29/25 1250       Dk Seo MD  07/29/25 0824

## 2025-07-29 NOTE — PROGRESS NOTES
Clinical Pharmacy Services: Medication History    Darwin Sparks is a 84 y.o. male presenting to [facility] for   Chief Complaint   Patient presents with    Fall    Altered Mental Status       He  has a past medical history of Cataract, Colon polyp, Deep vein thrombosis, Headache (28 years old), HL (hearing loss) (8 years ago), Hyperlipidemia, Hypertension, Hypothyroidism, Infectious viral hepatitis, Inflammatory bowel disease (Child to 77 years old), Irritable bowel syndrome, Kidney stone (1958), Low back pain (1973), Osteoarthritis, Pneumonia (Child), Prostate cancer (2013), Scoliosis (1973), Urinary tract infection (1973), and Visual impairment (1960).    Allergies as of 07/29/2025 - Reviewed 07/29/2025   Allergen Reaction Noted    Milk-related compounds Other (See Comments) 09/10/2018    Pregabalin  04/21/2016    Neomycin-bacitracin zn-polymyx Rash 09/10/2018       Medication information was obtained from: Patient & Family Member   Pharmacy and Phone Number:     Prior to Admission Medications       Prescriptions Last Dose Informant Patient Reported? Taking?    albuterol sulfate  (90 Base) MCG/ACT inhaler  Self No Yes    Inhale 2 puffs Every 4 (Four) Hours As Needed for Wheezing or Shortness of Air.    ammonium lactate (LAC-HYDRIN) 12 % lotion  Self No Yes    Apply  topically to the appropriate area as directed As Needed for Dry Skin. Best for lower extremity dry chronically's skin    Patient taking differently:  Apply 1 Application topically to the appropriate area as directed As Needed for Dry Skin. Best for lower extremity dry chronically's skin    folic acid (FOLVITE) 1 MG tablet  Self No Yes    Take 1 tablet by mouth Daily.    furosemide (Lasix) 20 MG tablet  Self No Yes    Take 1 tablet by mouth Daily As Needed (Dependent edema).    levothyroxine (SYNTHROID, LEVOTHROID) 125 MCG tablet  Self No Yes    Take 1 tablet by mouth Daily.    lisinopril (PRINIVIL,ZESTRIL) 10 MG tablet  Self No  Yes    Take 1 tablet by mouth Daily for 30 days.    Patient taking differently:  Take 1 tablet by mouth Daily.    potassium chloride (MICRO-K) 10 MEQ CR capsule  Self No Yes    Take 2 capsules by mouth Daily As Needed (Need to take if taking water pill furosemide).    Patient taking differently:  Take 2 capsules by mouth Daily.    sertraline (Zoloft) 50 MG tablet  Self No Yes    Take 1 tablet by mouth Daily. For improved mood and feelings of wellbeing    Patient taking differently:  Take 1 tablet by mouth Daily.    vitamin B-12 (CYANOCOBALAMIN) 1000 MCG tablet  Self No Yes    Take 1 tablet by mouth Daily.    celecoxib (CeleBREX) 200 MG capsule Not Taking Self No No    Take 1 capsule by mouth 2 (Two) Times a Day. With water, take lowest effective dose    Patient not taking:  Reported on 7/29/2025              Medication notes:     This medication list is complete to the best of my knowledge as of 7/29/2025    Please call if questions.    Yara Garza  Medication History Technician   [phone]    7/29/2025 13:14 EDT

## 2025-07-29 NOTE — ED TRIAGE NOTES
Patient to ed from home via ems with complaints of a fall out of bed at some point last night caretaker found patient on the floor,  and caretaker report increased confusion

## 2025-07-29 NOTE — ED NOTES
Nursing report ED to floor  Darwin Sparks  84 y.o.  male    HPI :  HPI  Stated Reason for Visit: fall ams  History Obtained From: EMS    Chief Complaint  Chief Complaint   Patient presents with    Fall    Altered Mental Status       Admitting doctor:   Alisia Banuelos MD    Admitting diagnosis:   The primary encounter diagnosis was Altered mental status, unspecified altered mental status type. Diagnoses of CHI (closed head injury), initial encounter, Repeated falls, and Uncontrolled hypertension were also pertinent to this visit.    Code status:   Current Code Status       Date Active Code Status Order ID Comments User Context       Prior            Allergies:   Milk-related compounds, Pregabalin, and Neomycin-bacitracin zn-polymyx    Isolation:   No active isolations    Intake and Output    Intake/Output Summary (Last 24 hours) at 7/29/2025 1533  Last data filed at 7/29/2025 1455  Gross per 24 hour   Intake 1000 ml   Output --   Net 1000 ml       Weight:       07/29/25  1151   Weight: 72.6 kg (160 lb)       Most recent vitals:   Vitals:    07/29/25 1420 07/29/25 1453 07/29/25 1500 07/29/25 1520   BP: (!) 190/105 175/97 (!) 188/97 159/93   Pulse: 66 66 63 66   Resp:       Temp:       TempSrc:       SpO2: 100%  100% 99%   Weight:       Height:           Active LDAs/IV Access:   Lines, Drains & Airways       Active LDAs       Name Placement date Placement time Site Days    Peripheral IV 07/29/25 1220 20 G Right;Posterior Forearm 07/29/25  1220  Forearm  less than 1                    Labs (abnormal labs have a star):   Labs Reviewed   COMPREHENSIVE METABOLIC PANEL - Abnormal; Notable for the following components:       Result Value    Glucose 101 (*)     BUN 5.0 (*)     Creatinine 0.63 (*)     Total Protein 5.9 (*)     Albumin 3.4 (*)     All other components within normal limits    Narrative:     GFR Categories in Chronic Kidney Disease (CKD)              GFR Category          GFR (mL/min/1.73)     Interpretation  G1                    90 or greater        Normal or high (1)  G2                    60-89                Mild decrease (1)  G3a                   45-59                Mild to moderate decrease  G3b                   30-44                Moderate to severe decrease  G4                    15-29                Severe decrease  G5                    14 or less           Kidney failure    (1)In the absence of evidence of kidney disease, neither GFR category G1 or G2 fulfill the criteria for CKD.    eGFR calculation 2021 CKD-EPI creatinine equation, which does not include race as a factor   TROPONIN - Abnormal; Notable for the following components:    HS Troponin T 31 (*)     All other components within normal limits    Narrative:     High Sensitive Troponin T Reference Range:  <14.0 ng/L- Negative Female for AMI  <22.0 ng/L- Negative Male for AMI  >=14 - Abnormal Female indicating possible myocardial injury.  >=22 - Abnormal Male indicating possible myocardial injury.   Clinicians would have to utilize clinical acumen, EKG, Troponin, and serial changes to determine if it is an Acute Myocardial Infarction or myocardial injury due to an underlying chronic condition.        CBC WITH AUTO DIFFERENTIAL - Abnormal; Notable for the following components:    .3 (*)     MCH 34.1 (*)     All other components within normal limits   MANUAL DIFFERENTIAL - Abnormal; Notable for the following components:    Neutrophil % 27.6 (*)     Lymphocyte % 65.3 (*)     Neutrophils Absolute 1.58 (*)     Lymphocytes Absolute 3.75 (*)     All other components within normal limits   HIGH SENSITIVITIY TROPONIN T 1HR - Abnormal; Notable for the following components:    HS Troponin T 33 (*)     All other components within normal limits    Narrative:     High Sensitive Troponin T Reference Range:  <14.0 ng/L- Negative Female for AMI  <22.0 ng/L- Negative Male for AMI  >=14 - Abnormal Female indicating possible myocardial  injury.  >=22 - Abnormal Male indicating possible myocardial injury.   Clinicians would have to utilize clinical acumen, EKG, Troponin, and serial changes to determine if it is an Acute Myocardial Infarction or myocardial injury due to an underlying chronic condition.        APTT - Normal   PROTIME-INR - Normal   URINALYSIS W/ CULTURE IF INDICATED - Normal    Narrative:     In absence of clinical symptoms, the presence of pyuria, bacteria, and/or nitrites on the urinalysis result does not correlate with infection.  Urine microscopic not indicated.   CK - Normal   BNP (IN-HOUSE) - Normal    Narrative:     This assay is used as an aid in the diagnosis of individuals suspected of having heart failure. It can be used as an aid in the diagnosis of acute decompensated heart failure (ADHF) in patients presenting with signs and symptoms of ADHF to the emergency department (ED). In addition, NT-proBNP of <300 pg/mL indicates ADHF is not likely.    Age Range Result Interpretation  NT-proBNP Concentration (pg/mL:      <50             Positive            >450                   Gray                 300-450                    Negative             <300    50-75           Positive            >900                  Gray                300-900                  Negative            <300      >75             Positive            >1800                  Gray                300-1800                  Negative            <300   CBC AND DIFFERENTIAL    Narrative:     The following orders were created for panel order CBC & Differential.  Procedure                               Abnormality         Status                     ---------                               -----------         ------                     CBC Auto Differential[[id number]]        Abnormal            Final result                 Please view results for these tests on the individual orders.       EKG:   ECG 12 Lead Altered Mental Status   Preliminary Result   HEART RATE=72  bpm    RR Znftbmmt=828  ms   RI Interval=74  ms   P Horizontal Axis=179  deg   P Front Axis=0  deg   QRSD Interval=94  ms   QT Qtvqiqem=852  ms   AHbZ=873  ms   QRS Axis=18  deg   T Wave Axis=10  deg   - ABNORMAL ECG -   Sinus rhythm   Atrial premature complex   Short RI interval   Borderline T wave abnormalities   Prolonged QT interval   Date and Time of Study:2025-07-29 12:38:14          Meds given in ED:   Medications   sodium chloride 0.9 % bolus 1,000 mL (0 mL Intravenous Stopped 7/29/25 1455)   metoprolol tartrate (LOPRESSOR) injection 2.5 mg (2.5 mg Intravenous Given 7/29/25 1453)       Imaging results:  CT Cervical Spine Without Contrast  Result Date: 7/29/2025  1. No evidence for acute intracranial abnormality. Moderate cerebral atrophy and chronic small vessel ischemic white matter change. 2. Advanced cervical spondylosis without evidence for acute abnormality in the cervical spine or significant change compared to CT 05/05/2025.  This report was finalized on 7/29/2025 3:05 PM by Micah Wade M.D on Workstation: [id number]      CT Head Without Contrast  Result Date: 7/29/2025  1. No evidence for acute intracranial abnormality. Moderate cerebral atrophy and chronic small vessel ischemic white matter change. 2. Advanced cervical spondylosis without evidence for acute abnormality in the cervical spine or significant change compared to CT 05/05/2025.  This report was finalized on 7/29/2025 3:05 PM by Micah Wade M.D on Workstation: [id number]        Ambulatory status:   - assist with walker    Social issues:   Social History     Socioeconomic History    Marital status:    Tobacco Use    Smoking status: Never    Smokeless tobacco: Never   Vaping Use    Vaping status: Never Used   Substance and Sexual Activity    Alcohol use: Yes    Drug use: Not Currently     Types: Marijuana     Comment: used in the 1970's    Sexual activity: Defer       Peripheral Neurovascular  Peripheral Neurovascular  (Adult)  Peripheral Neurovascular WDL: WDL    Neuro Cognitive  Neuro Cognitive (Adult)  Cognitive/Neuro/Behavioral WDL: WDL, level of consciousness, mood/behavior, speech, orientation  Level of Consciousness: Alert  Orientation: disoriented to, time    Learning  Learning Assessment  Learning Readiness and Ability: no barriers identified    Respiratory  Respiratory WDL  Respiratory WDL: WDL, rhythm/pattern  Rhythm/Pattern, Respiratory: no shortness of breath reported, depth regular, pattern regular, unlabored  Breath Sounds  All Lung Fields Breath Sounds: All Fields  All Lung Fields Breath Sounds: clear    Abdominal Pain       Pain Assessments  Pain (Adult)  (0-10) Pain Rating: Rest: 4    NIH Stroke Scale       Yessica Reveles, RN  07/29/25 15:33 EDT

## 2025-07-30 ENCOUNTER — APPOINTMENT (OUTPATIENT)
Dept: CARDIOLOGY | Facility: HOSPITAL | Age: 85
End: 2025-07-30
Payer: MEDICARE

## 2025-07-30 LAB
ALBUMIN SERPL-MCNC: 2.6 G/DL (ref 3.5–5.2)
ALBUMIN/GLOB SERPL: 1.3 G/DL
ALP SERPL-CCNC: 70 U/L (ref 39–117)
ALT SERPL W P-5'-P-CCNC: 13 U/L (ref 1–41)
ANION GAP SERPL CALCULATED.3IONS-SCNC: 9 MMOL/L (ref 5–15)
AST SERPL-CCNC: 23 U/L (ref 1–40)
BASOPHILS # BLD AUTO: 0.05 10*3/MM3 (ref 0–0.2)
BASOPHILS NFR BLD AUTO: 0.8 % (ref 0–1.5)
BH CV LOWER VASCULAR LEFT COMMON FEMORAL AUGMENT: NORMAL
BH CV LOWER VASCULAR LEFT COMMON FEMORAL COMPETENT: NORMAL
BH CV LOWER VASCULAR LEFT COMMON FEMORAL COMPRESS: NORMAL
BH CV LOWER VASCULAR LEFT COMMON FEMORAL PHASIC: NORMAL
BH CV LOWER VASCULAR LEFT COMMON FEMORAL SPONT: NORMAL
BH CV LOWER VASCULAR LEFT DISTAL FEMORAL COMPRESS: NORMAL
BH CV LOWER VASCULAR LEFT GASTRONEMIUS COMPRESS: NORMAL
BH CV LOWER VASCULAR LEFT GREATER SAPH AK COMPRESS: NORMAL
BH CV LOWER VASCULAR LEFT GREATER SAPH BK COMPRESS: NORMAL
BH CV LOWER VASCULAR LEFT LESSER SAPH COMPRESS: NORMAL
BH CV LOWER VASCULAR LEFT MID FEMORAL AUGMENT: NORMAL
BH CV LOWER VASCULAR LEFT MID FEMORAL COMPETENT: NORMAL
BH CV LOWER VASCULAR LEFT MID FEMORAL COMPRESS: NORMAL
BH CV LOWER VASCULAR LEFT MID FEMORAL PHASIC: NORMAL
BH CV LOWER VASCULAR LEFT MID FEMORAL SPONT: NORMAL
BH CV LOWER VASCULAR LEFT PERONEAL COMPRESS: NORMAL
BH CV LOWER VASCULAR LEFT POPLITEAL AUGMENT: NORMAL
BH CV LOWER VASCULAR LEFT POPLITEAL COMPETENT: NORMAL
BH CV LOWER VASCULAR LEFT POPLITEAL COMPRESS: NORMAL
BH CV LOWER VASCULAR LEFT POPLITEAL PHASIC: NORMAL
BH CV LOWER VASCULAR LEFT POPLITEAL SPONT: NORMAL
BH CV LOWER VASCULAR LEFT POSTERIOR TIBIAL COMPRESS: NORMAL
BH CV LOWER VASCULAR LEFT PROFUNDA FEMORAL COMPRESS: NORMAL
BH CV LOWER VASCULAR LEFT PROXIMAL FEMORAL COMPRESS: NORMAL
BH CV LOWER VASCULAR LEFT SAPHENOFEMORAL JUNCTION COMPRESS: NORMAL
BH CV LOWER VASCULAR RIGHT COMMON FEMORAL AUGMENT: NORMAL
BH CV LOWER VASCULAR RIGHT COMMON FEMORAL COMPETENT: NORMAL
BH CV LOWER VASCULAR RIGHT COMMON FEMORAL COMPRESS: NORMAL
BH CV LOWER VASCULAR RIGHT COMMON FEMORAL PHASIC: NORMAL
BH CV LOWER VASCULAR RIGHT COMMON FEMORAL SPONT: NORMAL
BH CV LOWER VASCULAR RIGHT DISTAL FEMORAL COMPRESS: NORMAL
BH CV LOWER VASCULAR RIGHT GASTRONEMIUS COMPRESS: NORMAL
BH CV LOWER VASCULAR RIGHT GREATER SAPH AK COMPRESS: NORMAL
BH CV LOWER VASCULAR RIGHT GREATER SAPH BK COMPRESS: NORMAL
BH CV LOWER VASCULAR RIGHT LESSER SAPH COMPRESS: NORMAL
BH CV LOWER VASCULAR RIGHT MID FEMORAL AUGMENT: NORMAL
BH CV LOWER VASCULAR RIGHT MID FEMORAL COMPETENT: NORMAL
BH CV LOWER VASCULAR RIGHT MID FEMORAL COMPRESS: NORMAL
BH CV LOWER VASCULAR RIGHT MID FEMORAL PHASIC: NORMAL
BH CV LOWER VASCULAR RIGHT MID FEMORAL SPONT: NORMAL
BH CV LOWER VASCULAR RIGHT PERONEAL COMPRESS: NORMAL
BH CV LOWER VASCULAR RIGHT POPLITEAL AUGMENT: NORMAL
BH CV LOWER VASCULAR RIGHT POPLITEAL COMPETENT: NORMAL
BH CV LOWER VASCULAR RIGHT POPLITEAL COMPRESS: NORMAL
BH CV LOWER VASCULAR RIGHT POPLITEAL PHASIC: NORMAL
BH CV LOWER VASCULAR RIGHT POPLITEAL SPONT: NORMAL
BH CV LOWER VASCULAR RIGHT POSTERIOR TIBIAL COMPRESS: NORMAL
BH CV LOWER VASCULAR RIGHT PROFUNDA FEMORAL COMPRESS: NORMAL
BH CV LOWER VASCULAR RIGHT PROXIMAL FEMORAL COMPRESS: NORMAL
BH CV LOWER VASCULAR RIGHT SAPHENOFEMORAL JUNCTION COMPRESS: NORMAL
BILIRUB SERPL-MCNC: 0.4 MG/DL (ref 0–1.2)
BUN SERPL-MCNC: 5 MG/DL (ref 8–23)
BUN/CREAT SERPL: 9.4 (ref 7–25)
CALCIUM SPEC-SCNC: 8 MG/DL (ref 8.6–10.5)
CHLORIDE SERPL-SCNC: 107 MMOL/L (ref 98–107)
CO2 SERPL-SCNC: 23 MMOL/L (ref 22–29)
CREAT SERPL-MCNC: 0.53 MG/DL (ref 0.76–1.27)
DEPRECATED RDW RBC AUTO: 49.1 FL (ref 37–54)
EGFRCR SERPLBLD CKD-EPI 2021: 97.6 ML/MIN/1.73
EOSINOPHIL # BLD AUTO: 0.16 10*3/MM3 (ref 0–0.4)
EOSINOPHIL NFR BLD AUTO: 2.5 % (ref 0.3–6.2)
ERYTHROCYTE [DISTWIDTH] IN BLOOD BY AUTOMATED COUNT: 12.6 % (ref 12.3–15.4)
FOLATE BLD-MCNC: 448 NG/ML
FOLATE RBC-MCNC: 980 NG/ML
GLOBULIN UR ELPH-MCNC: 2 GM/DL
GLUCOSE SERPL-MCNC: 90 MG/DL (ref 65–99)
HCT VFR BLD AUTO: 39.1 % (ref 37.5–51)
HCT VFR BLD AUTO: 45.7 % (ref 37.5–51)
HGB BLD-MCNC: 12.7 G/DL (ref 13–17.7)
IMM GRANULOCYTES # BLD AUTO: 0.01 10*3/MM3 (ref 0–0.05)
IMM GRANULOCYTES NFR BLD AUTO: 0.2 % (ref 0–0.5)
INR PPP: 1.08 (ref 0.9–1.1)
LYMPHOCYTES # BLD AUTO: 2.13 10*3/MM3 (ref 0.7–3.1)
LYMPHOCYTES NFR BLD AUTO: 33.7 % (ref 19.6–45.3)
MAGNESIUM SERPL-MCNC: 1.8 MG/DL (ref 1.6–2.4)
MCH RBC QN AUTO: 34 PG (ref 26.6–33)
MCHC RBC AUTO-ENTMCNC: 32.5 G/DL (ref 31.5–35.7)
MCV RBC AUTO: 104.5 FL (ref 79–97)
MONOCYTES # BLD AUTO: 0.47 10*3/MM3 (ref 0.1–0.9)
MONOCYTES NFR BLD AUTO: 7.4 % (ref 5–12)
NEUTROPHILS NFR BLD AUTO: 3.5 10*3/MM3 (ref 1.7–7)
NEUTROPHILS NFR BLD AUTO: 55.4 % (ref 42.7–76)
NRBC BLD AUTO-RTO: 0 /100 WBC (ref 0–0.2)
NT-PROBNP SERPL-MCNC: 1299 PG/ML (ref 0–1800)
PLATELET # BLD AUTO: 248 10*3/MM3 (ref 140–450)
PMV BLD AUTO: 10.8 FL (ref 6–12)
POTASSIUM SERPL-SCNC: 3.8 MMOL/L (ref 3.5–5.2)
PROT SERPL-MCNC: 4.6 G/DL (ref 6–8.5)
PROTHROMBIN TIME: 13.9 SECONDS (ref 11.7–14.2)
RBC # BLD AUTO: 3.74 10*6/MM3 (ref 4.14–5.8)
SODIUM SERPL-SCNC: 139 MMOL/L (ref 136–145)
WBC NRBC COR # BLD AUTO: 6.32 10*3/MM3 (ref 3.4–10.8)

## 2025-07-30 PROCEDURE — 90791 PSYCH DIAGNOSTIC EVALUATION: CPT

## 2025-07-30 PROCEDURE — 85025 COMPLETE CBC W/AUTO DIFF WBC: CPT | Performed by: INTERNAL MEDICINE

## 2025-07-30 PROCEDURE — 93970 EXTREMITY STUDY: CPT

## 2025-07-30 PROCEDURE — 93970 EXTREMITY STUDY: CPT | Performed by: SURGERY

## 2025-07-30 PROCEDURE — 80053 COMPREHEN METABOLIC PANEL: CPT | Performed by: INTERNAL MEDICINE

## 2025-07-30 PROCEDURE — 83735 ASSAY OF MAGNESIUM: CPT | Performed by: INTERNAL MEDICINE

## 2025-07-30 PROCEDURE — G0378 HOSPITAL OBSERVATION PER HR: HCPCS

## 2025-07-30 PROCEDURE — 85610 PROTHROMBIN TIME: CPT | Performed by: INTERNAL MEDICINE

## 2025-07-30 PROCEDURE — 97530 THERAPEUTIC ACTIVITIES: CPT

## 2025-07-30 PROCEDURE — 97162 PT EVAL MOD COMPLEX 30 MIN: CPT

## 2025-07-30 PROCEDURE — 96375 TX/PRO/DX INJ NEW DRUG ADDON: CPT

## 2025-07-30 PROCEDURE — 96376 TX/PRO/DX INJ SAME DRUG ADON: CPT

## 2025-07-30 PROCEDURE — 25010000002 THIAMINE PER 100 MG: Performed by: STUDENT IN AN ORGANIZED HEALTH CARE EDUCATION/TRAINING PROGRAM

## 2025-07-30 PROCEDURE — 25010000002 FUROSEMIDE PER 20 MG: Performed by: INTERNAL MEDICINE

## 2025-07-30 PROCEDURE — 97166 OT EVAL MOD COMPLEX 45 MIN: CPT

## 2025-07-30 RX ORDER — POTASSIUM CHLORIDE 1500 MG/1
20 TABLET, EXTENDED RELEASE ORAL DAILY
Status: DISCONTINUED | OUTPATIENT
Start: 2025-07-30 | End: 2025-08-01 | Stop reason: HOSPADM

## 2025-07-30 RX ORDER — DIAZEPAM 10 MG/2ML
20 INJECTION, SOLUTION INTRAMUSCULAR; INTRAVENOUS
Status: DISCONTINUED | OUTPATIENT
Start: 2025-07-30 | End: 2025-08-01 | Stop reason: HOSPADM

## 2025-07-30 RX ORDER — FOLIC ACID 1 MG/1
1 TABLET ORAL DAILY
Status: DISCONTINUED | OUTPATIENT
Start: 2025-07-30 | End: 2025-08-01 | Stop reason: HOSPADM

## 2025-07-30 RX ORDER — CLONIDINE HYDROCHLORIDE 0.1 MG/1
0.1 TABLET ORAL ONCE
Status: COMPLETED | OUTPATIENT
Start: 2025-07-30 | End: 2025-07-30

## 2025-07-30 RX ORDER — TRIAMCINOLONE ACETONIDE 1 MG/G
1 CREAM TOPICAL EVERY 12 HOURS SCHEDULED
Status: DISCONTINUED | OUTPATIENT
Start: 2025-07-30 | End: 2025-08-01 | Stop reason: HOSPADM

## 2025-07-30 RX ORDER — DIAZEPAM 10 MG/2ML
15 INJECTION, SOLUTION INTRAMUSCULAR; INTRAVENOUS
Status: DISCONTINUED | OUTPATIENT
Start: 2025-07-30 | End: 2025-08-01 | Stop reason: HOSPADM

## 2025-07-30 RX ORDER — THIAMINE HYDROCHLORIDE 100 MG/ML
200 INJECTION, SOLUTION INTRAMUSCULAR; INTRAVENOUS EVERY 8 HOURS SCHEDULED
Status: DISCONTINUED | OUTPATIENT
Start: 2025-07-30 | End: 2025-08-01 | Stop reason: HOSPADM

## 2025-07-30 RX ORDER — DIAZEPAM 10 MG/2ML
10 INJECTION, SOLUTION INTRAMUSCULAR; INTRAVENOUS
Status: DISCONTINUED | OUTPATIENT
Start: 2025-07-30 | End: 2025-08-01 | Stop reason: HOSPADM

## 2025-07-30 RX ORDER — DIAZEPAM 5 MG/1
10 TABLET ORAL
Status: DISCONTINUED | OUTPATIENT
Start: 2025-07-30 | End: 2025-08-01 | Stop reason: HOSPADM

## 2025-07-30 RX ORDER — MULTIPLE VITAMINS W/ MINERALS TAB 9MG-400MCG
1 TAB ORAL DAILY
Status: DISCONTINUED | OUTPATIENT
Start: 2025-07-30 | End: 2025-08-01 | Stop reason: HOSPADM

## 2025-07-30 RX ORDER — DIAZEPAM 5 MG/1
20 TABLET ORAL
Status: DISCONTINUED | OUTPATIENT
Start: 2025-07-30 | End: 2025-08-01 | Stop reason: HOSPADM

## 2025-07-30 RX ORDER — FUROSEMIDE 40 MG/1
40 TABLET ORAL DAILY
Status: DISCONTINUED | OUTPATIENT
Start: 2025-07-31 | End: 2025-08-01

## 2025-07-30 RX ORDER — DIAZEPAM 5 MG/1
15 TABLET ORAL
Status: DISCONTINUED | OUTPATIENT
Start: 2025-07-30 | End: 2025-08-01 | Stop reason: HOSPADM

## 2025-07-30 RX ADMIN — FOLIC ACID 1 MG: 1 TABLET ORAL at 18:18

## 2025-07-30 RX ADMIN — Medication 10 ML: at 20:39

## 2025-07-30 RX ADMIN — DOCUSATE SODIUM AND SENNOSIDES 2 TABLET: 8.6; 5 TABLET, FILM COATED ORAL at 08:19

## 2025-07-30 RX ADMIN — POTASSIUM CHLORIDE 20 MEQ: 1500 TABLET, EXTENDED RELEASE ORAL at 08:40

## 2025-07-30 RX ADMIN — ACETAMINOPHEN 650 MG: 325 TABLET, FILM COATED ORAL at 20:43

## 2025-07-30 RX ADMIN — SERTRALINE HYDROCHLORIDE 50 MG: 50 TABLET, FILM COATED ORAL at 08:16

## 2025-07-30 RX ADMIN — TRIAMCINOLONE ACETONIDE 1 APPLICATION: 1 CREAM TOPICAL at 17:09

## 2025-07-30 RX ADMIN — Medication 10 ML: at 08:16

## 2025-07-30 RX ADMIN — FUROSEMIDE 40 MG: 10 INJECTION, SOLUTION INTRAMUSCULAR; INTRAVENOUS at 08:16

## 2025-07-30 RX ADMIN — LISINOPRIL 10 MG: 20 TABLET ORAL at 08:16

## 2025-07-30 RX ADMIN — CLONIDINE HYDROCHLORIDE 0.1 MG: 0.1 TABLET ORAL at 02:16

## 2025-07-30 RX ADMIN — TRIAMCINOLONE ACETONIDE 1 APPLICATION: 1 CREAM TOPICAL at 06:53

## 2025-07-30 RX ADMIN — FUROSEMIDE 40 MG: 10 INJECTION, SOLUTION INTRAMUSCULAR; INTRAVENOUS at 17:08

## 2025-07-30 RX ADMIN — THIAMINE HYDROCHLORIDE 200 MG: 100 INJECTION, SOLUTION INTRAMUSCULAR; INTRAVENOUS at 20:38

## 2025-07-30 RX ADMIN — Medication 1 TABLET: at 18:18

## 2025-07-30 RX ADMIN — LEVOTHYROXINE SODIUM 137 MCG: 137 TABLET ORAL at 06:53

## 2025-07-30 NOTE — PLAN OF CARE
Goal Outcome Evaluation:  Plan of Care Reviewed With: patient           Outcome Evaluation: Pt. is an 84 year old Male admitted to the hospital after being found down on the floor at home by one of  his caregivers (h/o recent falls).  Pt. reports that prior to admission he was using a Rwx for ambulation.  Pt. currently presents with decreased strength, decreased balance, and decreased tolerance to functional activity.  This PM, pt. able to ambulate 5 feet and take 4 shuffled sidesteps at bedside, Min. assist x 1, with HHA x 1. Pt. requires CGA x 1 for bed mobility and Min. assist x 1 for sit <-> stand transfers.  Pt. performed sit <-> stand transfers x 4 reps for functional strength training. Verbal/tactile cues given throughout upright mobility for posture correction, although limited due to h/o back pain.  Pt. will benefit from skilled inpt. P.T. to address his functional deficits and to assist pt. in regaining his maximum level of independence with functional mobility.    Anticipated Discharge Disposition (PT): home with 24/7 care, home with home health, skilled nursing facility (All pending pt. progress)

## 2025-07-30 NOTE — THERAPY EVALUATION
Patient Name: Darwin Sparks  : 1940    MRN: [id number]                              Today's Date: 2025       Admit Date: 2025    Visit Dx:     ICD-10-CM ICD-9-CM   1. Altered mental status, unspecified altered mental status type  R41.82 780.97   2. CHI (closed head injury), initial encounter  S09.90XA 959.01   3. Repeated falls  R29.6 781.99   4. Uncontrolled hypertension  I10 401.9     Patient Active Problem List   Diagnosis    Primary hypertension    Mixed hyperlipidemia    Adult hypothyroidism    Insomnia    Anxiety    Dermatitis, eczematoid    Bronchitis    Seasonal allergies    Health care maintenance    Chronic midline low back pain without sciatica    Penis pain    Tinea cruris    Primary osteoarthritis involving multiple joints    Benzodiazepine dependence    Constipation    Abnormal finding on CT scan    Scoliosis of lumbar spine    Lumbar facet arthropathy    Vertigo    Multiple falls    Severe major depression    Decreased strength, endurance, and mobility    Gait instability    Syncope    Repeated falls     Past Medical History:   Diagnosis Date    Cataract     Colon polyp     10 years ago    Deep vein thrombosis     Childhood    Headache 28 years old    HL (hearing loss) 8 years ago    Not too bad, hearing aids for crouded places    Hyperlipidemia     Hypertension     Hypothyroidism     Infectious viral hepatitis     A & B ?    Inflammatory bowel disease Child to 77 years old    Constopation, alergy animal protine,Vegan diet    Irritable bowel syndrome     Vegan diet big improvement    Kidney stone 6    4 surgeries    Low back pain 1973    Osteoarthritis     Pneumonia Child    2 times    Prostate cancer 2013    Scoliosis 1973    Urinary tract infection 1973    After kidney stones    Visual impairment 1960    First glasses     Past Surgical History:   Procedure Laterality Date    ADENOIDECTOMY  10 years old    CATARACT EXTRACTION      CIRCUMCISION      COLONOSCOPY      COLONOSCOPY  N/A 05/27/2021    Procedure: COLONOSCOPY TO CECUM/TI;  Surgeon: Jamel Michaels MD;  Location: [facility] ENDOSCOPY;  Service: Gastroenterology;  Laterality: N/A;  Pre op: Abnormal cat scan, constipation, RLQ pain  Post op: Diverticulosis, Hemorrhoids, otherwise normal to and including TI.    MEDIAL BRANCH BLOCK Bilateral 07/30/2021    Procedure: LUMBAR MEDIAL BRANCH BLOCK;  Surgeon: Kb Rodriguez MD;  Location: [facility] MAIN OR;  Service: Pain Management;  Laterality: Bilateral;    MEDIAL BRANCH BLOCK Bilateral 08/20/2021    Procedure: MEDIAL BRANCH BLOCK--bilateral lumbar3-lumbar5;  Surgeon: Kb Rodriguez MD;  Location: [facility];  Service: Pain Management;  Laterality: Bilateral;    PROSTATE SURGERY  72 years old    Low radiation, started vegan diet    RADIOFREQUENCY ABLATION Bilateral 10/08/2021    Procedure: RADIOFREQUENCY ABLATION LUMBAR--bilateral lumbar3-5 WITH MAC;  Surgeon: Kb Rodriguez MD;  Location: [facility] MAIN OR;  Service: Pain Management;  Laterality: Bilateral;    TONSILLECTOMY      TOOTH EXTRACTION        General Information       Row Name 07/30/25 1534          Physical Therapy Time and Intention    Document Type evaluation  Pt. admitted after being found down on the floor by one of his caregivers;  Multiple falls recently as well.  -MS     Mode of Treatment physical therapy;individual therapy  -MS       Row Name 07/30/25 1534          General Information    Patient Profile Reviewed yes  -MS     Prior Level of Function independent:  Use of Rwx for ambulation  -MS     Existing Precautions/Restrictions fall  Exit alarm  -MS     Barriers to Rehab none identified  -MS       Row Name 07/30/25 1534          Cognition    Orientation Status (Cognition) oriented x 3  -MS       Row Name 07/30/25 1534          Safety Issues/Impairments Affecting Functional Mobility    Comment, Safety Issues/Impairments (Mobility) Gait belt used for safety.  -MS               User Key  (r) = Recorded By, (t)  = Taken By, (c) = Cosigned By      Initials Name Provider Type    MS Garvin Harley CHRISTIE, PT Physical Therapist                   Mobility       Row Name 07/30/25 1535          Bed Mobility    Supine-Sit Cotton (Bed Mobility) contact guard  -MS     Sit-Supine Cotton (Bed Mobility) contact guard  -MS       Row Name 07/30/25 1535          Sit-Stand Transfer    Sit-Stand Cotton (Transfers) minimum assist (75% patient effort)  -MS     Comment, (Sit-Stand Transfer) Pt. performed sit <-> stand transfers x 4 reps for functional strength training.  -MS       Row Name 07/30/25 1535          Gait/Stairs (Locomotion)    Cotton Level (Gait) minimum assist (75% patient effort)  -MS     Assistive Device (Gait) --  HHA x 1  -MS     Distance in Feet (Gait) 5  Pt. also able to take 4 shuffled sidesteps at bedside.  -MS     Deviations/Abnormal Patterns (Gait) carolyn decreased;stride length decreased  -MS     Bilateral Gait Deviations forward flexed posture  -MS     Comment, (Gait/Stairs) Verbal/tactile cues given for posture correction, although limited due to h/o chronic back pain per pt. report.  Unsteady and guarded when upright as well.  -MS               User Key  (r) = Recorded By, (t) = Taken By, (c) = Cosigned By      Initials Name Provider Type    MS Garvin Harley CHRISTIE, PT Physical Therapist                   Obj/Interventions       Row Name 07/30/25 1536          Range of Motion Comprehensive    Comment, General Range of Motion BLE (WFL's)  -MS       Row Name 07/30/25 1536          Strength Comprehensive (MMT)    Comment, General Manual Muscle Testing (MMT) Assessment BLE (3+/5)  -MS               User Key  (r) = Recorded By, (t) = Taken By, (c) = Cosigned By      Initials Name Provider Type    MS Garvin Harley CHRISTIE, PT Physical Therapist                   Goals/Plan       Row Name 07/30/25 1537          Bed Mobility Goal 1 (PT)    Activity/Assistive Device (Bed Mobility Goal 1, PT) bed mobility  activities, all  -MS     Patrick Level/Cues Needed (Bed Mobility Goal 1, PT) standby assist  -MS     Time Frame (Bed Mobility Goal 1, PT) long term goal (LTG);1 week  -MS       Row Name 07/30/25 1537          Transfer Goal 1 (PT)    Activity/Assistive Device (Transfer Goal 1, PT) transfers, all;walker, rolling  -MS     Patrick Level/Cues Needed (Transfer Goal 1, PT) standby assist  -MS     Time Frame (Transfer Goal 1, PT) long term goal (LTG);1 week  -MS       Row Name 07/30/25 1537          Gait Training Goal 1 (PT)    Activity/Assistive Device (Gait Training Goal 1, PT) gait (walking locomotion);walker, rolling  -MS     Patrick Level (Gait Training Goal 1, PT) standby assist  -MS     Distance (Gait Training Goal 1, PT) 50 feet  -MS     Time Frame (Gait Training Goal 1, PT) long term goal (LTG);1 week  -MS       Row Name 07/30/25 1537          Therapy Assessment/Plan (PT)    Planned Therapy Interventions (PT) balance training;bed mobility training;gait training;home exercise program;patient/family education;postural re-education;transfer training;strengthening  -MS               User Key  (r) = Recorded By, (t) = Taken By, (c) = Cosigned By      Initials Name Provider Type    Harley Bonner, PT Physical Therapist                   Clinical Impression       [facility] Name 07/30/25 1537          Pain    Pretreatment Pain Rating 5/10  -MS     Posttreatment Pain Rating 5/10  -MS     Pain Location back  -MS     Pain Side/Orientation lower  -MS     Pain Management Interventions nursing notified;positioning techniques utilized  -MS       Row Name 07/30/25 1537          Plan of Care Review    Plan of Care Reviewed With patient  -MS       Row Name 07/30/25 1537          Therapy Assessment/Plan (PT)    Rehab Potential (PT) good  -MS     Criteria for Skilled Interventions Met (PT) skilled treatment is necessary  -MS     Therapy Frequency (PT) 6 times/wk  -MS       Row Name 07/30/25 1537          Positioning and  Restraints    Pre-Treatment Position in bed  -MS     Post Treatment Position bed  -MS     In Bed notified nsg;supine;call light within reach;encouraged to call for assist;exit alarm on  All lines intact. V.S.S.  -MS               User Key  (r) = Recorded By, (t) = Taken By, (c) = Cosigned By      Initials Name Provider Type    MS Garvin Harley CHRISTIE, PT Physical Therapist                   Outcome Measures       Row Name 07/30/25 1538 07/30/25 0810       How much help from another person do you currently need...    Turning from your back to your side while in flat bed without using bedrails? 3  -MS 4  -DS    Moving from lying on back to sitting on the side of a flat bed without bedrails? 3  -MS 4  -DS    Moving to and from a bed to a chair (including a wheelchair)? 3  -MS 3  -DS    Standing up from a chair using your arms (e.g., wheelchair, bedside chair)? 3  -MS 3  -DS    Climbing 3-5 steps with a railing? 2  -MS 3  -DS    To walk in hospital room? 3  -MS 3  -DS    AM-PAC 6 Clicks Score (PT) 17  -MS 20  -DS    Highest Level of Mobility Goal Stand (1 or More Minutes)-5  -MS Walk 10 Steps or More-6  -DS      Row Name 07/30/25 1311          Modified Dougherty Scale    Modified Dougherty Scale 3 - Moderate disability.  Requiring some help, but able to walk without assistance.  -JR       Row Name 07/30/25 1538 07/30/25 1311       Functional Assessment    Outcome Measure Options AM-PAC 6 Clicks Basic Mobility (PT)  -MS AM-PAC 6 Clicks Daily Activity (OT);Modified [place]  -JR              User Key  (r) = Recorded By, (t) = Taken By, (c) = Cosigned By      Initials Name Provider Type    Harley Bonner, PT Physical Therapist    Nuha Kyle, RN Registered Nurse    Chevy Quezada OT Occupational Therapist                                 Physical Therapy Education       Title: PT OT SLP Therapies (In Progress)       Topic: Physical Therapy (In Progress)       Point: Mobility training (Done)       Learning Progress Summary             Patient Acceptance, E,D, VU,NR by MS at 7/30/2025 1538                      Point: Home exercise program (Not Started)       Learner Progress:  Not documented in this visit.              Point: Body mechanics (Done)       Learning Progress Summary            Patient Acceptance, E,D, VU,NR by MS at 7/30/2025 1538                      Point: Precautions (Done)       Learning Progress Summary            Patient Acceptance, E,D, VU,NR by MS at 7/30/2025 1538                                      User Key       Initials Effective Dates Name Provider Type Discipline    MS 06/16/21 -  Harley Garvin, PT Physical Therapist PT                  PT Recommendation and Plan  Planned Therapy Interventions (PT): balance training, bed mobility training, gait training, home exercise program, patient/family education, postural re-education, transfer training, strengthening  Outcome Evaluation: Pt. is an 84 year old Male admitted to the hospital after being found down on the floor at home by one of  his caregivers (h/o recent falls).  Pt. reports that prior to admission he was using a Rwx for ambulation.  Pt. currently presents with decreased strength, decreased balance, and decreased tolerance to functional activity.  This PM, pt. able to ambulate 5 feet and take 4 shuffled sidesteps at bedside, Min. assist x 1, with HHA x 1. Pt. requires CGA x 1 for bed mobility and Min. assist x 1 for sit <-> stand transfers.  Pt. performed sit <-> stand transfers x 4 reps for functional strength training. Verbal/tactile cues given throughout upright mobility for posture correction, although limited due to h/o back pain.  Pt. will benefit from skilled inpt. P.T. to address his functional deficits and to assist pt. in regaining his maximum level of independence with functional mobility.     Time Calculation:         PT Charges       Row Name 07/30/25 1544             Time Calculation    Start Time 1415  -MS      Stop Time 1433  -MS       Time Calculation (min) 18 min  -MS      PT Received On 07/30/25  -MS      PT - Next Appointment 07/31/25  -MS      PT Goal Re-Cert Due Date 08/06/25  -MS         Time Calculation- PT    Total Timed Code Minutes- PT 17 minute(s)  -MS                User Key  (r) = Recorded By, (t) = Taken By, (c) = Cosigned By      Initials Name Provider Type    Harley Bonner, PT Physical Therapist                  Therapy Charges for Today       Code Description Service Date Service Provider Modifiers Qty    65270179082 HC PT EVAL MOD COMPLEXITY 3 7/30/2025 Harley Garvin, PT GP 1    19981547416 HC PT THERAPEUTIC ACT EA 15 MIN 7/30/2025 Harley Garvin, PT GP 1            PT G-Codes  Outcome Measure Options: AM-PAC 6 Clicks Basic Mobility (PT)  AM-PAC 6 Clicks Score (PT): 17  AM-PAC 6 Clicks Score (OT): 18  Modified [place] Scale: 3 - Moderate disability.  Requiring some help, but able to walk without assistance.  PT Discharge Summary  Anticipated Discharge Disposition (PT): home with 24/7 care, home with home health, skilled nursing facility (Pending pt. progress)    Harley Garvin, PT  7/30/2025

## 2025-07-30 NOTE — PLAN OF CARE
Goal Outcome Evaluation:  Plan of Care Reviewed With: patient, caregiver        Progress: improving  Outcome Evaluation: 84 y.o. male admitted to [facility] for fall at home.  At baseline, patient lives with spouse at home (2 HERMINIA and chair lift from garage to inside house) Independent with ADLs and functional mobility (Rw).  Patient has caregiver 4x wk 9-5 to assist with spouse and  1x wk.  Patient house has been recently remodeled to allow w/c access throughout.  Patient rates lumbar pain at consistant 5/10.  Patient reports BLE swelling and painful to stand on barefeet.  A&Ox4, BUE wFL, 4/5.  Patient presents to OT with decreased strength, activity tolerance, coordination and balance.  Patient resting in bed with caregiver present.  Patient able to transition to EOB with SBA.  Once stabilized, patient required SBA/S for all sitting balance.  STS from EOB with Min A and HHA.  Patient able to take a few side steps toward HOB (R) with CGA and HHA.  Patient reports BLE swelling since Thansgiving.  Patient returned to supine with SBA.  Patient has generalized weakness and fatigue from fall and laying on floor.  OT would be beneficial to address underlying physical deficits and increase ADLs.  Anticipate patient d/c to home with HH assistance and caregiver assistance.    Anticipated Discharge Disposition (OT): home with home health, home with assist

## 2025-07-30 NOTE — DISCHARGE PLACEMENT REQUEST
"Evelio Sparks (84 y.o. Male)       Date of Birth   1940    Social Security Number       Address   [address] [place], [place] [address]    Home Phone   [phone]    MRN   [id number]       Cheondoism   None    Marital Status                               Admission Date   7/29/2025    Admission Type   Emergency    Admitting Provider   Alisia Banuelos MD    Attending Provider   Darcy Crews MD    [facility]       Discharge Date       Discharge Disposition       Discharge Destination                                 Attending Provider: Darcy Crews MD    Allergies: Milk-related Compounds, Pregabalin, Neomycin-bacitracin Zn-polymyx    Isolation: None   Infection: None   Code Status: CPR    Ht: 170.2 cm (67\")   Wt: 71.8 kg (158 lb 4.6 oz)    Admission Cmt: None   Principal Problem: Repeated falls [R29.6]                   Active Insurance as of 7/29/2025       Primary Coverage       Payor Plan Insurance Group Employer/Plan Group    [employer] MEDICARE REPLACEMENT [employer] PPO [id number]       Payor Plan Address Payor Plan Phone Number Payor Plan Fax Number Effective Dates    [address] [address] [phone]  1/1/2024 - None Entered    [facility] [address]         Subscriber Name Subscriber Birth Date Member ID       EVELIO SPARKS 1940 [id number]                     Emergency Contacts        (Rel.) Home Phone Work Phone Mobile Phone    JoannRoger (Spouse) [phone] -- [phone]    BERNICE ADAMS (Friend) [phone] -- [phone]    ZEYNEP MEYERS (Care Giver) -- -- [phone]                "

## 2025-07-30 NOTE — THERAPY EVALUATION
Patient Name: Darwin Sparks  : 1940    MRN: [id number]                              Today's Date: 2025       Admit Date: 2025    Visit Dx:     ICD-10-CM ICD-9-CM   1. Altered mental status, unspecified altered mental status type  R41.82 780.97   2. CHI (closed head injury), initial encounter  S09.90XA 959.01   3. Repeated falls  R29.6 781.99   4. Uncontrolled hypertension  I10 401.9     Patient Active Problem List   Diagnosis    Primary hypertension    Mixed hyperlipidemia    Adult hypothyroidism    Insomnia    Anxiety    Dermatitis, eczematoid    Bronchitis    Seasonal allergies    Health care maintenance    Chronic midline low back pain without sciatica    Penis pain    Tinea cruris    Primary osteoarthritis involving multiple joints    Benzodiazepine dependence    Constipation    Abnormal finding on CT scan    Scoliosis of lumbar spine    Lumbar facet arthropathy    Vertigo    Multiple falls    Severe major depression    Decreased strength, endurance, and mobility    Gait instability    Syncope    Repeated falls     Past Medical History:   Diagnosis Date    Cataract     Colon polyp     10 years ago    Deep vein thrombosis     Childhood    Headache 28 years old    HL (hearing loss) 8 years ago    Not too bad, hearing aids for crouded places    Hyperlipidemia     Hypertension     Hypothyroidism     Infectious viral hepatitis     A & B ?    Inflammatory bowel disease Child to 77 years old    Constopation, alergy animal protine,Vegan diet    Irritable bowel syndrome     Vegan diet big improvement    Kidney stone 6    4 surgeries    Low back pain 1973    Osteoarthritis     Pneumonia Child    2 times    Prostate cancer 2013    Scoliosis 1973    Urinary tract infection 1973    After kidney stones    Visual impairment 1960    First glasses     Past Surgical History:   Procedure Laterality Date    ADENOIDECTOMY  10 years old    CATARACT EXTRACTION      CIRCUMCISION      COLONOSCOPY      COLONOSCOPY  N/A 05/27/2021    Procedure: COLONOSCOPY TO CECUM/TI;  Surgeon: Jamel Michaels MD;  Location: [facility] ENDOSCOPY;  Service: Gastroenterology;  Laterality: N/A;  Pre op: Abnormal cat scan, constipation, RLQ pain  Post op: Diverticulosis, Hemorrhoids, otherwise normal to and including TI.    MEDIAL BRANCH BLOCK Bilateral 07/30/2021    Procedure: LUMBAR MEDIAL BRANCH BLOCK;  Surgeon: Kb Rodriguez MD;  Location[facility];  Service: Pain Management;  Laterality: Bilateral;    MEDIAL BRANCH BLOCK Bilateral 08/20/2021    Procedure: MEDIAL BRANCH BLOCK--bilateral lumbar3-lumbar5;  Surgeon: Kb Rodriguez MD;  Location: [facility];  Service: Pain Management;  Laterality: Bilateral;    PROSTATE SURGERY  72 years old    Low radiation, started vegan diet    RADIOFREQUENCY ABLATION Bilateral 10/08/2021    Procedure: RADIOFREQUENCY ABLATION LUMBAR--bilateral lumbar3-5 WITH MAC;  Surgeon: Kb Rodriguez MD;  Location: [facility] OR;  Service: Pain Management;  Laterality: Bilateral;    TONSILLECTOMY      TOOTH EXTRACTION        General Information       Row Name 07/30/25 1255          OT Time and Intention    Document Type evaluation  -JR     Mode of Treatment individual therapy;occupational therapy  -JR     Symptoms Noted During/After Treatment none  -JR       Row Name 07/30/25 125          General Information    Patient Profile Reviewed yes  -JR     Prior Level of Function independent:;ADL's  -JR     Existing Precautions/Restrictions no known precautions/restrictions  -JR     Barriers to Rehab previous functional deficit  -JR       Row Name 07/30/25 1259          Living Environment    Current Living Arrangements home  -JR     People in Home spouse  -JR       Row Name 07/30/25 1255          Home Main Entrance    Number of Stairs, Main Entrance two  -JR     Stair Railings, Main Entrance railings safe and in good condition  -JR       Row Name 07/30/25 1255          Stairs Within Home, Primary    Number  of Stairs, Within Home, Primary none  -JR     Stair Railings, Within Home, Primary none  -JR       Row Name 07/30/25 1255          Cognition    Orientation Status (Cognition) oriented x 4  -JR       Row Name 07/30/25 1255          Safety Issues/Impairments Affecting Functional Mobility    Comment, Safety Issues/Impairments (Mobility) gait belt and non skid socks donned  -               User Key  (r) = Recorded By, (t) = Taken By, (c) = Cosigned By      Initials Name Provider Type    Chevy Quezada OT Occupational Therapist                     Mobility/ADL's       Row Name 07/30/25 1301          Bed Mobility    Bed Mobility supine-sit;sit-supine  -JR     Supine-Sit [place] (Bed Mobility) standby assist  -     Sit-Supine [place] (Bed Mobility) standby assist  -     Bed Mobility, Safety Issues decreased use of legs for bridging/pushing  -     Assistive Device (Bed Mobility) head of bed elevated;bed rails  -       Row Name 07/30/25 1301          Transfers    Transfers sit-stand transfer  -       Row Name 07/30/25 1301          Sit-Stand Transfer    Sit-Stand [place] (Transfers) minimum assist (75% patient effort)  -     Assistive Device (Sit-Stand Transfers) other (see comments)  -     Comment, (Sit-Stand Transfer) HHA  -               User Key  (r) = Recorded By, (t) = Taken By, (c) = Cosigned By      Initials Name Provider Type    Chevy Quezada OT Occupational Therapist                   Obj/Interventions       Row Name 07/30/25 1301          Sensory Assessment (Somatosensory)    Sensory Assessment (Somatosensory) sensation intact  -       Row Name 07/30/25 1301          Vision Assessment/Intervention    Visual Impairment/Limitations WFL  -       Row Name 07/30/25 1301          Range of Motion Comprehensive    General Range of Motion bilateral upper extremity ROM WFL  -     Comment, General Range of Motion BUE WFL  -       Row Name 07/30/25 1301          Strength Comprehensive  (MMT)    General Manual Muscle Testing (MMT) Assessment upper extremity strength deficits identified  -JR     Comment, General Manual Muscle Testing (MMT) Assessment BUE 4/5  -JR       Row Name 07/30/25 1301          Balance    Balance Assessment sitting static balance;sitting dynamic balance;standing static balance;standing dynamic balance  -JR     Static Sitting Balance standby assist  -JR     Dynamic Sitting Balance standby assist  -JR     Position, Sitting Balance sitting edge of bed  -JR     Static Standing Balance contact guard  -JR     Dynamic Standing Balance contact guard  -JR     Position/Device Used, Standing Balance supported  -JR     Comment, Balance HHA for standing balance  -JR               User Key  (r) = Recorded By, (t) = Taken By, (c) = Cosigned By      Initials Name Provider Type    Chevy Quezada, OT Occupational Therapist                   Goals/Plan       Row Name 07/30/25 1310          Bed Mobility Goal 1 (OT)    Activity/Assistive Device (Bed Mobility Goal 1, OT) bed mobility activities, all  -JR     [place] Level/Cues Needed (Bed Mobility Goal 1, OT) modified independence  -JR     Time Frame (Bed Mobility Goal 1, OT) short term goal (STG);2 weeks  -JR     Progress/Outcomes (Bed Mobility Goal 1, OT) new goal  -JR       Row Name 07/30/25 1310          Transfer Goal 1 (OT)    Activity/Assistive Device (Transfer Goal 1, OT) transfers, all  -JR     [place] Level/Cues Needed (Transfer Goal 1, OT) modified independence  -JR     Time Frame (Transfer Goal 1, OT) short term goal (STG);2 weeks  -JR     Progress/Outcome (Transfer Goal 1, OT) new goal  -JR       Row Name 07/30/25 1310          Bathing Goal 1 (OT)    [place] Level/Cues Needed (Bathing Goal 1, OT) modified independence  -JR     Time Frame (Bathing Goal 1, OT) short term goal (STG);2 weeks  -JR     Progress/Outcomes (Bathing Goal 1, OT) new goal  -JR       Row Name 07/30/25 1310          Dressing Goal 1 (OT)     Activity/Device (Dressing Goal 1, OT) dressing skills, all  -JR     [place]/Cues Needed (Dressing Goal 1, OT) modified independence  -JR     Time Frame (Dressing Goal 1, OT) short term goal (STG);2 weeks  -JR     Progress/Outcome (Dressing Goal 1, OT) new goal  -JR       Row Name 07/30/25 1310          Toileting Goal 1 (OT)    Activity/Device (Toileting Goal 1, OT) toileting skills, all  -JR     [place] Level/Cues Needed (Toileting Goal 1, OT) modified independence  -JR     Time Frame (Toileting Goal 1, OT) short term goal (STG);2 weeks  -JR     Progress/Outcome (Toileting Goal 1, OT) new goal  -       Row Name 07/30/25 1310          Grooming Goal 1 (OT)    Activity/Device (Grooming Goal 1, OT) grooming skills, all  -JR     [place] (Grooming Goal 1, OT) modified independence  -JR     Time Frame (Grooming Goal 1, OT) short term goal (STG);2 weeks  -JR     Progress/Outcome (Grooming Goal 1, OT) new goal  -       Row Name 07/30/25 1310          Therapy Assessment/Plan (OT)    Planned Therapy Interventions (OT) activity tolerance training;adaptive equipment training;BADL retraining;neuromuscular control/coordination retraining;functional balance retraining;occupation/activity based interventions;passive ROM/stretching;transfer/mobility retraining;strengthening exercise;ROM/therapeutic exercise  -JR               User Key  (r) = Recorded By, (t) = Taken By, (c) = Cosigned By      Initials Name Provider Type    JR Chevy Dyer, OT Occupational Therapist                   Clinical Impression       Row Name 07/30/25 1302          Pain Assessment    Pretreatment Pain Rating 5/10  -JR     Posttreatment Pain Rating 5/10  -JR     Pain Location back  -JR     Pain Side/Orientation lower  -JR     Pain Management Interventions exercise or physical activity utilized  -JR     Response to Pain Interventions activity participation with tolerable pain  -JR       Row Name 07/30/25 1302          Plan of Care Review     Plan of Care Reviewed With patient;caregiver  -     Progress improving  -     Outcome Evaluation 84 y.o. male admitted to [facility] for fall at home.  At baseline, patient lives with spouse at home (2 HERMINIA and chair lift from garage to inside house) Independent with ADLs and functional mobility (Rw).  Patient has caregiver 4x wk 9-5 to assist with spouse and  1x wk.  Patient house has been recently remodeled to allow w/c access throughout.  Patient rates lumbar pain at consistant 5/10.  Patient reports BLE swelling and painful to stand on barefeet.  A&Ox4, BUE wFL, 4/5.  Patient presents to OT with decreased strength, activity tolerance, coordination and balance.  Patient resting in bed with caregiver present.  Patient able to transition to EOB with SBA.  Once stabilized, patient required SBA/S for all sitting balance.  STS from EOB with Min A and HHA.  Patient able to take a few side steps toward HOB (R) with CGA and HHA.  Patient reports BLE swelling since Thansgiving.  Patient returned to supine with SBA.  Patient has generalized weakness and fatigue from fall and laying on floor.  OT would be beneficial to address underlying physical deficits and increase ADLs.  Anticipate patient d/c to home with HH assistance and caregiver assistance.  -       Row Name 07/30/25 1307          Therapy Assessment/Plan (OT)    Rehab Potential (OT) good  -     Criteria for Skilled Therapeutic Interventions Met (OT) yes;skilled treatment is necessary  -     Therapy Frequency (OT) 5 times/wk  -       Row Name 07/30/25 1308          Therapy Plan Review/Discharge Plan (OT)    Anticipated Discharge Disposition (OT) home with home health;home with assist  -       Row Name 07/30/25 1302          Vital Signs    O2 Delivery Pre Treatment room air  -JR     O2 Delivery Intra Treatment room air  -JR     O2 Delivery Post Treatment room air  -JR     Pre Patient Position Supine  -JR     Intra Patient Position Standing  -JR      Post Patient Position Supine  -JR       Row Name 07/30/25 1302          Positioning and Restraints    Pre-Treatment Position in bed  -JR     Post Treatment Position bed  -JR     In Bed notified nsg;call light within reach;encouraged to call for assist;exit alarm on;with family/caregiver  -               User Key  (r) = Recorded By, (t) = Taken By, (c) = Cosigned By      Initials Name Provider Type    Chevy Quezada, OT Occupational Therapist                   Outcome Measures       Row Name 07/30/25 1311          How much help from another is currently needed...    Putting on and taking off regular lower body clothing? 2  -JR     Bathing (including washing, rinsing, and drying) 2  -JR     Toileting (which includes using toilet bed pan or urinal) 3  -JR     Putting on and taking off regular upper body clothing 3  -JR     Taking care of personal grooming (such as brushing teeth) 4  -JR     Eating meals 4  -JR     AM-PAC 6 Clicks Score (OT) 18  -JR       Row Name 07/30/25 0810          How much help from another person do you currently need...    Turning from your back to your side while in flat bed without using bedrails? 4  -DS     Moving from lying on back to sitting on the side of a flat bed without bedrails? 4  -DS     Moving to and from a bed to a chair (including a wheelchair)? 3  -DS     Standing up from a chair using your arms (e.g., wheelchair, bedside chair)? 3  -DS     Climbing 3-5 steps with a railing? 3  -DS     To walk in hospital room? 3  -DS     AM-PAC 6 Clicks Score (PT) 20  -DS       Row Name 07/30/25 1311          Modified [place] Scale    Modified [place] Scale 3 - Moderate disability.  Requiring some help, but able to walk without assistance.  -       Row Name 07/30/25 1311          Functional Assessment    Outcome Measure Options AM-PAC 6 Clicks Daily Activity (OT);Modified Roma  -               User Key  (r) = Recorded By, (t) = Taken By, (c) = Cosigned By      Initials Name Provider Type     Nuha Kyle, RN Registered Nurse    Chevy Quezada OT Occupational Therapist                    Occupational Therapy Education       Title: PT OT SLP Therapies (Done)       Topic: Occupational Therapy (Done)       Point: ADL training (Done)       Learning Progress Summary            Patient KATIE Lane, VU by  at 7/30/2025 1312    Comment: Role of OT                      Point: Home exercise program (Done)       Learning Progress Summary            Patient KATIE Lane, VU by  at 7/30/2025 1312    Comment: Role of OT                      Point: Precautions (Done)       Learning Progress Summary            Patient KATIE Lane, VU by  at 7/30/2025 1312    Comment: Role of OT                      Point: Body mechanics (Done)       Learning Progress Summary            Patient KATIE Lane, VU by  at 7/30/2025 1312    Comment: Role of OT                                      User Key       Initials Effective Dates Name Provider Type Discipline     07/24/24 -  Chevy Dyer OT Occupational Therapist OT                  OT Recommendation and Plan  Planned Therapy Interventions (OT): activity tolerance training, adaptive equipment training, BADL retraining, neuromuscular control/coordination retraining, functional balance retraining, occupation/activity based interventions, passive ROM/stretching, transfer/mobility retraining, strengthening exercise, ROM/therapeutic exercise  Therapy Frequency (OT): 5 times/wk  Plan of Care Review  Plan of Care Reviewed With: patient, caregiver  Progress: improving  Outcome Evaluation: 84 y.o. male [facility].  At baseline, patient lives with spouse at home (2 HERMINIA and chair lift from garage to inside house) Independent with ADLs and functional mobility (Rw).  Patient has caregiver 4x wk 9-5 to assist with spouse and  1x wk.  Patient house has been recently remodeled to allow w/c access throughout.  Patient rates lumbar pain at consistant 5/10.  Patient  reports BLE swelling and painful to stand on barefeet.  A&Ox4, BUE wFL, 4/5.  Patient presents to OT with decreased strength, activity tolerance, coordination and balance.  Patient resting in bed with caregiver present.  Patient able to transition to EOB with SBA.  Once stabilized, patient required SBA/S for all sitting balance.  STS from EOB with Min A and HHA.  Patient able to take a few side steps toward HOB (R) with CGA and HHA.  Patient reports BLE swelling since Thansgiving.  Patient returned to supine with SBA.  Patient has generalized weakness and fatigue from fall and laying on floor.  OT would be beneficial to address underlying physical deficits and increase ADLs.  Anticipate patient d/c to home with HH assistance and caregiver assistance.     Time Calculation:   Evaluation Complexity (OT)  Review Occupational Profile/Medical/Therapy History Complexity: expanded/moderate complexity  Assessment, Occupational Performance/Identification of Deficit Complexity: 3-5 performance deficits  Clinical Decision Making Complexity (OT): detailed assessment/moderate complexity  Overall Complexity of Evaluation (OT): moderate complexity     Time Calculation- OT       Row Name 07/30/25 1312             Time Calculation- OT    OT Start Time 1030  -JR      OT Stop Time 1114  -JR      OT Time Calculation (min) 44 min  -JR      Total Timed Code Minutes- OT 34 minute(s)  -JR      OT Received On 07/30/25  -JR      OT - Next Appointment 07/31/25  -JR      OT Goal Re-Cert Due Date 08/13/25  -JR         Timed Charges    70107 - OT Therapeutic Activity Minutes 34  -JR         Untimed Charges    OT Eval/Re-eval Minutes 10  -JR         Total Minutes    Timed Charges Total Minutes 34  -JR      Untimed Charges Total Minutes 10  -JR       Total Minutes 44  -JR                User Key  (r) = Recorded By, (t) = Taken By, (c) = Cosigned By      Initials Name Provider Type    Chevy Quezada, OT Occupational Therapist                  Therapy  Charges for Today       Code Description Service Date Service Provider Modifiers Qty    [id number]  OT THERAPEUTIC ACT EA 15 MIN 7/30/2025 Chevy Dyer OT GO 2    [id number]  OT EVAL MOD COMPLEXITY 3 7/30/2025 Chevy Dyer OT GO 1                 Chevy Dyer OT  7/30/2025

## 2025-07-30 NOTE — CASE MANAGEMENT/SOCIAL WORK
Discharge Planning Assessment  [facility]     Patient Name: Darwin Sparks  MRN: [id number]  Today's Date: 7/30/2025    Admit Date: 7/29/2025    Plan: Home with spouse and HH- referral pending to [facility]   Discharge Needs Assessment       Row Name 07/30/25 1353       Living Environment    People in Home spouse    Current Living Arrangements home    Primary Care Provided by self    Provides Primary Care For no one    Family Caregiver if Needed spouse    Family Caregiver Names Roger [phone]    Able to Return to Prior Arrangements yes       Transition Planning    Patient/Family Anticipates Transition to home with help/services;home with family    Transportation Anticipated family or friend will provide       Discharge Needs Assessment    Readmission Within the Last 30 Days no previous admission in last 30 days    Equipment Currently Used at Home cane, straight;walker, rolling;wheelchair    Concerns to be Addressed discharge planning                   Discharge Plan       Row Name 07/30/25 1358       Plan    Plan Home with spouse and HH- referral pending to [facility]    Patient/Family in Agreement with Plan yes    Plan Comments MOON letter checked. Met with patient at bedside, introduced self and explained CCP role. Verified facesheet and PCP- James Epley. Patient lives at home with spouse-Roger [phone]. Home has 2 steps to enter. Patient was ind with ADLS prior to admission. Patient has private caregivers that come 3-5hrs/day 4 days a week to help with cooking/cleaning. Patient is interested in HH services and referral placed to [facility] per his request- referral pending. Patient denies h/o SNF. Patient has cane, walker and WC. Patient uses [facility] pharmacy and reports no difficulty affording medications. Family will transport at [place]. CCP to follow. Senthil VILLEDA RN                  Continued Care and Services - Admitted Since 7/29/2025       Home Medical Care       Service Provider Request Status Services Address Phone Fax  Patient Preferred    [facility] Pending - Request Sent -- [address], [address], [place] [address] [phone] --                     Demographic Summary       Row Name 07/30/25 1352       General Information    Admission Type observation    Referral Source admission list    Reason for Consult discharge planning    Preferred Language English                   Functional Status       Row Name 07/30/25 1353       Functional Status    Usual Activity Tolerance fair    Current Activity Tolerance fair       Functional Status, IADL    Medications assistive person    Meal Preparation assistive person    Housekeeping assistive person    Laundry assistive person    Shopping assistive person    If for any reason you need help with day-to-day activities such as bathing, preparing meals, shopping, managing finances, etc., do you get the help you need? I get all the help I need                   Psychosocial    No documentation.                  Abuse/Neglect    No documentation.                  Legal    No documentation.                  Substance Abuse    No documentation.                  Patient Forms    No documentation.                     Senthil Diza RN

## 2025-07-30 NOTE — CONSULTS
"REASON FOR ACCESS CONSULT:    ETOH    HPI: Pt admitted after being found out of the bed with an empty bottle of wine. No ethanol was drawn in ED. UDS negative.     Pt was found RIB. When asked for circumstances for admission he states he had a fall. He also states he got in an argument with his , had two glasses of wine, and ended up in the hospital. He states he has had nine falls since last Thanksgiving. Pt answers orientation questions. Pt has to be redirected back to topic; may be a bit confused.    He denied any s/s withdrawal. He denied any cravings for a drink of ETOH. Most current /highest CIWA=4 at this time. Stated his last drink last \"last Thursday\", however, a bottle was found next to pt yesterday - no ethanol was drawn.    He denied current feeling of anxiety or depression. He denied current/recent SI/HI/AVH. Appetite \"ok\" - he is vegan. He states his sleep is poor at baseline as he always has to get up every hour at home to urinate. He denied any current stressors other than he worries about his spouse who is in a wheelchair at home. He states he and spouse have a few nurses that come into the home.    SUBSTANCE USE HX:  As above. Prior to the two glasses of wine last night, he states he hasn't had any ETOH since Thanksgiving. Prior to Thanksgiving he was an every day drinker for about 20 yrs. Doesn't quantify those amounts. Seems he may be a poor historian about his use. Per chart review, in May he stated he had 3-4 drinks nightly. Pt states his longest sober period in his life is 5-10 yrs, he can't recall when that was. He stated he has never been to treatment or AA. He denied hx of withdrawal/DT's.     MENTAL HEALTH HX:  Per chart review, hx of severe major depression, anxiety, insomnia. PCP managing home script of Zoloft 50 mg daily - he feels this is helping. He denied hx of psych hospitalizations. Has worked with therapy and psychiatry in the past. Denied hx of suicide attempts/self-harm. "     SOCIAL HX: Pt has been with spouse for a total of 63 yrs. They live together in a house. He denied violence in the home. Pt has no children. 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     PLAN: Pt poor historian regards ETOH use. He currently denied any problem ETOH. He declined BE resources. Access will sign off at this point.

## 2025-07-30 NOTE — CASE MANAGEMENT/SOCIAL WORK
Continued Stay Note  [facility]     Patient Name: Darwin Sparks  MRN: [id number]  Today's Date: 7/30/2025    Admit Date: 7/29/2025    Plan: Home with spouse and HH- referral to [facility] pending.   Discharge Plan       Row Name 07/30/25 1418       Plan    Plan Home with spouse and HH- referral to [facility] pending.    Patient/Family in Agreement with Plan yes    Plan Comments [facility] declined OON. Referral sent to [facility] per patient request. CCP to follow. Senthil VILLEDA RN      Row Name 07/30/25 1354       Plan    Plan Home with spouse and HH- referral pending to [facility]    Patient/Family in Agreement with Plan yes    Plan Comments MOON letter checked. Met with patient at bedside, introduced self and explained CCP role. Verified facesheet and PCP- James Epley. Patient lives at home with spouse-Roger [phone]. Home has 2 steps to enter. Patient was ind with ADLS prior to admission. Patient has private caregivers that come 3-5hrs/day 4 days a week to help with cooking/cleaning. Patient is interested in  services and referral placed to [facility] per his request- referral pending. Patient denies h/o SNF. Patient has cane, walker and WC. Patient uses [facility] and reports no difficulty affording medications. Family will transport at [place]. CCP to follow. Senthil VILLEDA RN                   Discharge Codes    No documentation.                 Expected Discharge Date and Time       Expected Discharge Date Expected Discharge Time    Jul 31, 2025               Senthil Diaz RN

## 2025-07-30 NOTE — PLAN OF CARE
Goal Outcome Evaluation:  Plan of Care Reviewed With: patient        Progress: no change  Outcome Evaluation: Pt without complaints of pain. Appetite good. CIWA stable. Purewick in place. Plan to discharge home with spouse.

## 2025-07-30 NOTE — H&P
PCP: Epley, James, APRN    Chief complaint   Chief Complaint   Patient presents with    Fall    Altered Mental Status       HPI  Patient is a 84 y.o. male presents with history of hypothyroidism, hyperlipidemia, prostate cancer, vegan, MSM, history of vertigo and syncope and orthostatic hypotension who presents to the ER after being found on the floor with an empty bottle of wine by his caregiver at 9 AM this morning.  Patient states that he got in a fight with his  and they went to their separate bedrooms.  The patient thought he only drink half a bottle of wine but caregiver reported an empty bottle.  Patient states he is not any alcohol in 6 months.  They were fighting and he drank alcohol and then went to sleep and the next thing he remembers was her waking him up and then him ended up in the hospital.  Some abrasions to left forehead    Patient is repeatedly falling says it has been 9 times in the last 7 months.  PAST MEDICAL HISTORY  Past Medical History:   Diagnosis Date    Cataract     Colon polyp     10 years ago    Deep vein thrombosis     Childhood    Headache 28 years old    HL (hearing loss) 8 years ago    Not too bad, hearing aids for crouded places    Hyperlipidemia     Hypertension     Hypothyroidism     Infectious viral hepatitis     A & B ?    Inflammatory bowel disease Child to 77 years old    Constopation, alergy animal protine,Vegan diet    Irritable bowel syndrome     Vegan diet big improvement    Kidney stone 1958    4 surgeries    Low back pain 1973    Osteoarthritis     Pneumonia Child    2 times    Prostate cancer 2013    Scoliosis 1973    Urinary tract infection 1973    After kidney stones    Visual impairment 1960    First glasses       PAST SURGICAL HISTORY  Past Surgical History:   Procedure Laterality Date    ADENOIDECTOMY  10 years old    CATARACT EXTRACTION      CIRCUMCISION      COLONOSCOPY      COLONOSCOPY N/A 05/27/2021    Procedure: COLONOSCOPY TO CECUM/TI;  Surgeon:  Jamel Michaels MD;  Location: [facility] ENDOSCOPY;  Service: Gastroenterology;  Laterality: N/A;  Pre op: Abnormal cat scan, constipation, RLQ pain  Post op: Diverticulosis, Hemorrhoids, otherwise normal to and including TI.    MEDIAL BRANCH BLOCK Bilateral 07/30/2021    Procedure: LUMBAR MEDIAL BRANCH BLOCK;  Surgeon: Kb Rodriguez MD;  Location: [facility] MAIN OR;  Service: Pain Management;  Laterality: Bilateral;    MEDIAL BRANCH BLOCK Bilateral 08/20/2021    Procedure: MEDIAL BRANCH BLOCK--bilateral lumbar3-lumbar5;  Surgeon: Kb Rodriguez MD;  Location: [facility] MAIN OR;  Service: Pain Management;  Laterality: Bilateral;    PROSTATE SURGERY  72 years old    Low radiation, started vegan diet    RADIOFREQUENCY ABLATION Bilateral 10/08/2021    Procedure: RADIOFREQUENCY ABLATION LUMBAR--bilateral lumbar3-5 WITH MAC;  Surgeon: Kb Rodriguez MD;  Location: [facility] MAIN OR;  Service: Pain Management;  Laterality: Bilateral;    TONSILLECTOMY      TOOTH EXTRACTION         FAMILY HISTORY  Family History   Problem Relation Age of Onset    Diabetes Mother     Hypertension Mother     Stroke Mother     Diabetes Father     Hypertension Father     Bipolar disorder Father     Stroke Father     Bipolar disorder Paternal Grandmother     Heart disease Other     Sudden death Other     Anuerysm Other     Malig Hyperthermia Neg Hx        SOCIAL HISTORY  Social History     Tobacco Use    Smoking status: Never    Smokeless tobacco: Never   Vaping Use    Vaping status: Never Used   Substance Use Topics    Alcohol use: Not Currently    Drug use: Not Currently     Types: Marijuana     Comment: used in the 1970's       MEDICATIONS:  Medications Prior to Admission   Medication Sig Dispense Refill Last Dose/Taking    albuterol sulfate  (90 Base) MCG/ACT inhaler Inhale 2 puffs Every 4 (Four) Hours As Needed for Wheezing or Shortness of Air. 6.7 g 2 Taking As Needed    ammonium lactate (LAC-HYDRIN) 12 % lotion Apply  topically  to the appropriate area as directed As Needed for Dry Skin. Best for lower extremity dry chronically's skin (Patient taking differently: Apply 1 Application topically to the appropriate area as directed As Needed for Dry Skin. Best for lower extremity dry chronically's skin) 400 g 3 Taking Differently    folic acid (FOLVITE) 1 MG tablet Take 1 tablet by mouth Daily. 90 tablet 3 Taking    furosemide (Lasix) 20 MG tablet Take 1 tablet by mouth Daily As Needed (Dependent edema). 30 tablet 2 Taking As Needed    levothyroxine (SYNTHROID, LEVOTHROID) 125 MCG tablet Take 1 tablet by mouth Daily. 90 tablet 3 Taking    lisinopril (PRINIVIL,ZESTRIL) 10 MG tablet Take 1 tablet by mouth Daily for 30 days. (Patient taking differently: Take 1 tablet by mouth Daily.) 30 tablet 0 Taking Differently    potassium chloride (MICRO-K) 10 MEQ CR capsule Take 2 capsules by mouth Daily As Needed (Need to take if taking water pill furosemide). (Patient taking differently: Take 2 capsules by mouth Daily.) 60 capsule 2 Taking Differently    sertraline (Zoloft) 50 MG tablet Take 1 tablet by mouth Daily. For improved mood and feelings of wellbeing (Patient taking differently: Take 1 tablet by mouth Daily.) 30 tablet 1 Taking Differently    vitamin B-12 (CYANOCOBALAMIN) 1000 MCG tablet Take 1 tablet by mouth Daily. 90 tablet 3 Taking    celecoxib (CeleBREX) 200 MG capsule Take 1 capsule by mouth 2 (Two) Times a Day. With water, take lowest effective dose (Patient not taking: Reported on 7/29/2025) 180 capsule 1 Not Taking       Allergies:  Milk-related compounds, Pregabalin, and Neomycin-bacitracin zn-polymyx    Review of Systems:  Fatigue, arthritis, falls  Negative for following (except as per HPI):  Constitution: chills, fevers,   Eyes: change of vision, loss of vision and discharge  ENT: ear drainage, ear ringing and facial trauma  Respiratory: cough, pleuritic pain, shortness of air  Cardiovascular: chest pressure, pain, lower extremity  "edema, palpitations  Gastrointestinal: constipation, diarrhea, nausea, vomiting, pain    Integument: rash and wound  Hematologic / Lymphatic: excessive bleeding and easy bruising  Musculoskeletal: joint pain, joint stiffness, joint swelling and muscle pain  Neurological: headaches, numbness, seizures and tremors  Behavioral / Psych: anxiety, depression and hallucinations         Vital Signs  Temp:  [97.5 °F (36.4 °C)-97.8 °F (36.6 °C)] 97.8 °F (36.6 °C)  Heart Rate:  [58-70] 70  Resp:  [18-22] 22  BP: (150-190)/() 176/93  Flowsheet Rows      Flowsheet Row First Filed Value   Admission Height 170.2 cm (67\") Documented at 07/29/2025 1151   Admission Weight 72.6 kg (160 lb) Documented at 07/29/2025 1151           Body mass index is 25.06 kg/m².      Physical Exam:  General Appearance:    Alert, cooperative, in no acute distress elderly   Head:    Normocephalic, without obvious abnormality, slight abrasion left frontal   Eyes:         Conjunctivae and sclerae normal, no icterus, PERRLA   ENT:    Ears grossly intact, oral mucosa moist   Neck:   No adenopathy, supple, trachea midline        Lungs:     Clear to auscultation, respirations regular, even and             unlabored    Heart:    Regular rhythm and normal rate,  no murmur, normal S1 and S2,   Abdomen:     Normal bowel sounds, no masses,  soft non-tender, non-distended   Extremities:   Moves all extremities well, no cyanosis, + 2+ edema             Pulses:   Pulses palpable and equal bilaterally   Skin:   No bleeding, rash, bruising ; contact dermatitis over left shin   Neurologic:    Psych:   Cranial nerves 2 - 12 grossly intact, sensation grossly intact,     Moves all extremities well, equal bilateral strength 4/5    Alert and oriented x 3, Normal Affect    I washed/sanitized my hands before entering the room and immediately upon leaving the room.    LABS:  Admission on 07/29/2025   Component Date Value Ref Range Status    Glucose 07/29/2025 101 (H)  65 - " 99 mg/dL Final    BUN 07/29/2025 5.0 (L)  8.0 - 23.0 mg/dL Final    Creatinine 07/29/2025 0.63 (L)  0.76 - 1.27 mg/dL Final    Sodium 07/29/2025 141  136 - 145 mmol/L Final    Potassium 07/29/2025 4.8  3.5 - 5.2 mmol/L Final    Slight hemolysis detected by analyzer. Result may be falsely elevated.    Chloride 07/29/2025 107  98 - 107 mmol/L Final    CO2 07/29/2025 24.1  22.0 - 29.0 mmol/L Final    Calcium 07/29/2025 8.8  8.6 - 10.5 mg/dL Final    Total Protein 07/29/2025 5.9 (L)  6.0 - 8.5 g/dL Final    Albumin 07/29/2025 3.4 (L)  3.5 - 5.2 g/dL Final    ALT (SGPT) 07/29/2025 19  1 - 41 U/L Final    AST (SGOT) 07/29/2025 31  1 - 40 U/L Final    Slight hemolysis detected by analyzer. Result may be falsely elevated.    Alkaline Phosphatase 07/29/2025 84  39 - 117 U/L Final    Total Bilirubin 07/29/2025 0.3  0.0 - 1.2 mg/dL Final    Globulin 07/29/2025 2.5  gm/dL Final    A/G Ratio 07/29/2025 1.4  g/dL Final    BUN/Creatinine Ratio 07/29/2025 7.9  7.0 - 25.0 Final    Anion Gap 07/29/2025 9.9  5.0 - 15.0 mmol/L Final    eGFR 07/29/2025 92.6  >60.0 mL/min/1.73 Final    PTT 07/29/2025 25.4  22.7 - 35.4 seconds Final    Protime 07/29/2025 12.7  11.7 - 14.2 Seconds Final    INR 07/29/2025 0.96  0.90 - 1.10 Final    Color, UA 07/29/2025 Yellow  Yellow, Straw Final    Appearance, UA 07/29/2025 Clear  Clear Final    pH, UA 07/29/2025 7.5  5.0 - 8.0 Final    Specific Gravity, UA 07/29/2025 1.008  1.005 - 1.030 Final    Glucose, UA 07/29/2025 Negative  Negative Final    Ketones, UA 07/29/2025 Negative  Negative Final    Bilirubin, UA 07/29/2025 Negative  Negative Final    Blood, UA 07/29/2025 Negative  Negative Final    Protein, UA 07/29/2025 Negative  Negative Final    Leuk Esterase, UA 07/29/2025 Negative  Negative Final    Nitrite, UA 07/29/2025 Negative  Negative Final    Urobilinogen, UA 07/29/2025 0.2 E.U./dL  0.2 - 1.0 E.U./dL Final    HS Troponin T 07/29/2025 31 (H)  <22 ng/L Final    QT Interval 07/29/2025 490  ms Final     QTC Interval 07/29/2025 532  ms Final    Creatine Kinase 07/29/2025 136  20 - 200 U/L Final    WBC 07/29/2025 5.74  3.40 - 10.80 10*3/mm3 Final    RBC 07/29/2025 4.17  4.14 - 5.80 10*6/mm3 Final    Hemoglobin 07/29/2025 14.2  13.0 - 17.7 g/dL Final    Hematocrit 07/29/2025 43.9  37.5 - 51.0 % Final    MCV 07/29/2025 105.3 (H)  79.0 - 97.0 fL Final    MCH 07/29/2025 34.1 (H)  26.6 - 33.0 pg Final    MCHC 07/29/2025 32.3  31.5 - 35.7 g/dL Final    RDW 07/29/2025 12.8  12.3 - 15.4 % Final    RDW-SD 07/29/2025 48.9  37.0 - 54.0 fl Final    MPV 07/29/2025 10.4  6.0 - 12.0 fL Final    Platelets 07/29/2025 257  140 - 450 10*3/mm3 Final    nRBC 07/29/2025 0.0  0.0 - 0.2 /100 WBC Final    Neutrophil % 07/29/2025 27.6 (L)  42.7 - 76.0 % Final    Lymphocyte % 07/29/2025 65.3 (H)  19.6 - 45.3 % Final    Monocyte % 07/29/2025 5.1  5.0 - 12.0 % Final    Eosinophil % 07/29/2025 1.0  0.3 - 6.2 % Final    Basophil % 07/29/2025 1.0  0.0 - 1.5 % Final    Neutrophils Absolute 07/29/2025 1.58 (L)  1.70 - 7.00 10*3/mm3 Final    Lymphocytes Absolute 07/29/2025 3.75 (H)  0.70 - 3.10 10*3/mm3 Final    Monocytes Absolute 07/29/2025 0.29  0.10 - 0.90 10*3/mm3 Final    Eosinophils Absolute 07/29/2025 0.06  0.00 - 0.40 10*3/mm3 Final    Basophils Absolute 07/29/2025 0.06  0.00 - 0.20 10*3/mm3 Final    Anisocytosis 07/29/2025 Slight/1+  None Seen Final    Macrocytes 07/29/2025 Mod/2+  None Seen Final    WBC Morphology 07/29/2025 Normal  Normal Final    Platelet Morphology 07/29/2025 Normal  Normal Final    proBNP 07/29/2025 1,438.0  0.0 - 1,800.0 pg/mL Final    HS Troponin T 07/29/2025 33 (H)  <22 ng/L Final    Troponin T Numeric Delta 07/29/2025 2  ng/L Final    Troponin T % Delta 07/29/2025 6  Abnormal if >/= 20% Final    Creatine Kinase 07/29/2025 122  20 - 200 U/L Final    THC, Screen, Urine 07/29/2025 Negative  Negative Final    Phencyclidine (PCP), Urine 07/29/2025 Negative  Negative Final    Cocaine Screen, Urine 07/29/2025 Negative   Negative Final    Methamphetamine, Ur 07/29/2025 Negative  Negative Final    Opiate Screen 07/29/2025 Negative  Negative Final    Amphetamine Screen, Urine 07/29/2025 Negative  Negative Final    Benzodiazepine Screen, Urine 07/29/2025 Negative  Negative Final    Tricyclic Antidepressants Screen 07/29/2025 Negative  Negative Final    Methadone Screen, Urine 07/29/2025 Negative  Negative Final    Barbiturates Screen, Urine 07/29/2025 Negative  Negative Final    Oxycodone Screen, Urine 07/29/2025 Negative  Negative Final    Buprenorphine, Screen, Urine 07/29/2025 Negative  Negative Final    TSH 07/29/2025 9.380 (H)  0.270 - 4.200 uIU/mL Final    HIV DUO 07/29/2025 Non-Reactive  Non-Reactive Final    Vitamin B-12 07/29/2025 498  211 - 946 pg/mL Final    Fentanyl, Urine 07/29/2025 Negative  Negative Final         DIAGNOSTICS:  CT Cervical Spine Without Contrast  Result Date: 7/29/2025  1. No evidence for acute intracranial abnormality. Moderate cerebral atrophy and chronic small vessel ischemic white matter change. 2. Advanced cervical spondylosis without evidence for acute abnormality in the cervical spine or significant change compared to CT 05/05/2025.  This report was finalized on 7/29/2025 3:05 PM by Micah Wade M.D on Workstation: [id number]      CT Head Without Contrast  Result Date: 7/29/2025  1. No evidence for acute intracranial abnormality. Moderate cerebral atrophy and chronic small vessel ischemic white matter change. 2. Advanced cervical spondylosis without evidence for acute abnormality in the cervical spine or significant change compared to CT 05/05/2025.  This report was finalized on 7/29/2025 3:05 PM by Micah Wade M.D on Workstation: [id number]             Results Review:   I reviewed the patient's new clinical results.  Discussed with ER physician  Old records reviewed / Medical Decision Making High Complexity  I personally viewed and interpreted the patient's EKG/Telemetry data- sinus  "rhythm      Left ventricular systolic function is normal. Calculated left ventricular EF = 53%    Left ventricular diastolic function is consistent with (grade I) impaired relaxation.    Estimated right ventricular systolic pressure from tricuspid regurgitation is normal (<35 mmHg).    ASSESSMENT AND PLAN    Repeated falls    Primary hypertension    Adult hypothyroidism    Anxiety    Severe major depression    Decreased strength, endurance, and mobility    Gait instability    Loss of consciousness   -Patient does have a history of vertigo and syncope.  However this was likely secondary to alcohol.  - Will check orthostatics patient needs to wait 1 to 3 minutes between position changes,  - Since he hit his head we will do neurochecks and do stat CT head if mental status changes  - Likely secondary to alcohol intoxication and hangover and patient has not drank in 6 months and drink a whole bottle of wine.    Repeated falls approximately 9 last 7 months  -Patient does have a history of syncope and vertigo.  And now too much alcohol  -Will get PT and OT to evaluate and treat  -Care manager to evaluate for safety    Hypothyroidism  -Patient's TSH was 8 approximately at 2 to 3 months ago.  He said his Synthroid was increased at that time.  He does not take it before his other medications and before eats.  And TSH is elevated again.  Will increase and also have the patient take before meds    Lymphocytosis  -Will check HIV  - Monitor and follow-up as an outpatient    Macrocytosis  -Will check B12 and RBC folate-for could be side effect to medication    Chronic Diastolic heart failure  -Patient does have some bilateral lower extremity edema that is increased.  - Seems to only take diuretics as needed and it was for \"dependent edema\".  Patient may need to be on scheduled.  Especially since he eats a lot of sodium.  - Will check a BNP and may need more aggressive diuresis.  But will have to weigh that out with the " syncope    Contact dermatitis  - Appears to be where he had a sock.  Will try some steroid cream      Chronic medical conditions being monitored- stable, continue medical management  -Chronic low back pain, kidney stones, history of prostate cancer in 2013  -  +DVT PROPH:    SCDs for at least 24 hours secondary to bruise head  + CODE STATUS: Full needs a living will, and also will get palliative care to evaluate for advance planning      I discussed the patient's findings and my recommendations with the patient and/or family.  Please reference all orders placed.    Alisia Banuelos MD  07/29/25  23:51 EDT      This document is intended for medical expert use only. Reading of this document by patients and/or patient's family without participating in medical staff guidance may result in misinterpretation and unintended morbidity. Any interpretation of such data is the responsibility of the patient and/or family member responsible for the patient in concert with their primary or specialist providers, and NOT to be left for sources of online searches such as [employer], [employer] or similar queries. Relying on these approaches to knowledge may result in misinterpretation, misguided goals of care and even death should patients or family members try recommendations outside of the realm of professional medical care in a supervised way.    Dictated utilizing Voice dictation:  Parts of this note may be an electronic transcription/translation of spoke language to printed text using Dragon dictation system.

## 2025-07-30 NOTE — PROGRESS NOTES
Name: Darwin Sparks ADMIT: 2025   : 1940  PCP: Epley Darwin MICHAEL    MRN: [id number] LOS: 0 days   AGE/SEX: 84 y.o. male  ROOM: [facility]     Subjective   Subjective   No acute events overnight.  Patient states he is feeling pretty well today.  No chest pain or shortness of breath.  Eating and drinking well.    Objective   Objective     Vital Signs  Temp:  [97.8 °F (36.6 °C)-98.9 °F (37.2 °C)] 98.9 °F (37.2 °C)  Heart Rate:  [64-71] 64  Resp:  [18-22] 18  BP: (110-183)/() 141/96  SpO2:  [96 %-98 %] 97 %  on   ;   Device (Oxygen Therapy): room air  Body mass index is 24.79 kg/m².    Physical Exam  General: Alert, no acute distress.  Lying in bed.  Chronically ill-appearing.  ENT: No conjunctival injection or scleral icterus. Moist mucous membranes.   Neuro: Eyes open and moving in all directions, generalized weakness, face symmetric, no focal deficits.   Lungs: Clear to auscultation bilaterally. No wheeze or crackles. No distress.   Heart: RRR, no murmurs.   Abdomen: Soft, non-tender, non-distended. Normal bowel sounds.   Ext: Edema bilateral lower extremities  Skin: No rashes or lesions. IV site without swelling or erythema.     Results Review     I reviewed the patient's new clinical results:  Results from last 7 days   Lab Units 25  0511 25  1219   WBC 10*3/mm3 6.32 5.74   HEMOGLOBIN g/dL 12.7* 14.2   PLATELETS 10*3/mm3 248 257     Results from last 7 days   Lab Units 25  0511 25  1219   SODIUM mmol/L 139 141   POTASSIUM mmol/L 3.8 4.8   CHLORIDE mmol/L 107 107   CO2 mmol/L 23.0 24.1   BUN mg/dL 5.0* 5.0*   CREATININE mg/dL 0.53* 0.63*   GLUCOSE mg/dL 90 101*   EGFR mL/min/1.73 97.6 92.6     Results from last 7 days   Lab Units 25  0511 25  1219   ALBUMIN g/dL 2.6* 3.4*   BILIRUBIN mg/dL 0.4 0.3   ALK PHOS U/L 70 84   AST (SGOT) U/L 23 31   ALT (SGPT) U/L 13 19     Results from last 7 days   Lab Units 25  0511 25  1219   CALCIUM mg/dL 8.0* 8.8  "  ALBUMIN g/dL 2.6* 3.4*   MAGNESIUM mg/dL 1.8  --        No results found for: \"HGBA1C\", \"POCGLU\"    CT Cervical Spine Without Contrast  Result Date: 7/29/2025  1. No evidence for acute intracranial abnormality. Moderate cerebral atrophy and chronic small vessel ischemic white matter change. 2. Advanced cervical spondylosis without evidence for acute abnormality in the cervical spine or significant change compared to CT 05/05/2025.  This report was finalized on 7/29/2025 3:05 PM by Micah Wade M.D on Workstation: [id number]      CT Head Without Contrast  Result Date: 7/29/2025  1. No evidence for acute intracranial abnormality. Moderate cerebral atrophy and chronic small vessel ischemic white matter change. 2. Advanced cervical spondylosis without evidence for acute abnormality in the cervical spine or significant change compared to CT 05/05/2025.  This report was finalized on 7/29/2025 3:05 PM by Micah Wade M.D on Workstation: [id number]        I have personally reviewed all medications:  Scheduled Medications  furosemide, 40 mg, Intravenous, BID Diuretics  levothyroxine, 137 mcg, Oral, Q AM  lisinopril, 10 mg, Oral, Daily  potassium chloride, 20 mEq, Oral, Daily  sertraline, 50 mg, Oral, Daily  sodium chloride, 10 mL, Intravenous, Q12H  triamcinolone, 1 Application, Topical, Q12H    Infusions   Diet  Diet: Cardiac, Vegetarian; Vegan (No animal products); Healthy Heart (2-3 Na+); Fluid Consistency: Thin (IDDSI 0)      Intake/Output Summary (Last 24 hours) at 7/30/2025 1714  Last data filed at 7/30/2025 1700  Gross per 24 hour   Intake 720 ml   Output 1700 ml   Net -980 ml       Assessment/Plan     Active Hospital Problems    Diagnosis  POA    **Repeated falls [R29.6]  Yes    Severe major depression [F32.2]  Yes    Decreased strength, endurance, and mobility [R53.1, Z74.09, R68.89]  Yes    Gait instability [R26.81]  Yes    Anxiety [F41.9]  Yes    Primary hypertension [I10]  Yes    Adult hypothyroidism " [E03.9]  Yes      Resolved Hospital Problems   No resolved problems to display.       84 y.o. male with Repeated falls.    Falls  -CT head and C-spine negative for acute abnormalities  -Similar episode in May and was evaluated by cardiology during that admission  -Most likely related to EtOH consumption  -Orthostatics negative  -B12 and folate normal, neuropathy may be contributing though.  May need further workup for this as an outpatient.  -PT/OT consults    Alcohol use disorder  - Alcohol likely contributed to fall.  Patient reportedly had not had any alcohol for the past 6 months prior to last night.  However, at his admission in May he did endorse drinking 3-4 drinks a night.  EtOH level was not checked.  Will monitor for any signs of alcohol withdrawal.   - Will go ahead and start CIWA protocol  - Thiamine, folate, multivitamin  - Access consult    Chronic diastolic heart failure  Lower extremity edema  Hypertension  Elevated troponin  -Troponin mildly elevated but flat  -EKG with no ischemic changes, patient denies chest pain or shortness of breath  -Recent echo with EF 53%, grade 1 diastolic dysfunction  - Will check Dopplers of bilateral lower extremities  - BP trend elevated initially but has improved most recently  - Continue lisinopril  - Will transition to oral Lasix.  Patient had been prescribed this at home but had not started taking it yet.    Hypothyroidism  -Continue Synthroid  -TSH elevated, will check free T4    SCDs for DVT prophylaxis.  Full code.  Discussed with patient and nursing staff.  Anticipate discharge home timing yet to be determined.      Darcy Crews MD  [facility]  07/30/25  17:14 EDT

## 2025-07-31 LAB
ALBUMIN SERPL-MCNC: 2.7 G/DL (ref 3.5–5.2)
ALBUMIN/GLOB SERPL: 1.2 G/DL
ALP SERPL-CCNC: 76 U/L (ref 39–117)
ALT SERPL W P-5'-P-CCNC: 13 U/L (ref 1–41)
ANION GAP SERPL CALCULATED.3IONS-SCNC: 10 MMOL/L (ref 5–15)
ANISOCYTOSIS BLD QL: NORMAL
AST SERPL-CCNC: 22 U/L (ref 1–40)
BASOPHILS # BLD AUTO: 0.04 10*3/MM3 (ref 0–0.2)
BASOPHILS NFR BLD AUTO: 0.8 % (ref 0–1.5)
BILIRUB SERPL-MCNC: 0.5 MG/DL (ref 0–1.2)
BUN SERPL-MCNC: 10 MG/DL (ref 8–23)
BUN/CREAT SERPL: 14.9 (ref 7–25)
CALCIUM SPEC-SCNC: 8.4 MG/DL (ref 8.6–10.5)
CHLORIDE SERPL-SCNC: 104 MMOL/L (ref 98–107)
CO2 SERPL-SCNC: 25 MMOL/L (ref 22–29)
CREAT SERPL-MCNC: 0.67 MG/DL (ref 0.76–1.27)
DEPRECATED RDW RBC AUTO: 48.3 FL (ref 37–54)
EGFRCR SERPLBLD CKD-EPI 2021: 90.9 ML/MIN/1.73
EOSINOPHIL # BLD AUTO: 0.19 10*3/MM3 (ref 0–0.4)
EOSINOPHIL NFR BLD AUTO: 3.8 % (ref 0.3–6.2)
ERYTHROCYTE [DISTWIDTH] IN BLOOD BY AUTOMATED COUNT: 12.4 % (ref 12.3–15.4)
GEN 5 1HR TROPONIN T REFLEX: 48 NG/L
GLOBULIN UR ELPH-MCNC: 2.2 GM/DL
GLUCOSE SERPL-MCNC: 87 MG/DL (ref 65–99)
HCT VFR BLD AUTO: 41.5 % (ref 37.5–51)
HGB BLD-MCNC: 13.3 G/DL (ref 13–17.7)
IMM GRANULOCYTES # BLD AUTO: 0.01 10*3/MM3 (ref 0–0.05)
IMM GRANULOCYTES NFR BLD AUTO: 0.2 % (ref 0–0.5)
LYMPHOCYTES # BLD AUTO: 2.56 10*3/MM3 (ref 0.7–3.1)
LYMPHOCYTES NFR BLD AUTO: 51.7 % (ref 19.6–45.3)
MACROCYTES BLD QL SMEAR: NORMAL
MCH RBC QN AUTO: 34.1 PG (ref 26.6–33)
MCHC RBC AUTO-ENTMCNC: 32 G/DL (ref 31.5–35.7)
MCV RBC AUTO: 106.4 FL (ref 79–97)
MONOCYTES # BLD AUTO: 0.42 10*3/MM3 (ref 0.1–0.9)
MONOCYTES NFR BLD AUTO: 8.5 % (ref 5–12)
NEUTROPHILS NFR BLD AUTO: 1.73 10*3/MM3 (ref 1.7–7)
NEUTROPHILS NFR BLD AUTO: 35 % (ref 42.7–76)
NRBC BLD AUTO-RTO: 0 /100 WBC (ref 0–0.2)
OVALOCYTES BLD QL SMEAR: NORMAL
PLAT MORPH BLD: NORMAL
PLATELET # BLD AUTO: 236 10*3/MM3 (ref 140–450)
PMV BLD AUTO: 10.7 FL (ref 6–12)
POTASSIUM SERPL-SCNC: 4.1 MMOL/L (ref 3.5–5.2)
PROT SERPL-MCNC: 4.9 G/DL (ref 6–8.5)
RBC # BLD AUTO: 3.9 10*6/MM3 (ref 4.14–5.8)
SODIUM SERPL-SCNC: 139 MMOL/L (ref 136–145)
T4 FREE SERPL-MCNC: 0.84 NG/DL (ref 0.92–1.68)
TROPONIN T % DELTA: -6
TROPONIN T NUMERIC DELTA: -3 NG/L
TROPONIN T SERPL HS-MCNC: 51 NG/L
WBC MORPH BLD: NORMAL
WBC NRBC COR # BLD AUTO: 4.95 10*3/MM3 (ref 3.4–10.8)

## 2025-07-31 PROCEDURE — 85025 COMPLETE CBC W/AUTO DIFF WBC: CPT | Performed by: INTERNAL MEDICINE

## 2025-07-31 PROCEDURE — 96376 TX/PRO/DX INJ SAME DRUG ADON: CPT

## 2025-07-31 PROCEDURE — 25010000002 THIAMINE PER 100 MG: Performed by: STUDENT IN AN ORGANIZED HEALTH CARE EDUCATION/TRAINING PROGRAM

## 2025-07-31 PROCEDURE — 85007 BL SMEAR W/DIFF WBC COUNT: CPT | Performed by: INTERNAL MEDICINE

## 2025-07-31 PROCEDURE — 93010 ELECTROCARDIOGRAM REPORT: CPT | Performed by: STUDENT IN AN ORGANIZED HEALTH CARE EDUCATION/TRAINING PROGRAM

## 2025-07-31 PROCEDURE — 84484 ASSAY OF TROPONIN QUANT: CPT | Performed by: STUDENT IN AN ORGANIZED HEALTH CARE EDUCATION/TRAINING PROGRAM

## 2025-07-31 PROCEDURE — 80053 COMPREHEN METABOLIC PANEL: CPT | Performed by: INTERNAL MEDICINE

## 2025-07-31 PROCEDURE — G0378 HOSPITAL OBSERVATION PER HR: HCPCS

## 2025-07-31 PROCEDURE — 84439 ASSAY OF FREE THYROXINE: CPT | Performed by: STUDENT IN AN ORGANIZED HEALTH CARE EDUCATION/TRAINING PROGRAM

## 2025-07-31 PROCEDURE — 93005 ELECTROCARDIOGRAM TRACING: CPT | Performed by: STUDENT IN AN ORGANIZED HEALTH CARE EDUCATION/TRAINING PROGRAM

## 2025-07-31 RX ORDER — LEVOTHYROXINE SODIUM 150 UG/1
150 TABLET ORAL
Status: DISCONTINUED | OUTPATIENT
Start: 2025-08-01 | End: 2025-08-01 | Stop reason: HOSPADM

## 2025-07-31 RX ADMIN — FOLIC ACID 1 MG: 1 TABLET ORAL at 08:10

## 2025-07-31 RX ADMIN — THIAMINE HYDROCHLORIDE 200 MG: 100 INJECTION, SOLUTION INTRAMUSCULAR; INTRAVENOUS at 05:17

## 2025-07-31 RX ADMIN — TRIAMCINOLONE ACETONIDE 1 APPLICATION: 1 CREAM TOPICAL at 05:17

## 2025-07-31 RX ADMIN — POLYETHYLENE GLYCOL 3350 17 G: 17 POWDER, FOR SOLUTION ORAL at 17:08

## 2025-07-31 RX ADMIN — LISINOPRIL 10 MG: 20 TABLET ORAL at 08:09

## 2025-07-31 RX ADMIN — Medication 10 ML: at 08:10

## 2025-07-31 RX ADMIN — Medication 10 ML: at 20:56

## 2025-07-31 RX ADMIN — THIAMINE HYDROCHLORIDE 200 MG: 100 INJECTION, SOLUTION INTRAMUSCULAR; INTRAVENOUS at 21:00

## 2025-07-31 RX ADMIN — Medication 1 TABLET: at 08:10

## 2025-07-31 RX ADMIN — POLYETHYLENE GLYCOL 3350 17 G: 17 POWDER, FOR SOLUTION ORAL at 08:15

## 2025-07-31 RX ADMIN — ACETAMINOPHEN 650 MG: 325 TABLET, FILM COATED ORAL at 20:54

## 2025-07-31 RX ADMIN — TRIAMCINOLONE ACETONIDE 1 APPLICATION: 1 CREAM TOPICAL at 17:08

## 2025-07-31 RX ADMIN — SERTRALINE HYDROCHLORIDE 50 MG: 50 TABLET, FILM COATED ORAL at 08:09

## 2025-07-31 RX ADMIN — POTASSIUM CHLORIDE 20 MEQ: 1500 TABLET, EXTENDED RELEASE ORAL at 08:09

## 2025-07-31 RX ADMIN — LEVOTHYROXINE SODIUM 137 MCG: 137 TABLET ORAL at 05:17

## 2025-07-31 RX ADMIN — THIAMINE HYDROCHLORIDE 200 MG: 100 INJECTION, SOLUTION INTRAMUSCULAR; INTRAVENOUS at 14:13

## 2025-07-31 RX ADMIN — FUROSEMIDE 40 MG: 40 TABLET ORAL at 08:09

## 2025-07-31 NOTE — CONSULTS
Patient Name: Darwin Sparks  YOB: 1940  MRN: [id number]  Admission date: 7/29/2025  Reason for Encounter: MD Consult  and MST 2-3 or Nursing Admission Screen    [facility] Clinical Nutrition Assessment     Subjective    Subjective Information     85 y/o male admitted s/p fall reports he has lost ~30 lbs in the past 10 months, he has had several falls during that time as well. He endorses that he really does like salt and uses it a lot at home. He is a vegan and has been for 15 years and eats a lot of vegan cheese and lunch meat alternatives which are likely high in sodium. He does not c/o any loss of appetite with his weight loss and upon physical exam he does have only mild muscle and fat loss. He reports some of his weight loss could be fluid related. He recently was told he should eat more protein and tried eating some salmon and tuna but this resulted in GI upset and diarrhea. He reports he has allergies to milk and other animal proteins. Discussed trial of Sgrouples ONS to provide additional kcals and protein to help regain strength. Pt is agreeable to try.      Objective   H&P and Current Problems      H&P  Past Medical History:   Diagnosis Date    Cataract     Colon polyp     10 years ago    Deep vein thrombosis     Childhood    Headache 28 years old    HL (hearing loss) 8 years ago    Not too bad, hearing aids for crouded places    Hyperlipidemia     Hypertension     Hypothyroidism     Infectious viral hepatitis     A & B ?    Inflammatory bowel disease Child to 77 years old    Constopation, alergy animal protine,Vegan diet    Irritable bowel syndrome     Vegan diet big improvement    Kidney stone 1958    4 surgeries    Low back pain 1973    Osteoarthritis     Pneumonia Child    2 times    Prostate cancer 2013    Scoliosis 1973    Urinary tract infection 1973    After kidney stones    Visual impairment 1960    First glasses      Past Surgical History:   Procedure Laterality Date     "ADENOIDECTOMY  10 years old    CATARACT EXTRACTION      CIRCUMCISION      COLONOSCOPY      COLONOSCOPY N/A 05/27/2021    Procedure: COLONOSCOPY TO CECUM/TI;  Surgeon: Jamel Michaels MD;  Location: [facility] ENDOSCOPY;  Service: Gastroenterology;  Laterality: N/A;  Pre op: Abnormal cat scan, constipation, RLQ pain  Post op: Diverticulosis, Hemorrhoids, otherwise normal to and including TI.    MEDIAL BRANCH BLOCK Bilateral 07/30/2021    Procedure: LUMBAR MEDIAL BRANCH BLOCK;  Surgeon: Kb Rodriguez MD;  [place] [facility] [place];  Service: Pain Management;  Laterality: Bilateral;    MEDIAL BRANCH BLOCK Bilateral 08/20/2021    Procedure: MEDIAL BRANCH BLOCK--bilateral lumbar3-lumbar5;  Surgeon: Kb Rodriguez MD;  [place] [facility];  Service: Pain Management;  Laterality: Bilateral;    PROSTATE SURGERY  72 years old    Low radiation, started vegan diet    RADIOFREQUENCY ABLATION Bilateral 10/08/2021    Procedure: RADIOFREQUENCY ABLATION LUMBAR--bilateral lumbar3-5 WITH MAC;  Surgeon: Kb Rodriguez MD;  Location: [facility] [place];  Service: Pain Management;  Laterality: Bilateral;    TONSILLECTOMY      TOOTH EXTRACTION        Current Problems   Admission Diagnosis:  Uncontrolled hypertension [I10]  Repeated falls [R29.6]  CHI (closed head injury), initial encounter [S09.90XA]  Altered mental status, unspecified altered mental status type [R41.82]    Problem List:    Repeated falls    Primary hypertension    Adult hypothyroidism    Anxiety    Severe major depression    Decreased strength, endurance, and mobility    Gait instability     Applicable Nutrition Hx N/A      Anthropometrics       Height: 170.2 cm (67\")  Weight: 72.6 kg (160 lb) (07/31/25 0504)  Weight Method: Bed scale  BMI (Calculated): 25.1       Trending Weight Changes 07/31/25: weight loss of 15 lbs (14%) over 4 month(s)    Significant?  No       Weight History  Wt Readings from Last 10 Encounters:   07/31/25 0504 72.6 kg (160 lb) "   07/30/25 0500 71.8 kg (158 lb 4.6 oz)   07/29/25 1151 72.6 kg (160 lb)   07/23/25 1433 79.8 kg (176 lb)   05/13/25 0930 80.2 kg (176 lb 11.2 oz)   05/08/25 0600 77.4 kg (170 lb 10.2 oz)   05/07/25 0611 81.9 kg (180 lb 8.9 oz)   05/06/25 1029 76.7 kg (169 lb)   05/06/25 0624 76.7 kg (169 lb 1.5 oz)   05/05/25 1639 77.2 kg (170 lb 3.1 oz)   04/14/25 1401 81.6 kg (180 lb)   03/21/25 1516 81.6 kg (180 lb)   03/09/25 1414 81.6 kg (180 lb)   05/14/24 1327 84.2 kg (185 lb 9.6 oz)   05/01/24 0854 83.9 kg (185 lb)   04/18/24 1312 84.1 kg (185 lb 6.4 oz)          Labs      Comment: Reviewed        Results from last 7 days   Lab Units 07/31/25 0458 07/30/25 0511 07/29/25  1757 07/29/25  1219   SODIUM mmol/L 139 139  --  141   POTASSIUM mmol/L 4.1 3.8  --  4.8   GLUCOSE mg/dL 87 90  --  101*   BUN mg/dL 10.0 5.0*  --  5.0*   CREATININE mg/dL 0.67* 0.53*  --  0.63*   CALCIUM mg/dL 8.4* 8.0*  --  8.8   MAGNESIUM mg/dL  --  1.8  --   --    ALBUMIN g/dL 2.7* 2.6*  --  3.4*   BILIRUBIN mg/dL 0.5 0.4  --  0.3   ALK PHOS U/L 76 70  --  84   AST (SGOT) U/L 22 23  --  31   ALT (SGPT) U/L 13 13  --  19   PROBNP pg/mL  --   --  1,299.0 1,438.0     Results from last 7 days   Lab Units 07/31/25 0458 07/30/25 0511 07/29/25  1757 07/29/25  1219   PLATELETS 10*3/mm3 236 248  --  257   HEMOGLOBIN g/dL 13.3 12.7*  --  14.2   HEMATOCRIT % 41.5 39.1 45.7 43.9     Lab Results   Component Value Date    HGBA1C 6.1 (H) 05/30/2023          Medications       Scheduled Medications folic acid, 1 mg, Oral, Daily  furosemide, 40 mg, Oral, Daily  [START ON 8/1/2025] levothyroxine, 150 mcg, Oral, Q AM  lisinopril, 10 mg, Oral, Daily  multivitamin with minerals, 1 tablet, Oral, Daily  potassium chloride, 20 mEq, Oral, Daily  sertraline, 50 mg, Oral, Daily  sodium chloride, 10 mL, Intravenous, Q12H  thiamine (B-1) IV, 200 mg, Intravenous, Q8H   Followed by  [START ON 8/5/2025] thiamine, 100 mg, Oral, Daily  triamcinolone, 1 Application, Topical, Q12H         Infusions      PRN Medications   acetaminophen **OR** acetaminophen **OR** acetaminophen    senna-docusate sodium **AND** polyethylene glycol **AND** bisacodyl **AND** bisacodyl    calcium carbonate    Calcium Replacement - Follow Nurse / BPA Driven Protocol    diazePAM **OR** diazePAM **OR** diazePAM **OR** diazePAM **OR** diazePAM **OR** diazePAM    magnesium hydroxide    Magnesium Standard Dose Replacement - Follow Nurse / BPA Driven Protocol    melatonin    nitroglycerin    ondansetron ODT **OR** ondansetron    Phosphorus Replacement - Follow Nurse / BPA Driven Protocol    Potassium Replacement - Follow Nurse / BPA Driven Protocol    sodium chloride    sodium chloride     Physical Findings      Chewing/Swallowing    No issues identified at this time   Dentition Mouth/Teeth WDL: .WDL except   Teeth Symptoms: tooth/teeth missing       Skin        Intact      Bowel function Last Bowel Movement: 07/27/25 (07/31/25 0807)               Consider bowel regimen      Edema Edema: leg, left, leg, right, ankle, left, ankle, right, foot, right, foot, left (07/31/25 0807)  Leg, Left Edema: 3+ (Moderate) (07/31/25 0807)  Leg, Right Edema: 3+ (Moderate) (07/31/25 0807)  Ankle, Left Edema: 3+ (Moderate) (07/31/25 0807)  Ankle, Right Edema: 3+ (Moderate) (07/31/25 0807)  Foot, Left Edema: 3+ (Moderate) (07/31/25 0807)  Foot, Right Edema: 3+ (Moderate) (07/31/25 0807)         Intake & Output (last 3 days)         07/28 0701 07/29 0700 07/29 0701 07/30 0700 07/30 0701 07/31 0700 07/31 0701  08/01 0700    P.O.   720 720    IV Piggyback  1000      Total Intake(mL/kg)  1000 (13.9) 720 (9.9) 720 (9.9)    Urine (mL/kg/hr)  1400 1850 (1.1)     Stool   0     Total Output  1400 1850     Net  -400 -1130 +720            Urine Unmeasured Occurrence  2 x 1 x     Stool Unmeasured Occurrence   1 x              Nutrition Focused Physical Exam     07/31/25: NFPE completed and not consistent with nutrition diagnosis of malnutrition at this  time using AND/ASPEN criteria.      Estimated Needs       Date Assessed 7/31/2025    Weight(s) Used  72.6 kg       Energy Requirements    Method for Estimation  20-25 kcals/kg   Daily Needs (kcal/day) 2254-7063 kcal/day    Protein Requirements    Method for Estimation 1.0-1.2 gm/kg   Daily Needs (g/day) 76-87   Fluid Requirements     Method for Estimation 1 mL/kcal    Daily Needs (mL/day) 6719-5849 ml      Current Nutrition Orders & Evaluation of Intake      Oral Nutrition     Food Allergies/Intolerances Milk, animal proteins    Current PO Diet Diet: Cardiac, Vegetarian; Vegan (No animal products); Healthy Heart (2-3 Na+); Fluid Consistency: Thin (IDDSI 0)   Oral Nutrition Supplement [employer] 1.4   Trending % PO Intake 07/31/25: 100     Enteral Nutrition     Current EN Order Patient isn't on Tube Feeding    Current EN Modulars None    EN Route     EN Tolerance     EN Observation/Intake         Parenteral Nutrition  Patient isn't on Parenteral Nutrition    Current TPN Order    TPN Route    Lipids (mL/%/frequency)     Total # Days on TPN    TPN Observation/Intake       Assessment & Plan   Nutrition Diagnosis and Goals       Nutrition Diagnosis 1 Increased Nutrient Needs (Protein) related to vegan diet as evidenced by nutrition labs         Goal(s) Maintain PO Intake  and Accepts Oral Nutrition Supplement     Nutrition Intervention and Prescription       Intervention  Start oral nutrition supplement and Provide Education      Diet Prescription Continue current diet     Supplement Prescription [employer] 1.4 BID will provide 910 kcal and 40 gm protein/day if consumed     Education Provided  Low Sodium diet, encouraged reading labels on pre packaged foods      Enteral Prescription N/a       TPN Prescription N/a      Monitoring/Evaluation       Monitor/Evaluation Per Protocol     RD Follow-Up Encounter prn     Electronically signed by:  Denita Brannon RD  07/31/25 14:35 EDT

## 2025-07-31 NOTE — CONSULTS
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     What is most important to Family: No family present at time of assessment.     Resource Assessment:   Financial Concerns: Patient/family reports no financial Concerns  Legal Concerns: Patient/Family report no legal concerns  Transportation Concerns: Patient/Family report no transportation concerns  Support System  Does patient have caregiver(s)? Yes: Patient has 3 caregivers that visit regularly. Two help with personal care for himself and his spouse, while the other assists with cooking/cleaning and household chores.   Is caregiver willing and able to provide support? Yes  Has caregiver experienced stress or strain regarding taking care of patient? No      Community Resources:   What community resources do you currently use? None  What type of medical equipment do you use where you live? Patient reports home was recently updated to include walk-in shower and stair lifts. Also utilizes cane, walker, and wheelchair.   Do you worry about having enough to eat? No    Mormonism/Spiritual Resources/Concerns:   What Brings you Hope (sources of strength and comfort)?:   Do you belong to an organized Sabianist or denomination?:   Personal spirituality (how to beliefs and practices lend meaning?):   What impact does beliefs have on medical decisions and end of life choices?: Patient reports that spirituality and Sabianist are not of importance to his care.       Behavioral/Mental Health Concerns:   Does patient report feeling depressed? No  Does patient have any thoughts of wanting to end their life? No  Does patient have feelings of anxiety No  Other Concerns: Substance use history. Patient reports  drinking wine prior to this hospital stay but claims he has not had a drink since Thanksving before that.         Advance Directives:   Are there current Advance Directives in Place?: Yes. Details: Patient reports that he has completed advance directives at home, which he is in the process of updating. Patient difficult to keep on topic, but it seems that an  is assisting with living will/POA completion.  Are Copies of Advance Directives in the Chart? No  Healthcare Surrogate/POA: Yes- name(s)- Patient identifies his spouse, Roger, as his healthcare surrogate.   Current Goals of Care:   Code Status:  FULL   Overall Goals regarding further treatment interventions: Full  Hospice and Palliative Care Education Required: Yes. Details: Provided education on palliative care and what Pallitus at home would look like. Patient does not feel that he needs Pallitus support at this time.      Grief Assessment:  Patient Grief Concerns: No Concerns regarding Grief  Caregiver Grief Concerns: No Concerns regarding Grief     Planning: Did not address.       Summary of Visit: SW met with patient at bedside this morning for initial palliative assessment. Patient able to answer orientation questions correctly, but moves quickly off topic during discussion and is difficult to redirect. Patient lives at home with his spouse, Roger, and has support from three caregivers who visit regularly. Two caregivers assist patient and Roger with ADLs and personal care as needed while another assists with cooking/cleaning/housekeeping. Patient shared that he has recently updated his home to include a walk-in shower, wider doorways, and stair lifts to help ensure he and his  can stay there for as long as possible. Patient reports that he does not want to ever end up in a nursing home, and hopes that improvements he has made in his home can help him to stay there. Discussed goals of care. 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 Patient is an artist and avid . Patient was receptive to education surrounding risks of CPR. For now, remains full code. Discussed advance care planning. Patient reports that he has completed advance directives previously around 15 years ago, and is in the process of updating them now with his . Declined to complete living will during visit today. He does confirm that he would like for his spouse, Roger, to make healthcare decisions for him if he cannot. Reviewed Pallitus services for outpatient support. Patient declines Pallitus referral at this time as he feels well supported at home. No additional needs anticipated at this time. Palliative team will sign-off. Please re-consult if needed.       Advance Directives Completed During Visit: No, patient declined.    Palliative Care and Hospice Education Provided to Patient/Family: yes-Palliative care education provided. Goals do not align with hospice at this time.      Palliative Care Social Work Follow Up Plan: No follow-up anticipated at this time.        PEPE Mullins  7/31/2025 10:53 EDT

## 2025-07-31 NOTE — PROGRESS NOTES
Name: Darwin Sparks ADMIT: 2025   : 1940  PCP: EpleyDarwin MICHAEL    MRN: [id number] LOS: 0 days   AGE/SEX: 84 y.o. male  ROOM: [facility]     Subjective   Subjective   Patient awake and resting in bed, LE swelling improved.       Objective   Objective   Vital Signs  Temp:  [96.4 °F (35.8 °C)-98.9 °F (37.2 °C)] 98.5 °F (36.9 °C)  Heart Rate:  [65-79] 71  Resp:  [18-22] 18  BP: (110-157)/() 124/86  SpO2:  [96 %-99 %] 98 %  on   ;   Device (Oxygen Therapy): room air  Body mass index is 25.06 kg/m².  Physical Exam  Vitals and nursing note reviewed.   Constitutional:       General: He is awake. He is not in acute distress.  Cardiovascular:      Rate and Rhythm: Normal rate.      Pulses: Normal pulses.      Heart sounds: Normal heart sounds.   Pulmonary:      Effort: Pulmonary effort is normal. No respiratory distress.      Breath sounds: No wheezing.   Abdominal:      Palpations: Abdomen is soft.      Tenderness: There is no abdominal tenderness.   Skin:     General: Skin is warm and dry.   Neurological:      Mental Status: He is alert.   Psychiatric:         Behavior: Behavior is cooperative.       Results Review     I reviewed the patient's new clinical results.  Results from last 7 days   Lab Units 25  0458 25  0511 25  1219   WBC 10*3/mm3 4.95 6.32 5.74   HEMOGLOBIN g/dL 13.3 12.7* 14.2   PLATELETS 10*3/mm3 236 248 257     Results from last 7 days   Lab Units 25  0458 25  0511 25  1219   SODIUM mmol/L 139 139 141   POTASSIUM mmol/L 4.1 3.8 4.8   CHLORIDE mmol/L 104 107 107   CO2 mmol/L 25.0 23.0 24.1   BUN mg/dL 10.0 5.0* 5.0*   CREATININE mg/dL 0.67* 0.53* 0.63*   GLUCOSE mg/dL 87 90 101*   EGFR mL/min/1.73 90.9 97.6 92.6     Results from last 7 days   Lab Units 25  0458 25  0511 25  1219   ALBUMIN g/dL 2.7* 2.6* 3.4*   BILIRUBIN mg/dL 0.5 0.4 0.3   ALK PHOS U/L 76 70 84   AST (SGOT) U/L 22 23 31   ALT (SGPT) U/L 13 13 19     Results from  "last 7 days   Lab Units 07/31/25  0458 07/30/25  0511 07/29/25  1219   CALCIUM mg/dL 8.4* 8.0* 8.8   ALBUMIN g/dL 2.7* 2.6* 3.4*   MAGNESIUM mg/dL  --  1.8  --        No results found for: \"HGBA1C\", \"POCGLU\"    CT Cervical Spine Without Contrast  Result Date: 7/29/2025  1. No evidence for acute intracranial abnormality. Moderate cerebral atrophy and chronic small vessel ischemic white matter change. 2. Advanced cervical spondylosis without evidence for acute abnormality in the cervical spine or significant change compared to CT 05/05/2025.  This report was finalized on 7/29/2025 3:05 PM by Micah Wade M.D on Workstation: [id number]      CT Head Without Contrast  Result Date: 7/29/2025  1. No evidence for acute intracranial abnormality. Moderate cerebral atrophy and chronic small vessel ischemic white matter change. 2. Advanced cervical spondylosis without evidence for acute abnormality in the cervical spine or significant change compared to CT 05/05/2025.  This report was finalized on 7/29/2025 3:05 PM by Micah Wade M.D on Workstation: [id number]        I have personally reviewed all medications:  Scheduled Medications  folic acid, 1 mg, Oral, Daily  furosemide, 40 mg, Oral, Daily  levothyroxine, 137 mcg, Oral, Q AM  lisinopril, 10 mg, Oral, Daily  multivitamin with minerals, 1 tablet, Oral, Daily  potassium chloride, 20 mEq, Oral, Daily  sertraline, 50 mg, Oral, Daily  sodium chloride, 10 mL, Intravenous, Q12H  thiamine (B-1) IV, 200 mg, Intravenous, Q8H   Followed by  [START ON 8/5/2025] thiamine, 100 mg, Oral, Daily  triamcinolone, 1 Application, Topical, Q12H    Infusions   Diet  Diet: Cardiac, Vegetarian; Vegan (No animal products); Healthy Heart (2-3 Na+); Fluid Consistency: Thin (IDDSI 0)    I have personally reviewed:  [x]  Laboratory   []  Microbiology   [x]  Radiology   [x]  EKG/Telemetry  [x]  Cardiology/Vascular   []  Pathology    []  Records       Assessment/Plan     Active Hospital " Problems    Diagnosis  POA    **Repeated falls [R29.6]  Yes    Severe major depression [F32.2]  Yes    Decreased strength, endurance, and mobility [R53.1, Z74.09, R68.89]  Yes    Gait instability [R26.81]  Yes    Anxiety [F41.9]  Yes    Primary hypertension [I10]  Yes    Adult hypothyroidism [E03.9]  Yes      Resolved Hospital Problems   No resolved problems to display.       84 y.o. male admitted with Repeated falls.    Falls  - CT head and C-spine negative for acute abnormalities  - Similar episode in May and was evaluated by cardiology during that admission; ETOH use felt to be contributing. Orthostatics negative. B12 and folate okay, also noted previously that neuropathy could be contributing. Therapy services consulted, disposition TBD based on clinical progress.     Alcohol use disorder  - Alcohol likely contributed to fall.  Patient reportedly had not had any alcohol for the past 6 months prior to last night.  However, at his admission in May he did endorse drinking 3-4 drinks a night.  EtOH level was not checked.  Will monitor for any signs of alcohol withdrawal.   - Continue with CIWA protocol, vitamin supplementation, access consulted.     Chronic diastolic heart failure  Lower extremity edema  Hypertension  Elevated troponin  -Troponin mildly elevated on arrival, he has endorsed intermittent chest discomfort to me this morning, but none at present. Recent echo with EF 53%, grade 1 diastolic dysfunction.  - Repeat troponin level, check EKG.  - Blood pressure better overall, need to monitor this, continue treatment as ordered.  - S/P IV diuresis, LE swelling improved, Duplex negative, transitioned to oral diuretics, continue as prescribed.  - Update/Addendum: Troponin worsening, will ask cardiology to evaluate.     Hypothyroidism  - TSH elevated, FT4 low. Increase Synthroid to 150 mcg. Monitor response to this.    SCDs for DVT prophylaxis.  Full code.  Discussed with patient and nursing staff.  Anticipate  discharge home in 1-2 days.  Expected Discharge Date: 8/1/2025; Expected Discharge Time:       Wai Burgess DO  [facility]  07/31/25

## 2025-07-31 NOTE — PLAN OF CARE
Problem: Adult Inpatient Plan of Care  Goal: Absence of Hospital-Acquired Illness or Injury  Intervention: Identify and Manage Fall Risk  Recent Flowsheet Documentation  Taken 7/31/2025 0600 by Balbir Gregorio, RN  Safety Promotion/Fall Prevention:   assistive device/personal items within reach   clutter free environment maintained   fall prevention program maintained   lighting adjusted   nonskid shoes/slippers when out of bed   room organization consistent  Taken 7/31/2025 0405 by Balbir Gregorio, RN  Safety Promotion/Fall Prevention:   assistive device/personal items within reach   clutter free environment maintained   fall prevention program maintained   lighting adjusted   nonskid shoes/slippers when out of bed   room organization consistent   safety round/check completed  Taken 7/31/2025 0210 by Balbir Gregorio, RN  Safety Promotion/Fall Prevention:   assistive device/personal items within reach   clutter free environment maintained   fall prevention program maintained   lighting adjusted   nonskid shoes/slippers when out of bed   room organization consistent   safety round/check completed  Taken 7/31/2025 0005 by Balbir Gregorio, RN  Safety Promotion/Fall Prevention:   assistive device/personal items within reach   clutter free environment maintained   fall prevention program maintained   lighting adjusted   nonskid shoes/slippers when out of bed   room organization consistent   safety round/check completed  Taken 7/30/2025 2140 by Balbir Gregorio, RN  Safety Promotion/Fall Prevention:   assistive device/personal items within reach   clutter free environment maintained   fall prevention program maintained   lighting adjusted   nonskid shoes/slippers when out of bed   room organization consistent   safety round/check completed  Taken 7/30/2025 2043 by Balbir Gregorio, RN  Safety Promotion/Fall Prevention:   assistive device/personal items within reach   clutter free environment maintained   fall prevention  program maintained   lighting adjusted   nonskid shoes/slippers when out of bed   room organization consistent   safety round/check completed  Intervention: Prevent Skin Injury  Recent Flowsheet Documentation  Taken 7/31/2025 0600 by Balbir Gregorio RN  Body Position: position changed independently  Taken 7/31/2025 0405 by Balbir Gregorio RN  Body Position: position changed independently  Taken 7/31/2025 0210 by Balbir Gregorio RN  Body Position: position changed independently  Taken 7/31/2025 0005 by Balbir Gregorio RN  Body Position: position changed independently  Taken 7/30/2025 2043 by Balbir Gregorio RN  Body Position: position changed independently  Intervention: Prevent and Manage VTE (Venous Thromboembolism) Risk  Recent Flowsheet Documentation  Taken 7/30/2025 2043 by Balbir Gregorio, RN  VTE Prevention/Management: patient refused intervention  Goal: Optimal Comfort and Wellbeing  Intervention: Monitor Pain and Promote Comfort  Recent Flowsheet Documentation  Taken 7/30/2025 2043 by Balbir Gregorio, RN  Pain Management Interventions: pain medication given  Intervention: Provide Person-Centered Care  Recent Flowsheet Documentation  Taken 7/30/2025 2043 by Balbir Gregorio, RN  Trust Relationship/Rapport:   care explained   choices provided   emotional support provided   empathic listening provided   questions answered   questions encouraged   reassurance provided   thoughts/feelings acknowledged     Problem: Fall Injury Risk  Goal: Absence of Fall and Fall-Related Injury  Intervention: Identify and Manage Contributors  Recent Flowsheet Documentation  Taken 7/30/2025 2043 by Balbir Gregorio, RN  Medication Review/Management: medications reviewed  Intervention: Promote Injury-Free Environment  Recent Flowsheet Documentation  Taken 7/31/2025 0600 by Balbir Gregorio, RN  Safety Promotion/Fall Prevention:   assistive device/personal items within reach   clutter free environment maintained   fall prevention  program maintained   lighting adjusted   nonskid shoes/slippers when out of bed   room organization consistent  Taken 7/31/2025 0405 by Balbir Gregorio, RN  Safety Promotion/Fall Prevention:   assistive device/personal items within reach   clutter free environment maintained   fall prevention program maintained   lighting adjusted   nonskid shoes/slippers when out of bed   room organization consistent   safety round/check completed  Taken 7/31/2025 0210 by Balbir Gregorio, RN  Safety Promotion/Fall Prevention:   assistive device/personal items within reach   clutter free environment maintained   fall prevention program maintained   lighting adjusted   nonskid shoes/slippers when out of bed   room organization consistent   safety round/check completed  Taken 7/31/2025 0005 by Balbir Gregorio, RN  Safety Promotion/Fall Prevention:   assistive device/personal items within reach   clutter free environment maintained   fall prevention program maintained   lighting adjusted   nonskid shoes/slippers when out of bed   room organization consistent   safety round/check completed  Taken 7/30/2025 2140 by Balbir Gregorio, RN  Safety Promotion/Fall Prevention:   assistive device/personal items within reach   clutter free environment maintained   fall prevention program maintained   lighting adjusted   nonskid shoes/slippers when out of bed   room organization consistent   safety round/check completed  Taken 7/30/2025 2043 by Balbir Gregorio, RN  Safety Promotion/Fall Prevention:   assistive device/personal items within reach   clutter free environment maintained   fall prevention program maintained   lighting adjusted   nonskid shoes/slippers when out of bed   room organization consistent   safety round/check completed     Problem: Pain Chronic (Persistent)  Goal: Optimal Pain Control and Function  Intervention: Optimize Psychosocial Wellbeing  Recent Flowsheet Documentation  Taken 7/30/2025 2043 by Balbir Gregorio,  RN  Supportive Measures: active listening utilized  Intervention: Develop Pain Management Plan  Recent Flowsheet Documentation  Taken 7/30/2025 2043 by Balbir Gregorio, RN  Pain Management Interventions: pain medication given  Intervention: Manage Persistent Pain  Recent Flowsheet Documentation  Taken 7/30/2025 2043 by Balbir Gregorio, RN  Medication Review/Management: medications reviewed     Problem: Skin Injury Risk Increased  Goal: Skin Health and Integrity  Intervention: Optimize Skin Protection  Recent Flowsheet Documentation  Taken 7/31/2025 0600 by Balbir Gregorio, RN  Head of Bed (HOB) Positioning: HOB at 20-30 degrees  Taken 7/31/2025 0405 by Balbir Gregorio, RN  Head of Bed (HOB) Positioning: HOB at 20-30 degrees  Taken 7/31/2025 0210 by Balbir Gregorio, RN  Head of Bed (HOB) Positioning: HOB at 20-30 degrees  Taken 7/31/2025 0005 by Balbir Gregorio, RN  Head of Bed (HOB) Positioning: HOB at 20-30 degrees  Taken 7/30/2025 2043 by Balbir Gregorio, RN  Activity Management: activity encouraged  Head of Bed (HOB) Positioning: HOB at 20-30 degrees   Goal Outcome Evaluation:

## 2025-07-31 NOTE — PLAN OF CARE
Goal Outcome Evaluation:  Plan of Care Reviewed With: patient        Progress: no change  Outcome Evaluation: Appetite good. Cardio consulted for CP and elevated Troponin. CIWA stable. Given PRN Miralax x2 for no BM for 4 days. Large result in commode. Purewick in place. Venous duplex negative. Will discharge home with spouse when stable.

## 2025-08-01 ENCOUNTER — TELEPHONE (OUTPATIENT)
Age: 85
End: 2025-08-01
Payer: MEDICARE

## 2025-08-01 ENCOUNTER — READMISSION MANAGEMENT (OUTPATIENT)
Dept: CALL CENTER | Facility: HOSPITAL | Age: 85
End: 2025-08-01
Payer: MEDICARE

## 2025-08-01 VITALS
DIASTOLIC BLOOD PRESSURE: 85 MMHG | BODY MASS INDEX: 25.5 KG/M2 | TEMPERATURE: 98 F | HEART RATE: 75 BPM | RESPIRATION RATE: 18 BRPM | WEIGHT: 162.48 LBS | SYSTOLIC BLOOD PRESSURE: 132 MMHG | OXYGEN SATURATION: 98 % | HEIGHT: 67 IN

## 2025-08-01 LAB
ALBUMIN SERPL-MCNC: 2.7 G/DL (ref 3.5–5.2)
ALBUMIN/GLOB SERPL: 1.1 G/DL
ALP SERPL-CCNC: 86 U/L (ref 39–117)
ALT SERPL W P-5'-P-CCNC: 15 U/L (ref 1–41)
ANION GAP SERPL CALCULATED.3IONS-SCNC: 9 MMOL/L (ref 5–15)
AST SERPL-CCNC: 23 U/L (ref 1–40)
BASOPHILS # BLD AUTO: 0.06 10*3/MM3 (ref 0–0.2)
BASOPHILS NFR BLD AUTO: 1 % (ref 0–1.5)
BILIRUB SERPL-MCNC: 0.3 MG/DL (ref 0–1.2)
BUN SERPL-MCNC: 13 MG/DL (ref 8–23)
BUN/CREAT SERPL: 16 (ref 7–25)
CALCIUM SPEC-SCNC: 8.7 MG/DL (ref 8.6–10.5)
CHLORIDE SERPL-SCNC: 104 MMOL/L (ref 98–107)
CO2 SERPL-SCNC: 26 MMOL/L (ref 22–29)
CREAT SERPL-MCNC: 0.81 MG/DL (ref 0.76–1.27)
DEPRECATED RDW RBC AUTO: 46.8 FL (ref 37–54)
EGFRCR SERPLBLD CKD-EPI 2021: 85.9 ML/MIN/1.73
EOSINOPHIL # BLD AUTO: 0.19 10*3/MM3 (ref 0–0.4)
EOSINOPHIL NFR BLD AUTO: 3.3 % (ref 0.3–6.2)
ERYTHROCYTE [DISTWIDTH] IN BLOOD BY AUTOMATED COUNT: 12.6 % (ref 12.3–15.4)
GLOBULIN UR ELPH-MCNC: 2.4 GM/DL
GLUCOSE SERPL-MCNC: 93 MG/DL (ref 65–99)
HCT VFR BLD AUTO: 38.7 % (ref 37.5–51)
HGB BLD-MCNC: 13.1 G/DL (ref 13–17.7)
IMM GRANULOCYTES # BLD AUTO: 0.01 10*3/MM3 (ref 0–0.05)
IMM GRANULOCYTES NFR BLD AUTO: 0.2 % (ref 0–0.5)
LYMPHOCYTES # BLD AUTO: 2.65 10*3/MM3 (ref 0.7–3.1)
LYMPHOCYTES NFR BLD AUTO: 45.6 % (ref 19.6–45.3)
MCH RBC QN AUTO: 34.5 PG (ref 26.6–33)
MCHC RBC AUTO-ENTMCNC: 33.9 G/DL (ref 31.5–35.7)
MCV RBC AUTO: 101.8 FL (ref 79–97)
MONOCYTES # BLD AUTO: 0.48 10*3/MM3 (ref 0.1–0.9)
MONOCYTES NFR BLD AUTO: 8.3 % (ref 5–12)
NEUTROPHILS NFR BLD AUTO: 2.42 10*3/MM3 (ref 1.7–7)
NEUTROPHILS NFR BLD AUTO: 41.6 % (ref 42.7–76)
NRBC BLD AUTO-RTO: 0 /100 WBC (ref 0–0.2)
PLATELET # BLD AUTO: 244 10*3/MM3 (ref 140–450)
PMV BLD AUTO: 11.1 FL (ref 6–12)
POTASSIUM SERPL-SCNC: 4.7 MMOL/L (ref 3.5–5.2)
PROT SERPL-MCNC: 5.1 G/DL (ref 6–8.5)
QT INTERVAL: 382 MS
QTC INTERVAL: 438 MS
RBC # BLD AUTO: 3.8 10*6/MM3 (ref 4.14–5.8)
SODIUM SERPL-SCNC: 139 MMOL/L (ref 136–145)
WBC NRBC COR # BLD AUTO: 5.81 10*3/MM3 (ref 3.4–10.8)

## 2025-08-01 PROCEDURE — G0378 HOSPITAL OBSERVATION PER HR: HCPCS

## 2025-08-01 PROCEDURE — 25010000002 THIAMINE PER 100 MG: Performed by: STUDENT IN AN ORGANIZED HEALTH CARE EDUCATION/TRAINING PROGRAM

## 2025-08-01 PROCEDURE — 96376 TX/PRO/DX INJ SAME DRUG ADON: CPT

## 2025-08-01 PROCEDURE — 99214 OFFICE O/P EST MOD 30 MIN: CPT | Performed by: INTERNAL MEDICINE

## 2025-08-01 PROCEDURE — 80053 COMPREHEN METABOLIC PANEL: CPT | Performed by: INTERNAL MEDICINE

## 2025-08-01 PROCEDURE — 97530 THERAPEUTIC ACTIVITIES: CPT

## 2025-08-01 PROCEDURE — 85025 COMPLETE CBC W/AUTO DIFF WBC: CPT | Performed by: INTERNAL MEDICINE

## 2025-08-01 RX ORDER — LEVOTHYROXINE SODIUM 150 UG/1
150 TABLET ORAL
Qty: 30 TABLET | Refills: 0 | Status: SHIPPED | OUTPATIENT
Start: 2025-08-02

## 2025-08-01 RX ADMIN — SERTRALINE HYDROCHLORIDE 50 MG: 50 TABLET, FILM COATED ORAL at 08:05

## 2025-08-01 RX ADMIN — THIAMINE HYDROCHLORIDE 200 MG: 100 INJECTION, SOLUTION INTRAMUSCULAR; INTRAVENOUS at 14:04

## 2025-08-01 RX ADMIN — ACETAMINOPHEN 650 MG: 325 TABLET, FILM COATED ORAL at 05:15

## 2025-08-01 RX ADMIN — ACETAMINOPHEN 650 MG: 325 TABLET, FILM COATED ORAL at 09:41

## 2025-08-01 RX ADMIN — Medication 10 ML: at 08:07

## 2025-08-01 RX ADMIN — LISINOPRIL 10 MG: 20 TABLET ORAL at 08:04

## 2025-08-01 RX ADMIN — LEVOTHYROXINE SODIUM 150 MCG: 0.15 TABLET ORAL at 05:15

## 2025-08-01 RX ADMIN — THIAMINE HYDROCHLORIDE 200 MG: 100 INJECTION, SOLUTION INTRAMUSCULAR; INTRAVENOUS at 05:15

## 2025-08-01 RX ADMIN — POTASSIUM CHLORIDE 20 MEQ: 1500 TABLET, EXTENDED RELEASE ORAL at 08:05

## 2025-08-01 RX ADMIN — FOLIC ACID 1 MG: 1 TABLET ORAL at 08:05

## 2025-08-01 RX ADMIN — TRIAMCINOLONE ACETONIDE 1 APPLICATION: 1 CREAM TOPICAL at 05:17

## 2025-08-01 RX ADMIN — Medication 1 TABLET: at 08:05

## 2025-08-01 RX ADMIN — FUROSEMIDE 40 MG: 40 TABLET ORAL at 08:05

## 2025-08-01 NOTE — OUTREACH NOTE
Prep Survey      Flowsheet Row Responses   [facility] patient discharged from? [place]   Is LACE score < 7 ? No   Eligibility [facility]   Date of Admission 07/29/25   Date of Discharge 08/01/25   Discharge Disposition Home-Health Care Sv   Discharge diagnosis Repeated falls, AMS   Does the patient have one of the following disease processes/diagnoses(primary or secondary)? Other   Does the patient have Home health ordered? Yes   What is the Home health agency?  [facility]    Is there a DME ordered? No   Prep survey completed? Yes            Mamta VILLEDA - Registered Nurse

## 2025-08-01 NOTE — PROGRESS NOTES
Continued Stay Note  [facility]     Patient Name: Darwin Sparks  MRN: [id number]  Today's Date: 8/1/2025    Admit Date: 7/29/2025    [facility]   Discharge Plan       Row Name 08/01/25 1503       Plan    Plan Home with spouse and [facility]  following    Patient/Family in Agreement with Plan yes    Plan Comments Spoke with patient at bedside, plan remains to return home with spouse and caregiver.  Spoke with [facility]  and she is aware of DC.  Patient states he does not have transportation.  No availability for [employer] [facility] [facility].  [facility] van scheduled for 5:00 p.m. Conf #[id number].  Patient is aware that [employer] will not take him up his steps at home.  He states spouse and caregiver will be there to assist.  Floresita HERNÁNDEZ                    Expected Discharge Date and Time       Expected Discharge Date Expected Discharge Time    Aug 1, 2025 11:52 AM              Becky S. [place], RN

## 2025-08-01 NOTE — TELEPHONE ENCOUNTER
8.1.2025 called and left voicemail to schedule a Hospital follow-up/ Patient already has a follow-up scheduled on 8/21. We can change that to hospital F/U if needed.

## 2025-08-01 NOTE — CONSULTS
Patient Name: Darwin Sparks  :1940  84 y.o.    Date of Admission: 2025  Date of Consultation:  25  Encounter Provider: Quiana Mcdonald MD  Place of Service: [facility] CARDIOLOGY  Referring Provider: Alisia Banuelos, *  Patient Care Team:  Epley, James, APRN as PCP - General (Family Medicine)      Chief complaint: Syncope/fall    History of Present Illness:  84-year-old man with a history of labile hypertension, grade 1 diastolic dysfunction normal for age.  He has poor balance, walks with a walker.  Apparently had not drank for a while and then drink a bottle of wine after an argument with his spouse.  He was then found on the ground and brought to the emergency room.  And troponin was drawn despite lack of cardiac symptoms.  It has been mildly elevated peaked at 50 on this hospital admission is now down to 48.  This is in the setting of normal renal function.  EKG shows sinus rhythm, no ischemic changes.  Unchanged from prior.  May 2025 he had an echocardiogram during hospitalization showing EF of 50%, grade 1 diastolic dysfunction, no significant valvular disease.  The past he has had issues with labile hypertension.  This hospitalization his diastolics are intermittently elevated.  Orthostatics this morning were suboptimally performed as he was unable to stand long enough due to back pain.  Laying down, 134/74, standing 108/84, seated 130/117 however he was moving during that measurement.    Past Medical History:   Diagnosis Date    Cataract     Colon polyp     10 years ago    Deep vein thrombosis     Childhood    Headache 28 years old    HL (hearing loss) 8 years ago    Not too bad, hearing aids for crouded places    Hyperlipidemia     Hypertension     Hypothyroidism     Infectious viral hepatitis     A & B ?    Inflammatory bowel disease Child to 77 years old    Constopation, alergy animal protine,Vegan diet    Irritable bowel syndrome     Vegan diet  big improvement    Kidney stone 1958    4 surgeries    Low back pain 1973    Osteoarthritis     Pneumonia Child    2 times    Prostate cancer 2013    Scoliosis 1973    Urinary tract infection 1973    After kidney stones    Visual impairment 1960    First glasses       Past Surgical History:   Procedure Laterality Date    ADENOIDECTOMY  10 years old    CATARACT EXTRACTION      CIRCUMCISION      COLONOSCOPY      COLONOSCOPY N/A 05/27/2021    Procedure: COLONOSCOPY TO CECUM/TI;  Surgeon: Jamel Michaels MD;  Location: [facility] ENDOSCOPY;  Service: Gastroenterology;  Laterality: N/A;  Pre op: Abnormal cat scan, constipation, RLQ pain  Post op: Diverticulosis, Hemorrhoids, otherwise normal to and including TI.    MEDIAL BRANCH BLOCK Bilateral 07/30/2021    Procedure: LUMBAR MEDIAL BRANCH BLOCK;  Surgeon: Kb Rodriguez MD;  [place] [facility] [place]  Service: Pain Management;  Laterality: Bilateral;    MEDIAL BRANCH BLOCK Bilateral 08/20/2021    Procedure: MEDIAL BRANCH BLOCK--bilateral lumbar3-lumbar5;  Surgeon: Kb Rodriguez MD;  Location: [facility] MAIN [facility];  Service: Pain Management;  Laterality: Bilateral;    PROSTATE SURGERY  72 years old    Low radiation, started vegan diet    RADIOFREQUENCY ABLATION Bilateral 10/08/2021    Procedure: RADIOFREQUENCY ABLATION LUMBAR--bilateral lumbar3-5 WITH MAC;  Surgeon: Kb Rodriguez MD;  Location: [facility] MAIN OR;  Service: Pain Management;  Laterality: Bilateral;    TONSILLECTOMY      TOOTH EXTRACTION           Prior to Admission medications    Medication Sig Start Date End Date Taking? Authorizing Provider   albuterol sulfate  (90 Base) MCG/ACT inhaler Inhale 2 puffs Every 4 (Four) Hours As Needed for Wheezing or Shortness of Air. 2/20/24  Yes Epley, James, APRN   ammonium lactate (LAC-HYDRIN) 12 % lotion Apply  topically to the appropriate area as directed As Needed for Dry Skin. Best for lower extremity dry chronically's skin  Patient taking differently:  Apply 1 Application topically to the appropriate area as directed As Needed for Dry Skin. Best for lower extremity dry chronically's skin 3/21/25  Yes Epley, James, APRN   folic acid (FOLVITE) 1 MG tablet Take 1 tablet by mouth Daily. 5/13/25  Yes Epley, James, APRN   furosemide (Lasix) 20 MG tablet Take 1 tablet by mouth Daily As Needed (Dependent edema). 7/23/25  Yes Epley, James, APRN   levothyroxine (SYNTHROID, LEVOTHROID) 125 MCG tablet Take 1 tablet by mouth Daily. 3/21/25  Yes Epley, James, APRN   lisinopril (PRINIVIL,ZESTRIL) 10 MG tablet Take 1 tablet by mouth Daily for 30 days.  Patient taking differently: Take 1 tablet by mouth Daily. 5/10/25 7/29/25 Yes Orquidea Haas MD   potassium chloride (MICRO-K) 10 MEQ CR capsule Take 2 capsules by mouth Daily As Needed (Need to take if taking water pill furosemide).  Patient taking differently: Take 2 capsules by mouth Daily. 7/23/25  Yes Epley, James, APRN   sertraline (Zoloft) 50 MG tablet Take 1 tablet by mouth Daily. For improved mood and feelings of wellbeing  Patient taking differently: Take 1 tablet by mouth Daily. 4/14/25  Yes Epley, James, APRN   vitamin B-12 (CYANOCOBALAMIN) 1000 MCG tablet Take 1 tablet by mouth Daily. 5/13/25  Yes Epley, James, APRN   celecoxib (CeleBREX) 200 MG capsule Take 1 capsule by mouth 2 (Two) Times a Day. With water, take lowest effective dose  Patient not taking: Reported on 7/29/2025 3/21/25   Epley, James, APRN       Allergies   Allergen Reactions    Milk-Related Compounds Other (See Comments)     Constipation    Pregabalin      Other reaction(s): Visual Disturbance    Neomycin-Bacitracin Zn-Polymyx Rash       Social History     Socioeconomic History    Marital status:    Tobacco Use    Smoking status: Never    Smokeless tobacco: Never   Vaping Use    Vaping status: Never Used   Substance and Sexual Activity    Alcohol use: Not Currently    Drug use: Not Currently     Types: Marijuana     Comment: used in the  1970's    Sexual activity: Defer       Family History   Problem Relation Age of Onset    Diabetes Mother     Hypertension Mother     Stroke Mother     Diabetes Father     Hypertension Father     Bipolar disorder Father     Stroke Father     Bipolar disorder Paternal Grandmother     Heart disease Other     Sudden death Other     Anuerysm Other     Malig Hyperthermia Neg Hx        REVIEW OF SYSTEMS:   All systems reviewed.  Pertinent positives identified in HPI.  All other systems are negative.      Objective:     Vitals:    08/01/25 0739 08/01/25 0933 08/01/25 0936 08/01/25 0939   BP: 134/73 134/74 108/84 (!) 131/117   BP Location: Right arm Right arm Right arm Right arm   Patient Position: Lying Lying Standing Standing   Pulse: 75 67 72 104   Resp: 18      Temp: 97.5 °F (36.4 °C)      TempSrc: Oral      SpO2: 97%  99%    Weight:       Height:         Body mass index is 25.45 kg/m².    General Appearance:    Alert, cooperative, in no acute distress   Head:    Normocephalic, without obvious abnormality, atraumatic   Eyes:            Lids and lashes normal, conjunctivae and sclerae normal, no icterus, no pallor, corneas clear, PERRLA   Ears:    Ears appear intact with no abnormalities noted   Throat:   No oral lesions, no thrush, oral mucosa moist   Neck:   No adenopathy, supple, trachea midline, no thyromegaly, no carotid bruit, no JVD   Back:     No kyphosis present, no scoliosis present, no skin lesions, erythema or scars, no tenderness to percussion or palpation, range of motion normal   Lungs:     Clear to auscultation, respirations regular, even and unlabored    Heart:    Regular rhythm and normal rate, normal S1 and S2, no murmur, no gallop, no rub, no click   Chest Wall:    No abnormalities observed   Abdomen:     Normal bowel sounds, no masses, no organomegaly, soft, nontender, nondistended, no guarding, no rebound  tenderness   Extremities:   Moves all extremities well, no edema, no cyanosis, no redness    Pulses:   Pulses palpable and equal bilaterally. Normal radial, carotid, femoral, dorsalis pedis and posterior tibial pulses bilaterally. Normal abdominal aorta   Skin:  Psychiatric:   No bleeding, bruising or rash    Alert and oriented x 3, normal mood and affect   Lab Review:     Results from last 7 days   Lab Units 08/01/25  0457   SODIUM mmol/L 139   POTASSIUM mmol/L 4.7   CHLORIDE mmol/L 104   CO2 mmol/L 26.0   BUN mg/dL 13.0   CREATININE mg/dL 0.81   CALCIUM mg/dL 8.7   BILIRUBIN mg/dL 0.3   ALK PHOS U/L 86   ALT (SGPT) U/L 15   AST (SGOT) U/L 23   GLUCOSE mg/dL 93     Results from last 7 days   Lab Units 07/31/25  1707 07/31/25  0458 07/29/25  1400 07/29/25  1219   CK TOTAL U/L  --   --  122 136   HSTROP T ng/L 48* 51* 33* 31*     Results from last 7 days   Lab Units 08/01/25  0457   WBC 10*3/mm3 5.81   HEMOGLOBIN g/dL 13.1   HEMATOCRIT % 38.7   PLATELETS 10*3/mm3 244     Results from last 7 days   Lab Units 07/30/25  0511 07/29/25  1219   INR  1.08 0.96   APTT seconds  --  25.4     Results from last 7 days   Lab Units 07/30/25  0511   MAGNESIUM mg/dL 1.8                   I personally viewed and interpreted the patient's EKG/Telemetry data.        Assessment and Plan:       1.  Fall: Likely related to alcohol intoxication as well as poor balance at baseline.  He is orthostatic on measurement today.  Has no peripheral edema and grade 1 diastolic dysfunction which does not require daily diuretics necessarily.  I will stop his Lasix 40.  I would prefer to have higher blood pressures and risk of recurrent falls with perfect numbers.  2.  Elevated troponin: Unclear etiology.  He has had no cardiac symptoms, completely denies angina or dyspnea.  EKG is without ischemic changes.  I would defer ischemic testing at this time.    No objection to discharge.  Should follow-up in the office with Dr. Hyatt in 1 month.    Quiana Mcdonald MD  08/01/25  10:27 EDT

## 2025-08-01 NOTE — DISCHARGE SUMMARY
Patient Name: Darwin Sparks  : 1940  MRN: [id number]    Date of Admission: 2025  Date of Discharge:  2025  Primary Care Physician: Epley, James, APRN      Chief Complaint:   Fall and Altered Mental Status      Discharge Diagnoses     Active Hospital Problems    Diagnosis  POA    **Repeated falls [R29.6]  Yes    Severe major depression [F32.2]  Yes    Decreased strength, endurance, and mobility [R53.1, Z74.09, R68.89]  Yes    Gait instability [R26.81]  Yes    Anxiety [F41.9]  Yes    Primary hypertension [I10]  Yes    Adult hypothyroidism [E03.9]  Yes      Resolved Hospital Problems   No resolved problems to display.        Hospital Course     Mr. Sparks is a 84 y.o. male who presented to [facility] initially after being found on the floor with an empty bottle of wine by his caregiver per H&P.  Please see the admitting history and physical for further details.  He was admitted to the hospital for further evaluation and treatment.      Falls  - CT head and C-spine negative for acute abnormalities. Similar episode in May and was evaluated by cardiology during that admission; ETOH use felt to be contributing, with possible component of neuropathy per prior evaluations and orthostasis. Orthostatics were initially negative, however, later returned + so abdominal binder and compression stockings were ordered, which led to resolution on repeat testing. Continue use of abdominal binder and stockings at discharge.     Alcohol use  - Alcohol likely contributed to fall.  Patient reportedly had not had any alcohol for the past 6 months prior to night before arrival. He was placed on CIWA protocol, no evidence of withdrawal. Strongly recommend ongoing alcohol cessation moving forward.      Chronic diastolic heart failure  Lower extremity edema  Hypertension  Elevated troponin  -Troponin mildly elevated on arrival, he has endorsed intermittent chest discomfort, so cardiology was consulted to  evaluate. Cardiology did not recommend ischemic testing at this time as he was without acute cardiopulmonary complaints. Echo with EF 53%, grade 1 diastolic dysfunction. He was initially given IV diuresis due to lower extremity swelling, and later transitioned to oral diuretics. However, in the presence of orthostatics as above, cardiology did recommend holding his furosemide for now until he can follow up with them in the outpatient setting for reassessment. He was given okay to continue with Lisinopril. Recommend that patient check blood pressure 2-3 times daily and record those values in log book and take it to next PCP visit to allow for greater insight into treatment efficacy to guide further management decisions. Recommend heart healthy/low sodium diet. Patient provided discharge instructions on weight monitoring and when to return to hospital. No SGLT2 inhibitor for now, due to orthostasis previously noted, and this could increase risk of lower blood pressure, this can be reassessed in the outpatient setting.    Hypothyroidism  - TSH elevated, FT4 low. Increased Synthroid to 150 mcg, continue with this increased dose at discharge. He will need repeat thyroid function study testing with PCP in 4-6 weeks.     Day of Discharge     Subjective:  Patient awake and resting in bed, no new acute complaints, he has been cleared for discharge by cardiology.    Physical Exam:  Temp:  [97.5 °F (36.4 °C)-97.8 °F (36.6 °C)] 97.5 °F (36.4 °C)  Heart Rate:  [] 104  Resp:  [16-18] 18  BP: ()/() 131/117  Body mass index is 25.45 kg/m².  Physical Exam  Vitals and nursing note reviewed.   Constitutional:       General: He is awake. He is not in acute distress.  Cardiovascular:      Rate and Rhythm: Normal rate.   Pulmonary:      Effort: Pulmonary effort is normal.      Breath sounds: No wheezing.   Abdominal:      Palpations: Abdomen is soft.      Tenderness: There is no abdominal tenderness.   Skin:      General: Skin is warm and dry.   Neurological:      Mental Status: He is alert.   Psychiatric:         Behavior: Behavior is cooperative.         Consultants     Consult Orders (all) (From admission, onward)       Start     Ordered    07/31/25 1630  Inpatient Cardiology Consult  Once        Specialty:  Cardiology  Provider:  Cade Newman MD    07/31/25 1630    07/30/25 1727  Inpatient Access Center Consult  Once        Provider:  (Not yet assigned)    07/30/25 1726    07/30/25 0007  Inpatient Advance Care Planning Consult  Once        Provider:  (Not yet assigned)    07/30/25 0007    07/30/25 0001  Inpatient Nutrition Consult  Once        Comments: Albumin is low. Vwegan and he eats too much salt dchf. Needs more protein   Provider:  (Not yet assigned)    07/30/25 0000    07/29/25 1645  Inpatient Spiritual Care Consult  Once        Provider:  (Not yet assigned)    07/29/25 1644    07/29/25 1645  Inpatient Palliative Care Team Consult  Once        Provider:  (Not yet assigned)    07/29/25 1644    07/29/25 1437  [facility] (on-call MD unless specified) Details  Once        Specialty:  Hospitalist  Provider:  (Not yet assigned)    07/29/25 1436                  Procedures     * Surgery not found *    Imaging Results (All)       Procedure Component Value Units Date/Time    CT Cervical Spine Without Contrast [[id number]] Collected: 07/29/25 1434     Updated: 07/29/25 1508    Narrative:      CT HEAD WITHOUT CONTRAST     HISTORY: Multiple falls. Closed head injury. Confusion.     TECHNIQUE:  Head CT includes axial imaging from the base of skull to the  vertex without intravenous contrast. Radiation dose reduction techniques  were utilized, including automated exposure control and exposure  modulation based on body size.     COMPARISON: CT head and cervical spine 05/05/2025, 03/09/2025.     FINDINGS:  HEAD CT: There is moderate cerebral atrophy and mild chronic small  vessel ischemic white matter change. There are no  abnormal areas of  increased attenuation intra-axially to suggest hemorrhage. No  extra-axial fluid collection is observed. There is no evidence for  cerebral edema or mass effect or shift of the midline structures. Bone  windows demonstrate no calvarial abnormality. Mastoid air cells and  visualized paranasal sinuses appear clear. Intracranial atherosclerotic  calcifications are present.        CERVICAL SPINE: There is advanced degenerative disc disease with disc  space flattening at the C3-4, C5-6, C6-7, and C7-T1 levels. Degenerative  malalignment with 2-3 mm retrolisthesis of C3 on C4, 3 mm  anterolisthesis of C4 on C5, 3 mm anterolisthesis of C7 on T1.  Multilevel uncovertebral overgrowth with osseous encroachment on the  neural foramina. C1 ring and odontoid process are intact. No evidence  for fracture or acute abnormality.       Impression:      1. No evidence for acute intracranial abnormality. Moderate cerebral  atrophy and chronic small vessel ischemic white matter change.  2. Advanced cervical spondylosis without evidence for acute abnormality  in the cervical spine or significant change compared to CT 05/05/2025.      This report was finalized on 7/29/2025 3:05 PM by Micah Wade M.D  on Workstation: [id number]       CT Head Without Contrast [[id number]] Collected: 07/29/25 1434     Updated: 07/29/25 1508    Narrative:      CT HEAD WITHOUT CONTRAST     HISTORY: Multiple falls. Closed head injury. Confusion.     TECHNIQUE:  Head CT includes axial imaging from the base of skull to the  vertex without intravenous contrast. Radiation dose reduction techniques  were utilized, including automated exposure control and exposure  modulation based on body size.     COMPARISON: CT head and cervical spine 05/05/2025, 03/09/2025.     FINDINGS:  HEAD CT: There is moderate cerebral atrophy and mild chronic small  vessel ischemic white matter change. There are no abnormal areas of  increased attenuation  intra-axially to suggest hemorrhage. No  extra-axial fluid collection is observed. There is no evidence for  cerebral edema or mass effect or shift of the midline structures. Bone  windows demonstrate no calvarial abnormality. Mastoid air cells and  visualized paranasal sinuses appear clear. Intracranial atherosclerotic  calcifications are present.        CERVICAL SPINE: There is advanced degenerative disc disease with disc  space flattening at the C3-4, C5-6, C6-7, and C7-T1 levels. Degenerative  malalignment with 2-3 mm retrolisthesis of C3 on C4, 3 mm  anterolisthesis of C4 on C5, 3 mm anterolisthesis of C7 on T1.  Multilevel uncovertebral overgrowth with osseous encroachment on the  neural foramina. C1 ring and odontoid process are intact. No evidence  for fracture or acute abnormality.       Impression:      1. No evidence for acute intracranial abnormality. Moderate cerebral  atrophy and chronic small vessel ischemic white matter change.  2. Advanced cervical spondylosis without evidence for acute abnormality  in the cervical spine or significant change compared to CT 05/05/2025.      This report was finalized on 7/29/2025 3:05 PM by Micah Wade M.D  on Workstation: [id number]             Results for orders placed during the hospital encounter of 07/29/25    Duplex Venous Lower Extremity - Bilateral CAR 07/30/2025  7:27 PM    Interpretation Summary    Normal bilateral lower extremity venous duplex scan.    Results for orders placed during the hospital encounter of 05/05/25    Adult Transthoracic Echo Complete W/ Cont if Necessary Per Protocol 05/06/2025 12:44 PM    Interpretation Summary    Left ventricular systolic function is normal. Calculated left ventricular EF = 53%    Left ventricular diastolic function is consistent with (grade I) impaired relaxation.    Estimated right ventricular systolic pressure from tricuspid regurgitation is normal (<35 mmHg).    Pertinent Labs     Results from last 7 days  "  Lab Units 08/01/25  0457 07/31/25  0458 07/30/25  0511 07/29/25  1219   WBC 10*3/mm3 5.81 4.95 6.32 5.74   HEMOGLOBIN g/dL 13.1 13.3 12.7* 14.2   PLATELETS 10*3/mm3 244 236 248 257     Results from last 7 days   Lab Units 08/01/25  0457 07/31/25  0458 07/30/25  0511 07/29/25  1219   SODIUM mmol/L 139 139 139 141   POTASSIUM mmol/L 4.7 4.1 3.8 4.8   CHLORIDE mmol/L 104 104 107 107   CO2 mmol/L 26.0 25.0 23.0 24.1   BUN mg/dL 13.0 10.0 5.0* 5.0*   CREATININE mg/dL 0.81 0.67* 0.53* 0.63*   GLUCOSE mg/dL 93 87 90 101*   EGFR mL/min/1.73 85.9 90.9 97.6 92.6     Results from last 7 days   Lab Units 08/01/25 0457 07/31/25  0458 07/30/25  0511 07/29/25  1219   ALBUMIN g/dL 2.7* 2.7* 2.6* 3.4*   BILIRUBIN mg/dL 0.3 0.5 0.4 0.3   ALK PHOS U/L 86 76 70 84   AST (SGOT) U/L 23 22 23 31   ALT (SGPT) U/L 15 13 13 19     Results from last 7 days   Lab Units 08/01/25 0457 07/31/25 0458 07/30/25  0511 07/29/25  1219   CALCIUM mg/dL 8.7 8.4* 8.0* 8.8   ALBUMIN g/dL 2.7* 2.7* 2.6* 3.4*   MAGNESIUM mg/dL  --   --  1.8  --        Results from last 7 days   Lab Units 07/31/25  1707 07/31/25  0458 07/29/25  1757 07/29/25  1400 07/29/25  1219   CK TOTAL U/L  --   --   --  122 136   HSTROP T ng/L 48* 51*  --  33* 31*   PROBNP pg/mL  --   --  1,299.0  --  1,438.0           Invalid input(s): \"LDLCALC\"          Test Results Pending at Discharge     Pending Results       None              Discharge Details        Discharge Medications        PAUSE taking these medications        Instructions Start Date   celecoxib 200 MG capsule  Wait to take this until your doctor or other care provider tells you to start again.  Commonly known as: CeleBREX   200 mg, Oral, 2 Times Daily, With water, take lowest effective dose      furosemide 20 MG tablet  Wait to take this until your doctor or other care provider tells you to start again.  Commonly known as: Lasix   20 mg, Oral, Daily PRN      potassium chloride 10 MEQ CR capsule  Wait to take this until " your doctor or other care provider tells you to start again.  Commonly known as: MICRO-K  What changed: when to take this   20 mEq, Oral, Daily PRN             Changes to Medications        Instructions Start Date   levothyroxine 150 MCG tablet  Commonly known as: SYNTHROID, LEVOTHROID  What changed:   medication strength  how much to take  when to take this   150 mcg, Oral, Every Early Morning   Start Date: August 2, 2025     lisinopril 10 MG tablet  Commonly known as: PRINIVIL,ZESTRIL  What changed: when to take this   10 mg, Oral, Every 24 Hours Scheduled             Continue These Medications        Instructions Start Date   albuterol sulfate  (90 Base) MCG/ACT inhaler  Commonly known as: PROVENTIL HFA;VENTOLIN HFA;PROAIR HFA   2 puffs, Inhalation, Every 4 Hours PRN      ammonium lactate 12 % lotion  Commonly known as: LAC-HYDRIN   Topical, As Needed, Best for lower extremity dry chronically's skin      folic acid 1 MG tablet  Commonly known as: FOLVITE   1 mg, Oral, Daily      sertraline 50 MG tablet  Commonly known as: Zoloft   50 mg, Oral, Daily, For improved mood and feelings of wellbeing      vitamin B-12 1000 MCG tablet  Commonly known as: CYANOCOBALAMIN   1,000 mcg, Oral, Daily               Allergies   Allergen Reactions    Milk-Related Compounds Other (See Comments)     Constipation    Pregabalin      Other reaction(s): Visual Disturbance    Neomycin-Bacitracin Zn-Polymyx Rash       Discharge Disposition:  Home or Self Care      Discharge Diet:  Diet Order   Procedures    Diet: Cardiac, Vegetarian; Vegan (No animal products); Healthy Heart (2-3 Na+); Fluid Consistency: Thin (IDDSI 0)       Discharge Activity:   Activity Instructions       Gradually Increase Activity Until at Pre-Hospitalization Level              CODE STATUS:    Code Status and Medical Interventions: CPR (Attempt to Resuscitate); Full   Ordered at: 07/29/25 1640     Code Status (Patient has no pulse and is not breathing):    CPR  (Attempt to Resuscitate)     Medical Interventions (Patient has pulse or is breathing):    Full       Future Appointments   Date Time Provider Department Center   8/13/2025  2:30 PM Epley, James, APRN [facility]   8/21/2025 11:20 AM Yessica Hyatt MD [facility]     Additional Instructions for the Follow-ups that You Need to Schedule       Discharge Follow-up with PCP   As directed       Currently Documented PCP:    Epley, James, APRN    PCP Phone Number:    [phone]     Follow Up Details: within 1 week after discharge for reassessment, medication and symptom review, blood pressure check, BMP and CBC        Discharge Follow-up with Specialty: cardiology   As directed      Specialty: cardiology   Follow Up Details: 1 month               Contact information for follow-up providers       Epley, James, APRN .    Specialties: Nurse Practitioner, Family Medicine  Why: within 1 week after discharge for reassessment, medication and symptom review, blood pressure check, BMP and CBC  Contact information:  [address]  [place] [id number] [phone]                       Contact information for after-discharge care       Home Medical Care       [facility] .    Service: Home Nursing  Contact information:  [address] [place] [address] [phone]                                   Additional Instructions for the Follow-ups that You Need to Schedule       Discharge Follow-up with PCP   As directed       Currently Documented PCP:    Epley, James, APRN    PCP Phone Number:    [phone]     Follow Up Details: within 1 week after discharge for reassessment, medication and symptom review, blood pressure check, BMP and CBC        Discharge Follow-up with Specialty: cardiology   As directed      Specialty: cardiology   Follow Up Details: 1 month            Time Spent on Discharge:  Greater than 30 minutes      DO [facility]  08/01/25  11:56 EDT

## 2025-08-01 NOTE — PLAN OF CARE
Goal Outcome Evaluation:  Plan of Care Reviewed With: patient           Outcome Evaluation: Pt agreed to PT marco, pt neil bed mob with assist of 1, neil STS w/min 1, neil amb with assist of 1 ~30ft w/rwx cues for posture and step length for safety, pt has caregivers that will assist, plans home at DC possibly later today    Anticipated Discharge Disposition (PT): home with 24/7 care

## 2025-08-01 NOTE — THERAPY TREATMENT NOTE
Patient Name: Darwin Sparks  : 1940    MRN: [id number]                              Today's Date: 2025       Admit Date: 2025    Visit Dx:     ICD-10-CM ICD-9-CM   1. Altered mental status, unspecified altered mental status type  R41.82 780.97   2. CHI (closed head injury), initial encounter  S09.90XA 959.01   3. Repeated falls  R29.6 781.99   4. Uncontrolled hypertension  I10 401.9     Patient Active Problem List   Diagnosis    Primary hypertension    Mixed hyperlipidemia    Adult hypothyroidism    Insomnia    Anxiety    Dermatitis, eczematoid    Bronchitis    Seasonal allergies    Health care maintenance    Chronic midline low back pain without sciatica    Penis pain    Tinea cruris    Primary osteoarthritis involving multiple joints    Benzodiazepine dependence    Constipation    Abnormal finding on CT scan    Scoliosis of lumbar spine    Lumbar facet arthropathy    Vertigo    Multiple falls    Severe major depression    Decreased strength, endurance, and mobility    Gait instability    Syncope    Repeated falls     Past Medical History:   Diagnosis Date    Cataract     Colon polyp     10 years ago    Deep vein thrombosis     Childhood    Headache 28 years old    HL (hearing loss) 8 years ago    Not too bad, hearing aids for crouded places    Hyperlipidemia     Hypertension     Hypothyroidism     Infectious viral hepatitis     A & B ?    Inflammatory bowel disease Child to 77 years old    Constopation, alergy animal protine,Vegan diet    Irritable bowel syndrome     Vegan diet big improvement    Kidney stone 6    4 surgeries    Low back pain 1973    Osteoarthritis     Pneumonia Child    2 times    Prostate cancer 2013    Scoliosis 1973    Urinary tract infection 1973    After kidney stones    Visual impairment 1960    First glasses     Past Surgical History:   Procedure Laterality Date    ADENOIDECTOMY  10 years old    CATARACT EXTRACTION      CIRCUMCISION      COLONOSCOPY      COLONOSCOPY  N/A 05/27/2021    Procedure: COLONOSCOPY TO CECUM/TI;  Surgeon: Jamel Michaels MD;  Location: [facility] ENDOSCOPY;  Service: Gastroenterology;  Laterality: N/A;  Pre op: Abnormal cat scan, constipation, RLQ pain  Post op: Diverticulosis, Hemorrhoids, otherwise normal to and including TI.    MEDIAL BRANCH BLOCK Bilateral 07/30/2021    Procedure: LUMBAR MEDIAL BRANCH BLOCK;  Surgeon: Kb Rodriguez MD;  Location: [facility] MAIN OR;  Service: Pain Management;  Laterality: Bilateral;    MEDIAL BRANCH BLOCK Bilateral 08/20/2021    Procedure: MEDIAL BRANCH BLOCK--bilateral lumbar3-lumbar5;  Surgeon: Kb Rodriguez MD;  Location: [facility];  Service: Pain Management;  Laterality: Bilateral;    PROSTATE SURGERY  72 years old    Low radiation, started vegan diet    RADIOFREQUENCY ABLATION Bilateral 10/08/2021    Procedure: RADIOFREQUENCY ABLATION LUMBAR--bilateral lumbar3-5 WITH MAC;  Surgeon: Kb Rodriguez MD;  Location: [facility] OR;  Service: Pain Management;  Laterality: Bilateral;    TONSILLECTOMY      TOOTH EXTRACTION        General Information       Row Name 08/01/25 1912          Physical Therapy Time and Intention    Document Type therapy note (daily note)  -     Mode of Treatment individual therapy;physical therapy  -       Row Name 08/01/25 1912          General Information    Patient Profile Reviewed yes  -     Existing Precautions/Restrictions fall  -       Row Name 08/01/25 1912          Cognition    Orientation Status (Cognition) oriented x 3  -       Row Name 08/01/25 1912          Safety Issues/Impairments Affecting Functional Mobility    Impairments Affecting Function (Mobility) balance;coordination;endurance/activity tolerance  -               User Key  (r) = Recorded By, (t) = Taken By, (c) = Cosigned By      Initials Name Provider Type     Yessica Cohn PTA Physical Therapist Assistant                   Mobility       Row Name 08/01/25 1912          Bed Mobility     Supine-Sit [place] (Bed Mobility) contact guard;verbal cues  -     Sit-Supine [place] (Bed Mobility) contact guard  -     Assistive Device (Bed Mobility) bed rails;head of bed elevated  -[facility] Name 08/01/25 1912          Sit-Stand Transfer    Sit-Stand [place] (Transfers) minimum assist (75% patient effort)  -     Assistive Device (Sit-Stand Transfers) walker, front-wheeled  -     Comment, (Sit-Stand Transfer) cues for hand placement and posture  -[facility] Name 08/01/25 1912          Gait/Stairs (Locomotion)    [place] Level (Gait) minimum assist (75% patient effort);contact guard;verbal cues  -     Assistive Device (Gait) walker, front-wheeled  -     Distance in Feet (Gait) 30  -     Deviations/Abnormal Patterns (Gait) carolyn decreased;stride length decreased  -     Bilateral Gait Deviations forward flexed posture  -               User Key  (r) = Recorded By, (t) = Taken By, (c) = Cosigned By      Initials Name Provider Type    Yessica Olmstead PTA Physical Therapist Assistant                   Obj/Interventions    No documentation.                  Goals/Plan    No documentation.                  Clinical Impression       [facility] Name 08/01/25 1914          Pain    Pretreatment Pain Rating 6/10  back  -     Posttreatment Pain Rating 7/10  head  -     Pain Location back;head  -[facility] Name 08/01/25 1914          Plan of Care Review    Plan of Care Reviewed With patient  -     Outcome Evaluation Pt agreed to PT marco pt neil bed mob with assist of 1, neil STS w/min 1, neil amb with assist of 1 ~30ft w/rwx cues for posture and step length for safety, pt has caregivers that will assist, plans home at [place] possibly later today  -       Row Name 08/01/25 1914          Therapy Assessment/Plan (PT)    Rehab Potential (PT) good  -     Criteria for Skilled Interventions Met (PT) yes  -[facility] Name 08/01/25 1914          Vital Signs    O2 Delivery Pre  Treatment room air  -       Row Name 08/01/25 1914          Positioning and Restraints    Pre-Treatment Position in bed  -     Post Treatment Position bed  -JM     In Bed fowlers;call light within reach;encouraged to call for assist;exit alarm on;notified [facility]               User Key  (r) = Recorded By, (t) = Taken By, (c) = Cosigned By      Initials Name Provider Type    Yessica Olmstead PTA Physical Therapist Assistant                   Outcome Measures       Row Name 08/01/25 1916 08/01/25 0810       How much help from another person do you currently need...    Turning from your back to your side while in flat bed without using bedrails? 3  -JM 3  -KE    Moving from lying on back to sitting on the side of a flat bed without bedrails? 3  -JM 3  -KE    Moving to and from a bed to a chair (including a wheelchair)? 3  - 3  -KE    Standing up from a chair using your arms (e.g., wheelchair, bedside chair)? 3  - 3  -KE    Climbing 3-5 steps with a railing? 1  - 3  -KE    To walk in hospital room? 3  - 3  -KE    AM-PAC 6 Clicks Score (PT) 16  - 18  -KE    Highest Level of Mobility Goal Stand (1 or More Minutes)-5  -JM Walk 10 Steps or More-6  -KE              User Key  (r) = Recorded By, (t) = Taken By, (c) = Cosigned By      Initials Name Provider Type    Yessica Olmstead PTA Physical Therapist Assistant    Joanne Potter RN Registered Nurse                                 Physical Therapy Education       Title: PT OT SLP Therapies (Resolved)       Topic: Physical Therapy (Resolved)       Point: Mobility training (Resolved)       Learning Progress Summary            Patient Acceptance, E,D, VU,NR by MS at 7/30/2025 9318                      Point: Home exercise program (Resolved)       Learner Progress:  Not documented in this visit.              Point: Body mechanics (Resolved)       Learning Progress Summary            Patient Acceptance, E,D, VU,NR by MS at 7/30/2025 0435                       Point: Precautions (Resolved)       Learning Progress Summary            Patient Acceptance, E,D, VU,NR by MS at 7/30/2025 1538                                      User Key       Initials Effective Dates Name Provider Type Discipline    MS 06/16/21 -  Harley Garvin PT Physical Therapist PT                  PT Recommendation and Plan     Outcome Evaluation: Pt agreed to PT marco, pt neil bed mob with assist of 1, neil STS w/min 1, neil amb with assist of 1 ~30ft w/rwx cues for posture and step length for safety, pt has caregivers that will assist, plans home at [place] possibly later today     Time Calculation:         PT Charges       Row Name 08/01/25 1917             Time Calculation    Start Time 1429  -      Stop Time 1504  -      Time Calculation (min) 35 min  -      PT Received On 08/01/25  -                User Key  (r) = Recorded By, (t) = Taken By, (c) = Cosigned By      Initials Name Provider Type    Yessica Olmstead PTA Physical Therapist Assistant                  Therapy Charges for Today       Code Description Service Date Service Provider Modifiers Qty    [id number]  PT THERAPEUTIC ACT EA 15 MIN 8/1/2025 Yessica Cohn PTA GP 2            PT G-Codes  Outcome Measure Options: AM-PAC 6 Clicks Basic Mobility (PT)  AM-PAC 6 Clicks Score (PT): 16  AM-PAC 6 Clicks Score (OT): 18  Modified [place] Scale: 3 - Moderate disability.  Requiring some help, but able to walk without assistance.  PT Discharge Summary  Anticipated Discharge Disposition (PT): home with 24/7 care    Yessica Cohn PTA  8/1/2025

## 2025-08-04 ENCOUNTER — TRANSITIONAL CARE MANAGEMENT TELEPHONE ENCOUNTER (OUTPATIENT)
Dept: CALL CENTER | Facility: HOSPITAL | Age: 85
End: 2025-08-04
Payer: MEDICARE

## 2025-08-21 ENCOUNTER — OFFICE VISIT (OUTPATIENT)
Dept: CARDIOLOGY | Facility: CLINIC | Age: 85
End: 2025-08-21
Payer: MEDICARE

## 2025-08-21 VITALS
HEIGHT: 67 IN | HEART RATE: 63 BPM | BODY MASS INDEX: 25.11 KG/M2 | OXYGEN SATURATION: 98 % | SYSTOLIC BLOOD PRESSURE: 116 MMHG | DIASTOLIC BLOOD PRESSURE: 78 MMHG | RESPIRATION RATE: 18 BRPM | WEIGHT: 160 LBS

## 2025-08-21 DIAGNOSIS — E78.2 MIXED HYPERLIPIDEMIA: ICD-10-CM

## 2025-08-21 DIAGNOSIS — I10 PRIMARY HYPERTENSION: Primary | ICD-10-CM

## 2025-08-21 DIAGNOSIS — R60.0 EDEMA LEG: ICD-10-CM

## 2025-08-21 PROCEDURE — 1159F MED LIST DOCD IN RCRD: CPT | Performed by: INTERNAL MEDICINE

## 2025-08-21 PROCEDURE — 99214 OFFICE O/P EST MOD 30 MIN: CPT | Performed by: INTERNAL MEDICINE

## 2025-08-21 PROCEDURE — 93000 ELECTROCARDIOGRAM COMPLETE: CPT | Performed by: INTERNAL MEDICINE

## 2025-08-21 PROCEDURE — 1160F RVW MEDS BY RX/DR IN RCRD: CPT | Performed by: INTERNAL MEDICINE

## (undated) DEVICE — ADAPT CLN BIOGUARD AIR/H2O DISP

## (undated) DEVICE — NDL SPINE 22G 31/2IN BLK

## (undated) DEVICE — EPIDURAL TRAY: Brand: MEDLINE INDUSTRIES, INC.

## (undated) DEVICE — CANN NASL O2 INF

## (undated) DEVICE — CANN O2 ETCO2 FITS ALL CONN CO2 SMPL A/ 7IN DISP LF

## (undated) DEVICE — LN SMPL CO2 SHTRM SD STREAM W/M LUER

## (undated) DEVICE — PAD GRND E/S NT200IX RF/GEN W/CABL DISP

## (undated) DEVICE — SENSR O2 OXIMAX FNGR A/ 18IN NONSTR

## (undated) DEVICE — KT ORCA ORCAPOD DISP STRL

## (undated) DEVICE — TUBING, SUCTION, 1/4" X 10', STRAIGHT: Brand: MEDLINE

## (undated) DEVICE — GLV SURG TRIUMPH PF LTX 7.5 STRL

## (undated) DEVICE — THE TORRENT IRRIGATION SCOPE CONNECTOR IS USED WITH THE TORRENT IRRIGATION TUBING TO PROVIDE IRRIGATION FLUIDS SUCH AS STERILE WATER DURING GASTROINTESTINAL ENDOSCOPIC PROCEDURES WHEN USED IN CONJUNCTION WITH AN IRRIGATION PUMP (OR ELECTROSURGICAL UNIT).: Brand: TORRENT

## (undated) DEVICE — Device

## (undated) DEVICE — TOWEL,OR,DSP,ST,BLUE,STD,4/PK,20PK/CS: Brand: MEDLINE